# Patient Record
Sex: MALE | Race: WHITE | NOT HISPANIC OR LATINO | Employment: OTHER | URBAN - METROPOLITAN AREA
[De-identification: names, ages, dates, MRNs, and addresses within clinical notes are randomized per-mention and may not be internally consistent; named-entity substitution may affect disease eponyms.]

---

## 2017-06-21 ENCOUNTER — RECORDS - HEALTHEAST (OUTPATIENT)
Dept: LAB | Facility: CLINIC | Age: 62
End: 2017-06-21

## 2017-06-21 ENCOUNTER — AMBULATORY - HEALTHEAST (OUTPATIENT)
Dept: PHYSICAL MEDICINE AND REHAB | Facility: CLINIC | Age: 62
End: 2017-06-21

## 2017-06-21 DIAGNOSIS — M54.50 LOW BACK PAIN: ICD-10-CM

## 2017-06-21 LAB
CHOLEST SERPL-MCNC: 142 MG/DL
FASTING STATUS PATIENT QL REPORTED: ABNORMAL
HDLC SERPL-MCNC: 29 MG/DL
LDLC SERPL CALC-MCNC: 83 MG/DL
PSA SERPL-MCNC: 3.3 NG/ML (ref 0–4.5)
TRIGL SERPL-MCNC: 152 MG/DL

## 2017-06-26 ENCOUNTER — AMBULATORY - HEALTHEAST (OUTPATIENT)
Dept: PHYSICAL MEDICINE AND REHAB | Facility: CLINIC | Age: 62
End: 2017-06-26

## 2017-06-26 ENCOUNTER — HOSPITAL ENCOUNTER (OUTPATIENT)
Dept: PHYSICAL MEDICINE AND REHAB | Facility: CLINIC | Age: 62
Discharge: HOME | End: 2017-06-26
Attending: PHYSICIAN ASSISTANT

## 2017-06-26 DIAGNOSIS — M47.817 FACET ARTHROPATHY, LUMBOSACRAL: ICD-10-CM

## 2017-06-26 DIAGNOSIS — M54.50 LUMBALGIA: ICD-10-CM

## 2017-06-26 DIAGNOSIS — M79.18 MYOFASCIAL PAIN: ICD-10-CM

## 2017-06-26 RX ORDER — PAROXETINE 20 MG/1
20 TABLET, FILM COATED ORAL EVERY MORNING
Refills: 3 | Status: SHIPPED | COMMUNITY
Start: 2017-04-19

## 2017-06-26 RX ORDER — ATORVASTATIN CALCIUM 10 MG/1
10 TABLET, FILM COATED ORAL DAILY
Refills: 3 | Status: SHIPPED | COMMUNITY
Start: 2017-04-19 | End: 2023-03-03

## 2017-06-26 ASSESSMENT — MIFFLIN-ST. JEOR: SCORE: 1732.48

## 2017-06-27 ENCOUNTER — COMMUNICATION - HEALTHEAST (OUTPATIENT)
Dept: PHYSICAL MEDICINE AND REHAB | Facility: CLINIC | Age: 62
End: 2017-06-27

## 2017-06-28 ENCOUNTER — COMMUNICATION - HEALTHEAST (OUTPATIENT)
Dept: PHYSICAL MEDICINE AND REHAB | Facility: CLINIC | Age: 62
End: 2017-06-28

## 2017-06-30 ENCOUNTER — HOSPITAL ENCOUNTER (OUTPATIENT)
Dept: PHYSICAL MEDICINE AND REHAB | Facility: CLINIC | Age: 62
Discharge: HOME | End: 2017-06-30
Attending: PHYSICIAN ASSISTANT

## 2017-06-30 DIAGNOSIS — M54.50 LUMBALGIA: ICD-10-CM

## 2017-06-30 DIAGNOSIS — M79.18 MYOFASCIAL PAIN: ICD-10-CM

## 2017-06-30 DIAGNOSIS — M47.817 FACET ARTHROPATHY, LUMBOSACRAL: ICD-10-CM

## 2017-06-30 DIAGNOSIS — M12.88 OTHER SPECIFIC ARTHROPATHIES, NOT ELSEWHERE CLASSIFIED, OTHER SPECIFIED SITE: ICD-10-CM

## 2017-07-03 ENCOUNTER — AMBULATORY - HEALTHEAST (OUTPATIENT)
Dept: PHYSICAL MEDICINE AND REHAB | Facility: CLINIC | Age: 62
End: 2017-07-03

## 2017-07-03 DIAGNOSIS — M47.816 LUMBAR SPONDYLOSIS: ICD-10-CM

## 2017-07-07 ENCOUNTER — HOSPITAL ENCOUNTER (OUTPATIENT)
Dept: PHYSICAL MEDICINE AND REHAB | Facility: CLINIC | Age: 62
Discharge: HOME | End: 2017-07-07
Attending: PHYSICIAN ASSISTANT

## 2017-07-07 DIAGNOSIS — M47.816 LUMBAR SPONDYLOSIS: ICD-10-CM

## 2017-07-21 ENCOUNTER — COMMUNICATION - HEALTHEAST (OUTPATIENT)
Dept: PHYSICAL MEDICINE AND REHAB | Facility: CLINIC | Age: 62
End: 2017-07-21

## 2017-12-13 ENCOUNTER — RECORDS - HEALTHEAST (OUTPATIENT)
Dept: ADMINISTRATIVE | Facility: OTHER | Age: 62
End: 2017-12-13

## 2018-02-02 ENCOUNTER — RECORDS - HEALTHEAST (OUTPATIENT)
Dept: ADMINISTRATIVE | Facility: OTHER | Age: 63
End: 2018-02-02

## 2018-02-23 ENCOUNTER — RECORDS - HEALTHEAST (OUTPATIENT)
Dept: ADMINISTRATIVE | Facility: OTHER | Age: 63
End: 2018-02-23

## 2018-02-25 ENCOUNTER — RECORDS - HEALTHEAST (OUTPATIENT)
Dept: ADMINISTRATIVE | Facility: OTHER | Age: 63
End: 2018-02-25

## 2018-02-26 ENCOUNTER — COMMUNICATION - HEALTHEAST (OUTPATIENT)
Dept: PULMONOLOGY | Facility: OTHER | Age: 63
End: 2018-02-26

## 2018-02-26 ENCOUNTER — AMBULATORY - HEALTHEAST (OUTPATIENT)
Dept: PULMONOLOGY | Facility: OTHER | Age: 63
End: 2018-02-26

## 2018-02-26 DIAGNOSIS — R06.02 SOB (SHORTNESS OF BREATH): ICD-10-CM

## 2018-04-20 ENCOUNTER — RECORDS - HEALTHEAST (OUTPATIENT)
Dept: ADMINISTRATIVE | Facility: OTHER | Age: 63
End: 2018-04-20

## 2018-04-20 ENCOUNTER — OFFICE VISIT - HEALTHEAST (OUTPATIENT)
Dept: PULMONOLOGY | Facility: OTHER | Age: 63
End: 2018-04-20

## 2018-04-20 DIAGNOSIS — E66.9 OBESITY: ICD-10-CM

## 2018-04-20 DIAGNOSIS — Z72.0 TOBACCO USE: ICD-10-CM

## 2018-04-20 DIAGNOSIS — R53.81 PHYSICAL DECONDITIONING: ICD-10-CM

## 2018-04-20 DIAGNOSIS — R06.09 DYSPNEA ON EXERTION: ICD-10-CM

## 2018-04-20 RX ORDER — IRBESARTAN 150 MG/1
TABLET ORAL
Refills: 1 | Status: SHIPPED | COMMUNITY
Start: 2018-03-16 | End: 2022-06-24

## 2018-04-27 ENCOUNTER — COMMUNICATION - HEALTHEAST (OUTPATIENT)
Dept: UROLOGY | Facility: CLINIC | Age: 63
End: 2018-04-27

## 2018-05-01 ENCOUNTER — COMMUNICATION - HEALTHEAST (OUTPATIENT)
Dept: UROLOGY | Facility: CLINIC | Age: 63
End: 2018-05-01

## 2019-01-11 ENCOUNTER — RECORDS - HEALTHEAST (OUTPATIENT)
Dept: LAB | Facility: CLINIC | Age: 64
End: 2019-01-11

## 2019-01-11 LAB
ALBUMIN SERPL-MCNC: 3.9 G/DL (ref 3.5–5)
ALP SERPL-CCNC: 121 U/L (ref 45–120)
ALT SERPL W P-5'-P-CCNC: 85 U/L (ref 0–45)
ANION GAP SERPL CALCULATED.3IONS-SCNC: 12 MMOL/L (ref 5–18)
AST SERPL W P-5'-P-CCNC: 43 U/L (ref 0–40)
BASOPHILS # BLD AUTO: 0.1 THOU/UL (ref 0–0.2)
BASOPHILS NFR BLD AUTO: 1 % (ref 0–2)
BILIRUB SERPL-MCNC: 0.7 MG/DL (ref 0–1)
BUN SERPL-MCNC: 14 MG/DL (ref 8–22)
CALCIUM SERPL-MCNC: 10.1 MG/DL (ref 8.5–10.5)
CHLORIDE BLD-SCNC: 104 MMOL/L (ref 98–107)
CHOLEST SERPL-MCNC: 146 MG/DL
CO2 SERPL-SCNC: 23 MMOL/L (ref 22–31)
CREAT SERPL-MCNC: 1.24 MG/DL (ref 0.7–1.3)
EOSINOPHIL # BLD AUTO: 0.2 THOU/UL (ref 0–0.4)
EOSINOPHIL NFR BLD AUTO: 2 % (ref 0–6)
ERYTHROCYTE [DISTWIDTH] IN BLOOD BY AUTOMATED COUNT: 12.5 % (ref 11–14.5)
FASTING STATUS PATIENT QL REPORTED: ABNORMAL
GFR SERPL CREATININE-BSD FRML MDRD: 59 ML/MIN/1.73M2
GLUCOSE BLD-MCNC: 101 MG/DL (ref 70–125)
HCT VFR BLD AUTO: 47.4 % (ref 40–54)
HDLC SERPL-MCNC: 31 MG/DL
HGB BLD-MCNC: 16.2 G/DL (ref 14–18)
LDLC SERPL CALC-MCNC: 88 MG/DL
LYMPHOCYTES # BLD AUTO: 3.6 THOU/UL (ref 0.8–4.4)
LYMPHOCYTES NFR BLD AUTO: 37 % (ref 20–40)
MCH RBC QN AUTO: 31.6 PG (ref 27–34)
MCHC RBC AUTO-ENTMCNC: 34.2 G/DL (ref 32–36)
MCV RBC AUTO: 92 FL (ref 80–100)
MONOCYTES # BLD AUTO: 1 THOU/UL (ref 0–0.9)
MONOCYTES NFR BLD AUTO: 10 % (ref 2–10)
NEUTROPHILS # BLD AUTO: 4.9 THOU/UL (ref 2–7.7)
NEUTROPHILS NFR BLD AUTO: 51 % (ref 50–70)
PLATELET # BLD AUTO: 196 THOU/UL (ref 140–440)
PMV BLD AUTO: 12.5 FL (ref 8.5–12.5)
POTASSIUM BLD-SCNC: 3.9 MMOL/L (ref 3.5–5)
PROT SERPL-MCNC: 7.6 G/DL (ref 6–8)
RBC # BLD AUTO: 5.13 MILL/UL (ref 4.4–6.2)
SODIUM SERPL-SCNC: 139 MMOL/L (ref 136–145)
TRIGL SERPL-MCNC: 136 MG/DL
WBC: 9.7 THOU/UL (ref 4–11)

## 2019-01-16 ENCOUNTER — HOSPITAL ENCOUNTER (OUTPATIENT)
Dept: PHYSICAL MEDICINE AND REHAB | Facility: CLINIC | Age: 64
Discharge: HOME | End: 2019-01-16
Attending: NURSE PRACTITIONER

## 2019-01-16 DIAGNOSIS — M47.817 FACET ARTHROPATHY, LUMBOSACRAL: ICD-10-CM

## 2019-01-16 DIAGNOSIS — G89.29 CHRONIC BILATERAL LOW BACK PAIN WITHOUT SCIATICA: ICD-10-CM

## 2019-01-16 DIAGNOSIS — M54.50 CHRONIC BILATERAL LOW BACK PAIN WITHOUT SCIATICA: ICD-10-CM

## 2019-01-16 RX ORDER — CELECOXIB 200 MG/1
200 CAPSULE ORAL DAILY PRN
Refills: 3 | Status: SHIPPED | COMMUNITY
Start: 2018-07-27 | End: 2024-09-28

## 2019-01-16 RX ORDER — IRBESARTAN AND HYDROCHLOROTHIAZIDE 300; 12.5 MG/1; MG/1
1 TABLET, FILM COATED ORAL DAILY
Refills: 1 | Status: SHIPPED | COMMUNITY
Start: 2018-11-24

## 2019-01-16 RX ORDER — ZOLPIDEM TARTRATE 10 MG/1
10 TABLET ORAL
Refills: 2 | Status: SHIPPED | COMMUNITY
Start: 2019-01-11

## 2019-01-18 ENCOUNTER — COMMUNICATION - HEALTHEAST (OUTPATIENT)
Dept: PHYSICAL MEDICINE AND REHAB | Facility: CLINIC | Age: 64
End: 2019-01-18

## 2019-01-18 ENCOUNTER — RECORDS - HEALTHEAST (OUTPATIENT)
Dept: ADMINISTRATIVE | Facility: OTHER | Age: 64
End: 2019-01-18

## 2019-01-21 ENCOUNTER — HOSPITAL ENCOUNTER (OUTPATIENT)
Dept: PHYSICAL MEDICINE AND REHAB | Facility: CLINIC | Age: 64
Discharge: HOME | End: 2019-01-21
Attending: NURSE PRACTITIONER

## 2019-01-21 DIAGNOSIS — M54.50 CHRONIC BILATERAL LOW BACK PAIN WITHOUT SCIATICA: ICD-10-CM

## 2019-01-21 DIAGNOSIS — M47.817 FACET ARTHROPATHY, LUMBOSACRAL: ICD-10-CM

## 2019-01-21 DIAGNOSIS — G89.29 CHRONIC BILATERAL LOW BACK PAIN WITHOUT SCIATICA: ICD-10-CM

## 2019-01-21 DIAGNOSIS — M79.18 MYOFASCIAL PAIN: ICD-10-CM

## 2019-02-18 ENCOUNTER — COMMUNICATION - HEALTHEAST (OUTPATIENT)
Dept: PHYSICAL MEDICINE AND REHAB | Facility: CLINIC | Age: 64
End: 2019-02-18

## 2019-02-21 ENCOUNTER — HOSPITAL ENCOUNTER (OUTPATIENT)
Dept: PHYSICAL MEDICINE AND REHAB | Facility: CLINIC | Age: 64
Discharge: HOME | End: 2019-02-21
Attending: PHYSICAL MEDICINE & REHABILITATION

## 2019-02-21 DIAGNOSIS — M47.817 FACET ARTHROPATHY, LUMBOSACRAL: ICD-10-CM

## 2019-04-15 ENCOUNTER — RECORDS - HEALTHEAST (OUTPATIENT)
Dept: LAB | Facility: CLINIC | Age: 64
End: 2019-04-15

## 2019-04-15 LAB
ALBUMIN SERPL-MCNC: 4.1 G/DL (ref 3.5–5)
ALP SERPL-CCNC: 115 U/L (ref 45–120)
ALT SERPL W P-5'-P-CCNC: 73 U/L (ref 0–45)
ANION GAP SERPL CALCULATED.3IONS-SCNC: 13 MMOL/L (ref 5–18)
AST SERPL W P-5'-P-CCNC: 36 U/L (ref 0–40)
BASOPHILS # BLD AUTO: 0.1 THOU/UL (ref 0–0.2)
BASOPHILS NFR BLD AUTO: 1 % (ref 0–2)
BILIRUB SERPL-MCNC: 0.5 MG/DL (ref 0–1)
BUN SERPL-MCNC: 12 MG/DL (ref 8–22)
CALCIUM SERPL-MCNC: 10.4 MG/DL (ref 8.5–10.5)
CHLORIDE BLD-SCNC: 106 MMOL/L (ref 98–107)
CHOLEST SERPL-MCNC: 146 MG/DL
CO2 SERPL-SCNC: 22 MMOL/L (ref 22–31)
CREAT SERPL-MCNC: 1.05 MG/DL (ref 0.7–1.3)
EOSINOPHIL # BLD AUTO: 0.3 THOU/UL (ref 0–0.4)
EOSINOPHIL NFR BLD AUTO: 3 % (ref 0–6)
ERYTHROCYTE [DISTWIDTH] IN BLOOD BY AUTOMATED COUNT: 12.6 % (ref 11–14.5)
FASTING STATUS PATIENT QL REPORTED: ABNORMAL
GFR SERPL CREATININE-BSD FRML MDRD: >60 ML/MIN/1.73M2
GLUCOSE BLD-MCNC: 91 MG/DL (ref 70–125)
HCT VFR BLD AUTO: 48.8 % (ref 40–54)
HDLC SERPL-MCNC: 27 MG/DL
HGB BLD-MCNC: 16.3 G/DL (ref 14–18)
LDLC SERPL CALC-MCNC: 86 MG/DL
LYMPHOCYTES # BLD AUTO: 3.4 THOU/UL (ref 0.8–4.4)
LYMPHOCYTES NFR BLD AUTO: 36 % (ref 20–40)
MCH RBC QN AUTO: 31.3 PG (ref 27–34)
MCHC RBC AUTO-ENTMCNC: 33.4 G/DL (ref 32–36)
MCV RBC AUTO: 94 FL (ref 80–100)
MONOCYTES # BLD AUTO: 0.9 THOU/UL (ref 0–0.9)
MONOCYTES NFR BLD AUTO: 9 % (ref 2–10)
NEUTROPHILS # BLD AUTO: 4.9 THOU/UL (ref 2–7.7)
NEUTROPHILS NFR BLD AUTO: 52 % (ref 50–70)
PLATELET # BLD AUTO: 229 THOU/UL (ref 140–440)
PMV BLD AUTO: 13.1 FL (ref 8.5–12.5)
POTASSIUM BLD-SCNC: 4.1 MMOL/L (ref 3.5–5)
PROT SERPL-MCNC: 7.7 G/DL (ref 6–8)
PSA SERPL-MCNC: 4.7 NG/ML (ref 0–4.5)
RBC # BLD AUTO: 5.21 MILL/UL (ref 4.4–6.2)
SODIUM SERPL-SCNC: 141 MMOL/L (ref 136–145)
TRIGL SERPL-MCNC: 166 MG/DL
WBC: 9.5 THOU/UL (ref 4–11)

## 2019-10-28 ENCOUNTER — HOSPITAL ENCOUNTER (OUTPATIENT)
Dept: PHYSICAL MEDICINE AND REHAB | Facility: CLINIC | Age: 64
Discharge: HOME | End: 2019-10-28
Attending: PAIN MEDICINE

## 2019-10-28 DIAGNOSIS — M79.18 MYOFASCIAL PAIN: ICD-10-CM

## 2019-10-28 DIAGNOSIS — M47.816 SPONDYLOSIS OF LUMBAR REGION WITHOUT MYELOPATHY OR RADICULOPATHY: ICD-10-CM

## 2019-10-28 RX ORDER — ALPRAZOLAM 0.5 MG
TABLET ORAL
Refills: 0 | Status: SHIPPED | COMMUNITY
Start: 2019-02-27 | End: 2024-09-28

## 2019-10-28 ASSESSMENT — MIFFLIN-ST. JEOR: SCORE: 1694.37

## 2019-11-11 ENCOUNTER — HOSPITAL ENCOUNTER (OUTPATIENT)
Dept: PHYSICAL MEDICINE AND REHAB | Facility: CLINIC | Age: 64
Discharge: HOME | End: 2019-11-11
Attending: PAIN MEDICINE

## 2019-11-11 DIAGNOSIS — M79.18 MYOFASCIAL PAIN: ICD-10-CM

## 2019-11-11 DIAGNOSIS — M47.816 SPONDYLOSIS OF LUMBAR REGION WITHOUT MYELOPATHY OR RADICULOPATHY: ICD-10-CM

## 2019-11-25 ENCOUNTER — HOSPITAL ENCOUNTER (OUTPATIENT)
Dept: PHYSICAL MEDICINE AND REHAB | Facility: CLINIC | Age: 64
Discharge: HOME | End: 2019-11-25
Attending: NURSE PRACTITIONER

## 2019-11-25 DIAGNOSIS — M54.50 CHRONIC BILATERAL LOW BACK PAIN WITHOUT SCIATICA: ICD-10-CM

## 2019-11-25 DIAGNOSIS — M47.817 FACET ARTHROPATHY, LUMBOSACRAL: ICD-10-CM

## 2019-11-25 DIAGNOSIS — G89.29 CHRONIC BILATERAL LOW BACK PAIN WITHOUT SCIATICA: ICD-10-CM

## 2020-01-03 ENCOUNTER — HOSPITAL ENCOUNTER (OUTPATIENT)
Dept: PHYSICAL MEDICINE AND REHAB | Facility: CLINIC | Age: 65
Discharge: HOME | End: 2020-01-03
Attending: NURSE PRACTITIONER

## 2020-01-03 DIAGNOSIS — M54.50 CHRONIC BILATERAL LOW BACK PAIN WITHOUT SCIATICA: ICD-10-CM

## 2020-01-03 DIAGNOSIS — M43.16 SPONDYLOLISTHESIS OF LUMBAR REGION: ICD-10-CM

## 2020-01-03 DIAGNOSIS — M54.16 RIGHT LUMBAR RADICULITIS: ICD-10-CM

## 2020-01-03 DIAGNOSIS — M54.41 ACUTE RIGHT-SIDED LOW BACK PAIN WITH RIGHT-SIDED SCIATICA: ICD-10-CM

## 2020-01-03 DIAGNOSIS — M48.061 LUMBAR FORAMINAL STENOSIS: ICD-10-CM

## 2020-01-03 DIAGNOSIS — G89.29 CHRONIC BILATERAL LOW BACK PAIN WITHOUT SCIATICA: ICD-10-CM

## 2020-01-03 ASSESSMENT — MIFFLIN-ST. JEOR: SCORE: 1685.3

## 2020-01-06 ENCOUNTER — HOSPITAL ENCOUNTER (OUTPATIENT)
Dept: PHYSICAL MEDICINE AND REHAB | Facility: CLINIC | Age: 65
Discharge: HOME | End: 2020-01-06
Attending: PAIN MEDICINE

## 2020-01-06 DIAGNOSIS — M48.061 LUMBAR FORAMINAL STENOSIS: ICD-10-CM

## 2020-01-06 DIAGNOSIS — M54.16 RIGHT LUMBAR RADICULITIS: ICD-10-CM

## 2020-01-06 DIAGNOSIS — M43.16 SPONDYLOLISTHESIS OF LUMBAR REGION: ICD-10-CM

## 2020-01-06 DIAGNOSIS — M54.41 ACUTE RIGHT-SIDED LOW BACK PAIN WITH RIGHT-SIDED SCIATICA: ICD-10-CM

## 2020-01-17 ENCOUNTER — HOSPITAL ENCOUNTER (OUTPATIENT)
Dept: PHYSICAL MEDICINE AND REHAB | Facility: CLINIC | Age: 65
Discharge: HOME | End: 2020-01-17
Attending: NURSE PRACTITIONER

## 2020-01-17 DIAGNOSIS — M48.061 LUMBAR FORAMINAL STENOSIS: ICD-10-CM

## 2020-01-17 DIAGNOSIS — M54.41 ACUTE RIGHT-SIDED LOW BACK PAIN WITH RIGHT-SIDED SCIATICA: ICD-10-CM

## 2020-01-17 DIAGNOSIS — M43.16 SPONDYLOLISTHESIS OF LUMBAR REGION: ICD-10-CM

## 2020-01-17 DIAGNOSIS — M54.16 RIGHT LUMBAR RADICULITIS: ICD-10-CM

## 2020-01-24 ENCOUNTER — RECORDS - HEALTHEAST (OUTPATIENT)
Dept: ADMINISTRATIVE | Facility: OTHER | Age: 65
End: 2020-01-24

## 2020-01-30 ENCOUNTER — COMMUNICATION - HEALTHEAST (OUTPATIENT)
Dept: PHYSICAL MEDICINE AND REHAB | Facility: CLINIC | Age: 65
End: 2020-01-30

## 2020-01-30 ENCOUNTER — AMBULATORY - HEALTHEAST (OUTPATIENT)
Dept: PHYSICAL MEDICINE AND REHAB | Facility: CLINIC | Age: 65
End: 2020-01-30

## 2020-01-30 DIAGNOSIS — M48.061 LUMBAR FORAMINAL STENOSIS: ICD-10-CM

## 2020-01-30 DIAGNOSIS — M54.50 CHRONIC BILATERAL LOW BACK PAIN WITHOUT SCIATICA: ICD-10-CM

## 2020-01-30 DIAGNOSIS — M54.16 RIGHT LUMBAR RADICULITIS: ICD-10-CM

## 2020-01-30 DIAGNOSIS — M43.16 SPONDYLOLISTHESIS OF LUMBAR REGION: ICD-10-CM

## 2020-01-30 DIAGNOSIS — G89.29 CHRONIC BILATERAL LOW BACK PAIN WITHOUT SCIATICA: ICD-10-CM

## 2020-01-30 DIAGNOSIS — M54.50 LUMBAR SPINE PAIN: ICD-10-CM

## 2020-02-03 ENCOUNTER — HOSPITAL ENCOUNTER (OUTPATIENT)
Dept: RADIOLOGY | Facility: HOSPITAL | Age: 65
Discharge: HOME | End: 2020-02-03

## 2020-02-03 DIAGNOSIS — G89.29 CHRONIC BILATERAL LOW BACK PAIN WITHOUT SCIATICA: ICD-10-CM

## 2020-02-03 DIAGNOSIS — M48.061 LUMBAR FORAMINAL STENOSIS: ICD-10-CM

## 2020-02-03 DIAGNOSIS — M43.16 SPONDYLOLISTHESIS OF LUMBAR REGION: ICD-10-CM

## 2020-02-03 DIAGNOSIS — M54.16 RIGHT LUMBAR RADICULITIS: ICD-10-CM

## 2020-02-03 DIAGNOSIS — M54.50 CHRONIC BILATERAL LOW BACK PAIN WITHOUT SCIATICA: ICD-10-CM

## 2020-02-04 ENCOUNTER — COMMUNICATION - HEALTHEAST (OUTPATIENT)
Dept: PHYSICAL MEDICINE AND REHAB | Facility: CLINIC | Age: 65
End: 2020-02-04

## 2020-02-10 ENCOUNTER — OFFICE VISIT - HEALTHEAST (OUTPATIENT)
Dept: PHYSICAL THERAPY | Facility: REHABILITATION | Age: 65
End: 2020-02-10

## 2020-02-10 DIAGNOSIS — M54.50 CHRONIC BILATERAL LOW BACK PAIN WITHOUT SCIATICA: ICD-10-CM

## 2020-02-10 DIAGNOSIS — M62.81 GENERALIZED MUSCLE WEAKNESS: ICD-10-CM

## 2020-02-10 DIAGNOSIS — G89.29 CHRONIC BILATERAL LOW BACK PAIN WITHOUT SCIATICA: ICD-10-CM

## 2020-02-17 ENCOUNTER — OFFICE VISIT - HEALTHEAST (OUTPATIENT)
Dept: PHYSICAL THERAPY | Facility: REHABILITATION | Age: 65
End: 2020-02-17

## 2020-02-17 DIAGNOSIS — G89.29 CHRONIC BILATERAL LOW BACK PAIN WITHOUT SCIATICA: ICD-10-CM

## 2020-02-17 DIAGNOSIS — M62.81 GENERALIZED MUSCLE WEAKNESS: ICD-10-CM

## 2020-02-17 DIAGNOSIS — M54.50 CHRONIC BILATERAL LOW BACK PAIN WITHOUT SCIATICA: ICD-10-CM

## 2020-02-24 ENCOUNTER — OFFICE VISIT - HEALTHEAST (OUTPATIENT)
Dept: PHYSICAL THERAPY | Facility: REHABILITATION | Age: 65
End: 2020-02-24

## 2020-02-24 DIAGNOSIS — G89.29 CHRONIC BILATERAL LOW BACK PAIN WITHOUT SCIATICA: ICD-10-CM

## 2020-02-24 DIAGNOSIS — M62.81 GENERALIZED MUSCLE WEAKNESS: ICD-10-CM

## 2020-02-24 DIAGNOSIS — M54.50 CHRONIC BILATERAL LOW BACK PAIN WITHOUT SCIATICA: ICD-10-CM

## 2020-03-02 ENCOUNTER — AMBULATORY - HEALTHEAST (OUTPATIENT)
Dept: PHYSICAL MEDICINE AND REHAB | Facility: CLINIC | Age: 65
End: 2020-03-02

## 2020-03-02 ENCOUNTER — COMMUNICATION - HEALTHEAST (OUTPATIENT)
Dept: PHYSICAL MEDICINE AND REHAB | Facility: CLINIC | Age: 65
End: 2020-03-02

## 2020-03-02 DIAGNOSIS — M43.16 SPONDYLOLISTHESIS OF LUMBAR REGION: ICD-10-CM

## 2020-03-02 DIAGNOSIS — M47.817 FACET ARTHROPATHY, LUMBOSACRAL: ICD-10-CM

## 2020-03-02 DIAGNOSIS — M54.50 LUMBAR SPINE PAIN: ICD-10-CM

## 2020-03-11 ENCOUNTER — HOSPITAL ENCOUNTER (OUTPATIENT)
Dept: PHYSICAL MEDICINE AND REHAB | Facility: CLINIC | Age: 65
Discharge: HOME | End: 2020-03-11
Attending: PHYSICAL MEDICINE & REHABILITATION

## 2020-03-11 DIAGNOSIS — M47.817 FACET ARTHROPATHY, LUMBOSACRAL: ICD-10-CM

## 2020-03-11 DIAGNOSIS — M43.16 SPONDYLOLISTHESIS OF LUMBAR REGION: ICD-10-CM

## 2020-03-11 DIAGNOSIS — M54.50 LUMBAR SPINE PAIN: ICD-10-CM

## 2020-03-20 ENCOUNTER — AMBULATORY - HEALTHEAST (OUTPATIENT)
Dept: PHYSICAL MEDICINE AND REHAB | Facility: CLINIC | Age: 65
End: 2020-03-20

## 2020-03-20 ENCOUNTER — COMMUNICATION - HEALTHEAST (OUTPATIENT)
Dept: PHYSICAL MEDICINE AND REHAB | Facility: CLINIC | Age: 65
End: 2020-03-20

## 2020-03-20 DIAGNOSIS — M47.816 FACET SYNDROME, LUMBAR: ICD-10-CM

## 2020-03-20 DIAGNOSIS — M54.50 BILATERAL LOW BACK PAIN WITHOUT SCIATICA: ICD-10-CM

## 2020-03-20 DIAGNOSIS — M47.816 SPONDYLOSIS OF LUMBAR REGION WITHOUT MYELOPATHY OR RADICULOPATHY: ICD-10-CM

## 2020-05-05 ENCOUNTER — COMMUNICATION - HEALTHEAST (OUTPATIENT)
Dept: PHYSICAL MEDICINE AND REHAB | Facility: CLINIC | Age: 65
End: 2020-05-05

## 2020-05-28 ENCOUNTER — RECORDS - HEALTHEAST (OUTPATIENT)
Dept: LAB | Facility: CLINIC | Age: 65
End: 2020-05-28

## 2020-05-28 LAB
ALBUMIN SERPL-MCNC: 4 G/DL (ref 3.5–5)
ALP SERPL-CCNC: 138 U/L (ref 45–120)
ALT SERPL W P-5'-P-CCNC: 84 U/L (ref 0–45)
ANION GAP SERPL CALCULATED.3IONS-SCNC: 12 MMOL/L (ref 5–18)
AST SERPL W P-5'-P-CCNC: 48 U/L (ref 0–40)
BASOPHILS # BLD AUTO: 0.1 THOU/UL (ref 0–0.2)
BASOPHILS NFR BLD AUTO: 1 % (ref 0–2)
BILIRUB SERPL-MCNC: 0.8 MG/DL (ref 0–1)
BUN SERPL-MCNC: 14 MG/DL (ref 8–22)
CALCIUM SERPL-MCNC: 9.2 MG/DL (ref 8.5–10.5)
CHLORIDE BLD-SCNC: 107 MMOL/L (ref 98–107)
CHOLEST SERPL-MCNC: 145 MG/DL
CO2 SERPL-SCNC: 21 MMOL/L (ref 22–31)
CREAT SERPL-MCNC: 1.09 MG/DL (ref 0.7–1.3)
EOSINOPHIL # BLD AUTO: 0.2 THOU/UL (ref 0–0.4)
EOSINOPHIL NFR BLD AUTO: 3 % (ref 0–6)
ERYTHROCYTE [DISTWIDTH] IN BLOOD BY AUTOMATED COUNT: 12.7 % (ref 11–14.5)
FASTING STATUS PATIENT QL REPORTED: ABNORMAL
GFR SERPL CREATININE-BSD FRML MDRD: >60 ML/MIN/1.73M2
GLUCOSE BLD-MCNC: 113 MG/DL (ref 70–125)
HCT VFR BLD AUTO: 48.7 % (ref 40–54)
HDLC SERPL-MCNC: 31 MG/DL
HGB BLD-MCNC: 16.4 G/DL (ref 14–18)
LDLC SERPL CALC-MCNC: 85 MG/DL
LYMPHOCYTES # BLD AUTO: 3.2 THOU/UL (ref 0.8–4.4)
LYMPHOCYTES NFR BLD AUTO: 37 % (ref 20–40)
MCH RBC QN AUTO: 32.1 PG (ref 27–34)
MCHC RBC AUTO-ENTMCNC: 33.7 G/DL (ref 32–36)
MCV RBC AUTO: 95 FL (ref 80–100)
MONOCYTES # BLD AUTO: 0.9 THOU/UL (ref 0–0.9)
MONOCYTES NFR BLD AUTO: 10 % (ref 2–10)
NEUTROPHILS # BLD AUTO: 4.2 THOU/UL (ref 2–7.7)
NEUTROPHILS NFR BLD AUTO: 49 % (ref 50–70)
PLATELET # BLD AUTO: 201 THOU/UL (ref 140–440)
PMV BLD AUTO: 14.4 FL (ref 8.5–12.5)
POTASSIUM BLD-SCNC: 3.8 MMOL/L (ref 3.5–5)
PROT SERPL-MCNC: 7.4 G/DL (ref 6–8)
PSA SERPL-MCNC: 4.8 NG/ML (ref 0–4.5)
RBC # BLD AUTO: 5.11 MILL/UL (ref 4.4–6.2)
SODIUM SERPL-SCNC: 140 MMOL/L (ref 136–145)
TRIGL SERPL-MCNC: 143 MG/DL
WBC: 8.5 THOU/UL (ref 4–11)

## 2020-07-07 ENCOUNTER — HOSPITAL ENCOUNTER (OUTPATIENT)
Dept: PHYSICAL MEDICINE AND REHAB | Facility: CLINIC | Age: 65
Discharge: HOME | End: 2020-07-07
Attending: PHYSICAL MEDICINE & REHABILITATION

## 2020-07-07 DIAGNOSIS — M54.50 BILATERAL LOW BACK PAIN WITHOUT SCIATICA: ICD-10-CM

## 2020-07-07 DIAGNOSIS — M47.816 FACET SYNDROME, LUMBAR: ICD-10-CM

## 2020-07-07 DIAGNOSIS — M47.816 SPONDYLOSIS OF LUMBAR REGION WITHOUT MYELOPATHY OR RADICULOPATHY: ICD-10-CM

## 2020-07-09 ENCOUNTER — AMBULATORY - HEALTHEAST (OUTPATIENT)
Dept: PHYSICAL MEDICINE AND REHAB | Facility: CLINIC | Age: 65
End: 2020-07-09

## 2020-07-09 DIAGNOSIS — M54.50 CHRONIC BILATERAL LOW BACK PAIN WITHOUT SCIATICA: ICD-10-CM

## 2020-07-09 DIAGNOSIS — M47.816 LUMBAR FACET ARTHROPATHY: ICD-10-CM

## 2020-07-09 DIAGNOSIS — G89.29 CHRONIC BILATERAL LOW BACK PAIN WITHOUT SCIATICA: ICD-10-CM

## 2020-07-24 ENCOUNTER — COMMUNICATION - HEALTHEAST (OUTPATIENT)
Dept: PHYSICAL MEDICINE AND REHAB | Facility: CLINIC | Age: 65
End: 2020-07-24

## 2020-08-03 ENCOUNTER — HOSPITAL ENCOUNTER (OUTPATIENT)
Dept: PHYSICAL MEDICINE AND REHAB | Facility: CLINIC | Age: 65
Discharge: HOME | End: 2020-08-03
Attending: NURSE PRACTITIONER

## 2020-08-03 DIAGNOSIS — M47.816 LUMBAR FACET ARTHROPATHY: ICD-10-CM

## 2020-08-03 DIAGNOSIS — M43.16 SPONDYLOLISTHESIS OF LUMBAR REGION: ICD-10-CM

## 2020-08-03 DIAGNOSIS — M47.816 FACET SYNDROME, LUMBAR: ICD-10-CM

## 2020-08-03 DIAGNOSIS — G89.29 CHRONIC BILATERAL LOW BACK PAIN WITHOUT SCIATICA: ICD-10-CM

## 2020-08-03 DIAGNOSIS — M54.50 CHRONIC BILATERAL LOW BACK PAIN WITHOUT SCIATICA: ICD-10-CM

## 2020-08-04 ENCOUNTER — COMMUNICATION - HEALTHEAST (OUTPATIENT)
Dept: PHYSICAL MEDICINE AND REHAB | Facility: CLINIC | Age: 65
End: 2020-08-04

## 2020-08-18 ENCOUNTER — AMBULATORY - HEALTHEAST (OUTPATIENT)
Dept: PHYSICAL MEDICINE AND REHAB | Facility: CLINIC | Age: 65
End: 2020-08-18

## 2020-08-18 DIAGNOSIS — Z20.822 ENCOUNTER FOR LABORATORY TESTING FOR COVID-19 VIRUS: ICD-10-CM

## 2020-08-30 ENCOUNTER — COMMUNICATION - HEALTHEAST (OUTPATIENT)
Dept: SCHEDULING | Facility: CLINIC | Age: 65
End: 2020-08-30

## 2020-08-31 ENCOUNTER — AMBULATORY - HEALTHEAST (OUTPATIENT)
Dept: CARE COORDINATION | Facility: CLINIC | Age: 65
End: 2020-08-31

## 2020-08-31 ENCOUNTER — COMMUNICATION - HEALTHEAST (OUTPATIENT)
Dept: CARE COORDINATION | Facility: CLINIC | Age: 65
End: 2020-08-31

## 2020-08-31 DIAGNOSIS — U07.1 2019 NOVEL CORONAVIRUS DISEASE (COVID-19): ICD-10-CM

## 2020-10-09 ENCOUNTER — HOSPITAL ENCOUNTER (OUTPATIENT)
Dept: PHYSICAL MEDICINE AND REHAB | Facility: CLINIC | Age: 65
Discharge: HOME | End: 2020-10-09
Attending: PHYSICAL MEDICINE & REHABILITATION

## 2020-10-09 DIAGNOSIS — M54.50 CHRONIC BILATERAL LOW BACK PAIN WITHOUT SCIATICA: ICD-10-CM

## 2020-10-09 DIAGNOSIS — M47.816 LUMBAR FACET ARTHROPATHY: ICD-10-CM

## 2020-10-09 DIAGNOSIS — G89.29 CHRONIC BILATERAL LOW BACK PAIN WITHOUT SCIATICA: ICD-10-CM

## 2020-10-16 ENCOUNTER — COMMUNICATION - HEALTHEAST (OUTPATIENT)
Dept: SCHEDULING | Facility: CLINIC | Age: 65
End: 2020-10-16

## 2021-05-25 ENCOUNTER — RECORDS - HEALTHEAST (OUTPATIENT)
Dept: ADMINISTRATIVE | Facility: CLINIC | Age: 66
End: 2021-05-25

## 2021-05-28 ENCOUNTER — RECORDS - HEALTHEAST (OUTPATIENT)
Dept: ADMINISTRATIVE | Facility: CLINIC | Age: 66
End: 2021-05-28

## 2021-05-29 ENCOUNTER — RECORDS - HEALTHEAST (OUTPATIENT)
Dept: ADMINISTRATIVE | Facility: CLINIC | Age: 66
End: 2021-05-29

## 2021-05-31 VITALS — WEIGHT: 213.4 LBS | BODY MASS INDEX: 32.34 KG/M2 | HEIGHT: 68 IN

## 2021-06-01 ENCOUNTER — RECORDS - HEALTHEAST (OUTPATIENT)
Dept: ADMINISTRATIVE | Facility: CLINIC | Age: 66
End: 2021-06-01

## 2021-06-01 VITALS — WEIGHT: 213 LBS | BODY MASS INDEX: 32.39 KG/M2

## 2021-06-02 ENCOUNTER — RECORDS - HEALTHEAST (OUTPATIENT)
Dept: ADMINISTRATIVE | Facility: CLINIC | Age: 66
End: 2021-06-02

## 2021-06-02 VITALS — WEIGHT: 207 LBS | BODY MASS INDEX: 31.47 KG/M2

## 2021-06-02 NOTE — PATIENT INSTRUCTIONS - HE
I have ordered bilateral L4-5 facet joint injections with Dr. Naidu.    ~Please call Nurse Navigation line [phone] with any questions or concerns about your treatment plan, if symptoms worsen and you would like to be seen urgently, or if you have problems controlling bladder and bowel function.

## 2021-06-02 NOTE — PROGRESS NOTES
Assessment:     Diagnoses and all orders for this visit:    Spondylosis of lumbar region without myelopathy or radiculopathy  -     OPS Paravertbral Facet Joint Inj Lumbar; Future; Expected date: 10/28/2019    Myofascial pain  -     OPS Paravertbral Facet Joint Inj Lumbar; Future; Expected date: 10/28/2019       Barry Jay is a 64 y.o. y.o. male with past medical history significant for hypertension, dyslipidemia, hand osteoarthritis, multiple site arthralgia who presents today for follow-up regarding right greater than left low back pain:    -Overall patient's physical exam is reassuring he has normal strength and reflexes.  Pain is in the same pattern in nature as prior to last injections in February 2019.  He received 90% relief of pain for 5 to 6 months with his previous injections.     Plan:     A shared decision making plan was used. The patient's values and choices were respected. Prior medical records from Claudia Baptiste's last note on 1/21/2019 were reviewed today. The following represents what was discussed and decided upon by the provider and the patient.        -DIAGNOSTIC TESTS:   --[facility] lumbar spine MRI 1/17/2019 shows severe facet arthropathy L4-5 with 5 mm spondylolisthesis, mild central and subarticular recess stenosis, mild right foraminal stenosis.  Mild facet arthropathy L3-4 and L5-S1.    -INTERVENTIONS: I have ordered bilateral L4-5 facet joint injections with Dr. Naidu.  Patient will need a  for these injections.    -MEDICATIONS: No changes to medications.  He is currently taking Celebrex for pain.  -  Discussed side effects of medications and proper use. Patient verbalized understanding.    -PHYSICAL THERAPY: We discussed physical therapy and would recommend that he has therapy in the future.      -PATIENT EDUCATION: We discussed pain management in a multimodal fashion including physical therapy, medication management, future injections.    -FOLLOW UP: Patient will follow-up  with Claudia Baptiste in 2 weeks after the injections.  Advised to contact clinic if symptoms worsen or change.    Subjective:     Barry Jay is a 64 y.o. male who presents today for follow-up regarding bilateral low back pain.  Patient reports that he received injections in his low back in February 2019 which decreased his pain by 90%.  He describes his pain in the beltline of his low back area and is currently worse right greater than left.  In the last month or 2 pain has increased and is now moderate to severe in nature.  Achy and constant.  His pain today is 8/10 as worst is 9/10 as best 5/10.  Pain is worse with prolonged sitting and standing and better with lying down and sleeping.  He notes that he has been golfing over the last 2 months when pain is increased.  He is taking alternating between Celebrex and Aleve.  Which helps somewhat.  He denies any bowel or bladder changes, fevers, chills, unintentional weight loss.    Treatment to Date: No prior spinal..  No prior physical therapy for back pain.     History of bilateral L4-5 RFA [place] [facility] Dr. Green 2011 and 2013, relief at least 2 years.  Bilateral L3-4 MBB 6/30/2017.  Preprocedure pain 7/10 post 2/10.  Bilateral L4-5 facet joint steroid injection 7/7/2017 with relief.  Preprocedure pain 8/10, post 5/10.    Patient Active Problem List   Diagnosis     Dyslipidemia     Hypertension     Diffuse Joint Pains (Arthralgias)     Joint Pain, Localized In The Wrist     Joint Pain, Localized In The Knee     Generalized Osteoarthritis Of The Hand     Osteoarthritis Of The Knee     Arthralgias In Multiple Sites       Current Outpatient Medications on File Prior to Encounter   Medication Sig Dispense Refill     atorvastatin (LIPITOR) 10 MG tablet TAKE ONE TABLET BY MOUTH ONCE DIALY  3     celecoxib (CELEBREX) 200 MG capsule Take 1 capsule by mouth as needed.  3     irbesartan (AVAPRO) 150 MG tablet TAKE 1 TABLET BY MOUTH ONCE A DAY FOR BLOOD PRESSURE  1      "irbesartan-hydrochlorothiazide (AVALIDE) 300-12.5 mg per tablet Take 1 tablet by mouth daily. for blood pressure  1     omeprazole (PRILOSEC) 20 MG capsule TAKE ONE CAPSULE BY MOUTH EVERY DAY FOR STOMACH  1     ondansetron (ZOFRAN ODT) 4 MG disintegrating tablet Take 1 tablet (4 mg total) by mouth every 8 (eight) hours as needed. 12 tablet 0     PARoxetine (PAXIL) 20 MG tablet TAKE 1 TABLET BY MOUTH ONCE DAILY FOR MOOD.  3     zolpidem (AMBIEN) 10 mg tablet Take 1 tablet by mouth as needed.  2     ALPRAZolam (XANAX) 0.5 MG tablet TAKE 1 TABLET BY MOUTH ONCE A DAY AS NEEDED ORALLY  0     No current facility-administered medications on file prior to encounter.        Allergies   Allergen Reactions     Iodine-Iodine Containing        Past Medical History:   Diagnosis Date     HTN (hypertension)      Hyperlipidemia         Review of Systems  ROS:  Specifically negative for bowel/bladder dysfunction, balance changes, headache, dizziness, foot drop, fevers, chills, appetite changes, nausea/vomiting, unexplained weight loss. Otherwise 13 systems reviewed are negative. Please see the patient's intake questionnaire from today for details.    Reviewed Social, Family, Past Medical and Past Surgical history with patient, no significant changes noted since prior visit.     Objective:     /83 (Patient Site: Left Arm, Patient Position: Sitting, Cuff Size: Adult Large)   Pulse 91   Ht 5' 8\" (1.727 m)   Wt 205 lb (93 kg)   BMI 31.17 kg/m      PHYSICAL EXAMINATION:    --CONSTITUTIONAL: Well developed, well nourished, healthy appearing individual.  --PSYCHIATRIC: Appropriate mood and affect. No difficulty interacting due to temper, social withdrawal, or memory issues.  --SKIN: Lumbar region is dry and intact.   --RESPIRATORY: Normal rhythm and effort. No abnormal accessory muscle breathing patterns noted.   --MUSCULOSKELETAL:  Normal lumbar lordosis noted, no lateral shift.  --GROSS MOTOR: Easily arises from a seated " position. Gait is non-antalgic  --LUMBAR SPINE:  Inspection reveals no evidence of deformity. Range of motion is not limited in lumbar flexion, extension, lateral rotation.  He has tenderness palpation along his low lumbar paraspinal muscles bilaterally.  Facet loading is positive bilaterally.  Straight leg raising in the seated position is negative to radicular pain.    --LOWER EXTREMITY MOTOR TESTING:  Plantar flexion left 5/5, right 5/5   Dorsiflexion left 5/5, right 5/5   Great toe MTP extension left 5/5, right 5/5  Knee flexion left 5/5, right 5/5  Knee extension left 5/5, right 5/5   Hip flexion left 5/5, right 5/5  Hip abduction left 5/5, right 5/5  Hip adduction left 5/5, right 5/5   --NEUROLOGIC: Bilateral patellar and achilles reflexes are physiologic and symmetric. Sensation to light touch is intact in the bilateral L4, L5, and S1 dermatomes.    RESULTS:   Imaging: Lumbar spine imaging was reviewed today.

## 2021-06-03 VITALS — BODY MASS INDEX: 30.87 KG/M2 | WEIGHT: 203 LBS

## 2021-06-03 VITALS — BODY MASS INDEX: 30.77 KG/M2 | WEIGHT: 203 LBS | HEIGHT: 68 IN

## 2021-06-03 VITALS — WEIGHT: 205 LBS | HEIGHT: 68 IN | BODY MASS INDEX: 31.07 KG/M2

## 2021-06-03 VITALS — WEIGHT: 203 LBS | BODY MASS INDEX: 30.87 KG/M2

## 2021-06-03 NOTE — PATIENT INSTRUCTIONS - HE
DISCHARGE INSTRUCTIONS    During office hours (8:00 a.m.- 4:00 p.m.) questions or concerns may be answered  by calling [facility] Navigation Nurses at  [phone].  Messages received after hours will be returned the following business day.      In the case of an emergency, please dial 911 or seek assistance at the nearest Emergency Room/Urgent Care facility.     All Patients:    ? You may experience an increase in your symptoms for the first 2 days (It may take anywhere between 2 days- 2 weeks for the steroid to have maximum effect).    ? You may use ice on the injection site, as frequently as 20 minutes each hour if needed.    ? You may take your pain medicine.    ? You may continue taking your regular medication after your injection. If you have had a Medial Branch Block you may resume pain medication once your pain diary is completed.    ? You may shower. No swimming, tub bath or hot tub for 48 hours.  You may remove your bandaid/bandage as soon as you are home.    ? You may resume light activities, as tolerated.    ? Resume your usual diet as tolerated.    ? It is strongly advised that you do not drive for 1-3 hours post injection.    ? If you have had oral sedation:  Do not drive for 8 hours post injection.      ? If you have had IV sedation:  Do not drive for 24 hours post injection.  Do not operate hazardous machinery or make important personal/business decisions for 24 hours.      POSSIBLE STEROID SIDE EFFECTS (If steroid/cortisone was used for your procedure)    -If you experience these symptoms, it should only last for a short period      Swelling of the legs                Skin redness (flushing)       Mouth (oral) irritation     Blood sugar (glucose) levels              Sweats                      Mood changes    Headache    Sleeplessness         POSSIBLE PROCEDURE SIDE EFFECTS  -Call the Spine Center if you are concerned    Increased Pain             Increased numbness/tingling         Nausea/Vomiting            Bruising/bleeding at site        Redness or swelling                                                Difficulty walking        Weakness             Fever greater than 100.5    *In the event of a severe headache after an epidural steroid injection that is relieved by lying down, please call the [facility] to speak with a clinical staff member*

## 2021-06-03 NOTE — PROGRESS NOTES
Assessment:     Diagnoses and all orders for this visit:    Chronic bilateral low back pain without sciatica    Facet arthropathy, lumbosacral       Barry Jay is a 64 y.o. y.o. male with past medical history significant for hypertension, dyslipidemia, hand osteoarthritis, multiple site arthralgia who presents today for follow-up regarding:    -Chronic bilateral low back pain with 90% relief on the left, minimal right relief with bilateral L4-5 facet joint steroid injection.  Patient does have severe facet arthropathy noted at L4-5 on previous imaging.     Plan:     A shared decision making plan was used. The patient's values and choices were respected. Prior medical records from 1/21/19 were reviewed today. The following represents what was discussed and decided upon by the provider and the patient.        -DIAGNOSTIC TESTS: Images were personally reviewed and interpreted.   --[facility] lumbar spine MRI 1/17/2019 shows severe facet arthropathy L4-5 with 5 mm spondylolisthesis, mild central and subarticular recess stenosis, mild right foraminal stenosis.  Mild facet arthropathy L3-4 and L5-S1.    -INTERVENTIONS: Discussed that we can repeat bilateral L4-5 facet joint steroid injection in 3 months timeframe if symptoms return.  Currently symptoms are tolerable.  -We also discussed trialing bilateral L3, L4 medial branch block for consideration of rhizotomy/radio frequency ablation down the road which she has gotten benefit with in the past however discussed that typically we recommend physical therapy prior within the last 6 months prior to considering ablation.  He is not at this time interested in therapy therefore he defers moving forward with MBB.    -MEDICATIONS: No change in medications advised patient to continue Celebrex at this time for arthritis type pain.  He does only take this on occasions as well.  Discussed side effects of medications and proper use. Patient verbalized understanding.    -PHYSICAL  THERAPY: Discussed physical therapy in length today and the benefits of establishing home exercise program for core strengthening along with his generalized activity, patient reports he is not interested in physical therapy at this time and defers this option altogether.  Discussed the importance of core strengthening, ROM, stretching exercises with the patient and how each of these entities is important in decreasing pain.  Explained to the patient that the purpose of physical therapy is to teach the patient a home exercise program.  These exercises need to be performed every day in order to decrease pain and prevent future occurrences of pain.        -PATIENT EDUCATION:  20 minutes of total visit time was spent face to face with the patient today, 60 % of the visit was spent on counseling, education, and coordinating care.   -5 minutes spent outside of visit time, non-face-to-face time, reviewing chart.    -FOLLOW UP: Follow-up as needed.  Advised to contact clinic if symptoms worsen or change.    Subjective:     Barry Jay is a 64 y.o. male who presents today for follow-up regarding ongoing chronic bilateral low back pain at the beltline in which she did receive 90% relief on the left however minimal symptoms relief on the right with recent bilateral L4-5 facet joint steroid injection.  Previously he did get better relief with this injection for about 6 months however.  Patient denies any new symptoms, denies lower extremity pain, denies recent trips or falls or balance changes, denies bowel or bladder loss control.    Currently his pain is tolerable however it can get up to an 8/10 at its worse with bending or prolonged sitting as well as generalized activity.  Patient reports that he takes Celebrex on occasion specifically with golfing or activity that aggravates his back but he does not need to take it on a daily basis.  He does state that his back pain is bothersome but more of an annoyance than anything  at this time and tolerable.     Treatment to Date: No prior spinal..  No prior physical therapy for back pain.  Patient is not interested in physical therapy options.     History of bilateral L4-5 RFA [place] [facility] Dr. Green 2011 and 2013, relief at least 2 years.  Bilateral L3-4 MBB 6/30/2017.  Preprocedure pain 7/10 post 2/10.  Bilateral L4-5 facet joint steroid injection 7/7/2017 with relief.  Preproc pain 8/10, post 5/10.  Bilateral L4-5 facet joint steroid injection 11/11/2019 with 90% relief on the left, minimal on the right.  Preproc pain 8/10, post 5/10.    Patient Active Problem List   Diagnosis     Dyslipidemia     Hypertension     Diffuse Joint Pains (Arthralgias)     Joint Pain, Localized In The Wrist     Joint Pain, Localized In The Knee     Generalized Osteoarthritis Of The Hand     Osteoarthritis Of The Knee     Arthralgias In Multiple Sites       Current Outpatient Medications on File Prior to Encounter   Medication Sig Dispense Refill     celecoxib (CELEBREX) 200 MG capsule Take 1 capsule by mouth as needed.  3     ALPRAZolam (XANAX) 0.5 MG tablet TAKE 1 TABLET BY MOUTH ONCE A DAY AS NEEDED ORALLY  0     atorvastatin (LIPITOR) 10 MG tablet TAKE ONE TABLET BY MOUTH ONCE DIALY  3     irbesartan (AVAPRO) 150 MG tablet TAKE 1 TABLET BY MOUTH ONCE A DAY FOR BLOOD PRESSURE  1     irbesartan-hydrochlorothiazide (AVALIDE) 300-12.5 mg per tablet Take 1 tablet by mouth daily. for blood pressure  1     omeprazole (PRILOSEC) 20 MG capsule TAKE ONE CAPSULE BY MOUTH EVERY DAY FOR STOMACH  1     ondansetron (ZOFRAN ODT) 4 MG disintegrating tablet Take 1 tablet (4 mg total) by mouth every 8 (eight) hours as needed. 12 tablet 0     PARoxetine (PAXIL) 20 MG tablet TAKE 1 TABLET BY MOUTH ONCE DAILY FOR MOOD.  3     zolpidem (AMBIEN) 10 mg tablet Take 1 tablet by mouth as needed.  2     No current facility-administered medications on file prior to encounter.        Allergies   Allergen Reactions     Iodine-Iodine  Containing        Past Medical History:   Diagnosis Date     HTN (hypertension)      Hyperlipidemia         Review of Systems  ROS:  Specifically negative for bowel/bladder dysfunction, balance changes, headache, dizziness, foot drop, fevers, chills, appetite changes, nausea/vomiting, unexplained weight loss. Otherwise 13 systems reviewed are negative. Please see the patient's intake questionnaire from today for details.    Reviewed Social, Family, Past Medical and Past Surgical history with patient, no significant changes noted since prior visit.     Objective:     /73 (Patient Site: Left Arm, Patient Position: Sitting)   Pulse 94   Wt 203 lb (92.1 kg)   SpO2 95%   BMI 30.87 kg/m      PHYSICAL EXAMINATION:    --CONSTITUTIONAL: Well developed, well nourished, healthy appearing individual.  --PSYCHIATRIC: Appropriate mood and affect. No difficulty interacting due to temper, social withdrawal, or memory issues.  --SKIN: Lumbar region is dry and intact.   --RESPIRATORY: Normal rhythm and effort. No abnormal accessory muscle breathing patterns noted.   --MUSCULOSKELETAL:  Normal lumbar lordosis noted, no lateral shift.  --GROSS MOTOR: Easily arises from a seated position. Gait is non-antalgic  --LUMBAR SPINE:  Inspection reveals no evidence of deformity. Range of motion is not limited in lumbar flexion, increased pain with lumbar extension and lateral rotation simultaneously bilaterally.    --SACROILIAC JOINT: One Finger point test negative.    RESULTS:   Imaging: Lumbar spine imaging was reviewed today. The images were shown to the patient and the findings were explained using a spine model.      MRI LUMBAR SPINE WITHOUT CONTRAST  [facility]- 1/17/19  CLINICAL INFORMATION: 63-year-old man with low back pain.  TECHNICAL INFORMATION: MRI lumbar spine was obtained on 0.6 Teresa open upright magnet including sagittal T2, sagittal T1, sagittal STIR, axial T2 and axial T1-weighted images.  INTERPRETATION: There is normal  lordotic curvature of the lumbar spine. No marrow edema within the lumbar vertebral bodies. No loss of vertebral body height. The conus is at the L1 level and is normal in appearance.  L5-S1: Transitional L5 segment with left L5 transverse process articulating with the sacrum. Mild developmental narrowing of the disc space. Mild bilateral facet arthropathy. No central spinal canal or foraminal stenosis.  L4-5: Mild degenerative disc disease with 5 mm degenerative spondylolisthesis and disc bulge. Severe bilateral facet arthropathy with ligamentum flavum thickening contributes to mild central spinal canal stenosis and subarticular recess narrowing with encroachment upon the traversing L5 nerve roots. Moderate right foraminal stenosis.  L3-4: Mild degenerative disc disease and disc bulge. Mild bilateral facet arthropathy. Mild subarticular recess narrowing. The ganglia exit without compromise.  L2-3 through T12-L1: No focal disc herniation, central spinal canal stenosis or neural impingement.  T11-12 and T10-11: Moderate degenerative disc disease with Schmorl's nodes and endplate irregularities. No cord deformity or central spinal canal stenosis.  CONCLUSION:  1. L5-S1 transitional L5 segment with left transverse process articulating with the sacrum. Mild developmental narrowing of the disc space. Mild bilateral facet arthropathy.  2. L4-5 grade 1 spondylolisthesis with disc bulge. Severe facet arthropathy and ligamentum flavum thickening with mild central spinal canal and subarticular recess stenosis with encroachment upon the traversing L5 nerve roots. Moderate right foraminal stenosis.  3. Mild degenerative disc disease with disc bulge. Mild facet arthropathy and mild subarticular recess narrowing.

## 2021-06-04 NOTE — PATIENT INSTRUCTIONS - HE
~Please call Nurse Navigation line [phone] with any questions or concerns about your treatment plan, if symptoms worsen and you would like to be seen urgently, or if you have problems controlling bladder and bowel function.  ~Follow Up Appointment time slots with Claudia Baptiste, CNP with the [facility], are also available at the [facility] location near [facility] on the first and third THURSDAY afternoons of each month.    A Medrol Dose Pack (Prednisone Taper) was prescribed today for your acute pain. Please follow the dosing instruction on prescription bottle.     POSSIBLE STEROID SIDE EFFECTS   -If you experience these, it should only last for a short period-   Swelling   Increased appetite   Skin redness (flushness)   Skin rash   Mouth (oral) irritation   Blood sugar (glucose) levels   Sweats   Mood changes

## 2021-06-04 NOTE — PROGRESS NOTES
Assessment:     Diagnoses and all orders for this visit:    Acute right-sided low back pain with right-sided sciatica  -     OPS TFESI Lumbar Sacral Unilateral; Future; Expected date: 01/03/2020  -     predniSONE (DELTASONE) 10 mg tablet pack; Take by mouth daily. Three 10 mg tablets daily for 2 days, then two 10 mg tablets for 2 days, then one 10 mg tablet for 2 days.  Dispense: 12 tablet; Refill: 0    Chronic bilateral low back pain without sciatica    Right lumbar radiculitis  -     OPS TFESI Lumbar Sacral Unilateral; Future; Expected date: 01/03/2020  -     predniSONE (DELTASONE) 10 mg tablet pack; Take by mouth daily. Three 10 mg tablets daily for 2 days, then two 10 mg tablets for 2 days, then one 10 mg tablet for 2 days.  Dispense: 12 tablet; Refill: 0    Lumbar foraminal stenosis  -     OPS TFESI Lumbar Sacral Unilateral; Future; Expected date: 01/03/2020    Spondylolisthesis of lumbar region  -     OPS TFESI Lumbar Sacral Unilateral; Future; Expected date: 01/03/2020       Barry Jay is a 64 y.o. y.o. male with past medical history significant for hypertension, dyslipidemia, hand osteoarthritis, arthralgia who presents today for follow-up regarding:    -Chronic bilateral low back pain with some benefit with previous facet injection however acute right low back pain with right lumbar radiculitis L5 dermatome pattern that is severe and debilitating.  MRI shows L4-5 spondylolisthesis with severe facet arthropathy and L5 impingement.     Patient is neurologically intact on exam.     Plan:     A shared decision making plan was used. The patient's values and choices were respected. Prior medical records from 1/21/2019 to current were reviewed today. The following represents what was discussed and decided upon by the provider and the patient.        -DIAGNOSTIC TESTS: Images were personally reviewed and interpreted.   --Discussed with patient that if he does not get relief with the injection I would  recommend obtaining an  updated lumbar spine MRI and flexion-extension x-ray.  --[facility] lumbar spine MRI 1/17/2019 shows severe facet arthropathy L4-5 with 5 mm spondylolisthesis, mild central and subarticular recess stenosis, mild right foraminal stenosis.  Mild facet arthropathy L3-4 and L5-S1.    -INTERVENTIONS: Ordered a right L4-5 and L5-S1 transforaminal epidural steroid injection see if we get further relief of his chronic right low back pain and acute on chronic right lumbar radiculitis L5 pattern.  Patient does have a spondylolisthesis at L4-5 with facet arthropathy and L5 impingement.  -Patient is also interested in radiofrequency ablation for his chronic low back pain.  Discussed with patient that we will target his right leg pain first and if his back pain remains then this would be the next option medial branch block for potential RFA.  Patient is aware that he would have to repeat physical therapy before considering RFA.    -MEDICATIONS: Did offer opioid medications for severe pain however patient deferred today.  -Did prescribe Medrol Dosepak 10 mg to see if we get further relief of radicular pain sooner than later per patient request.  Did report that if the oral steroid does improve his pain to let us know and we will cancel the injection.  Discussed side effects of medications and proper use. Patient verbalized understanding.    -PHYSICAL THERAPY: Did discuss with patient that if he does not get relief with the injection or his back pain returns and he wants to consider radiofrequency ablation for his chronic low back pain we would recommend physical therapy which he is aware of.  Discussed the importance of core strengthening, ROM, stretching exercises with the patient and how each of these entities is important in decreasing pain.  Explained to the patient that the purpose of physical therapy is to teach the patient a home exercise program.  These exercises need to be performed every day in order to  decrease pain and prevent future occurrences of pain.        -PATIENT EDUCATION:  20 minutes of total visit time was spent face to face with the patient today, 60 % of the visit was spent on counseling, education, and coordinating care.   -5 minutes spent outside of visit time, non-face-to-face time, reviewing chart.    -FOLLOW UP: Follow-up for injection with Dr. Wolff in 2 weeks postinjection.  Advised to contact clinic if symptoms worsen or change.    Subjective:     Barry Jay is a 64 y.o. male who presents today for follow-up regarding chronic bilateral low back pain which had been more tolerable however previous injections helped on the left but minimal on the right to the right is been more bothersome for some time.  In the last couple of days however he has had significant debilitating pain into the right low back that is radiating to the right posterior lateral thigh posterior lateral calf with associated numbness and tingling.  Patient reports that he did some shoveling/snowblowing and woke up with severe pain the next day.  Currently his leg pain is much more severe in comparison to his chronic back pain.    Patient does feel weaker in his right lower extremity, denies any recent trips or falls or balance changes, denies bowel or bladder loss control, denies left leg symptoms.    Patient is again scheduled for knee replacement surgery 2/6/2019.     Treatment to Date: No prior spinal..  No prior physical therapy for back pain.     History of bilateral L4-5 RFA [facility] [place] [facility] Dr. Green 2011 and 2013, relief at least 2 years.  Bilateral L3-4 MBB 6/30/2017.  Preprocedure pain 7/10 post 2/10.  Bilateral L4-5 facet joint steroid injection 7/7/2017 with relief.  Preprocedure pain 8/10, post 5/10.    Patient Active Problem List   Diagnosis     Dyslipidemia     Hypertension     Diffuse Joint Pains (Arthralgias)     Joint Pain, Localized In The Wrist     Joint Pain, Localized In The Knee     Generalized  "Osteoarthritis Of The Hand     Osteoarthritis Of The Knee     Arthralgias In Multiple Sites       Current Outpatient Medications on File Prior to Encounter   Medication Sig Dispense Refill     ALPRAZolam (XANAX) 0.5 MG tablet TAKE 1 TABLET BY MOUTH ONCE A DAY AS NEEDED ORALLY  0     atorvastatin (LIPITOR) 10 MG tablet TAKE ONE TABLET BY MOUTH ONCE DIALY  3     celecoxib (CELEBREX) 200 MG capsule Take 1 capsule by mouth as needed.  3     irbesartan (AVAPRO) 150 MG tablet TAKE 1 TABLET BY MOUTH ONCE A DAY FOR BLOOD PRESSURE  1     irbesartan-hydrochlorothiazide (AVALIDE) 300-12.5 mg per tablet Take 1 tablet by mouth daily. for blood pressure  1     omeprazole (PRILOSEC) 20 MG capsule TAKE ONE CAPSULE BY MOUTH EVERY DAY FOR STOMACH  1     ondansetron (ZOFRAN ODT) 4 MG disintegrating tablet Take 1 tablet (4 mg total) by mouth every 8 (eight) hours as needed. 12 tablet 0     PARoxetine (PAXIL) 20 MG tablet TAKE 1 TABLET BY MOUTH ONCE DAILY FOR MOOD.  3     zolpidem (AMBIEN) 10 mg tablet Take 1 tablet by mouth as needed.  2     No current facility-administered medications on file prior to encounter.        Allergies   Allergen Reactions     Iodine-Iodine Containing        Past Medical History:   Diagnosis Date     HTN (hypertension)      Hyperlipidemia         Review of Systems  ROS: Positive for numbness and tingling, right leg weakness.  Specifically negative for bowel/bladder dysfunction, balance changes, headache, dizziness, foot drop, fevers, chills, appetite changes, nausea/vomiting, unexplained weight loss. Otherwise 13 systems reviewed are negative. Please see the patient's intake questionnaire from today for details.    Reviewed Social, Family, Past Medical and Past Surgical history with patient, no significant changes noted since prior visit.     Objective:     /88 (Patient Site: Left Arm, Patient Position: Sitting, Cuff Size: Adult Large)   Pulse 95   Ht 5' 8\" (1.727 m)   Wt 203 lb (92.1 kg)   BMI " 30.87 kg/m      PHYSICAL EXAMINATION:    --CONSTITUTIONAL: Well developed, well nourished, healthy appearing individual.  --PSYCHIATRIC: Appropriate mood and affect. No difficulty interacting due to temper, social withdrawal, or memory issues.  --SKIN: Lumbar region is dry and intact.   --RESPIRATORY: Normal rhythm and effort. No abnormal accessory muscle breathing patterns noted.   --MUSCULOSKELETAL:  Normal lumbar lordosis noted, no lateral shift.  --GROSS MOTOR: Easily arises from a seated position. Gait is non-antalgic  --LUMBAR SPINE:  Inspection reveals no evidence of deformity. Range of motion is limited in all movements: Lumbar flexion, extension, lateral rotation. No tenderness to palpation lumbar spine. Straight leg raising in the seated position is negative to radicular pain on the left and positive on the right. Sciatic notch non-tender.   --SACROILIAC JOINT: One Finger point test negative.  --LOWER EXTREMITY MOTOR TESTING:  Plantar flexion left 5/5, right 5/5   Dorsiflexion left 5/5, right 5/5   Great toe MTP extension left 5/5, right 5/5  Knee flexion left 5/5, right 5/5  Knee extension left 5/5, right 4/5   Hip flexion left 5/5, right 5/5  Hip abduction left 5/5, right 5/5  Hip adduction left 5/5, right 5/5   --HIPS: Full range of motion bilaterally.   --NEUROLOGIC: Bilateral patellar and achilles reflexes are physiologic and symmetric. Sensation to light touch is intact in the bilateral L4, L5, and S1 dermatomes.    RESULTS:   Imaging: Lumbar spine imaging was reviewed today. The images were shown to the patient and the findings were explained using a spine model.      MRI LUMBAR SPINE WITHOUT CONTRAST  [facility]- 1/17/19  CLINICAL INFORMATION: 63-year-old man with low back pain.  TECHNICAL INFORMATION: MRI lumbar spine was obtained on 0.6 Teresa open upright magnet including sagittal T2, sagittal T1, sagittal STIR, axial T2 and axial T1-weighted images.  INTERPRETATION: There is normal lordotic curvature  of the lumbar spine. No marrow edema within the lumbar vertebral bodies. No loss of vertebral body height. The conus is at the L1 level and is normal in appearance.  L5-S1: Transitional L5 segment with left L5 transverse process articulating with the sacrum. Mild developmental narrowing of the disc space. Mild bilateral facet arthropathy. No central spinal canal or foraminal stenosis.  L4-5: Mild degenerative disc disease with 5 mm degenerative spondylolisthesis and disc bulge. Severe bilateral facet arthropathy with ligamentum flavum thickening contributes to mild central spinal canal stenosis and subarticular recess narrowing with encroachment upon the traversing L5 nerve roots. Moderate right foraminal stenosis.  L3-4: Mild degenerative disc disease and disc bulge. Mild bilateral facet arthropathy. Mild subarticular recess narrowing. The ganglia exit without compromise.  L2-3 through T12-L1: No focal disc herniation, central spinal canal stenosis or neural impingement.  T11-12 and T10-11: Moderate degenerative disc disease with Schmorl's nodes and endplate irregularities. No cord deformity or central spinal canal stenosis.  CONCLUSION:  1. L5-S1 transitional L5 segment with left transverse process articulating with the sacrum. Mild developmental narrowing of the disc space. Mild bilateral facet arthropathy.  2. L4-5 grade 1 spondylolisthesis with disc bulge. Severe facet arthropathy and ligamentum flavum thickening with mild central spinal canal and subarticular recess stenosis with encroachment upon the traversing L5 nerve roots. Moderate right foraminal stenosis.  3. Mild degenerative disc disease with disc bulge. Mild facet arthropathy and mild subarticular recess narrowing.

## 2021-06-05 NOTE — TELEPHONE ENCOUNTER
"Pt returned call. Provider's results and recommendations given. Pt stated understanding. Pt will complete flexion-extension x-rays.     Pt adds that he has been taking gabapentin for 15 days now. He is on 2-2-2 with no improvement thus far. He declines PT at this time. He declines surgical consult.     Pt very interested in repeat RFA. He states \"it's the only thing that has helped me before\". He states he has had 2 previous RFAs, with one lasting him 8 years and the other lasting him 2 years. He would like to know provider's thoughts about this.             "

## 2021-06-05 NOTE — PATIENT INSTRUCTIONS - HE
Please follow up two weeks post procedure with Claudia to evaluate your plan of care.       DISCHARGE INSTRUCTIONS    During office hours (8:00 a.m.- 4:00 p.m.) questions or concerns may be answered  by calling [facility] Nurses at  [phone].  Messages received after hours will be returned the following business day.      In the case of an emergency, please dial 911 or seek assistance at the nearest Emergency Room/Urgent Care facility.     All Patients:    ? You may experience an increase in your symptoms for the first 2 days (It may take anywhere between 2 days- 2 weeks for the steroid to have maximum effect).    ? You may use ice on the injection site, as frequently as 20 minutes each hour if needed.    ? You may take your pain medicine.    ? You may continue taking your regular medication after your injection. If you have had a Medial Branch Block you may resume pain medication once your pain diary is completed.    ? You may shower. No swimming, tub bath or hot tub for 48 hours.  You may remove your bandaid/bandage as soon as you are home.    ? You may resume light activities, as tolerated.    ? Resume your usual diet as tolerated.    ? It is strongly advised that you do not drive for 1-3 hours post injection.    ? If you have had oral sedation:  Do not drive for 8 hours post injection.      ? If you have had IV sedation:  Do not drive for 24 hours post injection.  Do not operate hazardous machinery or make important personal/business decisions for 24 hours.      POSSIBLE STEROID SIDE EFFECTS (If steroid/cortisone was used for your procedure)    -If you experience these symptoms, it should only last for a short period      Swelling of the legs                Skin redness (flushing)       Mouth (oral) irritation     Blood sugar (glucose) levels              Sweats                      Mood changes    Headache    Sleeplessness         POSSIBLE PROCEDURE SIDE EFFECTS  -Call the Spine Center if you  are concerned    Increased Pain             Increased numbness/tingling        Nausea/Vomiting            Bruising/bleeding at site        Redness or swelling                                                Difficulty walking        Weakness             Fever greater than 100.5    *In the event of a severe headache after an epidural steroid injection that is relieved by lying down, please call the [facility] to speak with a clinical staff member*

## 2021-06-05 NOTE — TELEPHONE ENCOUNTER
----- Message from Narinder Wakefield DO sent at 2/3/2020  4:12 PM CST -----  Lumbar x-rays reviewed in Claudia's absence: Lumbar x-ray showed no instability from flexion to extension.    Continue plan of physical therapy and I suggest follow-up with Claudia Baptiste.

## 2021-06-05 NOTE — PROGRESS NOTES
1. Reassurance given that exam was normal at today's visit.  She may work without any limitations or restrictions.  She will have urine drug screen through [facility].    2. We will give her a flu shot at today's visit. Otherwise her immunizations are up-to-date, including her tetanus.   [facility]   [facility]umbo-Pelvic Initial Evaluation    Patient Name: Barry Jay  Date of evaluation: 2/10/2020  Referral Diagnosis: Lumbar spine pain  Referring provider: Narinder Wakefield DO  Visit Diagnosis:     ICD-10-CM    1. Chronic bilateral low back pain without sciatica M54.5     G89.29    2. Generalized muscle weakness M62.81        Assessment:        Barry Jay is a 64 y.o. male who presents to therapy today with chief complaints of chronic low back pain. Onset date of sx was about 10 years ago.  Pt reported h/o sciatica that started about a month ago, that has relieved and is no longer present.  Pain symptoms are consistent and nagging, localized to the mid low back.  Functional impairments include standing longer than 5-10min, lifting, walking long, etc.  Pt demo's signs and sx consistent with chronic low back pain. Patient is hoping to attend physical therapy prior to getting ablation.  Treatment was initiated focusing on core strengthening and mobility. Patient is motivated and willing to participate in physical therapy treatment.  The POC is dynamic and will be modified on an ongoing basis.  Barriers to achieving goals as noted in the assessment section may affect outcome.  Prognosis to achieve goals is  good   Pt. is appropriate for skilled PT intervention as outlined in the Plan of Care (POC).  Pt. is a good candidate for skilled PT services to improve pain levels and function.    Plan of care and goals were established in collaboration with patient.     IMAGIN. L5-S1 transitional L5 segment with left transverse process articulating with the sacrum. Mild developmental narrowing of the disc space. Mild bilateral facet arthropathy.  2. L4-5 grade 1 spondylolisthesis with disc bulge. Severe facet arthropathy and ligamentum flavum thickening with mild central spinal canal and subarticular recess stenosis with encroachment upon the traversing L5 nerve roots. Moderate right  foraminal stenosis.  3. Mild degenerative disc disease with disc bulge. Mild facet arthropathy and mild subarticular recess narrowing.    Goals:  Pt. will demonstrate/verbalize independence in self-management of condition in : 4 weeks  Pt. will be independent with home exercise program in : 4 weeks    Pt will: improve JEWEL score by 6 points or more to show significant improvement in functional activity not being limited by low back pain, within 4 weeks.  Pt will: report being able to tolerate standing for 30min or more without having to sit due to increase in back pain, within 4 weeks.      Patient's expectations/goals are realistic.    Barriers to Learning or Achieving Goals:  Chronicity of the problem.       Plan / Patient Instructions:        Plan of Care:   Authorization / Certification Start Date: 02/10/20  Authorization / Certification End Date: 05/10/20  Authorization / Certification Number of Visits: 8  Communication with: Referral Source  Patient Related Instruction: Nature of Condition;Treatment plan and rationale;Self Care instruction;Basis of treatment;Body mechanics;Posture;Precautions;Next steps;Expected outcome  Times per Week: 1-2  Number of Weeks: 4-8  Number of Visits: 4-8  Discharge Planning: patient will discharge when goals are met or a plateau in progress  Therapeutic Exercise: ROM;Stretching;Strengthening  Neuromuscular Reeducation: kinesio tape;posture;balance/proprioception;TNE;core  Manual Therapy: soft tissue mobilization;myofascial release;joint mobilization;muscle energy  Modalities: TENS      POC and pathology of condition were reviewed with patient.  Pt. is in agreement with the Plan of Care  A Home Exercise Program (HEP) was initiated today.  Pt. was instructed in exercises by PT and patient was given a handout with detailed instructions.    Plan for next visit: progress exercises (patient only wants a few so progress ones given add leg or arm extension to quadriped and progress core  "strengthening)     Subjective:        Patient reports he has had his back pain for years. Recently, new years day had a sciatica attack. He went to the spine clinic and got an injection, he thinks the sciatica is gone (it has been a month) that feels a lot better. It was down the right leg, not really having any symptoms.  Back radiofrequency ablasians in the last 15 years (one lasted 2 years the other lasted 8 years). He was hoping to get this done again, he has to go through the process of PT before having it done. It is the only thing that has ever really worked. He does a lot of core and does golf in the summer.     Exercise: he goes to the gym everyday: walks, minimal weights, stretching.   Has seen a chiropractor a couple times and he is not a fan of it. He had a knee replacement surgery about a year ago that limits his feeling on the outer side of the left leg.    Social information:   Occupation: retired, last worked  (likes to golf, fish, gym and exercise)      Pain Ratin \"nagging\"  Pain rating at best: 6  Pain rating at worst: 9  Pain description: aching nagging    Functional limitations are described as occurring with:   Stand 5 min,  walk 10 min (feel it), bend, stairs, walking on uneven, lifting, kneeling/squating    Patient reports benefit from stretching and exercise (good day of exercise makes him feel better)      Objective:      Note: Items left blank indicates the item was not performed or not indicated at the time of the evaluation.    Patient Outcome Measures :    Modified Oswestry Low Back Pain Disablity Questionnaire  in %: 38     Scores range from 0-100%, where a score of 0% represents minimal pain and maximal function. The minimal clinically important difference is a score reduction of 12%.    Examination  1. Chronic bilateral low back pain without sciatica     2. Generalized muscle weakness       Involved side: Bilateral  Posture Observation:      General sitting posture is  " normal.  General standing posture is normal.    Lumbar ROM:    Date: 02/10/20     *Indicate scale AROM AROM AROM   Lumbar Flexion WNL no increase in pain     Lumbar Extension WNL no increase in pain      Right Left Right Left Right Left   Lumbar Sidebending WNL no increase in pain WNL no increase in pain       Lumbar Rotation WNL no increase in pain WNL no increase in pain       Thoracic Flexion      Thoracic Extension      Thoracic Sidebending         Thoracic Rotation           Lower Extremity Strength:   All LE strength 5/5 within normal limits  Date: 02/10/20     LE strength/5 Right Left Right Left Right Left   Hip Flexion (L1-3)         Hip Extension (L5-S1)         Hip Abduction (L4-5)         Hip Adduction (L2-3)         Hip External Rotation         Hip Internal Rotation         Knee Extension (L3-4)         Knee Flexion         Ankle Dorsiflexion (L4-5)         Great Toe Extension (L5)         Ankle Plantar flexion (S1)         Abdominals Weakness to nore       Sensation    Within normal limits to light touch bre LE, except for left lateral knee (from knee surgery)    Palpation: non tender to palpation on this date    Lumbar Special Tests:     Lumbar Special Tests Right Left SI Tests Right  Left   Quadrant test - - SI Compression - -   Straight leg raise - - SI Distraction - -   Crossover response   POSH Test     Slump - - Sacral Thrust - -   Sit-up test  FADIR - -   SLR - gaenslens - -   Trunk extensor endurance test  Resisted Abduction     Prone instability test - Other:     Pubic shotgun  Other:       Repeated Motion Testing:  Does not centralize  Does not peripheralize    Passive Mobility - Joint Integrity:  WNL    LE Screen:  WNL    Exercises:  Exercise #1: LTR x20  Comment #1: supine SLR x20  Exercise #2: TA activation/pelvic tilt x10  Comment #2: bridge x10  Exercise #3: cat/cow x10      Appt time: 130 - 224    Treatment Today:  TREATMENT MINUTES COMMENTS   Evaluation 20 Low Complexity Eval  Patient  educated on pathology  Discussed POC   Self-care/ Home management     Manual therapy 10 S/l lumbar mobilizations grade II III, decrease in pain from 7 to 5 upon completition   Neuromuscular Re-education     Therapeutic Activity     Therapeutic Exercises 24 Demo/performance of HEP, education on purpose of exercise. Handouts with written instruction provided.   Exercises:  Exercise #1: LTR x20  Comment #1: supine SLR x20  Exercise #2: TA activation/pelvic tilt x10  Comment #2: bridge x10  Exercise #3: cat/cow x10       Gait training     Modality__________________                Total 54    Blank areas are intentional and mean the treatment did not include these items.     PT Evaluation Code: (Please list factors)  Patient History/Comorbidities: See PMH   Examination: see above, 4+ elements   Clinical Presentation: stable  Clinical Decision Making: low    Patient History/  Comorbidities Examination  (body structures and functions, activity limitations, and/or participation restrictions) Clinical Presentation Clinical Decision Making (Complexity)   No documented Comorbidities or personal factors 1-2 Elements Stable and/or uncomplicated Low   1-2 documented comorbidities or personal factor 3 Elements Evolving clinical presentation with changing characteristics Moderate   3-4 documented comorbidities or personal factors 4 or more Unstable and unpredictable High            Afia Ulloa, PT, DPT  2/10/2020  7:39 AM

## 2021-06-05 NOTE — TELEPHONE ENCOUNTER
The radiofrequency ablation could potentially help with his back pain but will not help with his leg pain, therefore this would be a option for his back pain down the road.  Also it is mandated by insurance to trial at least 4 sessions of physical therapy prior to radiofrequency ablation as well.

## 2021-06-05 NOTE — TELEPHONE ENCOUNTER
Pt returned call. Provider's response given. Pt stated understanding. Pt would like to trial physical therapy at this time. His preferred location is [facility]. He is aware provider is not in clinic today. Will route to covering provider to see if order could be placed.     Did provide pt with PT scheduling contact # as well.

## 2021-06-05 NOTE — PROGRESS NOTES
Assessment:     Diagnoses and all orders for this visit:    Acute right-sided low back pain with right-sided sciatica  -     Cancel: MR Lumbar Spine Without Contrast; Future; Expected date: 01/17/2020  -     gabapentin (NEURONTIN) 100 MG capsule; Take 300 mg by mouth 3 (three) times a day. Follow Gabapentin Dosing chart given  Dispense: 270 capsule; Refill: 3  -     MR Lumbar Spine Without Contrast; Future; Expected date: 01/17/2020    Right lumbar radiculitis  -     Cancel: MR Lumbar Spine Without Contrast; Future; Expected date: 01/17/2020  -     gabapentin (NEURONTIN) 100 MG capsule; Take 300 mg by mouth 3 (three) times a day. Follow Gabapentin Dosing chart given  Dispense: 270 capsule; Refill: 3  -     MR Lumbar Spine Without Contrast; Future; Expected date: 01/17/2020    Lumbar foraminal stenosis  -     Cancel: MR Lumbar Spine Without Contrast; Future; Expected date: 01/17/2020  -     MR Lumbar Spine Without Contrast; Future; Expected date: 01/17/2020    Spondylolisthesis of lumbar region  -     Cancel: MR Lumbar Spine Without Contrast; Future; Expected date: 01/17/2020  -     MR Lumbar Spine Without Contrast; Future; Expected date: 01/17/2020       Barry Jay is a 64 y.o. y.o. male with past medical history significant for hypertension, dyslipidemia, hand osteoarthritis, arthralgia who presents today for follow-up regarding:    -Chronic bilateral low back pain with ongoing right lumbar radiculitis L5 dermatomal pattern with 30% relief only with recent right L4-5 and L5-S1 TFESI, pain is still most significant to the lateral thigh lateral shin.          Plan:     A shared decision making plan was used. The patient's values and choices were respected. Prior medical records from 1/3/2020 to current were reviewed today. The following represents what was discussed and decided upon by the provider and the patient.        -DIAGNOSTIC TESTS: Images were personally reviewed and interpreted.   --Given new  radicular symptoms recommend and ordered updated lumbar spine MRI open upright with CDI due to claustrophobia to further evaluate for possible further nerve compression.  --[facility] lumbar spine MRI 1/17/2019 shows severe facet arthropathy L4-5 with 5 mm spondylolisthesis, mild central and subarticular recess stenosis, mild right foraminal stenosis.  Mild facet arthropathy L3-4 and L5-S1.    -INTERVENTIONS: No further injection recommendations at this time, can reevaluate pending MRI review.  Currently his leg pain is worse than his back pain.  We did discuss however down the road that radiofrequency ablation would be a potential option to consider starting with MBB but this would only target his back pain not his leg symptoms.    -MEDICATIONS: Prescribed gabapentin to titrate up to 3 tablets 3 times daily as tolerated for radicular pain as well.  Discussed side effects of medications and proper use. Patient verbalized understanding.    -PHYSICAL THERAPY: Physical therapy would be a very good option however patient is not interested in physical therapy at this time.  Discussed the importance of core strengthening, ROM, stretching exercises with the patient and how each of these entities is important in decreasing pain.  Explained to the patient that the purpose of physical therapy is to teach the patient a home exercise program.  These exercises need to be performed every day in order to decrease pain and prevent future occurrences of pain.        -PATIENT EDUCATION:  20 minutes of total visit time was spent face to face with the patient today, 60 % of the visit was spent on counseling, education, and coordinating care.   -5 minutes spent outside of visit time, non-face-to-face time, reviewing chart.    -FOLLOW UP: Follow-up after obtaining updated MRI to review results and next step in his plan.  Advised to contact clinic if symptoms worsen or change.    Subjective:     Barry Weemsriel is a 64 y.o. male who presents  today for follow-up regarding ongoing chronic bilateral low back pain with new pain into the right lateral thigh and lateral shin as well as posterior thigh and posterior calf with perceived weakness onset in December with recent 30% relief with right L4-5 and L5-S1 however pain is still most significant is right lateral thigh as well as right lateral shin, most significant with walking, does improve with sitting.  Currently his pain is a 7/10, and 8 at its worst, a 6 at its best.  Patient denies left leg symptoms and he does report that his back pain is better with the injection but again his leg pain is most significant at this time.    Patient is again scheduled for knee replacement surgery 2/6/2019.     Treatment to Date: No prior spinal..  No prior physical therapy for back pain.     History of bilateral L4-5 RFA [facility] Dr. Green 2011 and 2013, relief at least 2 years.  Bilateral L3-4 MBB 6/30/2017.  Preprocedure pain 7/10 post 2/10.  Bilateral L4-5 facet joint steroid injection 7/7/2017 with relief.  Preprocedure pain 8/10, post 5/10.  Right L4-5 and L5-S1 TFESI 1/6/2020 with 30% lasting benefit only.  Preprocedure pain 10/10, post 7/10.    Patient Active Problem List   Diagnosis     Dyslipidemia     Hypertension     Diffuse Joint Pains (Arthralgias)     Joint Pain, Localized In The Wrist     Joint Pain, Localized In The Knee     Generalized Osteoarthritis Of The Hand     Osteoarthritis Of The Knee     Arthralgias In Multiple Sites       Current Outpatient Medications on File Prior to Encounter   Medication Sig Dispense Refill     ALPRAZolam (XANAX) 0.5 MG tablet TAKE 1 TABLET BY MOUTH ONCE A DAY AS NEEDED ORALLY  0     atorvastatin (LIPITOR) 10 MG tablet TAKE ONE TABLET BY MOUTH ONCE DIALY  3     celecoxib (CELEBREX) 200 MG capsule Take 1 capsule by mouth as needed.  3     irbesartan (AVAPRO) 150 MG tablet TAKE 1 TABLET BY MOUTH ONCE A DAY FOR BLOOD PRESSURE  1     irbesartan-hydrochlorothiazide  (AVALIDE) 300-12.5 mg per tablet Take 1 tablet by mouth daily. for blood pressure  1     omeprazole (PRILOSEC) 20 MG capsule TAKE ONE CAPSULE BY MOUTH EVERY DAY FOR STOMACH  1     ondansetron (ZOFRAN ODT) 4 MG disintegrating tablet Take 1 tablet (4 mg total) by mouth every 8 (eight) hours as needed. 12 tablet 0     PARoxetine (PAXIL) 20 MG tablet TAKE 1 TABLET BY MOUTH ONCE DAILY FOR MOOD.  3     zolpidem (AMBIEN) 10 mg tablet Take 1 tablet by mouth as needed.  2     predniSONE (DELTASONE) 10 mg tablet pack Take by mouth daily. Three 10 mg tablets daily for 2 days, then two 10 mg tablets for 2 days, then one 10 mg tablet for 2 days. 12 tablet 0     No current facility-administered medications on file prior to encounter.        Allergies   Allergen Reactions     Iodine-Iodine Containing        Past Medical History:   Diagnosis Date     HTN (hypertension)      Hyperlipidemia         Review of Systems  ROS: Positive for right lower extremity weakness.  Specifically negative for bowel/bladder dysfunction, balance changes, headache, dizziness, foot drop, fevers, chills, appetite changes, nausea/vomiting, unexplained weight loss. Otherwise 13 systems reviewed are negative. Please see the patient's intake questionnaire from today for details.    Reviewed Social, Family, Past Medical and Past Surgical history with patient, no significant changes noted since prior visit.     Objective:     /73   Pulse 99     PHYSICAL EXAMINATION:    --CONSTITUTIONAL: Well developed, well nourished, healthy appearing individual.  --PSYCHIATRIC: Appropriate mood and affect. No difficulty interacting due to temper, social withdrawal, or memory issues.  --SKIN: Lumbar region is dry and intact.   --RESPIRATORY: Normal rhythm and effort. No abnormal accessory muscle breathing patterns noted.   --MUSCULOSKELETAL:  Normal lumbar lordosis noted, no lateral shift.  --GROSS MOTOR: Easily arises from a seated position. Gait is  non-antalgic  --LUMBAR SPINE:  Inspection reveals no evidence of deformity.   --SACROILIAC JOINT: One Finger point test negative.  --LOWER EXTREMITY MOTOR TESTING:  Plantar flexion left 5/5, right 5/5   Dorsiflexion left 5/5, right 5/5   Great toe MTP extension left 5/5, right 4/5  Knee flexion left 5/5, right 5/5  Knee extension left 5/5, right 5/5   Hip flexion left 5/5, right 5/5  Hip abduction left 5/5, right 5/5  Hip adduction left 5/5, right 5/5   --HIPS: Full range of motion bilaterally. Negative FABERs on the involved lower extremity.   --NEUROLOGIC: Bilateral patellar and achilles reflexes are physiologic and symmetric. Sensation to light touch is intact in the bilateral L4, L5, and S1 dermatomes.    RESULTS:   Imaging: Lumbar spine imaging was reviewed today. The images were shown to the patient and the findings were explained using a spine model.      MRI LUMBAR SPINE WITHOUT CONTRAST  CDI- 1/17/19  CLINICAL INFORMATION: 63-year-old man with low back pain.  TECHNICAL INFORMATION: MRI lumbar spine was obtained on 0.6 Teresa open upright magnet including sagittal T2, sagittal T1, sagittal STIR, axial T2 and axial T1-weighted images.  INTERPRETATION: There is normal lordotic curvature of the lumbar spine. No marrow edema within the lumbar vertebral bodies. No loss of vertebral body height. The conus is at the L1 level and is normal in appearance.  L5-S1: Transitional L5 segment with left L5 transverse process articulating with the sacrum. Mild developmental narrowing of the disc space. Mild bilateral facet arthropathy. No central spinal canal or foraminal stenosis.  L4-5: Mild degenerative disc disease with 5 mm degenerative spondylolisthesis and disc bulge. Severe bilateral facet arthropathy with ligamentum flavum thickening contributes to mild central spinal canal stenosis and subarticular recess narrowing with encroachment upon the traversing L5 nerve roots. Moderate right foraminal stenosis.  L3-4: Mild  degenerative disc disease and disc bulge. Mild bilateral facet arthropathy. Mild subarticular recess narrowing. The ganglia exit without compromise.  L2-3 through T12-L1: No focal disc herniation, central spinal canal stenosis or neural impingement.  T11-12 and T10-11: Moderate degenerative disc disease with Schmorl's nodes and endplate irregularities. No cord deformity or central spinal canal stenosis.  CONCLUSION:  1. L5-S1 transitional L5 segment with left transverse process articulating with the sacrum. Mild developmental narrowing of the disc space. Mild bilateral facet arthropathy.  2. L4-5 grade 1 spondylolisthesis with disc bulge. Severe facet arthropathy and ligamentum flavum thickening with mild central spinal canal and subarticular recess stenosis with encroachment upon the traversing L5 nerve roots. Moderate right foraminal stenosis.  3. Mild degenerative disc disease with disc bulge. Mild facet arthropathy and mild subarticular recess narrowing.

## 2021-06-05 NOTE — PATIENT INSTRUCTIONS - HE
Prescribed Gabapentin today, 100 mg tablets, to be titrated up to 3 tablets 3 times a day as tolerated for your nerve pain. Please follow Gabapentin dosing chart below.    Gabapentin 100mg Dosing Chart    DATE  MORNING AFTERNOON BEDTIME    Day 1 0 0 1    Day 2 0 0 1    Day 3 0 0 1    Day 4 1 0 1    Day 5 1 0 1    Day 6 1 0 1    Day 7 1 1 1    Day 8 1 1 1    Day 9 1 1 1    Day 10 1 1 2    Day 11 1 1 2    Day 12 1 1 2    Day 13 2 1 2    Day 14 2 1 2    Day 15 2 1 2    Day 16 2 2 2    Day 17 2 2 2    Day 18 2 2 2    Day 19 2 2 3    Day 20 2 2 3    Day 21 2 2 3    Day 22 3 2 3    Day 23 3 2 3    Day 24 3 2 3    Day 25 3 3 3    Day 26 3 3 3    Day 27 3 3 3     Continue medication, taking 3 capsules three times daily    Please call if you have any questions regarding how to take your medication          Gabapentin Dosing Chart    DATE  MORNING AFTERNOON BEDTIME    Today 0 0 1     1 0 1     1 1 1     1 1 2     2 1 2     2 2 2     2 2 3     3 2 3     3 3 3

## 2021-06-05 NOTE — TELEPHONE ENCOUNTER
----- Message from Claudia Baptiste CNP sent at 1/30/2020  9:03 AM CST -----  Regarding: MRI results CDI  Please call patient and notify him that I did review his  open upright lumbar spine MRI with CDI.  It does show similar findings with bone shifting at L4-5/spondylolisthesis with facet arthritis with right greater than left nerve compression.  However this is the level that we targeted with the previous injection in which she received only about 30% relief, no further injection recommendations at this time. I would recommend a lumbar flexion-extension x-ray to evaluate if there is any further shifting at the L4-5 level, if there is surgery may be an option.  I did place an order for the x-ray. As I have recommended in the past I highly would recommend physical therapy at this point to establish core strengthening home exercises to take pressure off the L4-5 disc and the nerves that are being compressed.  Previously he deferred this option.  Typically in general if he fails conservative treatments with physical therapy, medications and injections, discussing surgery would be an option at any time as well.  Thanks,  Claudia

## 2021-06-06 NOTE — PROGRESS NOTES
"[facility] Daily Progress     Patient Name: Barry Jay  Date: 2/24/2020  Visit #: 3  PTA visit #:    Referral Diagnosis: Lumbar spine pain  Referring provider: Osmel Iniguez MD  Visit Diagnosis:     ICD-10-CM    1. Chronic bilateral low back pain without sciatica M54.5     G89.29    2. Generalized muscle weakness M62.81        Barry Jay is a 64 y.o. male who presents to therapy today with chief complaints of chronic low back pain. Onset date of sx was about 10 years ago.  Pt reported h/o sciatica that started about a month ago, that has relieved and is no longer present.  Pain symptoms are consistent and nagging, localized to the mid low back.  Functional impairments include standing longer than 5-10min, lifting, walking long, etc.  Pt demo's signs and sx consistent with chronic low back pain. Patient is hoping to attend physical therapy prior to getting ablation.  Treatment was initiated focusing on core strengthening and mobility. Patient is motivated and willing to participate in physical therapy treatment.  The POC is dynamic and will be modified on an ongoing basis.  No data recorded    Assessment:     HEP/POC compliance is  good .  Patient is benefitting from skilled physical therapy and is making steady progress toward functional goals.  Patient is appropriate to continue with skilled physical therapy intervention, as indicated by initial plan of care.  Patient no longer has pain free range of motion, still rating pain a 5/10 but noting that it is \"not bad at all\". Multiple times throughout session said he has no pain. Patient is tolerating strengthening exercises well and is progressing through therapy. Patient is going to think about choice for ablation and follow up with doctor will return to therapy after that.    Goal Status:  Pt. will demonstrate/verbalize independence in self-management of condition in : 4 weeks  Pt. will be independent with home exercise program in : 4 " "weeks    Pt will: improve JEWEL score by 6 points or more to show significant improvement in functional activity not being limited by low back pain, within 4 weeks.  Pt will: report being able to tolerate standing for 30min or more without having to sit due to increase in back pain, within 4 weeks.      Plan / Patient Education:     Continue with initial plan of care.  Progress with home program as tolerated.     Plan for next visit: progress exercises (patient only wants a few so progress ones given add leg or arm extension to quadriped and progress core strengthening), plank, rotation strengthening for golf  Subjective:     Pain Rating: \"not bad\" maybe a 5-6    Patient reports that pain is not to bad. He can tell they are making a difference. He can tell his left leg is weak and tight. He is probably going to wait about a week till deciding about the ablasian     Nagging pain more than real sharp pain    Can still feel it after standing for 20-30min but was able to do it when he wasn't able to in the past.     Objective:     No pain with lumbar flexion, extension or rotation on this date    Hypomobility in the lumbar spine      Exercise #1: LTR x20  Comment #1: supine SLR slump x20  Exercise #2: TA activation/pelvic tilt x10- progressed to leg extension  Comment #2: bridge x10  Exercise #3: quadriped leg extension x10-15 each leg  Comment #3: Trunk rotation with L 3 band x10 bre      Appt time: [id number]    Treatment Today     TREATMENT MINUTES COMMENTS   Evaluation     Self-care/ Home management     Manual therapy     Neuromuscular Re-education     Therapeutic Activity     Therapeutic Exercises 26 See flow sheet and above reviewed and progressed exercises. Patient is tolerating it well. Took objective measures  Added trunk rotation strengthening this session   Gait training     Modality__________________                Total 26    Blank areas are intentional and mean the treatment did not include these items. "       Afia Ulloa, PT, DPT  2/24/2020

## 2021-06-06 NOTE — TELEPHONE ENCOUNTER
Please call patient and notify him that I did place an order for medial branch block for the facet joints with L3-4 and L4-5 to see if he would be a candidate for radiofrequency ablation.  I see initially he did have the injections with Dr. Naidu back in 2017 however then saw Dr. Wolff in office and then had injections after with Dr. Wolff.    We can schedule the medial branch blocks if he would like with Dr. Naidu since he did have initial MBB with her in 2017, or Dr. Wolff if he prefers since he has seen him in clinic.

## 2021-06-06 NOTE — TELEPHONE ENCOUNTER
"Pt returned call. Recommendations given. Pt stated understanding and would like to proceed. Injection requirements reviewed. Pt does have listed \"Iodine-iodine containing\" allergy. He notes he has never had an issue with any of his injections and the medications used. Transferred pt to scheduling to make appt.   "

## 2021-06-06 NOTE — TELEPHONE ENCOUNTER
"Patient calling as he has completed the PT that was ordered. He is still wanting to move forward with an RF. The PT went well, but helped a little. I still have the pain in the back that I've had for years. At its best it is a 7/10 and at its worst it is a 9/10.\" Please advise.    See phone note of 1/30/20 for more information.   "

## 2021-06-06 NOTE — PROGRESS NOTES
"[facility] Daily Progress     Patient Name: Barry Jay  Date: 2/17/2020  Visit #: 2  PTA visit #:    Referral Diagnosis: Lumbar spine pain  Referring provider: Osmel Iniguez MD  Visit Diagnosis:     ICD-10-CM    1. Chronic bilateral low back pain without sciatica M54.5     G89.29    2. Generalized muscle weakness M62.81        Barry Jay is a 64 y.o. male who presents to therapy today with chief complaints of chronic low back pain. Onset date of sx was about 10 years ago.  Pt reported h/o sciatica that started about a month ago, that has relieved and is no longer present.  Pain symptoms are consistent and nagging, localized to the mid low back.  Functional impairments include standing longer than 5-10min, lifting, walking long, etc.  Pt demo's signs and sx consistent with chronic low back pain. Patient is hoping to attend physical therapy prior to getting ablation.  Treatment was initiated focusing on core strengthening and mobility. Patient is motivated and willing to participate in physical therapy treatment.  The POC is dynamic and will be modified on an ongoing basis.  No data recorded    Assessment:     HEP/POC compliance is  good .  Patient is benefitting from skilled physical therapy and is making steady progress toward functional goals.  Patient is appropriate to continue with skilled physical therapy intervention, as indicated by initial plan of care.  Patient no longer has pain free range of motion, still rating pain a 6/10 but noting that it is \"not bad at all\". Patient is tolerating strengthening exercises well and is progressing through therapy.    Goal Status:  Pt. will demonstrate/verbalize independence in self-management of condition in : 4 weeks  Pt. will be independent with home exercise program in : 4 weeks    Pt will: improve JEWEL score by 6 points or more to show significant improvement in functional activity not being limited by low back pain, within 4 weeks.  Pt " "will: report being able to tolerate standing for 30min or more without having to sit due to increase in back pain, within 4 weeks.      Plan / Patient Education:     Continue with initial plan of care.  Progress with home program as tolerated.     Plan for next visit: progress exercises (patient only wants a few so progress ones given add leg or arm extension to quadriped and progress core strengthening), plank, rotation strengthening for golf  Subjective:     Pain Rating: \"not bad\" maybe a 5-6    Patient reports that he is doing pretty good. He was sore from the exercise for the core but is doing good. Feels as if the exercises make him better for a bit. Did exercises every day last week, not the weekend.     Objective:     No pain with lumbar flexion, extension or rotation on this date    Hypomobility in the lumbar spine      Exercise #1: LTR x20  Comment #1: supine SLR slump x20  Exercise #2: TA activation/pelvic tilt x10- progressed to leg extension  Comment #2: bridge x10  Exercise #3: quadriped leg extension x10-15 each leg      Appt time: [id number]    Treatment Today     TREATMENT MINUTES COMMENTS   Evaluation     Self-care/ Home management     Manual therapy 10 S/l lumbar mobilizations grade II III, decrease in pain from 6 to 3-4 upon completition   Neuromuscular Re-education     Therapeutic Activity     Therapeutic Exercises 14 See flow sheet and above reviewed and progressed exercises. Patient is tolerating it well.   Gait training     Modality__________________                Total 24    Blank areas are intentional and mean the treatment did not include these items.       Afia Ulloa, PT, DPT  2/17/2020    "

## 2021-06-06 NOTE — PATIENT INSTRUCTIONS - HE
Please complete your pain diary and return the diary to the [facility] at your earliest convenience.  The [facility] Center will contact you to schedule your next appointment after your pain diary is reviewed by your doctor.  Thank you.    DISCHARGE INSTRUCTIONS    During office hours (8:00 a.m.- 4:00 p.m.) questions or concerns may be answered  by calling Spine Center Navigation Nurses at  [phone].  Messages received after hours will be returned the following business day.      In the case of an emergency, please dial 911 or seek assistance at the nearest Emergency Room/Urgent Care facility.     All Patients:  ? You may experience an increase in your symptoms for the first 2 days, once the numbing medication wears off.    ? You may resume your regular medication, no pain medication until you have completed your diary    ? You may shower. No swimming, tub bath or hot tub for 24 hours; remove bandage after 4 hours    ? Continue your activities that can cause you pain to test the blocks.                         ? You should not drive for the next 3 to 5 hours (have someone drive you)           POSSIBLE PROCEDURE SIDE EFFECTS  -Call Spine Center if concerned-    Increased Pain  Increased numbness/tingling     Nausea/Vomiting  Bruising/bleeding at site (hematoma)             Swelling at site (edema) Headache  Difficulty walking  Infection        Fever greater than 100.5

## 2021-06-07 NOTE — TELEPHONE ENCOUNTER
The patient was called today to let him know that his lumbar medial branch blocks have been recommended to be rescheduled out until July (currently he was scheduled for later in May 2020).  The patient says that his pain is manageable and he is okay rescheduling these injections for July.  He is encouraged to contact the clinic at any time if his pain significantly worsens, so that other treatment options can be discussed with him.  Nurse navigation phone number was provided today.  He verbalized understanding.

## 2021-06-07 NOTE — TELEPHONE ENCOUNTER
Pt was called to let him know that Claudia Baptiste had a chance to look over his 1st set of blocks, and she would like him to move forward with having Dr. Naidu do Confirmatory Bilat. L2 L3 L4 MBB's, but scheduled 4 wks out.  Pt was in complete agreement with this plan.  Msg was forwarded to Scheduling.

## 2021-06-07 NOTE — PROGRESS NOTES
[facility] Discharge Summary  Patient Name: Barry Jay  Date: 4/9/2020  Referral Diagnosis: Lumbar spine pain  Referring provider: Osmel Iniguez MD  Visit Diagnosis:     ICD-10-CM    1. Chronic bilateral low back pain without sciatica  M54.5     G89.29    2. Generalized muscle weakness  M62.81        Goals:  Pt. will demonstrate/verbalize independence in self-management of condition in : 4 weeks  Pt. will be independent with home exercise program in : 4 weeks    Pt will: improve JEWEL score by 6 points or more to show significant improvement in functional activity not being limited by low back pain, within 4 weeks.  Pt will: report being able to tolerate standing for 30min or more without having to sit due to increase in back pain, within 4 weeks.      Patient was seen for three visits from 2/10 to 2/24 with no missed appointments.  The patient attended therapy initially, but did not finish the therapy sessions prescribed.  Goals were not fully achieved.     Home exercise program given to patient:  Exercise #1: LTR x20  Comment #1: supine SLR slump x20  Exercise #2: TA activation/pelvic tilt x10- progressed to leg extension  Comment #2: bridge x10  Exercise #3: quadriped leg extension x10-15 each leg  Comment #3: Trunk rotation with L 3 band x10 bre      Therapy will be discontinued at this time.  The patient will need a new referral to resume.    Thank you for your referral.  Afia Ulloa, PT, DPT  4/9/2020  9:44 AM

## 2021-06-09 NOTE — TELEPHONE ENCOUNTER
Patient contacted to inform him that Claudia Baptiste recommends that he schedule a follow-up visit to complete an exam and discuss care plan recommendations prior to scheduling a [id number] call with is insurance company regarding the radiofrequency PA denial.    The patient is in agreement with the plan and states he will call the clinic on 7/27/20 to schedule the appointment with her.

## 2021-06-09 NOTE — PATIENT INSTRUCTIONS - HE
Please complete your pain diary and return the diary to the Spine Center at your earliest convenience.  The Sp[facility] Center will contact you to schedule your next appointment after your pain diary is reviewed by your doctor.  Thank you.    DISCHARGE INSTRUCTIONS    During office hours (8:00 a.m.- 4:00 p.m.) questions or concerns may be answered  by calling Sp[facility] Center Navigation Nurses at  [phone].  Messages received after hours will be returned the following business day.      In the case of an emergency, please dial 911 or seek assistance at the nearest Emergency Room/Urgent Care facility.     All Patients:  ? You may experience an increase in your symptoms for the first 2 days, once the numbing medication wears off.    ? You may resume your regular medication, no pain medication until you have completed your diary    ? You may shower. No swimming, tub bath or hot tub for 24 hours; remove bandage after 4 hours    ? Continue your activities that can cause you pain to test the blocks.                         ? You should not drive for the next 3 to 5 hours (have someone drive you)           POSSIBLE PROCEDURE SIDE EFFECTS  -Call Spine Center if concerned-    Increased Pain  Increased numbness/tingling     Nausea/Vomiting  Bruising/bleeding at site (hematoma)             Swelling at site (edema) Headache  Difficulty walking  Infection        Fever greater than 100.5

## 2021-06-10 NOTE — PATIENT INSTRUCTIONS - HE
~Please call Nurse Navigation line [phone] with any questions or concerns about your treatment plan, if symptoms worsen and you would like to be seen urgently, or if you have problems controlling bladder and bowel function.

## 2021-06-10 NOTE — TELEPHONE ENCOUNTER
Saundra called back stating that I should call the appeal's department at [phone]. The appeal letter can be fax to [phone]. However, to call this department first to be given to right info for hte appeal.

## 2021-06-10 NOTE — TELEPHONE ENCOUNTER
Saundra with [employer] called and left me a VM. She coordinates all the [id number] review appointments and does have 8/6 at 10 AM available but needing more info like the provider best contact number. Also she would like to let us know that the denial date on the letter was 7/17/20 and they have a 14 day mary period for reconsidering. However, after the 14 days then the [id number] is only be a discussion about why it was denied. Maybe an appeal would be something we would probably want. Either way to call her back at [phone].

## 2021-06-10 NOTE — TELEPHONE ENCOUNTER
Left VM for [employer] to request for a [id number] review. To call me back to schedule a peer-to peer for possibly Thursday, 8/6/20 at 10 AM or 2 PM with Claudia Baptiste.

## 2021-06-10 NOTE — TELEPHONE ENCOUNTER
I called and spoke with Gloria at the [facility] appeal's department. I was instructed to log onto [email or web address] and to fill out the appeal form then fax to [phone] along with any medical records.

## 2021-06-10 NOTE — TELEPHONE ENCOUNTER
I called back and left Saundra a message to return my call. Claudia would like to possible change the status of the denial on 7/17/20 so sounds like the [id number] wouldn't be the best option for us and maybe doing an appeal is better. On the denial letter from 7/17/20 it noted that another letter with additional info about this decision and full appeal rights will be mailed but I have not receive anything else. I would like to talk with Saundra about the appeal process if this is something she deals with too. Call back number is [phone].

## 2021-06-10 NOTE — PROGRESS NOTES
Assessment:     Diagnoses and all orders for this visit:    Chronic bilateral low back pain without sciatica    Lumbar facet arthropathy    Facet syndrome, lumbar    Spondylolisthesis of lumbar region       Barry Jay is a 65 y.o. y.o. male with past medical history significant for hypertension, dyslipidemia, hand osteoarthritis, arthralgia who presents today for follow-up regarding:    -Chronic bilateral low back pain at the belt line most consistent with facet mediated pain.  Positive response with medial branch block.    -Previous sciatica/radicular pain has resolved.     Plan:     A shared decision making plan was used. The patient's values and choices were respected. Prior medical records from 1/17/2020 to current were reviewed today. The following represents what was discussed and decided upon by the provider and the patient.        -DIAGNOSTIC TESTS: Images were personally reviewed and interpreted.   --Lumbar spine MRI 1/24/2020 [facility] with advanced facet arthropathy L4-5 right greater than left and facet arthropathy L3-4 moderate bilaterally.  Grade 1 spondylolisthesis L4-5 with right greater than left L5 impingement.  --Lumbar spine flexion-extension x-ray 2/3/2020 was 0.6 cm spondylolisthesis L4-5, no instability noted.  --[facility] lumbar spine MRI 1/17/2019 shows severe facet arthropathy L4-5 with 5 mm spondylolisthesis, mild central and subarticular recess stenosis, mild right foraminal stenosis.  Mild facet arthropathy L3-4 and L5-S1.    -INTERVENTIONS: Recommend moving forward with bilateral L2, L3, L4 radiofrequency ablation however this was denied by insurance therefore we will conduct a peer to peer to see if we get this approved.  *Positive response with bilateral L2, L3, L4 MBB 3/11/2020 and 7/7/2020.  Prior to that short-term relief with facet joint steroid injection only.  Patient does have advanced facet arthropathy at L3-4 and L4-5 and pain localizes to this area with increased pain with facet  loading on exam.  *Patient has had positive response from radiofrequency ablation in 2011 as well as 2013 with relief on the latter for 5 years.    -MEDICATIONS: No change in medications advised patient to continue with Celebrex as needed.  Discussed side effects of medications and proper use. Patient verbalized understanding.    -PHYSICAL THERAPY: Encourage patient to continue with home exercises on a regular basis which she is doing at this time.  He had 3 physical therapy sessions in February 2020 and was discharged from therapy at that time and told to continue with home exercises.  Discussed the importance of core strengthening, ROM, stretching exercises with the patient and how each of these entities is important in decreasing pain.  Explained to the patient that the purpose of physical therapy is to teach the patient a home exercise program.  These exercises need to be performed every day in order to decrease pain and prevent future occurrences of pain.        -PATIENT EDUCATION:  20 minutes of total visit time was spent face to face with the patient today, 60 % of the visit was spent on counseling, education, and coordinating care.   -5 minutes spent outside of visit time, non-face-to-face time, reviewing chart.  -Today we also discussed the issues related to the current COVID-19 pandemic, the pros and cons of the current treatment plan, the CDC guidelines such as social distancing, washing the hands, and covering the cough.    -FOLLOW UP:  Follow-up as needed, will conduct peer to peer at this time.  Advised to contact clinic if symptoms worsen or change.    Subjective:     Barry Jay is a 65 y.o. male who presents today for follow-up regarding chronic recurrent bilateral low back pain at the belt line slightly worse on the right however fairly equal bilaterally most of the time.  He does report previous leg pain in January 2020 had resolved and his back pain is his biggest concern today.  Patient  denies any lower extremity pain, denies numbness or tingling sensations, denies lower extremity weakness, denies recent trips or falls or balance changes, denies bowel or bladder loss control.  Patient does report that his medial branch block specifically the second 1 was significantly helpful for his back pain and he would like to move forward with radiofrequency ablation.  He reports at this time he has done everything he was told in regards to getting an updated MRI, physical therapy as well as the medial branch blocks and would like to pursue radiofrequency ablation however it was recently denied by insurance.    Treatment to Date: No prior spinal..  Physical therapy [facility] x3 sessions.  Patient continues with home exercises at this time.     History of bilateral L4-5 RFA [facility] Dr. Green 2011 and 2013, relief at least 2 years.  Bilateral L3-4 MBB 6/30/2017.  Preprocedure pain 7/10 post 2/10.  Bilateral L4-5 facet joint steroid injection 7/7/2017 with relief.  Preprocedure pain 8/10, post 5/10.  Right L4-5 and L5-S1 TFESI 1/6/2020 with 30% lasting benefit only.  Preprocedure pain 10/10, post 7/10.  Bilateral L2, L3, L4 MBB 3/11/2020 and 7/7/2020 with positive response.    Patient Active Problem List   Diagnosis     Dyslipidemia     Hypertension     Diffuse Joint Pains (Arthralgias)     Joint Pain, Localized In The Wrist     Joint Pain, Localized In The Knee     Generalized Osteoarthritis Of The Hand     Osteoarthritis Of The Knee     Arthralgias In Multiple Sites       Current Outpatient Medications on File Prior to Encounter   Medication Sig Dispense Refill     celecoxib (CELEBREX) 200 MG capsule Take 1 capsule by mouth as needed.  3     ALPRAZolam (XANAX) 0.5 MG tablet TAKE 1 TABLET BY MOUTH ONCE A DAY AS NEEDED ORALLY  0     atorvastatin (LIPITOR) 10 MG tablet TAKE ONE TABLET BY MOUTH ONCE DIALY  3     irbesartan (AVAPRO) 150 MG tablet TAKE 1 TABLET BY MOUTH ONCE A DAY FOR BLOOD PRESSURE  1      irbesartan-hydrochlorothiazide (AVALIDE) 300-12.5 mg per tablet Take 1 tablet by mouth daily. for blood pressure  1     omeprazole (PRILOSEC) 20 MG capsule TAKE ONE CAPSULE BY MOUTH EVERY DAY FOR STOMACH  1     ondansetron (ZOFRAN ODT) 4 MG disintegrating tablet Take 1 tablet (4 mg total) by mouth every 8 (eight) hours as needed. 12 tablet 0     PARoxetine (PAXIL) 20 MG tablet TAKE 1 TABLET BY MOUTH ONCE DAILY FOR MOOD.  3     zolpidem (AMBIEN) 10 mg tablet Take 1 tablet by mouth as needed.  2     [DISCONTINUED] gabapentin (NEURONTIN) 100 MG capsule Take 300 mg by mouth 3 (three) times a day. Follow Gabapentin Dosing chart given 270 capsule 3     [DISCONTINUED] predniSONE (DELTASONE) 10 mg tablet pack Take by mouth daily. Three 10 mg tablets daily for 2 days, then two 10 mg tablets for 2 days, then one 10 mg tablet for 2 days. 12 tablet 0     No current facility-administered medications on file prior to encounter.        Allergies   Allergen Reactions     Iodine-Iodine Containing        Past Medical History:   Diagnosis Date     HTN (hypertension)      Hyperlipidemia         Review of Systems  ROS:  Specifically negative for bowel/bladder dysfunction, balance changes, headache, dizziness, foot drop, fevers, chills, appetite changes, nausea/vomiting, unexplained weight loss. Otherwise 13 systems reviewed are negative. Please see the patient's intake questionnaire from today for details.    Reviewed Social, Family, Past Medical and Past Surgical history with patient, no significant changes noted since prior visit.     Objective:     /66 (Patient Site: Right Arm, Patient Position: Sitting)     PHYSICAL EXAMINATION:    --CONSTITUTIONAL: Well developed, well nourished, healthy appearing individual.  --PSYCHIATRIC: Appropriate mood and affect. No difficulty interacting due to temper, social withdrawal, or memory issues.  --SKIN: Lumbar region is dry and intact.   --RESPIRATORY: Normal rhythm and effort. No abnormal  accessory muscle breathing patterns noted.   --MUSCULOSKELETAL:  Normal lumbar lordosis noted, no lateral shift.  --GROSS MOTOR: Easily arises from a seated position. Gait is non-antalgic  --LUMBAR SPINE:  Inspection reveals no evidence of deformity. Range of motion is not limited in lumbar flexion, increased pain with lumbar extension and lateral rotation simultaneously bilaterally right greater than left. No tenderness to palpation lumbar spine. Straight leg raising in the supine position is negative to radicular pain. Sciatic notch non-tender.   --SACROILIAC JOINT: Negative distraction.  Negative Antonio's with reproduction of pain to affected extremity. One Finger point test negative.  --LOWER EXTREMITY MOTOR TESTING:  Plantar flexion left 5/5, right 5/5   Dorsiflexion left 5/5, right 5/5   Great toe MTP extension left 5/5, right 5/5  Knee flexion left 5/5, right 5/5  Knee extension left 5/5, right 5/5   Hip flexion left 5/5, right 5/5  Hip abduction left 5/5, right 5/5  Hip adduction left 5/5, right 5/5   --HIPS: Full range of motion bilaterally. Negative FABERs on the involved lower extremity.   --NEUROLOGIC: Bilateral patellar and achilles reflexes are physiologic and symmetric. Sensation to light touch is intact in the bilateral L4, L5, and S1 dermatomes.    RESULTS:   Imaging: Lumbar spine imaging was reviewed today. The images were shown to the patient and the findings were explained using a spine model.      XR LUMBAR SPINE FLEX AND EXT 2 OR 3 VWS  LOCATION: [facility]  DATE/TIME: 2/3/2020   INDICATION: Flexion, extension, and neutral views please to evaluate spondylolisthesis stability at L4-L5.  COMPARISON: 04/26/2018 CT abdomen/pelvis.  IMPRESSION:   In the upright position there is approximately 0.6 cm degenerative anterolisthesis of L4 on L5. There is no change with flexion or extension to suggest segmental instability.  The vertebral body heights are preserved. Mild intervertebral disc  degeneration at L3-L4, L4-L5 and L5-S1. Moderate/severe facet hypertrophic changes at L4-L5 and L5-S1.  There are vascular calcifications. Abdominal surgical clips.      MR LUMBAR SPINE WITHOUT CONTRAST  At [facility]-1/24/2020  CLINICAL INFORMATION: 64-year-old male with right low back and leg pain.  COMPARISON: Lumbar spine MRI dated January 17, 2019.  TECHNICAL INFORMATION: Sagittal T2, sagittal T1, sagittal STIR, axial T2, and axial T1-weighted MR images of the lumbar spine in the neutral, seated position on an open 0.6 Teresa magnet.  CONTRAST: None.  SEDATION: None.  INTERPRETATION: Transitional lumbosacral anatomy with partially sacralized L5, mild lumbar levocurvature centered at L3, and shallow chronic Scheuermann-type endplate Schmorl's nodes at T10-11 and T11-12 without spondylolysis, vertebral collapse, acute fracture, or destructive osseous lesion. Normal pre and paravertebral soft tissues. Enlarged prostate gland measuring at least 5 cm AP (series 2, image 7).  Conus medullaris positioned at upper L2, with normal configuration of the terminal nerve roots.  L5-S1: Hypoplastic disc and facets with mild facet degeneration on the left, an accessory articulation on the left, and no stenosis or impingement.  L4-5: Moderate disc degeneration, 3 mm spondylolisthesis, bilateral foraminal annular tears, diffuse annular bulge, advanced right/moderate left facet degeneration, moderate right greater than left subarticular stenosis with L5 nerve root impingement, and mild to moderate right/mild left foraminal stenosis with right L4 nerve root encroachment.  L3-4: Mild disc degeneration, 1-2 mm spondylolisthesis, mild diffuse annular bulge, moderate left facet degeneration, mild left subarticular stenosis, and mild bilateral foraminal stenosis.  L2-3: Mild degenerative disc desiccation with preserved disc height, mild bilateral foraminal annular bulge, normal facet morphology, and no stenosis or impingement.  L1-2 and  T12-L1: Normal dorsal disc contours and normal morphology.  T11-12: Mild disc degeneration, 1 mm spondylolisthesis, normal facet morphology, and no stenosis or impingement.  No change from January 17, 2019.  CONCLUSION: Multilevel lumbar degenerative changes and mild levocurvature with specific findings as follows:  1. L4-5 subarticular impingement of the right greater than left L5 nerve roots in the setting of grade 1 degenerative spondylolisthesis. L3-4 mild left subarticular stenosis without impingement.  2. Foraminal stenosis, mild to moderate on the right at L4-5 with right L4 nerve root encroachment. Mild on the left at L4-5 and bilaterally at L3-4.  3. Facet degeneration, advanced on the right at L4-5. Moderate on the left at L4-5 and L3-4. Mild on the left at L5-S1.  4. No spondylolysis, vertebral collapse, acute fracture, or destructive osseous lesion.  5. Transitional lumbosacral anatomy with partially sacralized L5.  6. Enlarged prostate gland.         MRI LUMBAR SPINE WITHOUT CONTRAST  [facility]- 1/17/19  CLINICAL INFORMATION: 63-year-old man with low back pain.  TECHNICAL INFORMATION: MRI lumbar spine was obtained on 0.6 Teresa open upright magnet including sagittal T2, sagittal T1, sagittal STIR, axial T2 and axial T1-weighted images.  INTERPRETATION: There is normal lordotic curvature of the lumbar spine. No marrow edema within the lumbar vertebral bodies. No loss of vertebral body height. The conus is at the L1 level and is normal in appearance.  L5-S1: Transitional L5 segment with left L5 transverse process articulating with the sacrum. Mild developmental narrowing of the disc space. Mild bilateral facet arthropathy. No central spinal canal or foraminal stenosis.  L4-5: Mild degenerative disc disease with 5 mm degenerative spondylolisthesis and disc bulge. Severe bilateral facet arthropathy with ligamentum flavum thickening contributes to mild central spinal canal stenosis and subarticular recess narrowing  with encroachment upon the traversing L5 nerve roots. Moderate right foraminal stenosis.  L3-4: Mild degenerative disc disease and disc bulge. Mild bilateral facet arthropathy. Mild subarticular recess narrowing. The ganglia exit without compromise.  L2-3 through T12-L1: No focal disc herniation, central spinal canal stenosis or neural impingement.  T11-12 and T10-11: Moderate degenerative disc disease with Schmorl's nodes and endplate irregularities. No cord deformity or central spinal canal stenosis.  CONCLUSION:  1. L5-S1 transitional L5 segment with left transverse process articulating with the sacrum. Mild developmental narrowing of the disc space. Mild bilateral facet arthropathy.  2. L4-5 grade 1 spondylolisthesis with disc bulge. Severe facet arthropathy and ligamentum flavum thickening with mild central spinal canal and subarticular recess stenosis with encroachment upon the traversing L5 nerve roots. Moderate right foraminal stenosis.  3. Mild degenerative disc disease with disc bulge. Mild facet arthropathy and mild subarticular recess narrowing.

## 2021-06-11 ENCOUNTER — RECORDS - HEALTHEAST (OUTPATIENT)
Dept: LAB | Facility: CLINIC | Age: 66
End: 2021-06-11

## 2021-06-11 LAB
ALBUMIN SERPL-MCNC: 4.2 G/DL (ref 3.5–5)
ALP SERPL-CCNC: 122 U/L (ref 45–120)
ALT SERPL W P-5'-P-CCNC: 89 U/L (ref 0–45)
ANION GAP SERPL CALCULATED.3IONS-SCNC: 14 MMOL/L (ref 5–18)
AST SERPL W P-5'-P-CCNC: 57 U/L (ref 0–40)
BASOPHILS # BLD AUTO: 0.1 THOU/UL (ref 0–0.2)
BASOPHILS NFR BLD AUTO: 1 % (ref 0–2)
BILIRUB SERPL-MCNC: 0.7 MG/DL (ref 0–1)
BUN SERPL-MCNC: 12 MG/DL (ref 8–22)
CALCIUM SERPL-MCNC: 9.3 MG/DL (ref 8.5–10.5)
CHLORIDE BLD-SCNC: 106 MMOL/L (ref 98–107)
CHOLEST SERPL-MCNC: 139 MG/DL
CO2 SERPL-SCNC: 18 MMOL/L (ref 22–31)
CREAT SERPL-MCNC: 1.05 MG/DL (ref 0.7–1.3)
EOSINOPHIL # BLD AUTO: 0.2 THOU/UL (ref 0–0.4)
EOSINOPHIL NFR BLD AUTO: 2 % (ref 0–6)
ERYTHROCYTE [DISTWIDTH] IN BLOOD BY AUTOMATED COUNT: 13.2 % (ref 11–14.5)
FASTING STATUS PATIENT QL REPORTED: ABNORMAL
GFR SERPL CREATININE-BSD FRML MDRD: >60 ML/MIN/1.73M2
GLUCOSE BLD-MCNC: 88 MG/DL (ref 70–125)
HCT VFR BLD AUTO: 46.2 % (ref 40–54)
HDLC SERPL-MCNC: 27 MG/DL
HGB BLD-MCNC: 16 G/DL (ref 14–18)
IMM GRANULOCYTES # BLD: 0 THOU/UL
IMM GRANULOCYTES NFR BLD: 0 %
LDLC SERPL CALC-MCNC: 74 MG/DL
LYMPHOCYTES # BLD AUTO: 3.5 THOU/UL (ref 0.8–4.4)
LYMPHOCYTES NFR BLD AUTO: 34 % (ref 20–40)
MCH RBC QN AUTO: 31.1 PG (ref 27–34)
MCHC RBC AUTO-ENTMCNC: 34.6 G/DL (ref 32–36)
MCV RBC AUTO: 90 FL (ref 80–100)
MONOCYTES # BLD AUTO: 1.1 THOU/UL (ref 0–0.9)
MONOCYTES NFR BLD AUTO: 10 % (ref 2–10)
NEUTROPHILS # BLD AUTO: 5.5 THOU/UL (ref 2–7.7)
NEUTROPHILS NFR BLD AUTO: 53 % (ref 50–70)
PLATELET # BLD AUTO: 222 THOU/UL (ref 140–440)
PMV BLD AUTO: 13.3 FL (ref 8.5–12.5)
POTASSIUM BLD-SCNC: 3.9 MMOL/L (ref 3.5–5)
PROT SERPL-MCNC: 7.6 G/DL (ref 6–8)
PSA SERPL-MCNC: 5.5 NG/ML (ref 0–4.5)
RBC # BLD AUTO: 5.15 MILL/UL (ref 4.4–6.2)
SODIUM SERPL-SCNC: 138 MMOL/L (ref 136–145)
TRIGL SERPL-MCNC: 189 MG/DL
WBC: 10.4 THOU/UL (ref 4–11)

## 2021-06-11 NOTE — PROGRESS NOTES
ASSESSMENT:     Diagnoses and all orders for this visit:    Lumbalgia  -     OPS Medial Branch Block Bilateral; Future; Expected date: 6/26/17  -     cyclobenzaprine (FLEXERIL) 5 MG tablet; Take 1 tablet at qhs for muscle spasm.  Dispense: 30 tablet; Refill: 0    Facet arthropathy, lumbosacral  -     OPS Medial Branch Block Bilateral; Future; Expected date: 6/26/17  -     cyclobenzaprine (FLEXERIL) 5 MG tablet; Take 1 tablet at qhs for muscle spasm.  Dispense: 30 tablet; Refill: 0    Myofascial pain  -     OPS Medial Branch Block Bilateral; Future; Expected date: 6/26/17  -     cyclobenzaprine (FLEXERIL) 5 MG tablet; Take 1 tablet at qhs for muscle spasm.  Dispense: 30 tablet; Refill: 0            Barry Jay is a 62 y.o. male  with a BMI of Body mass index is 32.45 kg/(m^2). who presents today in consultation at the request of Osmel Mendez MD  for new patient evaluation of chronic bilateral low back pain.  Patient reports improvement in his symptoms in the past with radiofrequency to the L4-L5 facets bilaterally.  He had first procedure was done back in 2011, second procedure in 2013 at [facility] by Dr. Green.  Second procedure of the radiofrequency provided him with at least 2 years relief.  Patient would like to proceed with radiofrequency to the L4-L5 facets.  I thoroughly explained to  the patient that since his last procedure was back in 2013 that we will proceed with medial branch block to the L3, L4 targeting the L4-L5 facets bilaterally.  I discussed with the patient thoroughly that proceeding with the third radiofrequency might not give him the expected results of pain relief.  I thoroughly discussed the benefits of facet steroid injection as well.  Patient verbalizes understanding.  CT of the lumbar spine done back in 2010 shows facet arthropathy at the L4-L5 facets bilaterally.           JEWEL:  26  WHO-5:  18    PLAN:  A shared decision making model was used.  The patient's values  and choices were respected.  The following represents what was discussed and decided upon by the physician and the patient.      1.  DIAGNOSTIC TESTS: I reviewed the CT scan results of the lumbar spine with the patient today.  2.  PHYSICAL THERAPY:  Discussed the importance of core strengthening, ROM, stretching exercises with the patient and how each of these entities is important in decreasing pain.  Explained to the patient that the purpose of physical therapy is to teach the patient a home exercise program.  These exercises need to be performed every day in order to decrease pain and prevent future occurrences of pain.  Likened it to brushing one's teeth.  Patient will start on physical therapy - [facility] program.   3.  MEDICATIONS: Patient will start on Flexeril 5 mg at nighttime.  4.  INTERVENTIONS: Medial branch block L3, L4 targeting the L4-L5 facets bilaterally in preparation for radiofrequency procedure if indicated..   5.  PATIENT EDUCATION: I thoroughly discussed the plan with the patient today.  He verbalizes understanding and agrees with the plan.      FOLLOW-UP:   Patient will follow up with me in 2 weeks after procedure. All questions were answered.      FABRICIO Case, MPA-C          SUBJECTIVE:  Barry Jay  Is a 62 y.o. male who presents today for new patient evaluation of low back pain.  Patient reports bilateral low back pain for years.  He had received radiofrequency treatment at [facility] by Dr. Norma falcon in 2011, and 2013.  Patient stated that the first procedure provided him with a limited pain relief however the second procedure  2013 provided him with at least 2 years of pain relief.  Is interested in repeating the procedure to help with his symptoms.  Patient stated that he noticed reappearance of his symptoms back in 2015 and went to the [facility] however due to insurance issues he was not able to receive the treatment there.    Today patient reports 7 out of 10  intensity dull ache pain at the lower lumbar spine bilaterally..  His symptoms are aggravated by standing, walking, sitting for too long and rates it at 10 out of 10 intensity on its worst.  His symptoms are alleviated by taking Tylenol as needed for pain.  Patient denies recent history of trauma to the lower back.  He denies history of back surgeries.  Patient denies urinary or bowel incontinence, unintentional weight loss, saddle anesthesia, fever or chills, balance difficulties.      Medications:    Current Outpatient Prescriptions   Medication Sig Dispense Refill     atorvastatin (LIPITOR) 10 MG tablet TAKE ONE TABLET BY MOUTH ONCE DIALY  3     betamethasone valerate (VALISONE) 0.1 % ointment APPLY A THIN LAYER TO AFFECTED AREA(S) 2 TIMES DAILY*REPLACES CLOBETASOL*  0     EDARBI 40 mg Tab tablet        PARoxetine (PAXIL) 20 MG tablet TAKE 1 TABLET BY MOUTH ONCE DAILY FOR MOOD.  3     cyclobenzaprine (FLEXERIL) 5 MG tablet Take 1 tablet at qhs for muscle spasm. 30 tablet 0     No current facility-administered medications for this encounter.        Allergies:   Allergies   Allergen Reactions     Iodine-Iodine Containing        PMH:  No past medical history on file.    PSH:  No past surgical history on file.    Family History:  No family history on file.    Social History:   Social History     Social History     Marital status: Single     Spouse name: N/A     Number of children: N/A     Years of education: N/A     Occupational History     Not on file.     Social History Main Topics     Smoking status: Current Every Day Smoker     Smokeless tobacco: Not on file     Alcohol use Not on file     Drug use: Not on file     Sexual activity: Not on file     Other Topics Concern     Not on file     Social History Narrative     No narrative on file       ROS: Bilateral low back pain.  Specifically negative for bowel/bladder dysfunction, fevers,chills, appetite changes, unexplained weight loss.   Otherwise 13 systems reviewed  are negative.  Please see the patient's intake questionnaire from today for details.      OBJECTIVE:  PHYSICAL EXAMINATION:    CONSTITUTIONAL:  Vital signs as above.  No acute distress.  The patient is well nourished and well groomed.  PSYCHIATRIC:  The patient is awake, alert, oriented to person, place, time and answering questions appropriately with clear speech.    SKIN:  Skin over the face, bilateral lower extremities, and posterior torso is clean, dry, intact without rashes.    GAIT:  Gait is non-antalgic.  The patient is able to heel and toe walk without significant difficulty.    STANDING EXAMINATION: Tenderness present at the L4-L5 bilaterally.  MUSCLE STRENGTH:  The patient has 5/5 strength for the bilateral hip flexors, knee flexors/extensors, ankle dorsiflexors/plantar flexors, great toe extensors, ankle evertors/invertors.  NEUROLOGICAL:  2/4 patellar, medial hamstring, and achilles reflexes bilaterally.  Negative Babinski's bilaterally.  No ankle clonus bilaterally. Sensation to light touch is intact in the bilateral L4, L5, and S1 dermatomes.  VASCULAR:  2/4  pulses bilaterally.  Bilateral lower extremities are warm.  There is no pitting edema of the bilateral lower extremities.  ABDOMINAL:  Soft, non-distended, non-tender throughout all quadrants.  No pulsatile mass palpated in the left lower quadrant.  LYMPH NODES:  No palpable or tender inguinal/popliteal lymph nodes.  MUSCULOSKELETAL: Negative straight leg raise bilaterally.  Negative Zachariah test bilaterally.  Negative hip impingement bilaterally.  Facet loading maneuver exacerbates pain at the L4-L5 facets.      RESULTS:      CT of the lumbar spine done on April 14 of 2010 shows moderate to advanced facet arthropathy at the L4-L5.  Please refer to media for a full report of the CT scan done at [facility].

## 2021-06-11 NOTE — TELEPHONE ENCOUNTER
Coronavirus (COVID-19) Notification    Reason for call  Notify of POSITIVE  COVID-19 lab result, assess symptoms,  review [facility] recommendations    Lab Result   Lab test for 2019-nCoV rRt-PCR or SARS-COV-2 PCR  Oropharyngeal AND/OR nasopharyngeal swabs were POSITIVE for 2019-nCoV RNA [OR] SARS-COV-2 RNA (COVID-19) RNA     We have been unable to reach Patient by phone at this time to notify of their Positive COVID-19 result.  Left voicemail message requesting a call back to [phone] [facility] for results.        POSITIVE COVID-19 Letter sent.    [Name]  Dot Mims RN  [facility]  COVID19 Results Team RN  Ph# [phone]

## 2021-06-12 NOTE — PATIENT INSTRUCTIONS - HE
Follow-up visit with Claudia Baptiste CNP in 4 weeks to discuss procedure outcome and determine care plan going forward.       DISCHARGE INSTRUCTIONS    During office hours (8:00 a.m.- 4:00 p.m.) questions or concerns may be answered  by calling [facility] Navigation Nurses at  [phone].  Messages received after hours will be returned the following business day.      In the case of an emergency, please dial 911 or seek assistance at the nearest Emergency Room/Urgent Care facility.     All Patients:    ? You may experience an increase in your symptoms for the first 2 days (It may take anywhere between 2 days- 2 weeks for the steroid to have maximum effect).    ? You may use ice on the injection site, as frequently as 20 minutes each hour if needed.    ? You may take your pain medicine.    ? You may continue taking your regular medication after your injection. If you have had a Medial Branch Block you may resume pain medication once your pain diary is completed.    ? You may shower. No swimming, tub bath or hot tub for 48 hours.  You may remove your bandaid/bandage as soon as you are home.    ? You may resume light activities, as tolerated.    ? Resume your usual diet as tolerated.    ? It is strongly advised that you do not drive for 1-3 hours post injection.    ? If you have had oral sedation:  Do not drive for 8 hours post injection.      ? If you have had IV sedation:  Do not drive for 24 hours post injection.  Do not operate hazardous machinery or make important personal/business decisions for 24 hours.      POSSIBLE STEROID SIDE EFFECTS (If steroid/cortisone was used for your procedure)    -If you experience these symptoms, it should only last for a short period      Swelling of the legs                Skin redness (flushing)       Mouth (oral) irritation     Blood sugar (glucose) levels              Sweats                      Mood changes    Headache    Sleeplessness         POSSIBLE PROCEDURE SIDE  EFFECTS  -Call the Spine Center if you are concerned    Increased Pain             Increased numbness/tingling        Nausea/Vomiting            Bruising/bleeding at site        Redness or swelling                                                Difficulty walking        Weakness             Fever greater than 100.5    *In the event of a severe headache after an epidural steroid injection that is relieved by lying down, please call the [facility] to speak with a clinical staff member*

## 2021-06-17 NOTE — PROGRESS NOTES
"Assessment/Plan:        Diagnoses and all orders for this visit:    Dyspnea on exertion    Tobacco use    Obesity    Physical deconditioning    63M with shortness of breath.  It is unlikely he has any primary pulmonary cause.  He is no evidence of COPD, asthma or interstitial lung disease on his pulmonary function tests and his chest x-ray is normal.  Because of his cigarette smoking he is at higher risk for cardiovascular disease.  We discussed these.  He has no pertinent positives that suggest a high risk of angina, but there is a possibility of shortness of breath is an anginal equivalent.      It is most likely that his symptoms are due to a combination of obesity and deconditioning.  His obesity will make his work of breathing much higher than this is combined by relative lack of physical activity.  Is able to go up the stairs without much limitation and he is breathing is primarily affected with more significant rates of exertion.    He artery has an exercise plan and will continue with this.  He will continue to work on weight control as well.  I recommended that he make a goal of losing 1-2 pounds per month and add more \"cardio\" to his exercise plan.  This is in addition to stretching and strengthening.    We discussed in detail his smoking.  We discussed strategies for quitting including nicotine replacement and medications like Chantix or Zyban.  At this point, due to significant stress of his job he thinks it is unlikely that he will be able to quit.  He is more interested in cutting down we discussed strategies for this as well.  Greater than 3 minutes spent on smoking cessation counseling regarding these issues today.    He will follow-up with his primary care doctor to discuss whether further cardiac evaluation will be helpful.        Subjective:    Patient ID: Barry Jay is a 63 y.o. male.    HPI  63-year-old man here with shortness of breath.  He first noticed it during some workouts a few months " ago.  His shortness of breath has been relatively stable since then.  It is worse with exertion.  It improves with rest.  There is no other clear provocative factors-it does not worsen with weather, exposures or other issues.  He has had chest cold in the past and it improved with some antibiotics and a short course of albuterol.  He is never had a problem with recurrent chest colds.  Symptoms localized to his chest.  Pertinent positives include weight gain over the last few years although he had some smaller amount of weight loss.  Pertinent negatives include no fever, no hemoptysis, no significant leg swelling.    He has been smoking for about 30 years. He smokes 10-20 cigarettes per day.  He has been cutting down a little bit.  He is quit for 10 months in the past.  He has some stress at work.    His weight has been going up and down.  Overall he feels better when he is less than 200 pounds.  He has been slowly gaining over the winter.    He does not do much physical activity at home.  He is on his feet about half time at work can go up and down stairs without much limitation.  He does not get much formal cardiovascular exercise.  Mostly stretching.      Review of Systems  Negative for 12 systems except as listed in HPI.        Objective:    Physical Exam   Constitutional: He is oriented to person, place, and time. He appears well-developed and well-nourished. No distress.   obese   HENT:   Head: Normocephalic.   Nose: Nose normal.   Mouth/Throat: Oropharynx is clear and moist. No oropharyngeal exudate.   Eyes: Pupils are equal, round, and reactive to light. Right eye exhibits no discharge. Left eye exhibits no discharge. No scleral icterus.   Neck: Normal range of motion. No JVD present. No tracheal deviation present. No thyromegaly present.   Cardiovascular: Normal rate and regular rhythm.  Exam reveals no gallop and no friction rub.    No murmur heard.  Pulmonary/Chest: Effort normal and breath sounds normal.  No stridor. No respiratory distress. He has no wheezes. He has no rales.   Abdominal: Soft. Bowel sounds are normal. He exhibits no distension. There is no tenderness.   Musculoskeletal: He exhibits no edema or tenderness.   Lymphadenopathy:     He has no cervical adenopathy.   Neurological: He is alert and oriented to person, place, and time. No cranial nerve deficit.   Skin: Skin is warm and dry. No rash noted. He is not diaphoretic. No erythema. No pallor.   Psychiatric: He has a normal mood and affect. His behavior is normal. Judgment and thought content normal.           Current Outpatient Prescriptions on File Prior to Visit   Medication Sig Dispense Refill     atorvastatin (LIPITOR) 10 MG tablet TAKE ONE TABLET BY MOUTH ONCE DIALY  3     PARoxetine (PAXIL) 20 MG tablet TAKE 1 TABLET BY MOUTH ONCE DAILY FOR MOOD.  3     [DISCONTINUED] betamethasone valerate (VALISONE) 0.1 % ointment APPLY A THIN LAYER TO AFFECTED AREA(S) 2 TIMES DAILY*REPLACES CLOBETASOL*  0     [DISCONTINUED] cyclobenzaprine (FLEXERIL) 5 MG tablet Take 1 tablet at qhs for muscle spasm. 30 tablet 0     [DISCONTINUED] EDARBI 40 mg Tab tablet        No current facility-administered medications on file prior to visit.      /82  Pulse 85  Resp 17  Wt 213 lb (96.6 kg)  SpO2 95% Comment: RA  BMI 32.39 kg/m2    Medical History  Active Ambulatory (Non-Hospital) Problems    Diagnosis     Diffuse Joint Pains (Arthralgias)     Joint Pain, Localized In The Wrist     Joint Pain, Localized In The Knee     Generalized Osteoarthritis Of The Hand     Osteoarthritis Of The Knee     Arthralgias In Multiple Sites     Dyslipidemia     Hypertension     No past medical history on file.     Surgical History  He  has no past surgical history on file.       Social History  Reviewed, and he  reports that he has been smoking Cigarettes.  He has a 30.00 pack-year smoking history. He has never used smokeless tobacco.    10-20 cigs a day.  No alcohol.      [place].   - 50% desk.       Allergies  Allergies   Allergen Reactions     Iodine-Iodine Containing     Family History  Reviewed, and mother with vascular disease.                            Data Review - imaging, labs, and ekgs listed below were reviewed by me.  Chest XRay and chest CT images and EKG tracings interpreted personally.     Past Labs  Appointment on 04/20/2018   Component Date Value     Hgb 04/20/2018 16.1        Past Imaging  Pulmonary function test raw data and tracings reviewed by me.  Explained patient.  FEV1, FVC and ratio normal.  No response to bronchodilators.  Lung volumes normal.  Diffusion capacity normal.    Assessment: Normal pulmonary function test.    [facility] and reviewed by me.  Summarized below.  The patient was seen for persistent shortness of breath.  He also has been problems with reflux which is improved with PPI that he was started on that time.  Although I do not see the results the clinic note reported that he had spirometry in the past that was essentially normal.    Outside chest x-ray reports reviewed by me from [facility].  Chest x-ray from 12/13/2017 and August 2, 2013.  Both chest x-rays were read as negative.    EKG reviewed by me.  Interpreted by me.  From February 2016.  Sinus rhythm with right bundle branch block and possible LAFB.

## 2021-06-18 NOTE — PATIENT INSTRUCTIONS - HE
Patient Instructions by Afia Ulloa PT at 2/17/2020 12:00 PM     Author: Afia Ulloa PT Service: -- Author Type: Physical Therapist    Filed: 2/17/2020 12:21 PM Encounter Date: 2/17/2020 Status: Signed    : Afia Ulloa PT (Physical Therapist)        QUADRUPED LEG LIFT    Start on hands and knees while keeping your spine flat and neutral.  Tighten abdominal muscles.  Raise leg back and hold 3-5 seconds.  Return to original position and alternate legs.    x10-15 each leg, 2 sets, 1x/day        BRACE HEEL SLIDES    While lying on your back with your knees bent,  slowly straighten your leg out and then bring it back, the lower the harder. Use your stomach muscles to keep your spine from moving.    x10-15 reps each side, 2 sets, 1x/day

## 2021-06-18 NOTE — PATIENT INSTRUCTIONS - HE
"Patient Instructions by Afia Ulloa PT at 2/10/2020  1:30 PM     Author: Afia Ulloa PT Service: -- Author Type: Physical Therapist    Filed: 2/10/2020  2:08 PM Encounter Date: 2/10/2020 Status: Signed    : Afia Ulloa PT (Physical Therapist)        PELVIC TILT    While lying on your back, use your stomach muscles to press your spine downwards towards the ground. Do not move into a painful position.        BRIDGING    While lying on your back, tighten your lower abdominals, squeeze your buttocks and then raise your buttocks off the floor/bed as creating a \"Bridge\" with your body.      CAT AND COW    While on your hands and knees in a crawl position, raise up your back and arch it towards the ceiling like an angry cat.    Next return to a lowered position and arch your back the opposite direction.          LOWER TRUNK ROTATIONS - LTR    Lying on your back with your knees bent, gently move your knees side-to-side.         Lie on your back   - Grab behind your knee slightly pulling your knee to your chest (you can use a towel if needed)   - Straighten the leg as far as you can. Get a nice easy stretch. - Bring the leg down            "

## 2021-06-23 NOTE — PROGRESS NOTES
Assessment:     Diagnoses and all orders for this visit:    Chronic bilateral low back pain without sciatica  -     OPS Injection To Be Determined By MD/DO  -     Cancel: MR Lumbar Spine Without Contrast  -     MR Lumbar Spine Without Contrast    Facet arthropathy, lumbosacral  -     OPS Injection To Be Determined By MD/DO  -     Cancel: MR Lumbar Spine Without Contrast  -     MR Lumbar Spine Without Contrast       Barry Jay is a 63 y.o. y.o. male previous patient of Audrey Mayorga last seen 6/26/2017, with past medical history significant for hypertension, dyslipidemia, hand osteoarthritis, multiple site arthralgia who presents today for follow-up regarding:    -Chronic bilateral low back pain most consistent with lumbar facet mediated pain as he does have increased pain with facet loading on exam.    No current radicular pain, patient is neurologically intact on exam.     Plan:     A shared decision making plan was used. The patient's values and choices were respected. Prior medical records from 6/26/2017 to current were reviewed today. The following represents what was discussed and decided upon by the provider and the patient.        -DIAGNOSTIC TESTS: Images were personally reviewed and interpreted.   --Ordered lumbar spine MRI to further evaluate ongoing chronic low back pain for consideration of injections, no images since 2010.  Patient requesting open upright MRI with CDI.    -INTERVENTIONS: To be determined injection order placed, likely facet joint steroid injection at L4-5.  He may be candidate for rhizotomy down the road however he is having a knee replacement 2/6/2019 and would like some relief prior to this.    -MEDICATIONS: No change in medications at this time advised patient to continue Tylenol as needed for pain.  Discussed side effects of medications and proper use. Patient verbalized understanding.    -PHYSICAL THERAPY: Did discuss the importance of physical therapy and I did recommend at  least physical therapy for establishing core strengthening program, could consider [facility] program down the road however given he is going to have knee replacement he would like to hold off on this modality until after his knee replacement has been completed which is understandable.  Discussed the importance of core strengthening, ROM, stretching exercises with the patient and how each of these entities is important in decreasing pain.  Explained to the patient that the purpose of physical therapy is to teach the patient a home exercise program.  These exercises need to be performed every day in order to decrease pain and prevent future occurrences of pain.        -PATIENT EDUCATION:  40 minutes of total visit time was spent face to face with the patient today, 60 % of the visit was spent on counseling, education, and coordinating care.   -5 minutes spent outside of visit time, non-face-to-face time, reviewing chart.    -FOLLOW UP: Follow-up for MRI review and for same-day injection to be determined.  Advised to contact clinic if symptoms worsen or change.    Subjective:     Barry Jay is a 63 y.o. male who presents today for follow-up regarding ongoing chronic bilateral low back pain belt line and lumbosacral junction that is worse first thing in the morning and constant and aching type of pain currently an 8/10, a 6 at its best he reports that its been more bothersome for the last couple months however this is the same pain he has had ongoing for many years and did get relief in the past with lumbar rhizotomy/RFA 2011 and 2013 for at least a couple of years.  Patient denies any leg pain, denies weakness or recent trips or falls, denies balance changes, denies bowel or bladder dysfunction.    Treatment to Date: No prior spinal surgery.  No prior physical therapy for back pain.    History of bilateral L4-5 RFA [place] [facility] Dr. Green 2011 and 2013, relief at least 2 years.  Bilateral L3-4 MBB 6/30/2017.   Preprocedure pain 7/10 post 2/10.  Bilateral L4-5 facet joint steroid injection 7/7/2017 with relief.  Preprocedure pain 8/10, post 5/10.    Patient Active Problem List   Diagnosis     Dyslipidemia     Hypertension     Diffuse Joint Pains (Arthralgias)     Joint Pain, Localized In The Wrist     Joint Pain, Localized In The Knee     Generalized Osteoarthritis Of The Hand     Osteoarthritis Of The Knee     Arthralgias In Multiple Sites       Current Outpatient Medications on File Prior to Encounter   Medication Sig Dispense Refill     atorvastatin (LIPITOR) 10 MG tablet TAKE ONE TABLET BY MOUTH ONCE DIALY  3     celecoxib (CELEBREX) 200 MG capsule Take 1 capsule by mouth as needed.  3     irbesartan (AVAPRO) 150 MG tablet TAKE 1 TABLET BY MOUTH ONCE A DAY FOR BLOOD PRESSURE  1     irbesartan-hydrochlorothiazide (AVALIDE) 300-12.5 mg per tablet Take 1 tablet by mouth daily. for blood pressure  1     omeprazole (PRILOSEC) 20 MG capsule TAKE ONE CAPSULE BY MOUTH EVERY DAY FOR STOMACH  1     ondansetron (ZOFRAN ODT) 4 MG disintegrating tablet Take 1 tablet (4 mg total) by mouth every 8 (eight) hours as needed. 12 tablet 0     PARoxetine (PAXIL) 20 MG tablet TAKE 1 TABLET BY MOUTH ONCE DAILY FOR MOOD.  3     zolpidem (AMBIEN) 10 mg tablet Take 1 tablet by mouth as needed.  2     No current facility-administered medications on file prior to encounter.        Allergies   Allergen Reactions     Iodine-Iodine Containing        Past Medical History:   Diagnosis Date     HTN (hypertension)      Hyperlipidemia         Review of Systems  ROS:  Specifically negative for bowel/bladder dysfunction, balance changes, headache, dizziness, foot drop, fevers, chills, appetite changes, nausea/vomiting, unexplained weight loss. Otherwise 13 systems reviewed are negative. Please see the patient's intake questionnaire from today for details.    Reviewed Social, Family, Past Medical and Past Surgical history with patient, no significant  changes noted since prior visit.     Objective:     There were no vitals taken for this visit.    PHYSICAL EXAMINATION:    --CONSTITUTIONAL: Well developed, well nourished, healthy appearing individual.  --PSYCHIATRIC: Appropriate mood and affect. No difficulty interacting due to temper, social withdrawal, or memory issues.  --SKIN: Lumbar region is dry and intact.   --RESPIRATORY: Normal rhythm and effort. No abnormal accessory muscle breathing patterns noted.   --MUSCULOSKELETAL:  Normal lumbar lordosis noted, no lateral shift.  --GROSS MOTOR: Easily arises from a seated position. Gait is non-antalgic  --LUMBAR SPINE:  Inspection reveals no evidence of deformity. Range of motion is not limited in lumbar flexion, increased pain with lumbar extension and lateral rotation simultaneously bilaterally.  No tenderness to palpation lumbar spine. Straight leg raising in the supine position is negative to radicular pain. Sciatic notch non-tender.   --SACROILIAC JOINT: One Finger point test negative.  --LOWER EXTREMITY MOTOR TESTING:  Plantar flexion left 5/5, right 5/5   Dorsiflexion left 5/5, right 5/5   Great toe MTP extension left 5/5, right 5/5  Knee flexion left 5/5, right 5/5  Knee extension left 5/5, right 5/5   Hip flexion left 5/5, right 5/5  Hip abduction left 5/5, right 5/5  Hip adduction left 5/5, right 5/5   --HIPS: Full range of motion bilaterally. Negative FABERs on the involved lower extremity.   --NEUROLOGIC: Bilateral patellar and achilles reflexes are physiologic and symmetric. Sensation to light touch is intact in the bilateral L4, L5, and S1 dermatomes.    RESULTS:   Imaging: No results found.

## 2021-06-23 NOTE — PATIENT INSTRUCTIONS - HE
MRI scheduled for tomorrow, 19 at 10:20 AM.    [facility] [place] Location  [address] [place] [address]   [id number]   [place] [address]  [phone]   Fax: [phone]         Discussed the importance of core strengthening, ROM, stretching exercises with the patient and how each of these entities is important in decreasing pain.  Explained to the patient that the purpose of physical therapy is to teach the patient a home exercise program.  These exercises need to be performed every day in order to decrease pain and prevent future occurrences of pain.        ~Please call Nurse Navigation line [phone] with any questions or concerns about your treatment plan, if symptoms worsen and you would like to be seen urgently, or if you have problems controlling bladder and bowel function.  ~Follow Up Appointment time slots with Claudia Baptiste CNP with the [facility], are also available at the [facility] location near [facility] on the first and third THURSDAY afternoons of each month.

## 2021-06-23 NOTE — TELEPHONE ENCOUNTER
----- Message from Telma Robledo PA-C sent at 1/18/2019 12:49 PM CST -----  Regarding: MRI  Can you please call the patient let him know that I reviewed his MRI lumbar spine since Claudia is out of the office.  This does show severe arthritis in the joints of the lower back at L4-5 which does result in some narrowing around the nerves.  Patient should continue with the plan of care including following up with Claudia on Monday.

## 2021-06-23 NOTE — PATIENT INSTRUCTIONS - HE
Discussed the importance of core strengthening, ROM, stretching exercises with the patient and how each of these entities is important in decreasing pain.  Explained to the patient that the purpose of physical therapy is to teach the patient a home exercise program.  These exercises need to be performed every day in order to decrease pain and prevent future occurrences of pain.        ~Please call Nurse Navigation line [phone] with any questions or concerns about your treatment plan, if symptoms worsen and you would like to be seen urgently, or if you have problems controlling bladder and bowel function.  ~Follow Up Appointment time slots with Claudia Baptiste CNP with the [facility], are also available at the [facility] location near [facility] on the first and third THURSDAY afternoons of each month.

## 2021-06-23 NOTE — PROGRESS NOTES
Assessment:     Diagnoses and all orders for this visit:    Chronic bilateral low back pain without sciatica  -     OPS Paravertbral Facet Joint Inj Lumbar    Facet arthropathy, lumbosacral  -     OPS Paravertbral Facet Joint Inj Lumbar    Myofascial pain  -     OPS Paravertbral Facet Joint Inj Lumbar       Barry Jay is a 63 y.o. y.o. male with past medical history significant for  hypertension, dyslipidemia, hand osteoarthritis, multiple site arthralgia who presents today for follow-up regarding:    -Chronic bilateral low back pain most consistent with lumbar facet mediated pain as he does have increased facet loading on exam and he does have severe facet arthropathy noted at the L4-5 level on recent imaging.    No current radicular pain, patient is neurologically intact on exam.     Plan:     A shared decision making plan was used. The patient's values and choices were respected. Prior medical records from 1/16/19 were reviewed today. The following represents what was discussed and decided upon by the provider and the patient.        -DIAGNOSTIC TESTS: Images were personally reviewed and interpreted.   --[facility] lumbar spine MRI 1/17/2019 shows severe facet arthropathy L4-5 with 5 mm spondylolisthesis, mild central and subarticular recess stenosis, mild right foraminal stenosis.  Mild facet arthropathy L3-4 and L5-S1.    -INTERVENTIONS: Ordered bilateral L4-5 facet joint steroid injection to target his severe facet arthropathy at L4-5.  If he does not get lasting relief down the road we could consider rhizotomy however he is having knee replacement 2/6/2019 therefore recommend trialing steroid injection to see if he gets some relief prior to this surgery.    -MEDICATIONS: No change in medications advised patient continue Tylenol as needed at this time.  Discussed side effects of medications and proper use. Patient verbalized understanding.    -PHYSICAL THERAPY: Did discuss the importance of physical therapy and  I did recommend at least physical therapy for establishing core strengthening program, could consider [facility] program down the road however given he is going to have knee replacement he would like to hold off on this modality until after his knee replacement has been completed which is understandable.  Discussed the importance of core strengthening, ROM, stretching exercises with the patient and how each of these entities is important in decreasing pain.  Explained to the patient that the purpose of physical therapy is to teach the patient a home exercise program.  These exercises need to be performed every day in order to decrease pain and prevent future occurrences of pain.        -PATIENT EDUCATION:  25 minutes of total visit time was spent face to face with the patient today, 60 % of the visit was spent on counseling, education, and coordinating care.   -5 minutes spent outside of visit time, non-face-to-face time, reviewing chart.    -FOLLOW UP: Follow-up for facet injection then 2 weeks postinjection if symptoms are not improving  Advised to contact clinic if symptoms worsen or change.    Subjective:     Barry Jay is a 63 y.o. male who presents today for follow-up regarding ongoing chronic low back pain at the belt line that is worse first thing in the morning constant aching type of pain that currently is a 4/10 up to an 8 at its worst which is also bothersome with climbing stairs and lifting.  Patient denies any radicular leg pain, denies any weakness or recent trips or falls, denies numbness or tingling sensations, denies bowel or bladder dysfunction.    Patient is again scheduled for knee replacement surgery 2/6/2019.    Treatment to Date: No prior spinal..  No prior physical therapy for back pain.     History of bilateral L4-5 RFA [facility] Dr. Green 2011 and 2013, relief at least 2 years.  Bilateral L3-4 MBB 6/30/2017.  Preprocedure pain 7/10 post 2/10.  Bilateral L4-5 facet joint steroid injection  7/7/2017 with relief.  Preprocedure pain 8/10, post 5/10.    Patient Active Problem List   Diagnosis     Dyslipidemia     Hypertension     Diffuse Joint Pains (Arthralgias)     Joint Pain, Localized In The Wrist     Joint Pain, Localized In The Knee     Generalized Osteoarthritis Of The Hand     Osteoarthritis Of The Knee     Arthralgias In Multiple Sites       Current Outpatient Medications on File Prior to Encounter   Medication Sig Dispense Refill     atorvastatin (LIPITOR) 10 MG tablet TAKE ONE TABLET BY MOUTH ONCE DIALY  3     celecoxib (CELEBREX) 200 MG capsule Take 1 capsule by mouth as needed.  3     irbesartan (AVAPRO) 150 MG tablet TAKE 1 TABLET BY MOUTH ONCE A DAY FOR BLOOD PRESSURE  1     irbesartan-hydrochlorothiazide (AVALIDE) 300-12.5 mg per tablet Take 1 tablet by mouth daily. for blood pressure  1     omeprazole (PRILOSEC) 20 MG capsule TAKE ONE CAPSULE BY MOUTH EVERY DAY FOR STOMACH  1     ondansetron (ZOFRAN ODT) 4 MG disintegrating tablet Take 1 tablet (4 mg total) by mouth every 8 (eight) hours as needed. 12 tablet 0     PARoxetine (PAXIL) 20 MG tablet TAKE 1 TABLET BY MOUTH ONCE DAILY FOR MOOD.  3     zolpidem (AMBIEN) 10 mg tablet Take 1 tablet by mouth as needed.  2     No current facility-administered medications on file prior to encounter.        Allergies   Allergen Reactions     Iodine-Iodine Containing        Past Medical History:   Diagnosis Date     HTN (hypertension)      Hyperlipidemia         Review of Systems  ROS:  Specifically negative for bowel/bladder dysfunction, balance changes, headache, dizziness, foot drop, fevers, chills, appetite changes, nausea/vomiting, unexplained weight loss. Otherwise 13 systems reviewed are negative. Please see the patient's intake questionnaire from today for details.    Reviewed Social, Family, Past Medical and Past Surgical history with patient, no significant changes noted since prior visit.     Objective:     /88 (Patient Site: Right  Arm, Patient Position: Sitting)   Pulse 82   Temp 98.5  F (36.9  C) (Oral)   Wt 207 lb (93.9 kg)   SpO2 96%   BMI 31.47 kg/m      PHYSICAL EXAMINATION:    --CONSTITUTIONAL: Well developed, well nourished, healthy appearing individual.  --PSYCHIATRIC: Appropriate mood and affect. No difficulty interacting due to temper, social withdrawal, or memory issues.  --SKIN: Lumbar region is dry and intact.   --RESPIRATORY: Normal rhythm and effort. No abnormal accessory muscle breathing patterns noted.   --MUSCULOSKELETAL:  Normal lumbar lordosis noted, no lateral shift.  --GROSS MOTOR: Easily arises from a seated position. Gait is non-antalgic  --LUMBAR SPINE:  Inspection reveals no evidence of deformity. Range of motion is not limited in lumbar flexion, increased pain with lumbar extension and lateral rotation simultaneously bilaterally.  --SACROILIAC JOINT: One Finger point test negative.    RESULTS:   Imaging: Lumbar spine imaging was reviewed today. The images were shown to the patient and the findings were explained using a spine model.      MRI LUMBAR SPINE WITHOUT CONTRAST  [facility]- 1/17/19  CLINICAL INFORMATION: 63-year-old man with low back pain.  TECHNICAL INFORMATION: MRI lumbar spine was obtained on 0.6 Teresa open upright magnet including sagittal T2, sagittal T1, sagittal STIR, axial T2 and axial T1-weighted images.  INTERPRETATION: There is normal lordotic curvature of the lumbar spine. No marrow edema within the lumbar vertebral bodies. No loss of vertebral body height. The conus is at the L1 level and is normal in appearance.  L5-S1: Transitional L5 segment with left L5 transverse process articulating with the sacrum. Mild developmental narrowing of the disc space. Mild bilateral facet arthropathy. No central spinal canal or foraminal stenosis.  L4-5: Mild degenerative disc disease with 5 mm degenerative spondylolisthesis and disc bulge. Severe bilateral facet arthropathy with ligamentum flavum thickening  contributes to mild central spinal canal stenosis and subarticular recess narrowing with encroachment upon the traversing L5 nerve roots. Moderate right foraminal stenosis.  L3-4: Mild degenerative disc disease and disc bulge. Mild bilateral facet arthropathy. Mild subarticular recess narrowing. The ganglia exit without compromise.  L2-3 through T12-L1: No focal disc herniation, central spinal canal stenosis or neural impingement.  T11-12 and T10-11: Moderate degenerative disc disease with Schmorl's nodes and endplate irregularities. No cord deformity or central spinal canal stenosis.  CONCLUSION:  1. L5-S1 transitional L5 segment with left transverse process articulating with the sacrum. Mild developmental narrowing of the disc space. Mild bilateral facet arthropathy.  2. L4-5 grade 1 spondylolisthesis with disc bulge. Severe facet arthropathy and ligamentum flavum thickening with mild central spinal canal and subarticular recess stenosis with encroachment upon the traversing L5 nerve roots. Moderate right foraminal stenosis.  3. Mild degenerative disc disease with disc bulge. Mild facet arthropathy and mild subarticular recess narrowing.

## 2021-06-24 NOTE — TELEPHONE ENCOUNTER
Phone call to patient to inform there is no need for antibiotics prior to his facet injections. Stated understanding.

## 2021-06-24 NOTE — PATIENT INSTRUCTIONS - HE
DISCHARGE INSTRUCTIONS    During office hours (8:00 a.m.- 4:30 p.m.) questions or concerns may be answered  by calling Spine Navigation Nurses at  [phone].     If you experience any problems after hours  please call [phone] and you will be connected to [facility].     All Patients:    ? You may experience an increase in your symptoms for the first 2 days (It may take anywhere between 2 days- 2 weeks for the steroid to have maximum effect).    ? You may use ice on the injection site, as frequently as 20 minutes each hour if needed.    ? You may take your pain medicine.    ? You may continue taking your regular medication after your injection. If you have had a Medial Branch Block you may resume pain medication once your pain diary is completed.    ? You may shower. No swimming, tub bath or hot tub for 48 hours.  You may remove your bandaid/bandage as soon as you are home.    ? You may resume light activities, as tolerated.    ? Resume your usual diet as tolerated.    ? It is strongly advised that you do not drive for 1-3 hours post injection.    ? If you have had oral sedation:  Do not drive for 8 hours post injection.      ? If you have had IV sedation:  Do not drive for 24 hours post injection.  Do not operate hazardous machinery or make important personal/business decisions for 24 hours.      POSSIBLE STEROID SIDE EFFECTS (If steroid/cortisone was used for your procedure)    -If you experience these symptoms, it should only last for a short period      Swelling of the legs                Skin redness (flushing)       Mouth (oral) irritation     Blood sugar (glucose) levels              Sweats                      Mood changes    Headache    Sleeplessness         POSSIBLE PROCEDURE SIDE EFFECTS  -Call the Spine Center if you are concerned    Increased Pain             Increased numbness/tingling        Nausea/Vomiting            Bruising/bleeding at site        Redness or swelling                                                 Difficulty walking        Weakness             Fever greater than 100.5    *In the event of a severe headache after an epidural steroid injection that is relieved by lying down, please call the [facility] to speak with a clinical staff member*

## 2021-06-24 NOTE — TELEPHONE ENCOUNTER
Patient calling to inquire if he needs to be treated with antibiotics prior to having his lumbar facet injections on 2/21/19. Patient is S/P total knee replacement of 2 weeks ago per [facility]. He is concerned as he was told by his dentist he would need to take them prior to having any dental work for the next 6 months. Please advise.

## 2021-07-03 NOTE — ADDENDUM NOTE
Addendum Note by Claudia Baptiste CNP at 1/16/2019 10:28 AM     Author: Claudia Baptiste CNP Service: -- Author Type: Nurse Practitioner    Filed: 1/16/2019  1:11 PM Date of Service: 1/16/2019 10:28 AM Status: Signed    : Claudia Baptiste CNP (Nurse Practitioner)    Encounter addended by: Claudia Baptiste CNP on: 1/16/2019  1:11 PM      Actions taken: Sign clinical note

## 2021-08-30 ENCOUNTER — OFFICE VISIT (OUTPATIENT)
Dept: PHYSICAL MEDICINE AND REHAB | Facility: CLINIC | Age: 66
End: 2021-08-30
Payer: COMMERCIAL

## 2021-08-30 VITALS — SYSTOLIC BLOOD PRESSURE: 130 MMHG | HEART RATE: 98 BPM | DIASTOLIC BLOOD PRESSURE: 70 MMHG | OXYGEN SATURATION: 95 %

## 2021-08-30 DIAGNOSIS — G89.29 CHRONIC RIGHT-SIDED LOW BACK PAIN WITH RIGHT-SIDED SCIATICA: Primary | ICD-10-CM

## 2021-08-30 DIAGNOSIS — M47.816 LUMBAR FACET ARTHROPATHY: ICD-10-CM

## 2021-08-30 DIAGNOSIS — M54.16 RIGHT LUMBAR RADICULITIS: ICD-10-CM

## 2021-08-30 DIAGNOSIS — M43.16 SPONDYLOLISTHESIS OF LUMBAR REGION: ICD-10-CM

## 2021-08-30 DIAGNOSIS — M47.816 FACET SYNDROME, LUMBAR: ICD-10-CM

## 2021-08-30 DIAGNOSIS — M54.41 CHRONIC RIGHT-SIDED LOW BACK PAIN WITH RIGHT-SIDED SCIATICA: Primary | ICD-10-CM

## 2021-08-30 PROCEDURE — 99214 OFFICE O/P EST MOD 30 MIN: CPT | Performed by: NURSE PRACTITIONER

## 2021-08-30 ASSESSMENT — PAIN SCALES - GENERAL: PAINLEVEL: EXTREME PAIN (8)

## 2021-08-30 NOTE — PROGRESS NOTES
Assessment:     Diagnoses and all orders for this visit:  Chronic right-sided low back pain with right-sided sciatica  -     PAIN Transforaminal REGI Inj Lumbosacral Right; Future  Right lumbar radiculitis  -     PAIN Transforaminal REGI Inj Lumbosacral Right; Future  Spondylolisthesis of lumbar region  -     PAIN Transforaminal REGI Inj Lumbosacral Right; Future  Lumbar facet arthropathy  Facet syndrome, lumbar     Barry Jay is a 66 year old y.o. male with past medical history significant for hypertension, dyslipidemia, hand osteoarthritis, arthralgia who presents today for follow-up regarding:    -Chronic bilateral low back pain with acute flareup right low back at the L4-5 location with some consistency with radiculitis, likely related to L4-5 spondylolisthesis.  Patient is neurologically intact on exam.     Plan:     A shared decision making plan was used. The patient's values and choices were respected. Prior medical records were reviewed today. The following represents what was discussed and decided upon by the provider and the patient.        -DIAGNOSTIC TESTS: Images were personally reviewed and interpreted.   --Patient would like to hold off on further imaging which is reasonable as his pain is chronic and recurrent and he is neurologically intact on exam.  Did discuss with patient that if his pain has not improved with injection then I would recommend further imaging to evaluate L4-5 spondylolisthesis.  --Lumbar spine MRI 1/24/2020 [facility] with advanced facet arthropathy L4-5 right greater than left and facet arthropathy L3-4 moderate bilaterally.  Grade 1 spondylolisthesis L4-5 with right greater than left L5 impingement.  --Lumbar spine flexion-extension x-ray 2/3/2020 was 0.6 cm spondylolisthesis L4-5, no instability noted.  --[facility] lumbar spine MRI 1/17/2019 shows severe facet arthropathy L4-5 with 5 mm spondylolisthesis, mild central and subarticular recess stenosis, mild right foraminal stenosis.  Mild  facet arthropathy L3-4 and L5-S1.    -INTERVENTIONS: Ordered an urgent right L4-5 transforaminal epidural steroid injection for acute flareup of right low back pain with some sciatica/radicular component however the majority of his pain is in his low back.  Does have some increased pain with facet loading on exam today, previous benefit with RFA October 2020, until recent flareup.    -MEDICATIONS: Advised patient continue with Celebrex as needed at this time.  Discussed side effects of medications and proper use. Patient verbalized understanding.    -PHYSICAL THERAPY: Did advise patient to continue with home exercises from prior physical therapy sessions which she is diligent about doing, minimal benefit at this time.  Discussed the importance of core strengthening, ROM, stretching exercises with the patient and how each of these entities is important in decreasing pain.  Explained to the patient that the purpose of physical therapy is to teach the patient a home exercise program.  These exercises need to be performed every day in order to decrease pain and prevent future occurrences of pain.        -PATIENT EDUCATION:  Total time of 32 minutes spent with the patient, reviewing the chart, placing orders, and documenting.   -Today we also discussed the issues related to the current COVID-19 pandemic, the pros and cons of the current treatment plan, the CDC guidelines such as social distancing, washing the hands, and covering the cough.    -FOLLOW UP: Follow-up for REGI, due to urgent injection patient is okay with being seen with Dr. Wolff, typically has seen Dr. Naidu in the past.  Advised to contact clinic if symptoms worsen or change.    Subjective:     Barry Jay is a 66 year old male who presents today for follow-up regarding chronic bilateral low back pain with improvement previous with radiofrequency ablation last year up until recent flareup with golfing.  He does report that his pain is over 75% on  the right at the low back at the belt line with some radiation pain into the right leg however primarily pain is centered in the low back.  Currently his pain is an 8/10 from a 10 at its worst, a 6 at its best aggravated with sitting, walking, bending.  Currently nothing is helping with his pain and he is hoping for an injection at this time.    Patient denies lower extremity weakness, denies recent trips falls or balance changes, denies bowel or bladder loss control.    Treatment to Date: No prior spinal..  Physical therapy [facility] x3 sessions.  Patient continues with home exercises at this time.     History of bilateral L4-5 RFA [facility] Dr. Green 2011 and 2013, relief at least 2 years.  Bilateral L3-4 MBB 6/30/2017.  Preprocedure pain 7/10 post 2/10.  Bilateral L4-5 facet joint steroid injection 7/7/2017 with relief.  Preprocedure pain 8/10, post 5/10.  Right L4-5 and L5-S1 TFESI 1/6/2020 with 30% lasting benefit only.  Preprocedure pain 10/10, post 7/10.  Bilateral L2, L3, L4 MBB 3/11/2020 and 7/7/2020 with positive response.  Bilateral L2, L3, L4 RFA 10/9/2020 with significant benefit with Dr. Naidu.    Patient Active Problem List   Diagnosis     Dyslipidemia     Hypertension     Diffuse Joint Pains (Arthralgias)     Joint Pain, Localized In The Wrist     Joint Pain, Localized In The Knee     Generalized Osteoarthritis Of The Hand     Osteoarthritis Of The Knee     Arthralgias In Multiple Sites       Current Outpatient Medications   Medication     celecoxib (CELEBREX) 200 MG capsule     ALPRAZolam (XANAX) 0.5 MG tablet     atorvastatin (LIPITOR) 10 MG tablet     irbesartan (AVAPRO) 150 MG tablet     irbesartan-hydrochlorothiazide (AVALIDE) 300-12.5 mg per tablet     omeprazole (PRILOSEC) 20 MG capsule     ondansetron (ZOFRAN ODT) 4 MG disintegrating tablet     PARoxetine (PAXIL) 20 MG tablet     zolpidem (AMBIEN) 10 mg tablet     No current facility-administered medications for this visit.        Allergies   Allergen Reactions     Iodine-Iodine Containing [Iodides] Unknown     Pt thinks 20+ yrs ago, Pt received omni 350 without premed and did fine 11/7/2020 [facility]       Past Medical History:   Diagnosis Date     HTN (hypertension)      Hyperlipidemia         Review of Systems  ROS:  Specifically negative for bowel/bladder dysfunction, balance changes, headache, dizziness, foot drop, fevers, chills, appetite changes, nausea/vomiting, unexplained weight loss. Otherwise 13 systems reviewed are negative. Please see the patient's intake questionnaire from today for details.    Reviewed Social, Family, Past Medical and Past Surgical history with patient, no significant changes noted since prior visit.     Objective:     /70 (BP Location: Right arm, Patient Position: Sitting)   Pulse 98   SpO2 95%     PHYSICAL EXAMINATION:    --CONSTITUTIONAL: Well developed, well nourished, healthy appearing individual.  --PSYCHIATRIC: Appropriate mood and affect. No difficulty interacting due to temper, social withdrawal, or memory issues.  --SKIN: Lumbar region is dry and intact.   --RESPIRATORY: Normal rhythm and effort. No abnormal accessory muscle breathing patterns noted.   --MUSCULOSKELETAL:  Normal lumbar lordosis noted, no lateral shift.  --GROSS MOTOR: Easily arises from a seated position. Gait is non-antalgic  --LUMBAR SPINE:  Inspection reveals no evidence of deformity. Range of motion is not limited in forward flexion, limitations with lumbar extension as well as lateral rotation simultaneously greater on the right. No tenderness to palpation lumbar spine. Straight leg raising in the supine position is negative to radicular pain. Sciatic notch non-tender.   --LOWER EXTREMITY MOTOR TESTING:  Plantar flexion left 5/5, right 5/5   Dorsiflexion left 5/5, right 5/5   Great toe MTP extension left 5/5, right 5/5  Knee flexion left 5/5, right 5/5  Knee extension left 5/5, right 5/5   Hip flexion left 5/5, right  5/5  Hip abduction left 5/5, right 5/5  Hip adduction left 5/5, right 5/5   --HIPS: Full range of motion bilaterally.   --NEUROLOGIC: Bilateral patellar and achilles reflexes are physiologic and symmetric. Sensation to light touch is intact in the bilateral L4, L5, and S1 dermatomes.    RESULTS:   Imaging: Lumbar spine imaging was reviewed today. The images were shown to the patient and the findings were explained using a spine model.      XR LUMBAR SPINE FLEX AND EXT 2 OR 3 VWS  LOCATION: [facility]  DATE/TIME: 2/3/2020   INDICATION: Flexion, extension, and neutral views please to evaluate spondylolisthesis stability at L4-L5.  COMPARISON: 04/26/2018 CT abdomen/pelvis.  IMPRESSION:   In the upright position there is approximately 0.6 cm degenerative anterolisthesis of L4 on L5. There is no change with flexion or extension to suggest segmental instability.  The vertebral body heights are preserved. Mild intervertebral disc degeneration at L3-L4, L4-L5 and L5-S1. Moderate/severe facet hypertrophic changes at L4-L5 and L5-S1.  There are vascular calcifications. Abdominal surgical clips.        MR LUMBAR SPINE WITHOUT CONTRAST  At [facility]-1/24/2020  CLINICAL INFORMATION: 64-year-old male with right low back and leg pain.  COMPARISON: Lumbar spine MRI dated January 17, 2019.  TECHNICAL INFORMATION: Sagittal T2, sagittal T1, sagittal STIR, axial T2, and axial T1-weighted MR images of the lumbar spine in the neutral, seated position on an open 0.6 Teresa magnet.  CONTRAST: None.  SEDATION: None.  INTERPRETATION: Transitional lumbosacral anatomy with partially sacralized L5, mild lumbar levocurvature centered at L3, and shallow chronic Scheuermann-type endplate Schmorl's nodes at T10-11 and T11-12 without spondylolysis, vertebral collapse, acute fracture, or destructive osseous lesion. Normal pre and paravertebral soft tissues. Enlarged prostate gland measuring at least 5 cm AP (series 2, image 7).  Conus medullaris  positioned at upper L2, with normal configuration of the terminal nerve roots.  L5-S1: Hypoplastic disc and facets with mild facet degeneration on the left, an accessory articulation on the left, and no stenosis or impingement.  L4-5: Moderate disc degeneration, 3 mm spondylolisthesis, bilateral foraminal annular tears, diffuse annular bulge, advanced right/moderate left facet degeneration, moderate right greater than left subarticular stenosis with L5 nerve root impingement, and mild to moderate right/mild left foraminal stenosis with right L4 nerve root encroachment.  L3-4: Mild disc degeneration, 1-2 mm spondylolisthesis, mild diffuse annular bulge, moderate left facet degeneration, mild left subarticular stenosis, and mild bilateral foraminal stenosis.  L2-3: Mild degenerative disc desiccation with preserved disc height, mild bilateral foraminal annular bulge, normal facet morphology, and no stenosis or impingement.  L1-2 and T12-L1: Normal dorsal disc contours and normal morphology.  T11-12: Mild disc degeneration, 1 mm spondylolisthesis, normal facet morphology, and no stenosis or impingement.  No change from January 17, 2019.  CONCLUSION: Multilevel lumbar degenerative changes and mild levocurvature with specific findings as follows:  1. L4-5 subarticular impingement of the right greater than left L5 nerve roots in the setting of grade 1 degenerative spondylolisthesis. L3-4 mild left subarticular stenosis without impingement.  2. Foraminal stenosis, mild to moderate on the right at L4-5 with right L4 nerve root encroachment. Mild on the left at L4-5 and bilaterally at L3-4.  3. Facet degeneration, advanced on the right at L4-5. Moderate on the left at L4-5 and L3-4. Mild on the left at L5-S1.  4. No spondylolysis, vertebral collapse, acute fracture, or destructive osseous lesion.  5. Transitional lumbosacral anatomy with partially sacralized L5.  6. Enlarged prostate gland.           MRI LUMBAR SPINE WITHOUT  CONTRAST  [facility]- 1/17/19  CLINICAL INFORMATION: 63-year-old man with low back pain.  TECHNICAL INFORMATION: MRI lumbar spine was obtained on 0.6 Teresa open upright magnet including sagittal T2, sagittal T1, sagittal STIR, axial T2 and axial T1-weighted images.  INTERPRETATION: There is normal lordotic curvature of the lumbar spine. No marrow edema within the lumbar vertebral bodies. No loss of vertebral body height. The conus is at the L1 level and is normal in appearance.  L5-S1: Transitional L5 segment with left L5 transverse process articulating with the sacrum. Mild developmental narrowing of the disc space. Mild bilateral facet arthropathy. No central spinal canal or foraminal stenosis.  L4-5: Mild degenerative disc disease with 5 mm degenerative spondylolisthesis and disc bulge. Severe bilateral facet arthropathy with ligamentum flavum thickening contributes to mild central spinal canal stenosis and subarticular recess narrowing with encroachment upon the traversing L5 nerve roots. Moderate right foraminal stenosis.  L3-4: Mild degenerative disc disease and disc bulge. Mild bilateral facet arthropathy. Mild subarticular recess narrowing. The ganglia exit without compromise.  L2-3 through T12-L1: No focal disc herniation, central spinal canal stenosis or neural impingement.  T11-12 and T10-11: Moderate degenerative disc disease with Schmorl's nodes and endplate irregularities. No cord deformity or central spinal canal stenosis.  CONCLUSION:  1. L5-S1 transitional L5 segment with left transverse process articulating with the sacrum. Mild developmental narrowing of the disc space. Mild bilateral facet arthropathy.  2. L4-5 grade 1 spondylolisthesis with disc bulge. Severe facet arthropathy and ligamentum flavum thickening with mild central spinal canal and subarticular recess stenosis with encroachment upon the traversing L5 nerve roots. Moderate right foraminal stenosis.  3. Mild degenerative disc disease with  disc bulge. Mild facet arthropathy and mild subarticular recess narrowing.

## 2021-08-30 NOTE — LETTER
8/30/2021         RE: Barry Jay  [address] [facility] [address]        Dear Colleague,    Thank you for referring your patient, Barry Jay, to the [facility] [place]. Please see a copy of my visit note below.      Assessment:     Diagnoses and all orders for this visit:  Chronic right-sided low back pain with right-sided sciatica  -     PAIN Transforaminal REGI Inj Lumbosacral Right; Future  Right lumbar radiculitis  -     PAIN Transforaminal REGI Inj Lumbosacral Right; Future  Spondylolisthesis of lumbar region  -     PAIN Transforaminal REGI Inj Lumbosacral Right; Future  Lumbar facet arthropathy  Facet syndrome, lumbar     Barry Jay is a 66 year old y.o. male with past medical history significant for hypertension, dyslipidemia, hand osteoarthritis, arthralgia who presents today for follow-up regarding:    -Chronic bilateral low back pain with acute flareup right low back at the L4-5 location with some consistency with radiculitis, likely related to L4-5 spondylolisthesis.  Patient is neurologically intact on exam.     Plan:     A shared decision making plan was used. The patient's values and choices were respected. Prior medical records were reviewed today. The following represents what was discussed and decided upon by the provider and the patient.        -DIAGNOSTIC TESTS: Images were personally reviewed and interpreted.   --Patient would like to hold off on further imaging which is reasonable as his pain is chronic and recurrent and he is neurologically intact on exam.  Did discuss with patient that if his pain has not improved with injection then I would recommend further imaging to evaluate L4-5 spondylolisthesis.  --Lumbar spine MRI 1/24/2020 [facility] with advanced facet arthropathy L4-5 right greater than left and facet arthropathy L3-4 moderate bilaterally.  Grade 1 spondylolisthesis L4-5 with right greater than left L5 impingement.  --Lumbar spine flexion-extension  x-ray 2/3/2020 was 0.6 cm spondylolisthesis L4-5, no instability noted.  --[facility] lumbar spine MRI 1/17/2019 shows severe facet arthropathy L4-5 with 5 mm spondylolisthesis, mild central and subarticular recess stenosis, mild right foraminal stenosis.  Mild facet arthropathy L3-4 and L5-S1.    -INTERVENTIONS: Ordered an urgent right L4-5 transforaminal epidural steroid injection for acute flareup of right low back pain with some sciatica/radicular component however the majority of his pain is in his low back.  Does have some increased pain with facet loading on exam today, previous benefit with RFA October 2020, until recent flareup.    -MEDICATIONS: Advised patient continue with Celebrex as needed at this time.  Discussed side effects of medications and proper use. Patient verbalized understanding.    -PHYSICAL THERAPY: Did advise patient to continue with home exercises from prior physical therapy sessions which she is diligent about doing, minimal benefit at this time.  Discussed the importance of core strengthening, ROM, stretching exercises with the patient and how each of these entities is important in decreasing pain.  Explained to the patient that the purpose of physical therapy is to teach the patient a home exercise program.  These exercises need to be performed every day in order to decrease pain and prevent future occurrences of pain.        -PATIENT EDUCATION:  Total time of 32 minutes spent with the patient, reviewing the chart, placing orders, and documenting.   -Today we also discussed the issues related to the current COVID-19 pandemic, the pros and cons of the current treatment plan, the CDC guidelines such as social distancing, washing the hands, and covering the cough.    -FOLLOW UP: Follow-up for REGI, due to urgent injection patient is okay with being seen with Dr. Wolff, typically has seen Dr. Naidu in the past.  Advised to contact clinic if symptoms worsen or change.    Subjective:     Barry  KADEEM Jay is a 66 year old male who presents today for follow-up regarding chronic bilateral low back pain with improvement previous with radiofrequency ablation last year up until recent flareup with golfing.  He does report that his pain is over 75% on the right at the low back at the belt line with some radiation pain into the right leg however primarily pain is centered in the low back.  Currently his pain is an 8/10 from a 10 at its worst, a 6 at its best aggravated with sitting, walking, bending.  Currently nothing is helping with his pain and he is hoping for an injection at this time.    Patient denies lower extremity weakness, denies recent trips falls or balance changes, denies bowel or bladder loss control.    Treatment to Date: No prior spinal..  Physical therapy [facility] x3 sessions.  Patient continues with home exercises at this time.     History of bilateral L4-5 RFA [place] [facility] Dr. Green 2011 and 2013, relief at least 2 years.  Bilateral L3-4 MBB 6/30/2017.  Preprocedure pain 7/10 post 2/10.  Bilateral L4-5 facet joint steroid injection 7/7/2017 with relief.  Preprocedure pain 8/10, post 5/10.  Right L4-5 and L5-S1 TFESI 1/6/2020 with 30% lasting benefit only.  Preprocedure pain 10/10, post 7/10.  Bilateral L2, L3, L4 MBB 3/11/2020 and 7/7/2020 with positive response.  Bilateral L2, L3, L4 RFA 10/9/2020 with significant benefit with Dr. Naidu.    Patient Active Problem List   Diagnosis     Dyslipidemia     Hypertension     Diffuse Joint Pains (Arthralgias)     Joint Pain, Localized In The Wrist     Joint Pain, Localized In The Knee     Generalized Osteoarthritis Of The Hand     Osteoarthritis Of The Knee     Arthralgias In Multiple Sites       Current Outpatient Medications   Medication     celecoxib (CELEBREX) 200 MG capsule     ALPRAZolam (XANAX) 0.5 MG tablet     atorvastatin (LIPITOR) 10 MG tablet     irbesartan (AVAPRO) 150 MG tablet     irbesartan-hydrochlorothiazide (AVALIDE) 300-12.5 mg  per tablet     omeprazole (PRILOSEC) 20 MG capsule     ondansetron (ZOFRAN ODT) 4 MG disintegrating tablet     PARoxetine (PAXIL) 20 MG tablet     zolpidem (AMBIEN) 10 mg tablet     No current facility-administered medications for this visit.       Allergies   Allergen Reactions     Iodine-Iodine Containing [Iodides] Unknown     Pt thinks 20+ yrs ago, Pt received omni 350 without premed and did fine 11/7/2020 [facility]       Past Medical History:   Diagnosis Date     HTN (hypertension)      Hyperlipidemia         Review of Systems  ROS:  Specifically negative for bowel/bladder dysfunction, balance changes, headache, dizziness, foot drop, fevers, chills, appetite changes, nausea/vomiting, unexplained weight loss. Otherwise 13 systems reviewed are negative. Please see the patient's intake questionnaire from today for details.    Reviewed Social, Family, Past Medical and Past Surgical history with patient, no significant changes noted since prior visit.     Objective:     /70 (BP Location: Right arm, Patient Position: Sitting)   Pulse 98   SpO2 95%     PHYSICAL EXAMINATION:    --CONSTITUTIONAL: Well developed, well nourished, healthy appearing individual.  --PSYCHIATRIC: Appropriate mood and affect. No difficulty interacting due to temper, social withdrawal, or memory issues.  --SKIN: Lumbar region is dry and intact.   --RESPIRATORY: Normal rhythm and effort. No abnormal accessory muscle breathing patterns noted.   --MUSCULOSKELETAL:  Normal lumbar lordosis noted, no lateral shift.  --GROSS MOTOR: Easily arises from a seated position. Gait is non-antalgic  --LUMBAR SPINE:  Inspection reveals no evidence of deformity. Range of motion is not limited in forward flexion, limitations with lumbar extension as well as lateral rotation simultaneously greater on the right. No tenderness to palpation lumbar spine. Straight leg raising in the supine position is negative to radicular pain. Sciatic notch non-tender.   --LOWER  EXTREMITY MOTOR TESTING:  Plantar flexion left 5/5, right 5/5   Dorsiflexion left 5/5, right 5/5   Great toe MTP extension left 5/5, right 5/5  Knee flexion left 5/5, right 5/5  Knee extension left 5/5, right 5/5   Hip flexion left 5/5, right 5/5  Hip abduction left 5/5, right 5/5  Hip adduction left 5/5, right 5/5   --HIPS: Full range of motion bilaterally.   --NEUROLOGIC: Bilateral patellar and achilles reflexes are physiologic and symmetric. Sensation to light touch is intact in the bilateral L4, L5, and S1 dermatomes.    RESULTS:   Imaging: Lumbar spine imaging was reviewed today. The images were shown to the patient and the findings were explained using a spine model.      XR LUMBAR SPINE FLEX AND EXT 2 OR 3 VWS  LOCATION: [facility]  DATE/TIME: 2/3/2020   INDICATION: Flexion, extension, and neutral views please to evaluate spondylolisthesis stability at L4-L5.  COMPARISON: 04/26/2018 CT abdomen/pelvis.  IMPRESSION:   In the upright position there is approximately 0.6 cm degenerative anterolisthesis of L4 on L5. There is no change with flexion or extension to suggest segmental instability.  The vertebral body heights are preserved. Mild intervertebral disc degeneration at L3-L4, L4-L5 and L5-S1. Moderate/severe facet hypertrophic changes at L4-L5 and L5-S1.  There are vascular calcifications. Abdominal surgical clips.        MR LUMBAR SPINE WITHOUT CONTRAST  At [facility]-1/24/2020  CLINICAL INFORMATION: 64-year-old male with right low back and leg pain.  COMPARISON: Lumbar spine MRI dated January 17, 2019.  TECHNICAL INFORMATION: Sagittal T2, sagittal T1, sagittal STIR, axial T2, and axial T1-weighted MR images of the lumbar spine in the neutral, seated position on an open 0.6 Teresa magnet.  CONTRAST: None.  SEDATION: None.  INTERPRETATION: Transitional lumbosacral anatomy with partially sacralized L5, mild lumbar levocurvature centered at L3, and shallow chronic Scheuermann-type endplate Schmorl's nodes at  T10-11 and T11-12 without spondylolysis, vertebral collapse, acute fracture, or destructive osseous lesion. Normal pre and paravertebral soft tissues. Enlarged prostate gland measuring at least 5 cm AP (series 2, image 7).  Conus medullaris positioned at upper L2, with normal configuration of the terminal nerve roots.  L5-S1: Hypoplastic disc and facets with mild facet degeneration on the left, an accessory articulation on the left, and no stenosis or impingement.  L4-5: Moderate disc degeneration, 3 mm spondylolisthesis, bilateral foraminal annular tears, diffuse annular bulge, advanced right/moderate left facet degeneration, moderate right greater than left subarticular stenosis with L5 nerve root impingement, and mild to moderate right/mild left foraminal stenosis with right L4 nerve root encroachment.  L3-4: Mild disc degeneration, 1-2 mm spondylolisthesis, mild diffuse annular bulge, moderate left facet degeneration, mild left subarticular stenosis, and mild bilateral foraminal stenosis.  L2-3: Mild degenerative disc desiccation with preserved disc height, mild bilateral foraminal annular bulge, normal facet morphology, and no stenosis or impingement.  L1-2 and T12-L1: Normal dorsal disc contours and normal morphology.  T11-12: Mild disc degeneration, 1 mm spondylolisthesis, normal facet morphology, and no stenosis or impingement.  No change from January 17, 2019.  CONCLUSION: Multilevel lumbar degenerative changes and mild levocurvature with specific findings as follows:  1. L4-5 subarticular impingement of the right greater than left L5 nerve roots in the setting of grade 1 degenerative spondylolisthesis. L3-4 mild left subarticular stenosis without impingement.  2. Foraminal stenosis, mild to moderate on the right at L4-5 with right L4 nerve root encroachment. Mild on the left at L4-5 and bilaterally at L3-4.  3. Facet degeneration, advanced on the right at L4-5. Moderate on the left at L4-5 and L3-4. Mild  on the left at L5-S1.  4. No spondylolysis, vertebral collapse, acute fracture, or destructive osseous lesion.  5. Transitional lumbosacral anatomy with partially sacralized L5.  6. Enlarged prostate gland.           MRI LUMBAR SPINE WITHOUT CONTRAST  [facility]- 1/17/19  CLINICAL INFORMATION: 63-year-old man with low back pain.  TECHNICAL INFORMATION: MRI lumbar spine was obtained on 0.6 Teresa open upright magnet including sagittal T2, sagittal T1, sagittal STIR, axial T2 and axial T1-weighted images.  INTERPRETATION: There is normal lordotic curvature of the lumbar spine. No marrow edema within the lumbar vertebral bodies. No loss of vertebral body height. The conus is at the L1 level and is normal in appearance.  L5-S1: Transitional L5 segment with left L5 transverse process articulating with the sacrum. Mild developmental narrowing of the disc space. Mild bilateral facet arthropathy. No central spinal canal or foraminal stenosis.  L4-5: Mild degenerative disc disease with 5 mm degenerative spondylolisthesis and disc bulge. Severe bilateral facet arthropathy with ligamentum flavum thickening contributes to mild central spinal canal stenosis and subarticular recess narrowing with encroachment upon the traversing L5 nerve roots. Moderate right foraminal stenosis.  L3-4: Mild degenerative disc disease and disc bulge. Mild bilateral facet arthropathy. Mild subarticular recess narrowing. The ganglia exit without compromise.  L2-3 through T12-L1: No focal disc herniation, central spinal canal stenosis or neural impingement.  T11-12 and T10-11: Moderate degenerative disc disease with Schmorl's nodes and endplate irregularities. No cord deformity or central spinal canal stenosis.  CONCLUSION:  1. L5-S1 transitional L5 segment with left transverse process articulating with the sacrum. Mild developmental narrowing of the disc space. Mild bilateral facet arthropathy.  2. L4-5 grade 1 spondylolisthesis with disc bulge. Severe facet  arthropathy and ligamentum flavum thickening with mild central spinal canal and subarticular recess stenosis with encroachment upon the traversing L5 nerve roots. Moderate right foraminal stenosis.  3. Mild degenerative disc disease with disc bulge. Mild facet arthropathy and mild subarticular recess narrowing.                        Again, thank you for allowing me to participate in the care of your patient.        Sincerely,        Claudia Baptiste, CNP

## 2021-08-30 NOTE — PATIENT INSTRUCTIONS
~Please call our [facility] Nurse Navigation line [phone] with any questions or concerns about your treatment plan, if symptoms worsen and you would like to be seen urgently, or if you have problems controlling bladder and bowel function.      Importance of specialized Physical Therapy: Discussed the importance of core strengthening, ROM, stretching exercises with the patient and how each of these entities is important in decreasing pain.  Explained to the patient that the purpose of physical therapy is to teach the patient a home exercise program individualized to them and their specific health concerns.  These exercises need to be performed every day in order to decrease pain and prevent future occurrences of pain.          An injection has been ordered today to potentially help with your pain symptoms. These injections do not fix what is going on in your back, therefore they typically do not take away the pain completely, however they can many times help improve symptoms. Injections should always be completed along with other modalities such as physical therapy for the best long term outcomes. If injections alone are done, then pain will likely return.     [facility] Injection Requirements      A  is required for all fluoroscopically-guided injections.    Injection appointments may be cancelled if there are signs/symptoms of an active infection or if the patient is being actively treated with antibiotics for a diagnosed infection.    Patients may have their steroid injection cancelled if they have had another steroid injection within 2 weeks.    Diabetic patients will have their blood glucose levels checked the day of their injection and the appointment will be rescheduled if the blood glucose level is 300 or higher.    Patients with allergies to cortisone, local anesthetics, iodine, or contrast dye should contact the Spine Center to further discuss these  considerations.    Patients scheduled for medial branch block diagnostic injections should refrain from taking pain medication the day of the procedure.  The medial branch block injection appointment will be rescheduled if the patient's pain rating is not 5/10 or greater at the time of the procedure.    Patients taking warfarin/Coumadin will have their INR checked the day of the procedure and the procedure may be rescheduled if the INR is greater than 3.0.    Please contact the Spine Center [phone]) if you are taking any prescription blood-thinning medications (warfarin, Plavix, Lovenox, Eliquis, Brilinta, Effient, etc.) as special dosing adjustments may need to be made depending on the type of injection you are scheduled to receive.    It is recommended that you delay having your steroid injection if you have received a flu shot or shingles vaccine within 2 weeks.

## 2021-08-31 ENCOUNTER — ANCILLARY PROCEDURE (OUTPATIENT)
Dept: PHYSICAL MEDICINE AND REHAB | Facility: CLINIC | Age: 66
End: 2021-08-31
Attending: NURSE PRACTITIONER
Payer: COMMERCIAL

## 2021-08-31 VITALS — HEART RATE: 81 BPM | DIASTOLIC BLOOD PRESSURE: 62 MMHG | OXYGEN SATURATION: 93 % | SYSTOLIC BLOOD PRESSURE: 126 MMHG

## 2021-08-31 DIAGNOSIS — M54.41 CHRONIC RIGHT-SIDED LOW BACK PAIN WITH RIGHT-SIDED SCIATICA: ICD-10-CM

## 2021-08-31 DIAGNOSIS — M54.16 RIGHT LUMBAR RADICULITIS: ICD-10-CM

## 2021-08-31 DIAGNOSIS — G89.29 CHRONIC RIGHT-SIDED LOW BACK PAIN WITH RIGHT-SIDED SCIATICA: ICD-10-CM

## 2021-08-31 DIAGNOSIS — M43.16 SPONDYLOLISTHESIS OF LUMBAR REGION: ICD-10-CM

## 2021-08-31 PROCEDURE — 64483 NJX AA&/STRD TFRM EPI L/S 1: CPT | Mod: RT | Performed by: PAIN MEDICINE

## 2021-08-31 RX ORDER — DEXAMETHASONE SODIUM PHOSPHATE 10 MG/ML
INJECTION, SOLUTION INTRAMUSCULAR; INTRAVENOUS
Status: COMPLETED | OUTPATIENT
Start: 2021-08-31 | End: 2021-08-31

## 2021-08-31 RX ORDER — LIDOCAINE HYDROCHLORIDE 10 MG/ML
INJECTION, SOLUTION EPIDURAL; INFILTRATION; INTRACAUDAL; PERINEURAL
Status: COMPLETED | OUTPATIENT
Start: 2021-08-31 | End: 2021-08-31

## 2021-08-31 RX ADMIN — DEXAMETHASONE SODIUM PHOSPHATE 10 MG: 10 INJECTION, SOLUTION INTRAMUSCULAR; INTRAVENOUS at 10:55

## 2021-08-31 RX ADMIN — LIDOCAINE HYDROCHLORIDE 1 ML: 10 INJECTION, SOLUTION EPIDURAL; INFILTRATION; INTRACAUDAL; PERINEURAL at 10:54

## 2021-08-31 ASSESSMENT — PAIN SCALES - GENERAL
PAINLEVEL: EXTREME PAIN (9)
PAINLEVEL: EXTREME PAIN (9)

## 2021-08-31 NOTE — PATIENT INSTRUCTIONS
DISCHARGE INSTRUCTIONS    During office hours (8:00 a.m.- 4:00 p.m.) questions or concerns may be answered  by calling Spine Center Navigation Nurses at  [phone].  Messages received after hours will be returned the following business day.      In the case of an emergency, please dial 911 or seek assistance at the nearest Emergency Room/Urgent Care facility.     All Patients:  ? You may experience an increase in your symptoms for the first 2 days (It may take anywhere between 2 days- 2 weeks for the steroid to have maximum effect).    ? You may use ice on the injection site, as frequently as 20 minutes each hour if needed.    ? You may take your pain medicine.    ? You may continue taking your regular medication.    ? You may shower. No swimming, tub bath or hot tub for 48 hours.  You may remove your bandaid/bandage as soon as you are home.    ? You may resume light activities, as tolerated.    ? Resume your usual diet as tolerated.    ? It is strongly advised that you do not drive for 1-3 hours post injection.    ? If you have had oral sedation:  Do not drive for 8 hours post injection.      ? If you have had IV sedation:  Do not drive for 24 hours post injection.  Do not operate hazardous machinery or make important personal/business decisions for 24 hours.    POSSIBLE STEROID SIDE EFFECTS (If steroid/cortisone was used for your procedure)    -If you experience these symptoms, it should only last for a short period      Swelling of the legs                Skin redness (flushing)       Mouth (oral) irritation     Blood sugar (glucose) levels              Sweats                     Mood changes    Headache    Weakened immune system for up to 14 days, which could increase the risk of navdeep the COVID-19 virus and/or experiencing more severe symptoms of the disease, if exposed.         POSSIBLE PROCEDURE SIDE EFFECTS    -Call the Spine Center if you are concerned      Increased Pain             Increased  numbness/tingling        Nausea/Vomiting            Bruising/bleeding at site        Redness or swelling                                                Difficulty walking        Weakness            Fever greater than 100.5    *In the event of a severe headache after an epidural steroid injection that is relieved by lying down, please call the [facility] to speak with a clinical staff member*

## 2021-09-23 ENCOUNTER — OFFICE VISIT (OUTPATIENT)
Dept: PHYSICAL MEDICINE AND REHAB | Facility: CLINIC | Age: 66
End: 2021-09-23
Payer: COMMERCIAL

## 2021-09-23 VITALS — SYSTOLIC BLOOD PRESSURE: 116 MMHG | OXYGEN SATURATION: 94 % | DIASTOLIC BLOOD PRESSURE: 75 MMHG | HEART RATE: 98 BPM

## 2021-09-23 DIAGNOSIS — G89.29 CHRONIC BILATERAL LOW BACK PAIN WITHOUT SCIATICA: Primary | ICD-10-CM

## 2021-09-23 DIAGNOSIS — M43.16 SPONDYLOLISTHESIS OF LUMBAR REGION: ICD-10-CM

## 2021-09-23 DIAGNOSIS — M47.816 LUMBAR FACET ARTHROPATHY: ICD-10-CM

## 2021-09-23 DIAGNOSIS — M54.50 CHRONIC BILATERAL LOW BACK PAIN WITHOUT SCIATICA: Primary | ICD-10-CM

## 2021-09-23 PROCEDURE — 99214 OFFICE O/P EST MOD 30 MIN: CPT | Performed by: NURSE PRACTITIONER

## 2021-09-23 ASSESSMENT — PAIN SCALES - GENERAL: PAINLEVEL: SEVERE PAIN (7)

## 2021-09-23 NOTE — LETTER
9/23/2021         RE: Barry Jay  [address] [facility] [address]        Dear Colleague,    Thank you for referring your patient, Barry Jay, to the [facility] [place]. Please see a copy of my visit note below.      Assessment:     Diagnoses and all orders for this visit:  Chronic bilateral low back pain without sciatica  -     PAIN Radiofreq Ablation Lumbar/Sacral Joint Nerve Bilateral; Future  Lumbar facet arthropathy  -     PAIN Radiofreq Ablation Lumbar/Sacral Joint Nerve Bilateral; Future  Spondylolisthesis of lumbar region  -     PAIN Radiofreq Ablation Lumbar/Sacral Joint Nerve Bilateral; Future     Barry Jay is a 66 year old y.o. male with past medical history significant for hypertension, dyslipidemia, hand osteoarthritis, arthralgia who presents today for follow-up regarding:    -Chronic recurrent bilateral low back pain with previous relief with radiofrequency ablation in 2020.  Pain consistent with facet mediated pain.  Patient does have spondylolisthesis at L4-5 with facet arthropathy L3-4 and L4-5.    -More acute right low back pain is resolved post right L4-5 TFESI.  No current radicular pain.     Plan:     A shared decision making plan was used. The patient's values and choices were respected. Prior medical records were reviewed today. The following represents what was discussed and decided upon by the provider and the patient.        -DIAGNOSTIC TESTS: Images were personally reviewed and interpreted.   --Lumbar spine MRI 1/24/2020 [facility] with advanced facet arthropathy L4-5 right greater than left and facet arthropathy L3-4 moderate bilaterally.  Grade 1 spondylolisthesis L4-5 with right greater than left L5 impingement.  --Lumbar spine flexion-extension x-ray 2/3/2020 was 0.6 cm spondylolisthesis L4-5, no instability noted.  --[facility] lumbar spine MRI 1/17/2019 shows severe facet arthropathy L4-5 with 5 mm spondylolisthesis, mild central and subarticular recess  stenosis, mild right foraminal stenosis.  Mild facet arthropathy L3-4 and L5-S1.    -INTERVENTIONS: Ordered a bilateral L2, L3, L4 radiofrequency ablation to target the bilateral L3-4 and L4-5 facet joints as he does have facet arthropathy at this level, did have a positive response with RFA in October 2020 as well as relief historically in the past as well.    -MEDICATIONS: No changes medications  Discussed side effects of medications and proper use. Patient verbalized understanding.    -PHYSICAL THERAPY: Advised patient continue with home exercises which he is diligent about doing on a daily basis at this time, minimal benefit with current chronic LBP however.  Discussed the importance of core strengthening, ROM, stretching exercises with the patient and how each of these entities is important in decreasing pain.  Explained to the patient that the purpose of physical therapy is to teach the patient a home exercise program.  These exercises need to be performed every day in order to decrease pain and prevent future occurrences of pain.        -PATIENT EDUCATION:  Total time of 32 minutes spent with the patient, reviewing the chart, placing orders, and documenting.   -Today we also discussed the issues related to the current COVID-19 pandemic, the pros and cons of the current treatment plan, the CDC guidelines such as social distancing, washing the hands, and covering the cough.    -FOLLOW UP: We will submit for prior authorization and notify patient once approval obtained to schedule RFA with Dr. Wolff.  Advised to contact clinic if symptoms worsen or change.    Subjective:     Barry Jay is a 66 year old male who presents today for follow-up regarding post right L4-5 TFESI in which he did receive significant relief of the more acute right low back pain.  At this point however he still having chronic bilateral low back pain that is back to his baseline in which he did receive significant relief with this  similar type of pain back in January 2021 with relief for over a year.  Patient currently reports his back pain is a 7/10, and 8 at its worst, aggravated with generalized standing, bending and prolonged sitting, does improve with laying down.  Patient currently denies radiating lower extremity pain, denies    Treatment to Date: No prior spinal.  Physical therapy [facility] x3 sessions.  Patient continues with home exercises at this time on a daily basis.     History of bilateral L4-5 RFA [place] [facility] Dr. Green 2011 and 2013, relief at least 2 years.  Bilateral L3-4 MBB 6/30/2017.  Preprocedure pain 7/10 post 2/10.  Bilateral L4-5 facet joint steroid injection 7/7/2017 with relief.  Preprocedure pain 8/10, post 5/10.  Right L4-5 and L5-S1 TFESI 1/6/2020 with 30% lasting benefit only.  Preprocedure pain 10/10, post 7/10.  Bilateral L2, L3, L4 MBB 3/11/2020 and 7/7/2020 with positive response.  Bilateral L2, L3, L4 RFA 10/9/2020 with significant benefit.  Right L4-5 TFESI 8/31/2021 with significant relief acute right LBP, minimal relief with chronic bilateral LBP however.  Pre and post procedure pain 9/10.    Patient Active Problem List   Diagnosis     Dyslipidemia     Hypertension     Diffuse Joint Pains (Arthralgias)     Joint Pain, Localized In The Wrist     Joint Pain, Localized In The Knee     Generalized Osteoarthritis Of The Hand     Osteoarthritis Of The Knee     Arthralgias In Multiple Sites       Current Outpatient Medications   Medication     celecoxib (CELEBREX) 200 MG capsule     ALPRAZolam (XANAX) 0.5 MG tablet     atorvastatin (LIPITOR) 10 MG tablet     irbesartan (AVAPRO) 150 MG tablet     irbesartan-hydrochlorothiazide (AVALIDE) 300-12.5 mg per tablet     omeprazole (PRILOSEC) 20 MG capsule     ondansetron (ZOFRAN ODT) 4 MG disintegrating tablet     PARoxetine (PAXIL) 20 MG tablet     zolpidem (AMBIEN) 10 mg tablet     No current facility-administered medications for this visit.       Allergies    Allergen Reactions     Iodine-Iodine Containing [Iodides] Unknown     Pt thinks 20+ yrs ago, Pt received omni 350 without premed and did fine 11/7/2020 [facility]       Past Medical History:   Diagnosis Date     HTN (hypertension)      Hyperlipidemia         Review of Systems  ROS:  Specifically negative for bowel/bladder dysfunction, balance changes, headache, dizziness, foot drop, fevers, chills, appetite changes, nausea/vomiting, unexplained weight loss. Otherwise 13 systems reviewed are negative. Please see the patient's intake questionnaire from today for details.    Reviewed Social, Family, Past Medical and Past Surgical history with patient, no significant changes noted since prior visit.     Objective:     /75 (BP Location: Right arm, Patient Position: Sitting)   Pulse 98   SpO2 94%     PHYSICAL EXAMINATION:    --CONSTITUTIONAL: Well developed, well nourished, healthy appearing individual.  --PSYCHIATRIC: Appropriate mood and affect. No difficulty interacting due to temper, social withdrawal, or memory issues.  --SKIN: Lumbar region is dry and intact.   --RESPIRATORY: Normal rhythm and effort. No abnormal accessory muscle breathing patterns noted.   --MUSCULOSKELETAL:  Normal lumbar lordosis noted, no lateral shift.  --GROSS MOTOR: Easily arises from a seated position. Gait is non-antalgic  --LUMBAR SPINE:  Inspection reveals no evidence of deformity. Range of motion is not limited in lumbar flexion, increased pain with lumbar extension lateral rotation simultaneously bilaterally.  --LOWER EXTREMITY MOTOR TESTING:  Plantar flexion left 5/5, right 5/5   Dorsiflexion left 5/5, right 5/5   Great toe MTP extension left 5/5, right 5/5  Knee flexion left 5/5, right 5/5  Knee extension left 5/5, right 5/5   Hip flexion left 5/5, right 5/5  Hip abduction left 5/5, right 5/5  Hip adduction left 5/5, right 5/5   --HIPS: Full range of motion bilaterally.  --NEUROLOGIC: Bilateral patellar and achilles reflexes are  physiologic and symmetric. Sensation to light touch is intact in the bilateral L4, L5, and S1 dermatomes.    RESULTS:   Imaging: Lumbar spine imaging was reviewed today. The images were shown to the patient and the findings were explained using a spine model.      XR LUMBAR SPINE FLEX AND EXT 2 OR 3 VWS  LOCATION: [facility]  DATE/TIME: 2/3/2020   INDICATION: Flexion, extension, and neutral views please to evaluate spondylolisthesis stability at L4-L5.  COMPARISON: 04/26/2018 CT abdomen/pelvis.  IMPRESSION:   In the upright position there is approximately 0.6 cm degenerative anterolisthesis of L4 on L5. There is no change with flexion or extension to suggest segmental instability.  The vertebral body heights are preserved. Mild intervertebral disc degeneration at L3-L4, L4-L5 and L5-S1. Moderate/severe facet hypertrophic changes at L4-L5 and L5-S1.  There are vascular calcifications. Abdominal surgical clips.        MR LUMBAR SPINE WITHOUT CONTRAST  At [facility]-1/24/2020  CLINICAL INFORMATION: 64-year-old male with right low back and leg pain.  COMPARISON: Lumbar spine MRI dated January 17, 2019.  TECHNICAL INFORMATION: Sagittal T2, sagittal T1, sagittal STIR, axial T2, and axial T1-weighted MR images of the lumbar spine in the neutral, seated position on an open 0.6 Teresa magnet.  CONTRAST: None.  SEDATION: None.  INTERPRETATION: Transitional lumbosacral anatomy with partially sacralized L5, mild lumbar levocurvature centered at L3, and shallow chronic Scheuermann-type endplate Schmorl's nodes at T10-11 and T11-12 without spondylolysis, vertebral collapse, acute fracture, or destructive osseous lesion. Normal pre and paravertebral soft tissues. Enlarged prostate gland measuring at least 5 cm AP (series 2, image 7).  Conus medullaris positioned at upper L2, with normal configuration of the terminal nerve roots.  L5-S1: Hypoplastic disc and facets with mild facet degeneration on the left, an accessory  articulation on the left, and no stenosis or impingement.  L4-5: Moderate disc degeneration, 3 mm spondylolisthesis, bilateral foraminal annular tears, diffuse annular bulge, advanced right/moderate left facet degeneration, moderate right greater than left subarticular stenosis with L5 nerve root impingement, and mild to moderate right/mild left foraminal stenosis with right L4 nerve root encroachment.  L3-4: Mild disc degeneration, 1-2 mm spondylolisthesis, mild diffuse annular bulge, moderate left facet degeneration, mild left subarticular stenosis, and mild bilateral foraminal stenosis.  L2-3: Mild degenerative disc desiccation with preserved disc height, mild bilateral foraminal annular bulge, normal facet morphology, and no stenosis or impingement.  L1-2 and T12-L1: Normal dorsal disc contours and normal morphology.  T11-12: Mild disc degeneration, 1 mm spondylolisthesis, normal facet morphology, and no stenosis or impingement.  No change from January 17, 2019.  CONCLUSION: Multilevel lumbar degenerative changes and mild levocurvature with specific findings as follows:  1. L4-5 subarticular impingement of the right greater than left L5 nerve roots in the setting of grade 1 degenerative spondylolisthesis. L3-4 mild left subarticular stenosis without impingement.  2. Foraminal stenosis, mild to moderate on the right at L4-5 with right L4 nerve root encroachment. Mild on the left at L4-5 and bilaterally at L3-4.  3. Facet degeneration, advanced on the right at L4-5. Moderate on the left at L4-5 and L3-4. Mild on the left at L5-S1.  4. No spondylolysis, vertebral collapse, acute fracture, or destructive osseous lesion.  5. Transitional lumbosacral anatomy with partially sacralized L5.  6. Enlarged prostate gland.           MRI LUMBAR SPINE WITHOUT CONTRAST  [facility]- 1/17/19  CLINICAL INFORMATION: 63-year-old man with low back pain.  TECHNICAL INFORMATION: MRI lumbar spine was obtained on 0.6 Teresa open upright magnet  including sagittal T2, sagittal T1, sagittal STIR, axial T2 and axial T1-weighted images.  INTERPRETATION: There is normal lordotic curvature of the lumbar spine. No marrow edema within the lumbar vertebral bodies. No loss of vertebral body height. The conus is at the L1 level and is normal in appearance.  L5-S1: Transitional L5 segment with left L5 transverse process articulating with the sacrum. Mild developmental narrowing of the disc space. Mild bilateral facet arthropathy. No central spinal canal or foraminal stenosis.  L4-5: Mild degenerative disc disease with 5 mm degenerative spondylolisthesis and disc bulge. Severe bilateral facet arthropathy with ligamentum flavum thickening contributes to mild central spinal canal stenosis and subarticular recess narrowing with encroachment upon the traversing L5 nerve roots. Moderate right foraminal stenosis.  L3-4: Mild degenerative disc disease and disc bulge. Mild bilateral facet arthropathy. Mild subarticular recess narrowing. The ganglia exit without compromise.  L2-3 through T12-L1: No focal disc herniation, central spinal canal stenosis or neural impingement.  T11-12 and T10-11: Moderate degenerative disc disease with Schmorl's nodes and endplate irregularities. No cord deformity or central spinal canal stenosis.  CONCLUSION:  1. L5-S1 transitional L5 segment with left transverse process articulating with the sacrum. Mild developmental narrowing of the disc space. Mild bilateral facet arthropathy.  2. L4-5 grade 1 spondylolisthesis with disc bulge. Severe facet arthropathy and ligamentum flavum thickening with mild central spinal canal and subarticular recess stenosis with encroachment upon the traversing L5 nerve roots. Moderate right foraminal stenosis.  3. Mild degenerative disc disease with disc bulge. Mild facet arthropathy and mild subarticular recess narrowing.                                    Again, thank you for allowing me to participate in the care of  your patient.        Sincerely,        Claudia Baptiste, CNP

## 2021-09-23 NOTE — PROGRESS NOTES
Assessment:     Diagnoses and all orders for this visit:  Chronic bilateral low back pain without sciatica  -     PAIN Radiofreq Ablation Lumbar/Sacral Joint Nerve Bilateral; Future  Lumbar facet arthropathy  -     PAIN Radiofreq Ablation Lumbar/Sacral Joint Nerve Bilateral; Future  Spondylolisthesis of lumbar region  -     PAIN Radiofreq Ablation Lumbar/Sacral Joint Nerve Bilateral; Future     Barry Jay is a 66 year old y.o. male with past medical history significant for hypertension, dyslipidemia, hand osteoarthritis, arthralgia who presents today for follow-up regarding:    -Chronic recurrent bilateral low back pain with previous 90% significant relief with radiofrequency ablation in 2020 for 8 months.  Pain consistent with facet mediated pain.  Patient does have spondylolisthesis at L4-5 with facet arthropathy L3-4 and L4-5.    -More acute right low back pain is resolved post right L4-5 TFESI.  No current radicular pain.     Plan:     A shared decision making plan was used. The patient's values and choices were respected. Prior medical records were reviewed today. The following represents what was discussed and decided upon by the provider and the patient.        -DIAGNOSTIC TESTS: Images were personally reviewed and interpreted.   --Lumbar spine MRI 1/24/2020 [facility] with advanced facet arthropathy L4-5 right greater than left and facet arthropathy L3-4 moderate bilaterally.  Grade 1 spondylolisthesis L4-5 with right greater than left L5 impingement.  --Lumbar spine flexion-extension x-ray 2/3/2020 was 0.6 cm spondylolisthesis L4-5, no instability noted.  --[facility] lumbar spine MRI 1/17/2019 shows severe facet arthropathy L4-5 with 5 mm spondylolisthesis, mild central and subarticular recess stenosis, mild right foraminal stenosis.  Mild facet arthropathy L3-4 and L5-S1.    -INTERVENTIONS: Ordered a bilateral L2, L3, L4 radiofrequency ablation to target the bilateral L3-4 and L4-5 facet joints as he does have  facet arthropathy at this level, did have a positive response with RFA in October 2020 with 90% relief as well as relief historically in the past as well.    -MEDICATIONS: No changes medications  Discussed side effects of medications and proper use. Patient verbalized understanding.    -PHYSICAL THERAPY: Advised patient continue with home exercises which he is diligent about doing on a daily basis at this time, minimal benefit with current chronic LBP however.  Discussed the importance of core strengthening, ROM, stretching exercises with the patient and how each of these entities is important in decreasing pain.  Explained to the patient that the purpose of physical therapy is to teach the patient a home exercise program.  These exercises need to be performed every day in order to decrease pain and prevent future occurrences of pain.        -PATIENT EDUCATION:  Total time of 32 minutes spent with the patient, reviewing the chart, placing orders, and documenting.   -Today we also discussed the issues related to the current COVID-19 pandemic, the pros and cons of the current treatment plan, the CDC guidelines such as social distancing, washing the hands, and covering the cough.    -FOLLOW UP: We will submit for prior authorization and notify patient once approval obtained to schedule RFA with Dr. Wolff.  Advised to contact clinic if symptoms worsen or change.    Subjective:     Barry Jay is a 66 year old male who presents today for follow-up regarding post right L4-5 TFESI in which he did receive significant relief of the more acute right low back pain.  At this point however he still having chronic bilateral low back pain that is back to his baseline in which he did receive significant relief with this similar type of pain back in January 2021 with relief for over a year.  Patient currently reports his back pain is a 7/10, and 8 at its worst, aggravated with generalized standing, bending and prolonged  sitting, does improve with laying down.  Patient currently denies radiating lower extremity pain, denies    Treatment to Date: No prior spinal.  Physical therapy [facility] x3 sessions.  Patient continues with home exercises at this time on a daily basis.     History of bilateral L4-5 RFA [facility] Dr. Green 2011 and 2013, relief at least 2 years.  Bilateral L3-4 MBB 6/30/2017.  Preprocedure pain 7/10 post 2/10.  Bilateral L4-5 facet joint steroid injection 7/7/2017 with relief.  Preprocedure pain 8/10, post 5/10.  Right L4-5 and L5-S1 TFESI 1/6/2020 with 30% lasting benefit only.  Preprocedure pain 10/10, post 7/10.  Bilateral L2, L3, L4 MBB 3/11/2020 and 7/7/2020 with positive response.  Bilateral L2, L3, L4 RFA 10/9/2020 with 90% significant relief x 8 months.  Right L4-5 TFESI 8/31/2021 with significant relief acute right LBP, minimal relief with chronic bilateral LBP however.  Pre and post procedure pain 9/10.    Patient Active Problem List   Diagnosis     Dyslipidemia     Hypertension     Diffuse Joint Pains (Arthralgias)     Joint Pain, Localized In The Wrist     Joint Pain, Localized In The Knee     Generalized Osteoarthritis Of The Hand     Osteoarthritis Of The Knee     Arthralgias In Multiple Sites       Current Outpatient Medications   Medication     celecoxib (CELEBREX) 200 MG capsule     ALPRAZolam (XANAX) 0.5 MG tablet     atorvastatin (LIPITOR) 10 MG tablet     irbesartan (AVAPRO) 150 MG tablet     irbesartan-hydrochlorothiazide (AVALIDE) 300-12.5 mg per tablet     omeprazole (PRILOSEC) 20 MG capsule     ondansetron (ZOFRAN ODT) 4 MG disintegrating tablet     PARoxetine (PAXIL) 20 MG tablet     zolpidem (AMBIEN) 10 mg tablet     No current facility-administered medications for this visit.       Allergies   Allergen Reactions     Iodine-Iodine Containing [Iodides] Unknown     Pt thinks 20+ yrs ago, Pt received omni 350 without premed and did fine 11/7/2020 [facility]       Past Medical History:   Diagnosis  Date     HTN (hypertension)      Hyperlipidemia         Review of Systems  ROS:  Specifically negative for bowel/bladder dysfunction, balance changes, headache, dizziness, foot drop, fevers, chills, appetite changes, nausea/vomiting, unexplained weight loss. Otherwise 13 systems reviewed are negative. Please see the patient's intake questionnaire from today for details.    Reviewed Social, Family, Past Medical and Past Surgical history with patient, no significant changes noted since prior visit.     Objective:     /75 (BP Location: Right arm, Patient Position: Sitting)   Pulse 98   SpO2 94%     PHYSICAL EXAMINATION:    --CONSTITUTIONAL: Well developed, well nourished, healthy appearing individual.  --PSYCHIATRIC: Appropriate mood and affect. No difficulty interacting due to temper, social withdrawal, or memory issues.  --SKIN: Lumbar region is dry and intact.   --RESPIRATORY: Normal rhythm and effort. No abnormal accessory muscle breathing patterns noted.   --MUSCULOSKELETAL:  Normal lumbar lordosis noted, no lateral shift.  --GROSS MOTOR: Easily arises from a seated position. Gait is non-antalgic  --LUMBAR SPINE:  Inspection reveals no evidence of deformity. Range of motion is not limited in lumbar flexion, increased pain with lumbar extension lateral rotation simultaneously bilaterally.  --LOWER EXTREMITY MOTOR TESTING:  Plantar flexion left 5/5, right 5/5   Dorsiflexion left 5/5, right 5/5   Great toe MTP extension left 5/5, right 5/5  Knee flexion left 5/5, right 5/5  Knee extension left 5/5, right 5/5   Hip flexion left 5/5, right 5/5  Hip abduction left 5/5, right 5/5  Hip adduction left 5/5, right 5/5   --HIPS: Full range of motion bilaterally.  --NEUROLOGIC: Bilateral patellar and achilles reflexes are physiologic and symmetric. Sensation to light touch is intact in the bilateral L4, L5, and S1 dermatomes.    RESULTS:   Imaging: Lumbar spine imaging was reviewed today. The images were shown to the  patient and the findings were explained using a spine model.      XR LUMBAR SPINE FLEX AND EXT 2 OR 3 VWS  LOCATION: [facility]  DATE/TIME: 2/3/2020   INDICATION: Flexion, extension, and neutral views please to evaluate spondylolisthesis stability at L4-L5.  COMPARISON: 04/26/2018 CT abdomen/pelvis.  IMPRESSION:   In the upright position there is approximately 0.6 cm degenerative anterolisthesis of L4 on L5. There is no change with flexion or extension to suggest segmental instability.  The vertebral body heights are preserved. Mild intervertebral disc degeneration at L3-L4, L4-L5 and L5-S1. Moderate/severe facet hypertrophic changes at L4-L5 and L5-S1.  There are vascular calcifications. Abdominal surgical clips.        MR LUMBAR SPINE WITHOUT CONTRAST  At [facility]-1/24/2020  CLINICAL INFORMATION: 64-year-old male with right low back and leg pain.  COMPARISON: Lumbar spine MRI dated January 17, 2019.  TECHNICAL INFORMATION: Sagittal T2, sagittal T1, sagittal STIR, axial T2, and axial T1-weighted MR images of the lumbar spine in the neutral, seated position on an open 0.6 Teresa magnet.  CONTRAST: None.  SEDATION: None.  INTERPRETATION: Transitional lumbosacral anatomy with partially sacralized L5, mild lumbar levocurvature centered at L3, and shallow chronic Scheuermann-type endplate Schmorl's nodes at T10-11 and T11-12 without spondylolysis, vertebral collapse, acute fracture, or destructive osseous lesion. Normal pre and paravertebral soft tissues. Enlarged prostate gland measuring at least 5 cm AP (series 2, image 7).  Conus medullaris positioned at upper L2, with normal configuration of the terminal nerve roots.  L5-S1: Hypoplastic disc and facets with mild facet degeneration on the left, an accessory articulation on the left, and no stenosis or impingement.  L4-5: Moderate disc degeneration, 3 mm spondylolisthesis, bilateral foraminal annular tears, diffuse annular bulge, advanced right/moderate left facet  degeneration, moderate right greater than left subarticular stenosis with L5 nerve root impingement, and mild to moderate right/mild left foraminal stenosis with right L4 nerve root encroachment.  L3-4: Mild disc degeneration, 1-2 mm spondylolisthesis, mild diffuse annular bulge, moderate left facet degeneration, mild left subarticular stenosis, and mild bilateral foraminal stenosis.  L2-3: Mild degenerative disc desiccation with preserved disc height, mild bilateral foraminal annular bulge, normal facet morphology, and no stenosis or impingement.  L1-2 and T12-L1: Normal dorsal disc contours and normal morphology.  T11-12: Mild disc degeneration, 1 mm spondylolisthesis, normal facet morphology, and no stenosis or impingement.  No change from January 17, 2019.  CONCLUSION: Multilevel lumbar degenerative changes and mild levocurvature with specific findings as follows:  1. L4-5 subarticular impingement of the right greater than left L5 nerve roots in the setting of grade 1 degenerative spondylolisthesis. L3-4 mild left subarticular stenosis without impingement.  2. Foraminal stenosis, mild to moderate on the right at L4-5 with right L4 nerve root encroachment. Mild on the left at L4-5 and bilaterally at L3-4.  3. Facet degeneration, advanced on the right at L4-5. Moderate on the left at L4-5 and L3-4. Mild on the left at L5-S1.  4. No spondylolysis, vertebral collapse, acute fracture, or destructive osseous lesion.  5. Transitional lumbosacral anatomy with partially sacralized L5.  6. Enlarged prostate gland.           MRI LUMBAR SPINE WITHOUT CONTRAST  [facility]- 1/17/19  CLINICAL INFORMATION: 63-year-old man with low back pain.  TECHNICAL INFORMATION: MRI lumbar spine was obtained on 0.6 Teresa open upright magnet including sagittal T2, sagittal T1, sagittal STIR, axial T2 and axial T1-weighted images.  INTERPRETATION: There is normal lordotic curvature of the lumbar spine. No marrow edema within the lumbar vertebral  bodies. No loss of vertebral body height. The conus is at the L1 level and is normal in appearance.  L5-S1: Transitional L5 segment with left L5 transverse process articulating with the sacrum. Mild developmental narrowing of the disc space. Mild bilateral facet arthropathy. No central spinal canal or foraminal stenosis.  L4-5: Mild degenerative disc disease with 5 mm degenerative spondylolisthesis and disc bulge. Severe bilateral facet arthropathy with ligamentum flavum thickening contributes to mild central spinal canal stenosis and subarticular recess narrowing with encroachment upon the traversing L5 nerve roots. Moderate right foraminal stenosis.  L3-4: Mild degenerative disc disease and disc bulge. Mild bilateral facet arthropathy. Mild subarticular recess narrowing. The ganglia exit without compromise.  L2-3 through T12-L1: No focal disc herniation, central spinal canal stenosis or neural impingement.  T11-12 and T10-11: Moderate degenerative disc disease with Schmorl's nodes and endplate irregularities. No cord deformity or central spinal canal stenosis.  CONCLUSION:  1. L5-S1 transitional L5 segment with left transverse process articulating with the sacrum. Mild developmental narrowing of the disc space. Mild bilateral facet arthropathy.  2. L4-5 grade 1 spondylolisthesis with disc bulge. Severe facet arthropathy and ligamentum flavum thickening with mild central spinal canal and subarticular recess stenosis with encroachment upon the traversing L5 nerve roots. Moderate right foraminal stenosis.  3. Mild degenerative disc disease with disc bulge. Mild facet arthropathy and mild subarticular recess narrowing.

## 2021-10-19 ENCOUNTER — ANCILLARY PROCEDURE (OUTPATIENT)
Dept: PHYSICAL MEDICINE AND REHAB | Facility: CLINIC | Age: 66
End: 2021-10-19
Attending: NURSE PRACTITIONER
Payer: COMMERCIAL

## 2021-10-19 VITALS
TEMPERATURE: 98.1 F | HEART RATE: 79 BPM | DIASTOLIC BLOOD PRESSURE: 76 MMHG | SYSTOLIC BLOOD PRESSURE: 132 MMHG | OXYGEN SATURATION: 97 %

## 2021-10-19 DIAGNOSIS — M54.50 CHRONIC BILATERAL LOW BACK PAIN WITHOUT SCIATICA: ICD-10-CM

## 2021-10-19 DIAGNOSIS — G89.29 CHRONIC BILATERAL LOW BACK PAIN WITHOUT SCIATICA: ICD-10-CM

## 2021-10-19 DIAGNOSIS — M43.16 SPONDYLOLISTHESIS OF LUMBAR REGION: ICD-10-CM

## 2021-10-19 DIAGNOSIS — M47.816 LUMBAR FACET ARTHROPATHY: ICD-10-CM

## 2021-10-19 PROCEDURE — 64635 DESTROY LUMB/SAC FACET JNT: CPT | Mod: 50 | Performed by: PHYSICAL MEDICINE & REHABILITATION

## 2021-10-19 PROCEDURE — 64636 DESTROY L/S FACET JNT ADDL: CPT | Mod: 50 | Performed by: PHYSICAL MEDICINE & REHABILITATION

## 2021-10-19 RX ORDER — LIDOCAINE HYDROCHLORIDE 10 MG/ML
INJECTION, SOLUTION EPIDURAL; INFILTRATION; INTRACAUDAL; PERINEURAL
Status: COMPLETED | OUTPATIENT
Start: 2021-10-19 | End: 2021-10-19

## 2021-10-19 RX ORDER — LIDOCAINE HYDROCHLORIDE 20 MG/ML
INJECTION, SOLUTION EPIDURAL; INFILTRATION; INTRACAUDAL; PERINEURAL
Status: COMPLETED | OUTPATIENT
Start: 2021-10-19 | End: 2021-10-19

## 2021-10-19 RX ADMIN — LIDOCAINE HYDROCHLORIDE 9 ML: 20 INJECTION, SOLUTION EPIDURAL; INFILTRATION; INTRACAUDAL; PERINEURAL at 10:07

## 2021-10-19 RX ADMIN — LIDOCAINE HYDROCHLORIDE 6 ML: 10 INJECTION, SOLUTION EPIDURAL; INFILTRATION; INTRACAUDAL; PERINEURAL at 10:07

## 2021-10-19 ASSESSMENT — PAIN SCALES - GENERAL
PAINLEVEL: EXTREME PAIN (8)
PAINLEVEL: NO PAIN (0)

## 2021-10-19 NOTE — PATIENT INSTRUCTIONS
Please follow up four weeks post procedure with Claudia to evaluate your plan of care.    DISCHARGE INSTRUCTIONS    During office hours (8:00 a.m.-4:00 p.m.) questions or concerns may be answered  by calling [facility] Nurses at  [phone].  Messages received after hours will be returned the following business day.      In the case of an emergency,please dial 911 or seek assistance at the nearest Emergency Room/Urgent Care facility.     All Patients:  ? You may experience an increase in your symptoms forthe first 5-7 days. Soreness may persist during the first few weeks as it takes 4-6 weeks for the nerves to fully heal.    ? You may use ice on the injection site,as frequently as 20 minutes each hour if needed. It is recommended NOT to apply heat during the first 24 hours.    ? You may take your pain medicine.    ? You may continue taking your regular medication.    ? You may shower. No swimming, tub bath or hot tub for 48hours. This also includes no lotions, ointments or patches to the needle sites as well. You may remove your bandaid/bandage as soon as you are home.    ? You mayresume light activities, as tolerated.    ? Resume your usual diet as tolerated.    ? It is strongly advisedthat you do not drive for 1-3 hours post injection.           POSSIBLE PROCEDURE SIDE EFFECTS    -Call the Spine Center if you are concerned      IncreasedPain             Increased numbness/tingling        Nausea/Vomiting            Bruising/bleeding at site        Redness or swelling    Difficulty walking        Weakness            Fever greater than 100.5

## 2022-04-27 ENCOUNTER — OFFICE VISIT (OUTPATIENT)
Dept: PHYSICAL MEDICINE AND REHAB | Facility: CLINIC | Age: 67
End: 2022-04-27
Payer: COMMERCIAL

## 2022-04-27 VITALS — DIASTOLIC BLOOD PRESSURE: 78 MMHG | SYSTOLIC BLOOD PRESSURE: 150 MMHG

## 2022-04-27 DIAGNOSIS — M54.50 CHRONIC BILATERAL LOW BACK PAIN WITHOUT SCIATICA: Primary | ICD-10-CM

## 2022-04-27 DIAGNOSIS — M53.3 SACROILIAC JOINT DYSFUNCTION: ICD-10-CM

## 2022-04-27 DIAGNOSIS — M79.18 RIGHT BUTTOCK PAIN: ICD-10-CM

## 2022-04-27 DIAGNOSIS — M47.816 LUMBAR FACET ARTHROPATHY: ICD-10-CM

## 2022-04-27 DIAGNOSIS — M43.16 SPONDYLOLISTHESIS OF LUMBAR REGION: ICD-10-CM

## 2022-04-27 DIAGNOSIS — M53.3 PAIN OF RIGHT SACROILIAC JOINT: ICD-10-CM

## 2022-04-27 DIAGNOSIS — G89.29 CHRONIC BILATERAL LOW BACK PAIN WITHOUT SCIATICA: Primary | ICD-10-CM

## 2022-04-27 PROCEDURE — 99214 OFFICE O/P EST MOD 30 MIN: CPT | Performed by: NURSE PRACTITIONER

## 2022-04-27 RX ORDER — MELOXICAM 7.5 MG/1
7.5 TABLET ORAL 2 TIMES DAILY PRN
Qty: 30 TABLET | Refills: 3 | Status: SHIPPED | OUTPATIENT
Start: 2022-04-27 | End: 2022-06-02 | Stop reason: ALTCHOICE

## 2022-04-27 ASSESSMENT — PAIN SCALES - GENERAL: PAINLEVEL: WORST PAIN (10)

## 2022-04-27 NOTE — PROGRESS NOTES
Assessment:     Diagnoses and all orders for this visit:  Chronic bilateral low back pain without sciatica  Lumbar facet arthropathy  Spondylolisthesis of lumbar region  Right buttock pain  -     meloxicam (MOBIC) 7.5 MG tablet; Take 1 tablet (7.5 mg) by mouth 2 times daily as needed for pain Take with food  -     PAIN Joint Injection Sacroiliac Joint Right; Future  Pain of right sacroiliac joint  -     meloxicam (MOBIC) 7.5 MG tablet; Take 1 tablet (7.5 mg) by mouth 2 times daily as needed for pain Take with food  -     PAIN Joint Injection Sacroiliac Joint Right; Future  Sacroiliac joint dysfunction  -     PAIN Joint Injection Sacroiliac Joint Right; Future     Barry Jay is a 67 year old y.o. male with past medical history significant for hypertension, dyslipidemia, hand osteoarthritis, arthralgia who presents today for follow-up regarding:    -Chronic bilateral low back pain that is doing quite well at this time.    -Acute right upper buttock pain localizing to the sacroiliac joint with increased pain with SI provocative maneuvers on exam today indicating sacroiliac joint dysfunction.  No current radicular component.     Plan:     A shared decision making plan was used. The patient's values and choices were respected. Prior medical records were reviewed today. The following represents what was discussed and decided upon by the provider and the patient.        -DIAGNOSTIC TESTS: Images were personally reviewed and interpreted.    --If no benefit with SI injection would recommend updated Lumbar spine MRI.  --Lumbar spine MRI 1/24/2020 [facility] with advanced facet arthropathy L4-5 right greater than left and facet arthropathy L3-4 moderate bilaterally.  Grade 1 spondylolisthesis L4-5 with right greater than left L5 impingement.  --Lumbar spine flexion-extension x-ray 2/3/2020 was 0.6 cm spondylolisthesis L4-5, no instability noted.  --[facility] lumbar spine MRI 1/17/2019 shows severe facet arthropathy L4-5 with 5 mm  spondylolisthesis, mild central and subarticular recess stenosis, mild right foraminal stenosis.  Mild facet arthropathy L3-4 and L5-S1.    -INTERVENTIONS: Ordered right sacroiliac joint steroid injection to be completed with Dr. Naidu deceiving get further relief of right buttock pain.  He is continuing with home exercises from prior physical therapy sessions with aggravation at this time.    -MEDICATIONS: Advised patient to stop taking Celebrex due to ineffectiveness.  Prescribed meloxicam 7.5 mg 1 tablet twice daily for pain and inflammation at this time.  Advised patient to avoid OTC NSAIDs and to take this medication with full glass water and food.  Discussed side effects of medications and proper use. Patient verbalized understanding.    -PHYSICAL THERAPY: Advised patient continue with home exercises however he does find that these exercises are aggravating at this time.  Discussed the importance of core strengthening, ROM, stretching exercises with the patient and how each of these entities is important in decreasing pain.  Explained to the patient that the purpose of physical therapy is to teach the patient a home exercise program.  These exercises need to be performed every day in order to decrease pain and prevent future occurrences of pain.        -PATIENT EDUCATION:  Total time of 32 minutes, on the day of service, spent with the patient, reviewing the chart, placing orders, and documenting.   -Today we also discussed the issues related to the current COVID-19 pandemic, the pros and cons of the current treatment plan, the CDC guidelines such as social distancing, washing the hands, and covering the cough.    -FOLLOW UP: Follow-up for injection with Dr. Naidu  Advised to contact clinic if symptoms worsen or change.    Subjective:     Barry Jay is a 67 year old male who presents today for follow-up regarding chronic bilateral low back pain that is doing quite well after the ablation October  2021.  Patient reports new symptoms into the upper buttock which is quite a bit lower than where he was having pain previously.  He reports that pain has been significant for the last couple of weeks with standing as well as bending forward and prolonged sitting, does feel better when he is laying in bed.  He reports that currently his pain is a 10/10 and quite aggravation localized to the upper buttock.  He denies any radiating pain into the lower extremity.  Denies left-sided symptoms.  Denies numbness or tingling sensations, denies recent trips or falls or balance changes, denies bowel or bladder loss control.    Treatment to Date: No prior spinal.  Physical therapy [facility] x3 sessions.  Patient continues with home exercises at this time on a daily basis.     History of bilateral L4-5 RFA [place] [facility] Dr. Green 2011 and 2013, relief at least 2 years.  Bilateral L3-4 MBB 6/30/2017.  Preprocedure pain 7/10 post 2/10.  Bilateral L4-5 facet joint steroid injection 7/7/2017 with relief.  Preprocedure pain 8/10, post 5/10.  Right L4-5 and L5-S1 TFESI 1/6/2020 with 30% lasting benefit only.  Preprocedure pain 10/10, post 7/10.  Bilateral L2, L3, L4 MBB 3/11/2020 and 7/7/2020 with positive response.  Bilateral L2, L3, L4 RFA 10/9/2020 with 90% significant relief x 8 months.  Right L4-5 TFESI 8/31/2021 with significant relief acute right LBP, minimal relief with chronic bilateral LBP however.  Pre and post procedure pain 9/10.  Bilateral L2, L3, L4 RFA 10/19/2021 with significant relief.  Preprocedure pain 8/10, post 0/10.    Patient Active Problem List   Diagnosis     Dyslipidemia     Hypertension     Diffuse Joint Pains (Arthralgias)     Joint Pain, Localized In The Wrist     Joint Pain, Localized In The Knee     Generalized Osteoarthritis Of The Hand     Osteoarthritis Of The Knee     Arthralgias In Multiple Sites       Current Outpatient Medications   Medication     meloxicam (MOBIC) 7.5 MG tablet     ALPRAZolam  (XANAX) 0.5 MG tablet     atorvastatin (LIPITOR) 10 MG tablet     celecoxib (CELEBREX) 200 MG capsule     irbesartan (AVAPRO) 150 MG tablet     irbesartan-hydrochlorothiazide (AVALIDE) 300-12.5 mg per tablet     omeprazole (PRILOSEC) 20 MG capsule     ondansetron (ZOFRAN ODT) 4 MG disintegrating tablet     PARoxetine (PAXIL) 20 MG tablet     zolpidem (AMBIEN) 10 mg tablet     No current facility-administered medications for this visit.       Allergies   Allergen Reactions     Iodine-Iodine Containing [Iodides] Unknown     Pt thinks 20+ yrs ago, Pt received omni 350 without premed and did fine 11/7/2020 [facility]       Past Medical History:   Diagnosis Date     HTN (hypertension)      Hyperlipidemia         Review of Systems  ROS:  Specifically negative for bowel/bladder dysfunction, balance changes, headache, dizziness, foot drop, fevers, chills, appetite changes, nausea/vomiting, unexplained weight loss. Otherwise 13 systems reviewed are negative. Please see the patient's intake questionnaire from today for details.    Reviewed Social, Family, Past Medical and Past Surgical history with patient, no significant changes noted since prior visit.     Objective:     BP (!) 150/78 (BP Location: Right arm, Patient Position: Sitting)     PHYSICAL EXAMINATION:    --CONSTITUTIONAL: Well developed, well nourished, healthy appearing individual.  --PSYCHIATRIC: Appropriate mood and affect. No difficulty interacting due to temper, social withdrawal, or memory issues.  --SKIN: Lumbar region is dry and intact.   --RESPIRATORY: Normal rhythm and effort. No abnormal accessory muscle breathing patterns noted.   --MUSCULOSKELETAL:  Normal lumbar lordosis noted, no lateral shift.  --GROSS MOTOR: Easily arises from a seated position. Gait is non-antalgic  --LUMBAR SPINE:  Inspection reveals no evidence of deformity. Range of motion is not limited in lumbar extension, lateral rotation, limitation with forward flexion due to pain. No tenderness  to palpation lumbar spine. Straight leg raising in the supine position is negative to radicular pain. Sciatic notch non-tender on the left, tender on the right.   --SACROILIAC JOINT: Positive right distraction.  Positive right Antonio's with reproduction of pain to affected extremity.  Positive right Gaenslen's Test with reproduction of pain to affected extremity. Posterior Pelvic Pain Provocative Test positive right. Sacroiliac Joint Compression Test positive right. Sacral Thrust/Yeoman's Test positive right.  --LOWER EXTREMITY MOTOR TESTING:  Plantar flexion left 5/5, right 5/5   Dorsiflexion left 5/5, right 5/5   Great toe MTP extension left 5/5, right 5/5  Knee flexion left 5/5, right 5/5  Knee extension left 5/5, right 5/5   Hip flexion left 5/5, right 5/5  Hip abduction left 5/5, right 5/5  Hip adduction left 5/5, right 5/5   --HIPS: Full range of motion bilaterally. Negative FABERs on the involved lower extremity.  Negative scour maneuver.  --NEUROLOGIC: Bilateral patellar and achilles reflexes are physiologic and symmetric. Sensation to light touch is intact in the bilateral L4, L5, and S1 dermatomes.    RESULTS:   Imaging: Lumbar spine imaging was reviewed today. The images were shown to the patient and the findings were explained using a spine model.      XR LUMBAR SPINE FLEX AND EXT 2 OR 3 VWS  LOCATION: [facility]  DATE/TIME: 2/3/2020   INDICATION: Flexion, extension, and neutral views please to evaluate spondylolisthesis stability at L4-L5.  COMPARISON: 04/26/2018 CT abdomen/pelvis.  IMPRESSION:   In the upright position there is approximately 0.6 cm degenerative anterolisthesis of L4 on L5. There is no change with flexion or extension to suggest segmental instability.  The vertebral body heights are preserved. Mild intervertebral disc degeneration at L3-L4, L4-L5 and L5-S1. Moderate/severe facet hypertrophic changes at L4-L5 and L5-S1.  There are vascular calcifications. Abdominal surgical  clips.        MR LUMBAR SPINE WITHOUT CONTRAST  At [facility]-1/24/2020  CLINICAL INFORMATION: 64-year-old male with right low back and leg pain.  COMPARISON: Lumbar spine MRI dated January 17, 2019.  TECHNICAL INFORMATION: Sagittal T2, sagittal T1, sagittal STIR, axial T2, and axial T1-weighted MR images of the lumbar spine in the neutral, seated position on an open 0.6 Teresa magnet.  CONTRAST: None.  SEDATION: None.  INTERPRETATION: Transitional lumbosacral anatomy with partially sacralized L5, mild lumbar levocurvature centered at L3, and shallow chronic Scheuermann-type endplate Schmorl's nodes at T10-11 and T11-12 without spondylolysis, vertebral collapse, acute fracture, or destructive osseous lesion. Normal pre and paravertebral soft tissues. Enlarged prostate gland measuring at least 5 cm AP (series 2, image 7).  Conus medullaris positioned at upper L2, with normal configuration of the terminal nerve roots.  L5-S1: Hypoplastic disc and facets with mild facet degeneration on the left, an accessory articulation on the left, and no stenosis or impingement.  L4-5: Moderate disc degeneration, 3 mm spondylolisthesis, bilateral foraminal annular tears, diffuse annular bulge, advanced right/moderate left facet degeneration, moderate right greater than left subarticular stenosis with L5 nerve root impingement, and mild to moderate right/mild left foraminal stenosis with right L4 nerve root encroachment.  L3-4: Mild disc degeneration, 1-2 mm spondylolisthesis, mild diffuse annular bulge, moderate left facet degeneration, mild left subarticular stenosis, and mild bilateral foraminal stenosis.  L2-3: Mild degenerative disc desiccation with preserved disc height, mild bilateral foraminal annular bulge, normal facet morphology, and no stenosis or impingement.  L1-2 and T12-L1: Normal dorsal disc contours and normal morphology.  T11-12: Mild disc degeneration, 1 mm spondylolisthesis, normal facet morphology, and no stenosis or  impingement.  No change from January 17, 2019.  CONCLUSION: Multilevel lumbar degenerative changes and mild levocurvature with specific findings as follows:  1. L4-5 subarticular impingement of the right greater than left L5 nerve roots in the setting of grade 1 degenerative spondylolisthesis. L3-4 mild left subarticular stenosis without impingement.  2. Foraminal stenosis, mild to moderate on the right at L4-5 with right L4 nerve root encroachment. Mild on the left at L4-5 and bilaterally at L3-4.  3. Facet degeneration, advanced on the right at L4-5. Moderate on the left at L4-5 and L3-4. Mild on the left at L5-S1.  4. No spondylolysis, vertebral collapse, acute fracture, or destructive osseous lesion.  5. Transitional lumbosacral anatomy with partially sacralized L5.  6. Enlarged prostate gland.        MRI LUMBAR SPINE WITHOUT CONTRAST  [facility]- 1/17/19  CLINICAL INFORMATION: 63-year-old man with low back pain.  TECHNICAL INFORMATION: MRI lumbar spine was obtained on 0.6 Teresa open upright magnet including sagittal T2, sagittal T1, sagittal STIR, axial T2 and axial T1-weighted images.  INTERPRETATION: There is normal lordotic curvature of the lumbar spine. No marrow edema within the lumbar vertebral bodies. No loss of vertebral body height. The conus is at the L1 level and is normal in appearance.  L5-S1: Transitional L5 segment with left L5 transverse process articulating with the sacrum. Mild developmental narrowing of the disc space. Mild bilateral facet arthropathy. No central spinal canal or foraminal stenosis.  L4-5: Mild degenerative disc disease with 5 mm degenerative spondylolisthesis and disc bulge. Severe bilateral facet arthropathy with ligamentum flavum thickening contributes to mild central spinal canal stenosis and subarticular recess narrowing with encroachment upon the traversing L5 nerve roots. Moderate right foraminal stenosis.  L3-4: Mild degenerative disc disease and disc bulge. Mild bilateral  facet arthropathy. Mild subarticular recess narrowing. The ganglia exit without compromise.  L2-3 through T12-L1: No focal disc herniation, central spinal canal stenosis or neural impingement.  T11-12 and T10-11: Moderate degenerative disc disease with Schmorl's nodes and endplate irregularities. No cord deformity or central spinal canal stenosis.  CONCLUSION:  1. L5-S1 transitional L5 segment with left transverse process articulating with the sacrum. Mild developmental narrowing of the disc space. Mild bilateral facet arthropathy.  2. L4-5 grade 1 spondylolisthesis with disc bulge. Severe facet arthropathy and ligamentum flavum thickening with mild central spinal canal and subarticular recess stenosis with encroachment upon the traversing L5 nerve roots. Moderate right foraminal stenosis.  3. Mild degenerative disc disease with disc bulge. Mild facet arthropathy and mild subarticular recess narrowing.

## 2022-04-27 NOTE — LETTER
4/27/2022         RE: Barry Jay  [address] [facility] [address]        Dear Colleague,    Thank you for referring your patient, Barry Jay, to the [facility]. Please see a copy of my visit note below.      Assessment:     Diagnoses and all orders for this visit:  Chronic bilateral low back pain without sciatica  Lumbar facet arthropathy  Spondylolisthesis of lumbar region  Right buttock pain  -     meloxicam (MOBIC) 7.5 MG tablet; Take 1 tablet (7.5 mg) by mouth 2 times daily as needed for pain Take with food  -     PAIN Joint Injection Sacroiliac Joint Right; Future  Pain of right sacroiliac joint  -     meloxicam (MOBIC) 7.5 MG tablet; Take 1 tablet (7.5 mg) by mouth 2 times daily as needed for pain Take with food  -     PAIN Joint Injection Sacroiliac Joint Right; Future  Sacroiliac joint dysfunction  -     PAIN Joint Injection Sacroiliac Joint Right; Future     Barry Jay is a 67 year old y.o. male with past medical history significant for hypertension, dyslipidemia, hand osteoarthritis, arthralgia who presents today for follow-up regarding:    -Chronic bilateral low back pain that is doing quite well at this time.    -Acute right upper buttock pain localizing to the sacroiliac joint with increased pain with SI provocative maneuvers on exam today indicating sacroiliac joint dysfunction.  No current radicular component.     Plan:     A shared decision making plan was used. The patient's values and choices were respected. Prior medical records were reviewed today. The following represents what was discussed and decided upon by the provider and the patient.        -DIAGNOSTIC TESTS: Images were personally reviewed and interpreted.    --If no benefit with SI injection would recommend updated Lumbar spine MRI.  --Lumbar spine MRI 1/24/2020 [facility] with advanced facet arthropathy L4-5 right greater than left and facet arthropathy L3-4 moderate bilaterally.  Grade 1  spondylolisthesis L4-5 with right greater than left L5 impingement.  --Lumbar spine flexion-extension x-ray 2/3/2020 was 0.6 cm spondylolisthesis L4-5, no instability noted.  --[facility] lumbar spine MRI 1/17/2019 shows severe facet arthropathy L4-5 with 5 mm spondylolisthesis, mild central and subarticular recess stenosis, mild right foraminal stenosis.  Mild facet arthropathy L3-4 and L5-S1.    -INTERVENTIONS: Ordered right sacroiliac joint steroid injection to be completed with Dr. Naidu deceiving get further relief of right buttock pain.  He is continuing with home exercises from prior physical therapy sessions with aggravation at this time.    -MEDICATIONS: Advised patient to stop taking Celebrex due to ineffectiveness.  Prescribed meloxicam 7.5 mg 1 tablet twice daily for pain and inflammation at this time.  Advised patient to avoid OTC NSAIDs and to take this medication with full glass water and food.  Discussed side effects of medications and proper use. Patient verbalized understanding.    -PHYSICAL THERAPY: Advised patient continue with home exercises however he does find that these exercises are aggravating at this time.  Discussed the importance of core strengthening, ROM, stretching exercises with the patient and how each of these entities is important in decreasing pain.  Explained to the patient that the purpose of physical therapy is to teach the patient a home exercise program.  These exercises need to be performed every day in order to decrease pain and prevent future occurrences of pain.        -PATIENT EDUCATION:  Total time of 32 minutes, on the day of service, spent with the patient, reviewing the chart, placing orders, and documenting.   -Today we also discussed the issues related to the current COVID-19 pandemic, the pros and cons of the current treatment plan, the CDC guidelines such as social distancing, washing the hands, and covering the cough.    -FOLLOW UP: Follow-up for injection with   Evangel[facility]  Advised to contact clinic if symptoms worsen or change.    Subjective:     Barry Jay is a 67 year old male who presents today for follow-up regarding chronic bilateral low back pain that is doing quite well after the ablation October 2021.  Patient reports new symptoms into the upper buttock which is quite a bit lower than where he was having pain previously.  He reports that pain has been significant for the last couple of weeks with standing as well as bending forward and prolonged sitting, does feel better when he is laying in bed.  He reports that currently his pain is a 10/10 and quite aggravation localized to the upper buttock.  He denies any radiating pain into the lower extremity.  Denies left-sided symptoms.  Denies numbness or tingling sensations, denies recent trips or falls or balance changes, denies bowel or bladder loss control.    Treatment to Date: No prior spinal.  Physical therapy optimum x3 sessions.  Patient continues with home exercises at this time on a daily basis.     History of bilateral L4-5 RFA [facility] Dr. Green 2011 and 2013, relief at least 2 years.  Bilateral L3-4 MBB 6/30/2017.  Preprocedure pain 7/10 post 2/10.  Bilateral L4-5 facet joint steroid injection 7/7/2017 with relief.  Preprocedure pain 8/10, post 5/10.  Right L4-5 and L5-S1 TFESI 1/6/2020 with 30% lasting benefit only.  Preprocedure pain 10/10, post 7/10.  Bilateral L2, L3, L4 MBB 3/11/2020 and 7/7/2020 with positive response.  Bilateral L2, L3, L4 RFA 10/9/2020 with 90% significant relief x 8 months.  Right L4-5 TFESI 8/31/2021 with significant relief acute right LBP, minimal relief with chronic bilateral LBP however.  Pre and post procedure pain 9/10.  Bilateral L2, L3, L4 RFA 10/19/2021 with significant relief.  Preprocedure pain 8/10, post 0/10.    Patient Active Problem List   Diagnosis     Dyslipidemia     Hypertension     Diffuse Joint Pains (Arthralgias)     Joint Pain, Localized In The Wrist      Joint Pain, Localized In The Knee     Generalized Osteoarthritis Of The Hand     Osteoarthritis Of The Knee     Arthralgias In Multiple Sites       Current Outpatient Medications   Medication     meloxicam (MOBIC) 7.5 MG tablet     ALPRAZolam (XANAX) 0.5 MG tablet     atorvastatin (LIPITOR) 10 MG tablet     celecoxib (CELEBREX) 200 MG capsule     irbesartan (AVAPRO) 150 MG tablet     irbesartan-hydrochlorothiazide (AVALIDE) 300-12.5 mg per tablet     omeprazole (PRILOSEC) 20 MG capsule     ondansetron (ZOFRAN ODT) 4 MG disintegrating tablet     PARoxetine (PAXIL) 20 MG tablet     zolpidem (AMBIEN) 10 mg tablet     No current facility-administered medications for this visit.       Allergies   Allergen Reactions     Iodine-Iodine Containing [Iodides] Unknown     Pt thinks 20+ yrs ago, Pt received omni 350 without premed and did fine 11/7/2020 [facility]       Past Medical History:   Diagnosis Date     HTN (hypertension)      Hyperlipidemia         Review of Systems  ROS:  Specifically negative for bowel/bladder dysfunction, balance changes, headache, dizziness, foot drop, fevers, chills, appetite changes, nausea/vomiting, unexplained weight loss. Otherwise 13 systems reviewed are negative. Please see the patient's intake questionnaire from today for details.    Reviewed Social, Family, Past Medical and Past Surgical history with patient, no significant changes noted since prior visit.     Objective:     BP (!) 150/78 (BP Location: Right arm, Patient Position: Sitting)     PHYSICAL EXAMINATION:    --CONSTITUTIONAL: Well developed, well nourished, healthy appearing individual.  --PSYCHIATRIC: Appropriate mood and affect. No difficulty interacting due to temper, social withdrawal, or memory issues.  --SKIN: Lumbar region is dry and intact.   --RESPIRATORY: Normal rhythm and effort. No abnormal accessory muscle breathing patterns noted.   --MUSCULOSKELETAL:  Normal lumbar lordosis noted, no lateral shift.  --GROSS MOTOR: Easily  arises from a seated position. Gait is non-antalgic  --LUMBAR SPINE:  Inspection reveals no evidence of deformity. Range of motion is not limited in lumbar extension, lateral rotation, limitation with forward flexion due to pain. No tenderness to palpation lumbar spine. Straight leg raising in the supine position is negative to radicular pain. Sciatic notch non-tender on the left, tender on the right.   --SACROILIAC JOINT: Positive right distraction.  Positive right Antonio's with reproduction of pain to affected extremity.  Positive right Gaenslen's Test with reproduction of pain to affected extremity. Posterior Pelvic Pain Provocative Test positive right. Sacroiliac Joint Compression Test positive right. Sacral Thrust/Yeoman's Test positive right.  --LOWER EXTREMITY MOTOR TESTING:  Plantar flexion left 5/5, right 5/5   Dorsiflexion left 5/5, right 5/5   Great toe MTP extension left 5/5, right 5/5  Knee flexion left 5/5, right 5/5  Knee extension left 5/5, right 5/5   Hip flexion left 5/5, right 5/5  Hip abduction left 5/5, right 5/5  Hip adduction left 5/5, right 5/5   --HIPS: Full range of motion bilaterally. Negative FABERs on the involved lower extremity.  Negative scour maneuver.  --NEUROLOGIC: Bilateral patellar and achilles reflexes are physiologic and symmetric. Sensation to light touch is intact in the bilateral L4, L5, and S1 dermatomes.    RESULTS:   Imaging: Lumbar spine imaging was reviewed today. The images were shown to the patient and the findings were explained using a spine model.      XR LUMBAR SPINE FLEX AND EXT 2 OR 3 VWS  LOCATION: [facility]  DATE/TIME: 2/3/2020   INDICATION: Flexion, extension, and neutral views please to evaluate spondylolisthesis stability at L4-L5.  COMPARISON: 04/26/2018 CT abdomen/pelvis.  IMPRESSION:   In the upright position there is approximately 0.6 cm degenerative anterolisthesis of L4 on L5. There is no change with flexion or extension to suggest segmental  instability.  The vertebral body heights are preserved. Mild intervertebral disc degeneration at L3-L4, L4-L5 and L5-S1. Moderate/severe facet hypertrophic changes at L4-L5 and L5-S1.  There are vascular calcifications. Abdominal surgical clips.        MR LUMBAR SPINE WITHOUT CONTRAST  At [facility]-1/24/2020  CLINICAL INFORMATION: 64-year-old male with right low back and leg pain.  COMPARISON: Lumbar spine MRI dated January 17, 2019.  TECHNICAL INFORMATION: Sagittal T2, sagittal T1, sagittal STIR, axial T2, and axial T1-weighted MR images of the lumbar spine in the neutral, seated position on an open 0.6 Teresa magnet.  CONTRAST: None.  SEDATION: None.  INTERPRETATION: Transitional lumbosacral anatomy with partially sacralized L5, mild lumbar levocurvature centered at L3, and shallow chronic Scheuermann-type endplate Schmorl's nodes at T10-11 and T11-12 without spondylolysis, vertebral collapse, acute fracture, or destructive osseous lesion. Normal pre and paravertebral soft tissues. Enlarged prostate gland measuring at least 5 cm AP (series 2, image 7).  Conus medullaris positioned at upper L2, with normal configuration of the terminal nerve roots.  L5-S1: Hypoplastic disc and facets with mild facet degeneration on the left, an accessory articulation on the left, and no stenosis or impingement.  L4-5: Moderate disc degeneration, 3 mm spondylolisthesis, bilateral foraminal annular tears, diffuse annular bulge, advanced right/moderate left facet degeneration, moderate right greater than left subarticular stenosis with L5 nerve root impingement, and mild to moderate right/mild left foraminal stenosis with right L4 nerve root encroachment.  L3-4: Mild disc degeneration, 1-2 mm spondylolisthesis, mild diffuse annular bulge, moderate left facet degeneration, mild left subarticular stenosis, and mild bilateral foraminal stenosis.  L2-3: Mild degenerative disc desiccation with preserved disc height, mild bilateral foraminal  annular bulge, normal facet morphology, and no stenosis or impingement.  L1-2 and T12-L1: Normal dorsal disc contours and normal morphology.  T11-12: Mild disc degeneration, 1 mm spondylolisthesis, normal facet morphology, and no stenosis or impingement.  No change from January 17, 2019.  CONCLUSION: Multilevel lumbar degenerative changes and mild levocurvature with specific findings as follows:  1. L4-5 subarticular impingement of the right greater than left L5 nerve roots in the setting of grade 1 degenerative spondylolisthesis. L3-4 mild left subarticular stenosis without impingement.  2. Foraminal stenosis, mild to moderate on the right at L4-5 with right L4 nerve root encroachment. Mild on the left at L4-5 and bilaterally at L3-4.  3. Facet degeneration, advanced on the right at L4-5. Moderate on the left at L4-5 and L3-4. Mild on the left at L5-S1.  4. No spondylolysis, vertebral collapse, acute fracture, or destructive osseous lesion.  5. Transitional lumbosacral anatomy with partially sacralized L5.  6. Enlarged prostate gland.        MRI LUMBAR SPINE WITHOUT CONTRAST  [facility]- 1/17/19  CLINICAL INFORMATION: 63-year-old man with low back pain.  TECHNICAL INFORMATION: MRI lumbar spine was obtained on 0.6 Teresa open upright magnet including sagittal T2, sagittal T1, sagittal STIR, axial T2 and axial T1-weighted images.  INTERPRETATION: There is normal lordotic curvature of the lumbar spine. No marrow edema within the lumbar vertebral bodies. No loss of vertebral body height. The conus is at the L1 level and is normal in appearance.  L5-S1: Transitional L5 segment with left L5 transverse process articulating with the sacrum. Mild developmental narrowing of the disc space. Mild bilateral facet arthropathy. No central spinal canal or foraminal stenosis.  L4-5: Mild degenerative disc disease with 5 mm degenerative spondylolisthesis and disc bulge. Severe bilateral facet arthropathy with ligamentum flavum thickening  contributes to mild central spinal canal stenosis and subarticular recess narrowing with encroachment upon the traversing L5 nerve roots. Moderate right foraminal stenosis.  L3-4: Mild degenerative disc disease and disc bulge. Mild bilateral facet arthropathy. Mild subarticular recess narrowing. The ganglia exit without compromise.  L2-3 through T12-L1: No focal disc herniation, central spinal canal stenosis or neural impingement.  T11-12 and T10-11: Moderate degenerative disc disease with Schmorl's nodes and endplate irregularities. No cord deformity or central spinal canal stenosis.  CONCLUSION:  1. L5-S1 transitional L5 segment with left transverse process articulating with the sacrum. Mild developmental narrowing of the disc space. Mild bilateral facet arthropathy.  2. L4-5 grade 1 spondylolisthesis with disc bulge. Severe facet arthropathy and ligamentum flavum thickening with mild central spinal canal and subarticular recess stenosis with encroachment upon the traversing L5 nerve roots. Moderate right foraminal stenosis.  3. Mild degenerative disc disease with disc bulge. Mild facet arthropathy and mild subarticular recess narrowing.                             Again, thank you for allowing me to participate in the care of your patient.        Sincerely,        Claudia Baptiste, CNP

## 2022-04-27 NOTE — PATIENT INSTRUCTIONS
~[facility] scheduling #[phone].  ~Please call our [facility] Nurse Navigation line [phone] with any questions or concerns about your treatment plan, if symptoms worsen and you would like to be seen urgently, or if you have problems controlling bladder and bowel function.       Prescribed meloxicam today. Please take as prescribed, as needed for pain control as well as to aid in decreasing inflammation. Take medication with a full glass of water and food.   *Do not take Advil, Ibuprofen, Aleve, or Naproxen, or Celebrex while taking this medication as it can cause organ failure if taken together*  This medication does have risks if taken long term, these risks include: gastrointestinal irritation, kidney dysfunction, and cardiovascular effects.       An injection has been ordered today to potentially help with your pain symptoms. These injections do not fix what is going on in your back, therefore they typically do not take away the pain completely, however they can many times help improve symptoms. Injections should always be completed along with other modalities such as physical therapy for the best long term outcomes. If injections alone are done, then pain will likely return.     [facility] Injection Requirements    A  is required for all fluoroscopically-guided injections.  Injection appointments may be cancelled if there are signs/symptoms of an active infection or if the patient is being actively treated with antibiotics for a diagnosed infection.  Patients may have their steroid injection cancelled if they have had another steroid injection within 2 weeks.  Diabetic patients will have their blood glucose levels checked the day of their injection and the appointment will be rescheduled if the blood glucose level is 300 or higher.  Patients with allergies to cortisone, local anesthetics, iodine, or contrast dye should contact the Spine  Center to further discuss these considerations.  Patients scheduled for medial branch block diagnostic injections should refrain from taking pain medication the day of the procedure.  The medial branch block injection appointment will be rescheduled if the patient's pain rating is not 5/10 or greater at the time of the procedure.  Patients taking warfarin/Coumadin will have their INR checked the day of the procedure and the procedure may be rescheduled if the INR is greater than 3.0.  Please contact the Spine Center (#[phone]) if you are taking any prescription blood-thinning medications (warfarin, Plavix, Lovenox, Eliquis, Brilinta, Effient, etc.) as special dosing adjustments may need to be made depending on the type of injection you are scheduled to receive.  It is recommended that you delay having your steroid injection if you have received a flu shot or shingles vaccine within 2 weeks.

## 2022-05-04 ENCOUNTER — RADIOLOGY INJECTION OFFICE VISIT (OUTPATIENT)
Dept: PHYSICAL MEDICINE AND REHAB | Facility: CLINIC | Age: 67
End: 2022-05-04
Attending: NURSE PRACTITIONER
Payer: COMMERCIAL

## 2022-05-04 VITALS
HEART RATE: 100 BPM | SYSTOLIC BLOOD PRESSURE: 137 MMHG | TEMPERATURE: 98.3 F | OXYGEN SATURATION: 94 % | DIASTOLIC BLOOD PRESSURE: 72 MMHG

## 2022-05-04 DIAGNOSIS — M79.18 RIGHT BUTTOCK PAIN: ICD-10-CM

## 2022-05-04 DIAGNOSIS — M53.3 PAIN OF RIGHT SACROILIAC JOINT: ICD-10-CM

## 2022-05-04 DIAGNOSIS — M53.3 SACROILIAC JOINT DYSFUNCTION: ICD-10-CM

## 2022-05-04 PROCEDURE — 99207 PR DROP WITH A PROCEDURE: CPT | Mod: 25 | Performed by: PHYSICAL MEDICINE & REHABILITATION

## 2022-05-04 ASSESSMENT — PAIN SCALES - GENERAL: PAINLEVEL: WORST PAIN (10)

## 2022-05-04 NOTE — PATIENT INSTRUCTIONS
Follow-up visit in 2 weeks with Claudia Baptiste CNP to discuss injection outcome and determine care plan going forward.     DISCHARGE INSTRUCTIONS    During office hours (8:00 a.m.- 4:00 p.m.) questions or concerns may be answered  by calling Spine Center Navigation Nurses at  [phone].  Messages received after hours will be returned the following business day.      In the case of an emergency, please dial 911 or seek assistance at the nearest Emergency Room/Urgent Care facility.     All Patients:    You may experience an increase in your symptoms for the first 2 days (It may take anywhere between 2 days- 2 weeks for the steroid to have maximum effect).    You may use ice on the injection site, as frequently as 20 minutes each hour if needed.    You may take your pain medicine.    You may continue taking your regular medication after your injection. If you have had a Medial Branch Block you may resume pain medication once your pain diary is completed.    You may shower. No swimming, tub bath or hot tub for 48 hours.  You may remove your bandaid/bandage as soon as you are home.    You may resume light activities, as tolerated.    Resume your usual diet as tolerated.    It is strongly advised that you do not drive for 1-3 hours post injection.    If you have had oral sedation:  Do not drive for 8 hours post injection.      If you have had IV sedation:  Do not drive for 24 hours post injection.  Do not operate hazardous machinery or make important personal/business decisions for 24 hours.      POSSIBLE STEROID SIDE EFFECTS (If steroid/cortisone was used for your procedure)    -If you experience these symptoms, it should only last for a short period    Swelling of the legs              Skin redness (flushing)     Mouth (oral) irritation   Blood sugar (glucose) levels            Sweats                    Mood changes  Headache  Sleeplessness  Weakened immune system for up to 14 days, which could increase the risk  of navdeep the COVID-19 virus and/or experiencing more severe symptoms of the disease, if exposed.  Decreased effectiveness of the flu vaccine if given within 2 weeks of the steroid.         POSSIBLE PROCEDURE SIDE EFFECTS  -Call the Spine Center if you are concerned  Increased Pain           Increased numbness/tingling      Nausea/Vomiting          Bruising/bleeding at site      Redness or swelling                                              Difficulty walking      Weakness           Fever greater than 100.5    *In the event of a severe headache after an epidural steroid injection that is relieved by lying down, please call the [facility] to speak with a clinical staff member*

## 2022-05-04 NOTE — PROGRESS NOTES
Barry Jay is here today for a Right sacroiliac joint injection.  The patient is not able to have the injection today because he received his COVID 2nd booster on 4/29/2022. Per the system guidelines, the minimum wait time between a COVID booster and a cortisone injection is 7 days (with the preferred wait time being 2 weeks). The patient was rescheduled for Friday 5/6/2022.    No charge for today s visit.

## 2022-05-06 ENCOUNTER — RADIOLOGY INJECTION OFFICE VISIT (OUTPATIENT)
Dept: PHYSICAL MEDICINE AND REHAB | Facility: CLINIC | Age: 67
End: 2022-05-06
Attending: NURSE PRACTITIONER
Payer: COMMERCIAL

## 2022-05-06 ENCOUNTER — TELEPHONE (OUTPATIENT)
Dept: PHYSICAL MEDICINE AND REHAB | Facility: CLINIC | Age: 67
End: 2022-05-06

## 2022-05-06 VITALS
SYSTOLIC BLOOD PRESSURE: 152 MMHG | HEART RATE: 86 BPM | TEMPERATURE: 98.8 F | OXYGEN SATURATION: 97 % | DIASTOLIC BLOOD PRESSURE: 84 MMHG | RESPIRATION RATE: 16 BRPM

## 2022-05-06 DIAGNOSIS — M79.18 RIGHT BUTTOCK PAIN: ICD-10-CM

## 2022-05-06 DIAGNOSIS — G89.29 CHRONIC BILATERAL LOW BACK PAIN WITHOUT SCIATICA: Primary | ICD-10-CM

## 2022-05-06 DIAGNOSIS — M54.50 CHRONIC BILATERAL LOW BACK PAIN WITHOUT SCIATICA: Primary | ICD-10-CM

## 2022-05-06 DIAGNOSIS — M53.3 PAIN OF RIGHT SACROILIAC JOINT: ICD-10-CM

## 2022-05-06 DIAGNOSIS — M47.816 LUMBAR FACET ARTHROPATHY: ICD-10-CM

## 2022-05-06 DIAGNOSIS — M53.3 SACROILIAC JOINT DYSFUNCTION: ICD-10-CM

## 2022-05-06 DIAGNOSIS — M43.16 SPONDYLOLISTHESIS OF LUMBAR REGION: ICD-10-CM

## 2022-05-06 PROCEDURE — 27096 INJECT SACROILIAC JOINT: CPT | Mod: RT | Performed by: PHYSICAL MEDICINE & REHABILITATION

## 2022-05-06 RX ORDER — METHYLPREDNISOLONE ACETATE 40 MG/ML
INJECTION, SUSPENSION INTRA-ARTICULAR; INTRALESIONAL; INTRAMUSCULAR; SOFT TISSUE
Status: COMPLETED | OUTPATIENT
Start: 2022-05-06 | End: 2022-05-06

## 2022-05-06 RX ORDER — ROPIVACAINE HYDROCHLORIDE 5 MG/ML
INJECTION, SOLUTION EPIDURAL; INFILTRATION; PERINEURAL
Status: COMPLETED | OUTPATIENT
Start: 2022-05-06 | End: 2022-05-06

## 2022-05-06 RX ADMIN — METHYLPREDNISOLONE ACETATE 40 MG: 40 INJECTION, SUSPENSION INTRA-ARTICULAR; INTRALESIONAL; INTRAMUSCULAR; SOFT TISSUE at 12:59

## 2022-05-06 RX ADMIN — ROPIVACAINE HYDROCHLORIDE 2 ML: 5 INJECTION, SOLUTION EPIDURAL; INFILTRATION; PERINEURAL at 12:58

## 2022-05-06 ASSESSMENT — PAIN SCALES - GENERAL
PAINLEVEL: WORST PAIN (10)
PAINLEVEL: MODERATE PAIN (5)

## 2022-05-06 NOTE — TELEPHONE ENCOUNTER
Per [facility] Claudia Baptiste, CNP: Ordered for updated lumbar spine MRI per Dr. Naidu's recommendation due to his severe pain.   Please let him know that he can get the MRI at any time, specifically if he did not feel he got benefit with medial branch block.  If he did get benefit then it is up to him if he wants to obtain MRI or we can discuss it further when we get his pain diary.     Phone call to patient to discuss. Left message to return call.

## 2022-05-06 NOTE — PATIENT INSTRUCTIONS
Follow-up visit with Claudia Baptiste CNP in 1 week to discuss injection outcome and determine care plan going forward.       DISCHARGE INSTRUCTIONS    During office hours (8:00 a.m.- 4:00 p.m.) questions or concerns may be answered  by calling Spine Center Navigation Nurses at  [phone].  Messages received after hours will be returned the following business day.      In the case of an emergency, please dial 911 or seek assistance at the nearest Emergency Room/Urgent Care facility.     All Patients:    You may experience an increase in your symptoms for the first 2 days (It may take anywhere between 2 days- 2 weeks for the steroid to have maximum effect).    You may use ice on the injection site, as frequently as 20 minutes each hour if needed.    You may take your pain medicine.    You may continue taking your regular medication after your injection. If you have had a Medial Branch Block you may resume pain medication once your pain diary is completed.    You may shower. No swimming, tub bath or hot tub for 48 hours.  You may remove your bandaid/bandage as soon as you are home.    You may resume light activities, as tolerated.    Resume your usual diet as tolerated.    It is strongly advised that you do not drive for 1-3 hours post injection.    If you have had oral sedation:  Do not drive for 8 hours post injection.      If you have had IV sedation:  Do not drive for 24 hours post injection.  Do not operate hazardous machinery or make important personal/business decisions for 24 hours.      POSSIBLE STEROID SIDE EFFECTS (If steroid/cortisone was used for your procedure)    -If you experience these symptoms, it should only last for a short period    Swelling of the legs              Skin redness (flushing)     Mouth (oral) irritation   Blood sugar (glucose) levels            Sweats                    Mood changes  Headache  Sleeplessness  Weakened immune system for up to 14 days, which could increase the risk  of navdeep the COVID-19 virus and/or experiencing more severe symptoms of the disease, if exposed.  Decreased effectiveness of the flu vaccine if given within 2 weeks of the steroid.         POSSIBLE PROCEDURE SIDE EFFECTS  -Call the Spine Center if you are concerned  Increased Pain           Increased numbness/tingling      Nausea/Vomiting          Bruising/bleeding at site      Redness or swelling                                              Difficulty walking      Weakness           Fever greater than 100.5    *In the event of a severe headache after an epidural steroid injection that is relieved by lying down, please call the [facility] to speak with a clinical staff member*

## 2022-05-09 NOTE — TELEPHONE ENCOUNTER
Patient had called on Friday at end of day. Left message on nurse navigation line.    Returned his call. Explained MRI order had been placed per [facility] after discussion was had with Dr. Naidu. Recommended to have the updated imaging particularly if had not seen improvement of pain after his MBBs. States he is still waiting for some relief. Explained that if he was going to have any relief he would have had it in the hours following the injections. Encouraged him to have the MRI done and return for the follow up with [facility]. He is also encouraged to call his insurance to locate and in-network imaging facility. He will call back with this information.

## 2022-05-09 NOTE — TELEPHONE ENCOUNTER
Patient had returned call. [facility] is in-network for imaging. Contact information provided.  Order for MRI printed and faxed.

## 2022-05-10 ENCOUNTER — TRANSFERRED RECORDS (OUTPATIENT)
Dept: HEALTH INFORMATION MANAGEMENT | Facility: CLINIC | Age: 67
End: 2022-05-10
Payer: COMMERCIAL

## 2022-05-12 ENCOUNTER — OFFICE VISIT (OUTPATIENT)
Dept: PHYSICAL MEDICINE AND REHAB | Facility: CLINIC | Age: 67
End: 2022-05-12
Payer: COMMERCIAL

## 2022-05-12 VITALS
HEIGHT: 68 IN | SYSTOLIC BLOOD PRESSURE: 140 MMHG | DIASTOLIC BLOOD PRESSURE: 78 MMHG | BODY MASS INDEX: 30.31 KG/M2 | WEIGHT: 200 LBS

## 2022-05-12 DIAGNOSIS — M47.816 LUMBAR FACET ARTHROPATHY: ICD-10-CM

## 2022-05-12 DIAGNOSIS — M54.16 RIGHT LUMBAR RADICULOPATHY: ICD-10-CM

## 2022-05-12 DIAGNOSIS — M51.369 BULGING LUMBAR DISC: ICD-10-CM

## 2022-05-12 DIAGNOSIS — M54.41 CHRONIC RIGHT-SIDED LOW BACK PAIN WITH RIGHT-SIDED SCIATICA: Primary | ICD-10-CM

## 2022-05-12 DIAGNOSIS — G89.29 CHRONIC RIGHT-SIDED LOW BACK PAIN WITH RIGHT-SIDED SCIATICA: Primary | ICD-10-CM

## 2022-05-12 DIAGNOSIS — M43.16 SPONDYLOLISTHESIS OF LUMBAR REGION: ICD-10-CM

## 2022-05-12 PROCEDURE — 99214 OFFICE O/P EST MOD 30 MIN: CPT | Performed by: NURSE PRACTITIONER

## 2022-05-12 RX ORDER — GABAPENTIN 100 MG/1
300 CAPSULE ORAL 3 TIMES DAILY
Qty: 270 CAPSULE | Refills: 3 | Status: SHIPPED | OUTPATIENT
Start: 2022-05-12 | End: 2022-06-02

## 2022-05-12 RX ORDER — OXYCODONE AND ACETAMINOPHEN 5; 325 MG/1; MG/1
1 TABLET ORAL EVERY 6 HOURS PRN
Qty: 30 TABLET | Refills: 0 | Status: SHIPPED | OUTPATIENT
Start: 2022-05-12 | End: 2022-05-19

## 2022-05-12 ASSESSMENT — PAIN SCALES - GENERAL: PAINLEVEL: WORST PAIN (10)

## 2022-05-12 NOTE — PATIENT INSTRUCTIONS
~[facility] scheduling #[phone].  ~Please call our [facility] Nurse Navigation line [phone] with any questions or concerns about your treatment plan, if symptoms worsen and you would like to be seen urgently, or if you have problems controlling bladder and bowel function.     Prescribed Gabapentin today, 100 mg tablets, to be titrated up to 3 tablets 3 times a day as tolerated for your nerve pain. Please follow Gabapentin dosing chart below.    Gabapentin 100mg Dosing Chart    DATE  MORNING AFTERNOON BEDTIME    Day 1 0 0 1    Day 2 0 0 1    Day 3 0 0 1    Day 4 1 0 1    Day 5 1 0 1    Day 6 1 0 1    Day 7 1 1 1    Day 8 1 1 1    Day 9 1 1 1    Day 10 1 1 2    Day 11 1 1 2    Day 12 1 1 2    Day 13 2 1 2    Day 14 2 1 2    Day 15 2 1 2    Day 16 2 2 2    Day 17 2 2 2    Day 18 2 2 2    Day 19 2 2 3    Day 20 2 2 3    Day 21 2 2 3    Day 22 3 2 3    Day 23 3 2 3    Day 24 3 2 3    Day 25 3 3 3    Day 26 3 3 3    Day 27 3 3 3     Continue medication, taking 3 capsules three times daily  Please call if you have any questions regarding how to take your medication    Gabapentin Dosing Chart    DATE  MORNING AFTERNOON BEDTIME    Today 0 0 1     1 0 1     1 1 1     1 1 2     2 1 2     2 2 2     2 2 3     3 2 3     3 3 3        An injection has been ordered today to potentially help with your pain symptoms. These injections do not fix what is going on in your back, therefore they typically do not take away the pain completely, however they can many times help improve symptoms. Injections should always be completed along with other modalities such as physical therapy for the best long term outcomes. If injections alone are done, then pain will likely return.     [facility] Injection Requirements    A  is required for all fluoroscopically-guided injections.  Injection appointments may be cancelled if there are signs/symptoms of an active infection or if  the patient is being actively treated with antibiotics for a diagnosed infection.  Patients may have their steroid injection cancelled if they have had another steroid injection within 2 weeks.  Diabetic patients will have their blood glucose levels checked the day of their injection and the appointment will be rescheduled if the blood glucose level is 300 or higher.  Patients with allergies to cortisone, local anesthetics, iodine, or contrast dye should contact the Spine Center to further discuss these considerations.  Patients scheduled for medial branch block diagnostic injections should refrain from taking pain medication the day of the procedure.  The medial branch block injection appointment will be rescheduled if the patient's pain rating is not 5/10 or greater at the time of the procedure.  Patients taking warfarin/Coumadin will have their INR checked the day of the procedure and the procedure may be rescheduled if the INR is greater than 3.0.  Please contact the Spine Center (#[phone]) if you are taking any prescription blood-thinning medications (warfarin, Plavix, Lovenox, Eliquis, Brilinta, Effient, etc.) as special dosing adjustments may need to be made depending on the type of injection you are scheduled to receive.  It is recommended that you delay having your steroid injection if you have received a flu shot or shingles vaccine within 2 weeks.

## 2022-05-12 NOTE — PROGRESS NOTES
Assessment:     Diagnoses and all orders for this visit:  Chronic right-sided low back pain with right-sided sciatica  -     PAIN Transforaminal REGI Inj Lumbosacral Right; Future  -     gabapentin (NEURONTIN) 100 MG capsule; Take 3 capsules (300 mg) by mouth 3 times daily Follow Dosing Chart  Spondylolisthesis of lumbar region  -     PAIN Transforaminal REGI Inj Lumbosacral Right; Future  Lumbar facet arthropathy  Bulging lumbar disc  -     PAIN Transforaminal REGI Inj Lumbosacral Right; Future  Right lumbar radiculopathy  -     oxyCODONE-acetaminophen (PERCOCET) 5-325 MG tablet; Take 1 tablet by mouth every 6 hours as needed for severe pain Do not drive while taking.  -     PAIN Transforaminal REGI Inj Lumbosacral Right; Future  -     gabapentin (NEURONTIN) 100 MG capsule; Take 3 capsules (300 mg) by mouth 3 times daily Follow Dosing Chart     Barry Jay is a 67 year old y.o. male with past medical history significant for hypertension, dyslipidemia, hand osteoarthritis, arthralgias who presents today for follow-up regarding:    -Chronic bilateral low back pain    -Severe acute right lumbar radiculitis, no benefit with sacroiliac joint injection.     Plan:     A shared decision making plan was used. The patient's values and choices were respected. Prior medical records were reviewed today. The following represents what was discussed and decided upon by the provider and the patient.        -DIAGNOSTIC TESTS: Images were personally reviewed and interpreted.   -- Consider updated lumbar flexion-extension x-ray to evaluate spondylolisthesis stability prior to any surgical intervention.  --Lumbar spine MRI [facility] 5/10/2022 with transitional lumbosacral anatomy with partially sacralized L5.  L4-5 grade 1 spondylolisthesis, 3 mm, with annular bulging with advanced facet arthropathy with mild to moderate right and mild left foraminal stenosis, right L4 nerve root impingement.  L3-4 1 to 2 mm spondylolisthesis with facet  arthropathy with mild left greater than right foraminal stenosis.  --Lumbar spine MRI 1/24/2020 [facility] with advanced facet arthropathy L4-5 right greater than left and facet arthropathy L3-4 moderate bilaterally.  Grade 1 spondylolisthesis L4-5 with right greater than left L5 impingement.  --Lumbar spine flexion-extension x-ray 2/3/2020 was 0.6 cm spondylolisthesis L4-5, no instability noted.  --[facility] lumbar spine MRI 1/17/2019 shows severe facet arthropathy L4-5 with 5 mm spondylolisthesis, mild central and subarticular recess stenosis, mild right foraminal stenosis.  Mild facet arthropathy L3-4 and L5-S1.    -INTERVENTIONS: Ordered a right L4-5 transforaminal epidural steroid injection to see if we get further relief of severe acute right lumbar radiculopathy as he does have a disc bulging at this level with right L4 nerve root compression and foraminal stenosis.  -Patient had his SI joint steroid injection 5/6/2022 therefore we cannot do the injection before 5/20/2022.  Patient is hoping to get an for the injection 5/20/2022 due to the severe pain and he does have a wedding that weekend that he is hoping to be able to go to and tolerate mobility a little bit better.  *Transitional lumbosacral anatomy with partially sacralized L5.    -MEDICATIONS: Prescribed gabapentin 100 mg to titrate up to 3 tablets 3 times daily as tolerated for radicular pain.  Prescribed Percocet 5/325 mg 1 tablet every 8 hours as needed for severe breakthrough pain number 30 tablets given first 10 days worth.  [place]  checked.  This is for acute pain only.  Refills will not be given over the telephone.  Discussed the risks (eg, addiction, overdose, worsening pain, death) verses benefit of opioid use with patient today. Explained that this medication will not be a long term solution to ongoing pain. Discussed using lowest effective dose and the importance of other measures for pain management including PT, other non-opioid medications, behavioral  treatments, and other procedure options.   Discussed side effects of medications and proper use. Patient verbalized understanding.    -PHYSICAL THERAPY: Consider physical therapy again down the road, currently he is not tolerating his home exercises however therefore we will hold off.  Discussed the importance of core strengthening, ROM, stretching exercises with the patient and how each of these entities is important in decreasing pain.  Explained to the patient that the purpose of physical therapy is to teach the patient a home exercise program.  These exercises need to be performed every day in order to decrease pain and prevent future occurrences of pain.        -PATIENT EDUCATION:  Total time of 32 minutes, on the day of service, spent with the patient, reviewing the chart, placing orders, and documenting.   -Today we also discussed the issues related to the current COVID-19 pandemic, the pros and cons of the current treatment plan, the CDC guidelines such as social distancing, washing the hands, and covering the cough.    -FOLLOW UP: Follow-up for injection hopefully on 5/20/2022, message sent to Dr. Evangelista Wolff to see if we can potentially double book him that day.  Advised to contact clinic if symptoms worsen or change.    Subjective:     Barry Jay is a 67 year old male who presents today for follow-up regarding chronic bilateral low back pain with severe acute worsening right low back pain rating to the right buttock lateral hip and anterior thigh mostly that stops at the knee with some numbness and tingling to the right foot.  Currently his pain is a 10/10 severe pain with any type of movement being upright standing walking, does report that laying down gives him some benefit but his pain is still constant and severe and intolerable.    Treatment to Date: No prior spinal surgery.  Physical therapy [facility] x3 sessions.  Patient continues with home exercises at this time on a daily  basis.     History of bilateral L4-5 RFA [place] [facility] Dr. Green 2011 and 2013, relief at least 2 years.  Bilateral L3-4 MBB 6/30/2017.  Preprocedure pain 7/10 post 2/10.  Bilateral L4-5 facet joint steroid injection 7/7/2017 with relief.  Preprocedure pain 8/10, post 5/10.  Right L4-5 and L5-S1 TFESI 1/6/2020 with 30% lasting benefit only.  Preprocedure pain 10/10, post 7/10.  Bilateral L2, L3, L4 MBB 3/11/2020 and 7/7/2020 with positive response.  Bilateral L2, L3, L4 RFA 10/9/2020 with 90% significant relief x 8 months.  Right L4-5 TFESI 8/31/2021 with 100% relief acute right LBP and right lumbar radiculitis for 6 months, minimal relief with chronic bilateral LBP however.  Pre and post procedure pain 9/10.  Bilateral L2, L3, L4 RFA 10/19/2021 with significant relief.  Preprocedure pain 8/10, post 0/10.  Right SI joint steroid injection 5/6/2022 with minimal initial and no lasting benefit.  Preprocedure pain 10/10, post 5/10.    Medications:  Meloxicam    Patient Active Problem List   Diagnosis     Dyslipidemia     Hypertension     Diffuse Joint Pains (Arthralgias)     Joint Pain, Localized In The Wrist     Joint Pain, Localized In The Knee     Generalized Osteoarthritis Of The Hand     Osteoarthritis Of The Knee     Arthralgias In Multiple Sites       Current Outpatient Medications   Medication     gabapentin (NEURONTIN) 100 MG capsule     oxyCODONE-acetaminophen (PERCOCET) 5-325 MG tablet     ALPRAZolam (XANAX) 0.5 MG tablet     atorvastatin (LIPITOR) 10 MG tablet     celecoxib (CELEBREX) 200 MG capsule     irbesartan (AVAPRO) 150 MG tablet     irbesartan-hydrochlorothiazide (AVALIDE) 300-12.5 mg per tablet     meloxicam (MOBIC) 7.5 MG tablet     omeprazole (PRILOSEC) 20 MG capsule     PARoxetine (PAXIL) 20 MG tablet     zolpidem (AMBIEN) 10 mg tablet     No current facility-administered medications for this visit.       Allergies   Allergen Reactions     Iodides Unknown and Hives     Pt thinks 20+ yrs ago, Pt  "received omni 350 without premed and did fine 11/7/2020 [facility]  Pt thinks 20+ yrs ago  Pt received omni 350 without premed and did fine 11/7/2020 [facility]         Past Medical History:   Diagnosis Date     HTN (hypertension)      Hyperlipidemia         Review of Systems  ROS:  Specifically negative for bowel/bladder dysfunction, balance changes, headache, dizziness, foot drop, fevers, chills, appetite changes, nausea/vomiting, unexplained weight loss. Otherwise 13 systems reviewed are negative. Please see the patient's intake questionnaire from today for details.    Reviewed Social, Family, Past Medical and Past Surgical history with patient, no significant changes noted since prior visit.     Objective:     BP (!) 140/78 (BP Location: Right arm, Patient Position: Sitting)   Ht 5' 8\" (1.727 m)   Wt 200 lb (90.7 kg)   BMI 30.41 kg/m      PHYSICAL EXAMINATION:    --CONSTITUTIONAL: Well developed, well nourished, healthy appearing individual.  --PSYCHIATRIC: Appropriate mood and affect. No difficulty interacting due to temper, social withdrawal, or memory issues.  --SKIN: Lumbar region is dry and intact.   --RESPIRATORY: Normal rhythm and effort. No abnormal accessory muscle breathing patterns noted.   --MUSCULOSKELETAL:  Normal lumbar lordosis noted, no lateral shift.  --GROSS MOTOR: Easily arises from a seated position. Gait is non-antalgic  --LUMBAR SPINE:  Inspection reveals no evidence of deformity.    RESULTS:   Imaging: Lumbar spine imaging was reviewed today. The images were shown to the patient and the findings were explained using a spine model.      [facility] MRI 5/10/2022 lumbar spine      XR LUMBAR SPINE FLEX AND EXT 2 OR 3 VWS  LOCATION: [facility]  DATE/TIME: 2/3/2020   INDICATION: Flexion, extension, and neutral views please to evaluate spondylolisthesis stability at L4-L5.  COMPARISON: 04/26/2018 CT abdomen/pelvis.  IMPRESSION:   In the upright position there is approximately 0.6 cm degenerative " anterolisthesis of L4 on L5. There is no change with flexion or extension to suggest segmental instability.  The vertebral body heights are preserved. Mild intervertebral disc degeneration at L3-L4, L4-L5 and L5-S1. Moderate/severe facet hypertrophic changes at L4-L5 and L5-S1.  There are vascular calcifications. Abdominal surgical clips.        MR LUMBAR SPINE WITHOUT CONTRAST  At [facility]-1/24/2020  CLINICAL INFORMATION: 64-year-old male with right low back and leg pain.  COMPARISON: Lumbar spine MRI dated January 17, 2019.  TECHNICAL INFORMATION: Sagittal T2, sagittal T1, sagittal STIR, axial T2, and axial T1-weighted MR images of the lumbar spine in the neutral, seated position on an open 0.6 Teresa magnet.  CONTRAST: None.  SEDATION: None.  INTERPRETATION: Transitional lumbosacral anatomy with partially sacralized L5, mild lumbar levocurvature centered at L3, and shallow chronic Scheuermann-type endplate Schmorl's nodes at T10-11 and T11-12 without spondylolysis, vertebral collapse, acute fracture, or destructive osseous lesion. Normal pre and paravertebral soft tissues. Enlarged prostate gland measuring at least 5 cm AP (series 2, image 7).  Conus medullaris positioned at upper L2, with normal configuration of the terminal nerve roots.  L5-S1: Hypoplastic disc and facets with mild facet degeneration on the left, an accessory articulation on the left, and no stenosis or impingement.  L4-5: Moderate disc degeneration, 3 mm spondylolisthesis, bilateral foraminal annular tears, diffuse annular bulge, advanced right/moderate left facet degeneration, moderate right greater than left subarticular stenosis with L5 nerve root impingement, and mild to moderate right/mild left foraminal stenosis with right L4 nerve root encroachment.  L3-4: Mild disc degeneration, 1-2 mm spondylolisthesis, mild diffuse annular bulge, moderate left facet degeneration, mild left subarticular stenosis, and mild bilateral foraminal  stenosis.  L2-3: Mild degenerative disc desiccation with preserved disc height, mild bilateral foraminal annular bulge, normal facet morphology, and no stenosis or impingement.  L1-2 and T12-L1: Normal dorsal disc contours and normal morphology.  T11-12: Mild disc degeneration, 1 mm spondylolisthesis, normal facet morphology, and no stenosis or impingement.  No change from January 17, 2019.  CONCLUSION: Multilevel lumbar degenerative changes and mild levocurvature with specific findings as follows:  1. L4-5 subarticular impingement of the right greater than left L5 nerve roots in the setting of grade 1 degenerative spondylolisthesis. L3-4 mild left subarticular stenosis without impingement.  2. Foraminal stenosis, mild to moderate on the right at L4-5 with right L4 nerve root encroachment. Mild on the left at L4-5 and bilaterally at L3-4.  3. Facet degeneration, advanced on the right at L4-5. Moderate on the left at L4-5 and L3-4. Mild on the left at L5-S1.  4. No spondylolysis, vertebral collapse, acute fracture, or destructive osseous lesion.  5. Transitional lumbosacral anatomy with partially sacralized L5.  6. Enlarged prostate gland.        MRI LUMBAR SPINE WITHOUT CONTRAST  [facility]- 1/17/19  CLINICAL INFORMATION: 63-year-old man with low back pain.  TECHNICAL INFORMATION: MRI lumbar spine was obtained on 0.6 Teresa open upright magnet including sagittal T2, sagittal T1, sagittal STIR, axial T2 and axial T1-weighted images.  INTERPRETATION: There is normal lordotic curvature of the lumbar spine. No marrow edema within the lumbar vertebral bodies. No loss of vertebral body height. The conus is at the L1 level and is normal in appearance.  L5-S1: Transitional L5 segment with left L5 transverse process articulating with the sacrum. Mild developmental narrowing of the disc space. Mild bilateral facet arthropathy. No central spinal canal or foraminal stenosis.  L4-5: Mild degenerative disc disease with 5 mm degenerative  spondylolisthesis and disc bulge. Severe bilateral facet arthropathy with ligamentum flavum thickening contributes to mild central spinal canal stenosis and subarticular recess narrowing with encroachment upon the traversing L5 nerve roots. Moderate right foraminal stenosis.  L3-4: Mild degenerative disc disease and disc bulge. Mild bilateral facet arthropathy. Mild subarticular recess narrowing. The ganglia exit without compromise.  L2-3 through T12-L1: No focal disc herniation, central spinal canal stenosis or neural impingement.  T11-12 and T10-11: Moderate degenerative disc disease with Schmorl's nodes and endplate irregularities. No cord deformity or central spinal canal stenosis.  CONCLUSION:  1. L5-S1 transitional L5 segment with left transverse process articulating with the sacrum. Mild developmental narrowing of the disc space. Mild bilateral facet arthropathy.  2. L4-5 grade 1 spondylolisthesis with disc bulge. Severe facet arthropathy and ligamentum flavum thickening with mild central spinal canal and subarticular recess stenosis with encroachment upon the traversing L5 nerve roots. Moderate right foraminal stenosis.  3. Mild degenerative disc disease with disc bulge. Mild facet arthropathy and mild subarticular recess narrowing.

## 2022-05-12 NOTE — LETTER
5/12/2022         RE: Barry Jay  [address] [facility] [address]        Dear Colleague,    Thank you for referring your patient, Barry Jay, to the [facility]. Please see a copy of my visit note below.      Assessment:     Diagnoses and all orders for this visit:  Chronic right-sided low back pain with right-sided sciatica  -     PAIN Transforaminal REGI Inj Lumbosacral Right; Future  -     gabapentin (NEURONTIN) 100 MG capsule; Take 3 capsules (300 mg) by mouth 3 times daily Follow Dosing Chart  Spondylolisthesis of lumbar region  -     PAIN Transforaminal REGI Inj Lumbosacral Right; Future  Lumbar facet arthropathy  Bulging lumbar disc  -     PAIN Transforaminal REGI Inj Lumbosacral Right; Future  Right lumbar radiculopathy  -     oxyCODONE-acetaminophen (PERCOCET) 5-325 MG tablet; Take 1 tablet by mouth every 6 hours as needed for severe pain Do not drive while taking.  -     PAIN Transforaminal REGI Inj Lumbosacral Right; Future  -     gabapentin (NEURONTIN) 100 MG capsule; Take 3 capsules (300 mg) by mouth 3 times daily Follow Dosing Chart     Barry Jay is a 67 year old y.o. male with past medical history significant for hypertension, dyslipidemia, hand osteoarthritis, arthralgias who presents today for follow-up regarding:    -Chronic bilateral low back pain    -Severe acute right lumbar radiculitis, no benefit with sacroiliac joint injection.     Plan:     A shared decision making plan was used. The patient's values and choices were respected. Prior medical records were reviewed today. The following represents what was discussed and decided upon by the provider and the patient.        -DIAGNOSTIC TESTS: Images were personally reviewed and interpreted.   -- Consider updated lumbar flexion-extension x-ray to evaluate spondylolisthesis stability prior to any surgical intervention.  --Lumbar spine MRI [facility] 5/10/2022 with transitional lumbosacral anatomy with  partially sacralized L5.  L4-5 grade 1 spondylolisthesis, 3 mm, with annular bulging with advanced facet arthropathy with mild to moderate right and mild left foraminal stenosis, right L4 nerve root impingement.  L3-4 1 to 2 mm spondylolisthesis with facet arthropathy with mild left greater than right foraminal stenosis.  --Lumbar spine MRI 1/24/2020 [facility] with advanced facet arthropathy L4-5 right greater than left and facet arthropathy L3-4 moderate bilaterally.  Grade 1 spondylolisthesis L4-5 with right greater than left L5 impingement.  --Lumbar spine flexion-extension x-ray 2/3/2020 was 0.6 cm spondylolisthesis L4-5, no instability noted.  --[facility] lumbar spine MRI 1/17/2019 shows severe facet arthropathy L4-5 with 5 mm spondylolisthesis, mild central and subarticular recess stenosis, mild right foraminal stenosis.  Mild facet arthropathy L3-4 and L5-S1.    -INTERVENTIONS: Ordered a right L4-5 transforaminal epidural steroid injection to see if we get further relief of severe acute right lumbar radiculopathy as he does have a disc bulging at this level with right L4 nerve root compression and foraminal stenosis.  -Patient had his SI joint steroid injection 5/6/2022 therefore we cannot do the injection before 5/20/2022.  Patient is hoping to get an for the injection 5/20/2022 due to the severe pain and he does have a wedding that weekend that he is hoping to be able to go to and tolerate mobility a little bit better.  *Transitional lumbosacral anatomy with partially sacralized L5.    -MEDICATIONS: Prescribed gabapentin 100 mg to titrate up to 3 tablets 3 times daily as tolerated for radicular pain.  Prescribed Percocet 5/325 mg 1 tablet every 8 hours as needed for severe breakthrough pain number 30 tablets given first 10 days worth.  [place]  checked.  This is for acute pain only.  Refills will not be given over the telephone.  Discussed the risks (eg, addiction, overdose, worsening pain, death) verses benefit of  opioid use with patient today. Explained that this medication will not be a long term solution to ongoing pain. Discussed using lowest effective dose and the importance of other measures for pain management including PT, other non-opioid medications, behavioral treatments, and other procedure options.   Discussed side effects of medications and proper use. Patient verbalized understanding.    -PHYSICAL THERAPY: Consider physical therapy again down the road, currently he is not tolerating his home exercises however therefore we will hold off.  Discussed the importance of core strengthening, ROM, stretching exercises with the patient and how each of these entities is important in decreasing pain.  Explained to the patient that the purpose of physical therapy is to teach the patient a home exercise program.  These exercises need to be performed every day in order to decrease pain and prevent future occurrences of pain.        -PATIENT EDUCATION:  Total time of 32 minutes, on the day of service, spent with the patient, reviewing the chart, placing orders, and documenting.   -Today we also discussed the issues related to the current COVID-19 pandemic, the pros and cons of the current treatment plan, the CDC guidelines such as social distancing, washing the hands, and covering the cough.    -FOLLOW UP: Follow-up for injection hopefully on 5/20/2022, message sent to Dr. Evangelista Wolff to see if we can potentially double book him that day.  Advised to contact clinic if symptoms worsen or change.    Subjective:     Barry Jay is a 67 year old male who presents today for follow-up regarding chronic bilateral low back pain with severe acute worsening right low back pain rating to the right buttock lateral hip and anterior thigh mostly that stops at the knee with some numbness and tingling to the right foot.  Currently his pain is a 10/10 severe pain with any type of movement being upright standing walking, does  report that laying down gives him some benefit but his pain is still constant and severe and intolerable.    Treatment to Date: No prior spinal surgery.  Physical therapy [facility] x3 sessions.  Patient continues with home exercises at this time on a daily basis.     History of bilateral L4-5 RFA [facility] Dr. Green 2011 and 2013, relief at least 2 years.  Bilateral L3-4 MBB 6/30/2017.  Preprocedure pain 7/10 post 2/10.  Bilateral L4-5 facet joint steroid injection 7/7/2017 with relief.  Preprocedure pain 8/10, post 5/10.  Right L4-5 and L5-S1 TFESI 1/6/2020 with 30% lasting benefit only.  Preprocedure pain 10/10, post 7/10.  Bilateral L2, L3, L4 MBB 3/11/2020 and 7/7/2020 with positive response.  Bilateral L2, L3, L4 RFA 10/9/2020 with 90% significant relief x 8 months.  Right L4-5 TFESI 8/31/2021 with significant relief acute right LBP, minimal relief with chronic bilateral LBP however.  Pre and post procedure pain 9/10.  Bilateral L2, L3, L4 RFA 10/19/2021 with significant relief.  Preprocedure pain 8/10, post 0/10.  Right SI joint steroid injection 5/6/2022 with minimal initial and no lasting benefit.  Preprocedure pain 10/10, post 5/10.    Medications:  Meloxicam    Patient Active Problem List   Diagnosis     Dyslipidemia     Hypertension     Diffuse Joint Pains (Arthralgias)     Joint Pain, Localized In The Wrist     Joint Pain, Localized In The Knee     Generalized Osteoarthritis Of The Hand     Osteoarthritis Of The Knee     Arthralgias In Multiple Sites       Current Outpatient Medications   Medication     gabapentin (NEURONTIN) 100 MG capsule     oxyCODONE-acetaminophen (PERCOCET) 5-325 MG tablet     ALPRAZolam (XANAX) 0.5 MG tablet     atorvastatin (LIPITOR) 10 MG tablet     celecoxib (CELEBREX) 200 MG capsule     irbesartan (AVAPRO) 150 MG tablet     irbesartan-hydrochlorothiazide (AVALIDE) 300-12.5 mg per tablet     meloxicam (MOBIC) 7.5 MG tablet     omeprazole (PRILOSEC) 20 MG capsule     PARoxetine  "(PAXIL) 20 MG tablet     zolpidem (AMBIEN) 10 mg tablet     No current facility-administered medications for this visit.       Allergies   Allergen Reactions     Iodides Unknown and Hives     Pt thinks 20+ yrs ago, Pt received omni 350 without premed and did fine 11/7/2020 [facility]  Pt thinks 20+ yrs ago  Pt received omni 350 without premed and did fine 11/7/2020 [facility]         Past Medical History:   Diagnosis Date     HTN (hypertension)      Hyperlipidemia         Review of Systems  ROS:  Specifically negative for bowel/bladder dysfunction, balance changes, headache, dizziness, foot drop, fevers, chills, appetite changes, nausea/vomiting, unexplained weight loss. Otherwise 13 systems reviewed are negative. Please see the patient's intake questionnaire from today for details.    Reviewed Social, Family, Past Medical and Past Surgical history with patient, no significant changes noted since prior visit.     Objective:     BP (!) 140/78 (BP Location: Right arm, Patient Position: Sitting)   Ht 5' 8\" (1.727 m)   Wt 200 lb (90.7 kg)   BMI 30.41 kg/m      PHYSICAL EXAMINATION:    --CONSTITUTIONAL: Well developed, well nourished, healthy appearing individual.  --PSYCHIATRIC: Appropriate mood and affect. No difficulty interacting due to temper, social withdrawal, or memory issues.  --SKIN: Lumbar region is dry and intact.   --RESPIRATORY: Normal rhythm and effort. No abnormal accessory muscle breathing patterns noted.   --MUSCULOSKELETAL:  Normal lumbar lordosis noted, no lateral shift.  --GROSS MOTOR: Easily arises from a seated position. Gait is non-antalgic  --LUMBAR SPINE:  Inspection reveals no evidence of deformity.    RESULTS:   Imaging: Lumbar spine imaging was reviewed today. The images were shown to the patient and the findings were explained using a spine model.      [facility] MRI 5/10/2022 lumbar spine      XR LUMBAR SPINE FLEX AND EXT 2 OR 3 VWS  LOCATION: [facility]  DATE/TIME: 2/3/2020   INDICATION: " Flexion, extension, and neutral views please to evaluate spondylolisthesis stability at L4-L5.  COMPARISON: 04/26/2018 CT abdomen/pelvis.  IMPRESSION:   In the upright position there is approximately 0.6 cm degenerative anterolisthesis of L4 on L5. There is no change with flexion or extension to suggest segmental instability.  The vertebral body heights are preserved. Mild intervertebral disc degeneration at L3-L4, L4-L5 and L5-S1. Moderate/severe facet hypertrophic changes at L4-L5 and L5-S1.  There are vascular calcifications. Abdominal surgical clips.        MR LUMBAR SPINE WITHOUT CONTRAST  At [facility]-1/24/2020  CLINICAL INFORMATION: 64-year-old male with right low back and leg pain.  COMPARISON: Lumbar spine MRI dated January 17, 2019.  TECHNICAL INFORMATION: Sagittal T2, sagittal T1, sagittal STIR, axial T2, and axial T1-weighted MR images of the lumbar spine in the neutral, seated position on an open 0.6 Teresa magnet.  CONTRAST: None.  SEDATION: None.  INTERPRETATION: Transitional lumbosacral anatomy with partially sacralized L5, mild lumbar levocurvature centered at L3, and shallow chronic Scheuermann-type endplate Schmorl's nodes at T10-11 and T11-12 without spondylolysis, vertebral collapse, acute fracture, or destructive osseous lesion. Normal pre and paravertebral soft tissues. Enlarged prostate gland measuring at least 5 cm AP (series 2, image 7).  Conus medullaris positioned at upper L2, with normal configuration of the terminal nerve roots.  L5-S1: Hypoplastic disc and facets with mild facet degeneration on the left, an accessory articulation on the left, and no stenosis or impingement.  L4-5: Moderate disc degeneration, 3 mm spondylolisthesis, bilateral foraminal annular tears, diffuse annular bulge, advanced right/moderate left facet degeneration, moderate right greater than left subarticular stenosis with L5 nerve root impingement, and mild to moderate right/mild left foraminal stenosis with right L4  nerve root encroachment.  L3-4: Mild disc degeneration, 1-2 mm spondylolisthesis, mild diffuse annular bulge, moderate left facet degeneration, mild left subarticular stenosis, and mild bilateral foraminal stenosis.  L2-3: Mild degenerative disc desiccation with preserved disc height, mild bilateral foraminal annular bulge, normal facet morphology, and no stenosis or impingement.  L1-2 and T12-L1: Normal dorsal disc contours and normal morphology.  T11-12: Mild disc degeneration, 1 mm spondylolisthesis, normal facet morphology, and no stenosis or impingement.  No change from January 17, 2019.  CONCLUSION: Multilevel lumbar degenerative changes and mild levocurvature with specific findings as follows:  1. L4-5 subarticular impingement of the right greater than left L5 nerve roots in the setting of grade 1 degenerative spondylolisthesis. L3-4 mild left subarticular stenosis without impingement.  2. Foraminal stenosis, mild to moderate on the right at L4-5 with right L4 nerve root encroachment. Mild on the left at L4-5 and bilaterally at L3-4.  3. Facet degeneration, advanced on the right at L4-5. Moderate on the left at L4-5 and L3-4. Mild on the left at L5-S1.  4. No spondylolysis, vertebral collapse, acute fracture, or destructive osseous lesion.  5. Transitional lumbosacral anatomy with partially sacralized L5.  6. Enlarged prostate gland.        MRI LUMBAR SPINE WITHOUT CONTRAST  [facility]- 1/17/19  CLINICAL INFORMATION: 63-year-old man with low back pain.  TECHNICAL INFORMATION: MRI lumbar spine was obtained on 0.6 Teresa open upright magnet including sagittal T2, sagittal T1, sagittal STIR, axial T2 and axial T1-weighted images.  INTERPRETATION: There is normal lordotic curvature of the lumbar spine. No marrow edema within the lumbar vertebral bodies. No loss of vertebral body height. The conus is at the L1 level and is normal in appearance.  L5-S1: Transitional L5 segment with left L5 transverse process articulating  with the sacrum. Mild developmental narrowing of the disc space. Mild bilateral facet arthropathy. No central spinal canal or foraminal stenosis.  L4-5: Mild degenerative disc disease with 5 mm degenerative spondylolisthesis and disc bulge. Severe bilateral facet arthropathy with ligamentum flavum thickening contributes to mild central spinal canal stenosis and subarticular recess narrowing with encroachment upon the traversing L5 nerve roots. Moderate right foraminal stenosis.  L3-4: Mild degenerative disc disease and disc bulge. Mild bilateral facet arthropathy. Mild subarticular recess narrowing. The ganglia exit without compromise.  L2-3 through T12-L1: No focal disc herniation, central spinal canal stenosis or neural impingement.  T11-12 and T10-11: Moderate degenerative disc disease with Schmorl's nodes and endplate irregularities. No cord deformity or central spinal canal stenosis.  CONCLUSION:  1. L5-S1 transitional L5 segment with left transverse process articulating with the sacrum. Mild developmental narrowing of the disc space. Mild bilateral facet arthropathy.  2. L4-5 grade 1 spondylolisthesis with disc bulge. Severe facet arthropathy and ligamentum flavum thickening with mild central spinal canal and subarticular recess stenosis with encroachment upon the traversing L5 nerve roots. Moderate right foraminal stenosis.  3. Mild degenerative disc disease with disc bulge. Mild facet arthropathy and mild subarticular recess narrowing.                           Again, thank you for allowing me to participate in the care of your patient.        Sincerely,        Claudia Baptiste, CNP

## 2022-05-16 ENCOUNTER — TELEPHONE (OUTPATIENT)
Dept: PHYSICAL MEDICINE AND REHAB | Facility: CLINIC | Age: 67
End: 2022-05-16
Payer: COMMERCIAL

## 2022-05-16 NOTE — TELEPHONE ENCOUNTER
PSP: Claudia Baptiste CNP   Last clinic visit:  5/12/22 OV; 5/6/22 SI joint injection  Reason for call: Pain medication not helping severe right hip   Clinical information:  Reports he is taking 1 Oxycodone 5/325 every 6 hours without any relief of pain. He is also currently taking Gabapentin 100 mg without side effect.   Advice given to patient: Explained he could try to increase the Gabapentin up quicker if he is tolerating it. He will take 200 mg this afternoon and then 200 mg at HS.   PSP was updated. She is in agreement with increasing Gabapentin more rapidly to a total of 300 mg TID if tolerates. He may also that Oxycodone 1.5 to 2 tablets every 6-8 hours for severe pain. Discussed moving patient's injection up from 5/20 to 5/19. She will be reaching out to Dr. Naidu.     All directions for medications reviewed in full with patient upon another call back to him. Stated understanding. Explained that as he will be increasing his pain medications, he will need a follow up appointment to discuss refills further with PSP. Assisted with coordinating PSP appointment for Thursday afternoon and asked that a hold be placed for possible injection that same afternoon. Informed him he will be called with answer regarding moving injection to Thursday or not. Stated understanding and appreciation for call back.

## 2022-05-19 ENCOUNTER — RADIOLOGY INJECTION OFFICE VISIT (OUTPATIENT)
Dept: PHYSICAL MEDICINE AND REHAB | Facility: CLINIC | Age: 67
End: 2022-05-19
Attending: NURSE PRACTITIONER
Payer: COMMERCIAL

## 2022-05-19 ENCOUNTER — OFFICE VISIT (OUTPATIENT)
Dept: PHYSICAL MEDICINE AND REHAB | Facility: CLINIC | Age: 67
End: 2022-05-19
Payer: COMMERCIAL

## 2022-05-19 VITALS
SYSTOLIC BLOOD PRESSURE: 127 MMHG | DIASTOLIC BLOOD PRESSURE: 74 MMHG | OXYGEN SATURATION: 96 % | TEMPERATURE: 98.9 F | HEART RATE: 88 BPM

## 2022-05-19 VITALS — SYSTOLIC BLOOD PRESSURE: 126 MMHG | DIASTOLIC BLOOD PRESSURE: 82 MMHG | OXYGEN SATURATION: 98 % | HEART RATE: 87 BPM

## 2022-05-19 DIAGNOSIS — M51.369 BULGING LUMBAR DISC: ICD-10-CM

## 2022-05-19 DIAGNOSIS — M54.41 CHRONIC RIGHT-SIDED LOW BACK PAIN WITH RIGHT-SIDED SCIATICA: ICD-10-CM

## 2022-05-19 DIAGNOSIS — M54.16 RIGHT LUMBAR RADICULOPATHY: ICD-10-CM

## 2022-05-19 DIAGNOSIS — G89.29 CHRONIC RIGHT-SIDED LOW BACK PAIN WITH RIGHT-SIDED SCIATICA: ICD-10-CM

## 2022-05-19 DIAGNOSIS — M43.16 SPONDYLOLISTHESIS OF LUMBAR REGION: ICD-10-CM

## 2022-05-19 PROCEDURE — 99213 OFFICE O/P EST LOW 20 MIN: CPT | Performed by: NURSE PRACTITIONER

## 2022-05-19 PROCEDURE — 64483 NJX AA&/STRD TFRM EPI L/S 1: CPT | Mod: RT | Performed by: PHYSICAL MEDICINE & REHABILITATION

## 2022-05-19 RX ORDER — METHYLPREDNISOLONE ACETATE 80 MG/ML
INJECTION, SUSPENSION INTRA-ARTICULAR; INTRALESIONAL; INTRAMUSCULAR; SOFT TISSUE
Status: COMPLETED | OUTPATIENT
Start: 2022-05-19 | End: 2022-05-19

## 2022-05-19 RX ORDER — LIDOCAINE HYDROCHLORIDE 10 MG/ML
INJECTION, SOLUTION EPIDURAL; INFILTRATION; INTRACAUDAL; PERINEURAL
Status: COMPLETED | OUTPATIENT
Start: 2022-05-19 | End: 2022-05-19

## 2022-05-19 RX ORDER — OXYCODONE AND ACETAMINOPHEN 5; 325 MG/1; MG/1
1-2 TABLET ORAL EVERY 6 HOURS PRN
Qty: 42 TABLET | Refills: 0 | Status: SHIPPED | OUTPATIENT
Start: 2022-05-19 | End: 2022-06-02

## 2022-05-19 RX ADMIN — METHYLPREDNISOLONE ACETATE 80 MG: 80 INJECTION, SUSPENSION INTRA-ARTICULAR; INTRALESIONAL; INTRAMUSCULAR; SOFT TISSUE at 14:11

## 2022-05-19 RX ADMIN — LIDOCAINE HYDROCHLORIDE 2 ML: 10 INJECTION, SOLUTION EPIDURAL; INFILTRATION; INTRACAUDAL; PERINEURAL at 14:10

## 2022-05-19 ASSESSMENT — PAIN SCALES - GENERAL
PAINLEVEL: WORST PAIN (10)

## 2022-05-19 NOTE — LETTER
5/19/2022         RE: Barry Jay  [id number] [facility] [address]        Dear Colleague,    Thank you for referring your patient, Barry Jay, to the [facility]. Please see a copy of my visit note below.      Assessment:     Diagnoses and all orders for this visit:  Right lumbar radiculopathy  -     oxyCODONE-acetaminophen (PERCOCET) 5-325 MG tablet; Take 1-2 tablets by mouth every 6 hours as needed for severe pain Do not drive while taking.     Barry Jay is a 67 year old y.o. male with past medical history significant for hypertension, dyslipidemia, hand osteoarthritis, arthralgias who presents today for follow-up regarding:    -Bilateral low back pain that is more tolerable.    -Severe acute right lumbar radiculopathy, no benefit with previous SI injection.  MRI with disc bulging at L4-5 with spondylolisthesis with L4 impingement.     Plan:     A shared decision making plan was used. The patient's values and choices were respected. Prior medical records were reviewed today. The following represents what was discussed and decided upon by the provider and the patient.        -DIAGNOSTIC TESTS: Images were personally reviewed and interpreted.   --Lumbar spine MRI [facility] 5/10/2022 with transitional lumbosacral anatomy with partially sacralized L5.  L4-5 grade 1 spondylolisthesis, 3 mm, with annular bulging with advanced facet arthropathy with mild to moderate right and mild left foraminal stenosis, right L4 nerve root impingement.  L3-4 1 to 2 mm spondylolisthesis with facet arthropathy with mild left greater than right foraminal stenosis.  --Lumbar spine MRI 1/24/2020 [facility] with advanced facet arthropathy L4-5 right greater than left and facet arthropathy L3-4 moderate bilaterally.  Grade 1 spondylolisthesis L4-5 with right greater than left L5 impingement.  --Lumbar spine flexion-extension x-ray 2/3/2020 was 0.6 cm spondylolisthesis L4-5, no instability  noted.  --[facility] lumbar spine MRI 1/17/2019 shows severe facet arthropathy L4-5 with 5 mm spondylolisthesis, mild central and subarticular recess stenosis, mild right foraminal stenosis.  Mild facet arthropathy L3-4 and L5-S1.    -INTERVENTIONS: RIGHT L4-5 TFESI with today with Dr. Naidu.    -MEDICATIONS: Advised patient to increase gabapentin 100 mg to 3 tablets in the morning 3 in the afternoon and 3 at bedtime.  Prescribed Percocet 5/325 mg 1 to 2 tablets every 6 hours as needed for severe breakthrough pain number 42 tablets given for 1 weeks worth.  [place]  checked.  This is for acute pain only.  Refills will not be given over the telephone.  Discussed the risks (eg, addiction, overdose, worsening pain, death) verses benefit of opioid use with patient today. Explained that this medication will not be a long term solution to ongoing pain. Discussed using lowest effective dose and the importance of other measures for pain management including PT, other non-opioid medications, behavioral treatments, and other procedure options.   Discussed side effects of medications and proper use. Patient verbalized understanding.    -PHYSICAL THERAPY: Consider physical therapy referral if symptoms are improving, currently his pain is severe and debilitating he does not feel he would tolerate further physical therapy is walking his severe at this time even.  Discussed the importance of core strengthening, ROM, stretching exercises with the patient and how each of these entities is important in decreasing pain.  Explained to the patient that the purpose of physical therapy is to teach the patient a home exercise program.  These exercises need to be performed every day in order to decrease pain and prevent future occurrences of pain.        -PATIENT EDUCATION:  Total time of 28 minutes, on the day of service, spent with the patient, reviewing the chart, placing orders, and documenting.   -Today we also discussed the issues related to  the current COVID-19 pandemic, the pros and cons of the current treatment plan, the CDC guidelines such as social distancing, washing the hands, and covering the cough.    -FOLLOW UP: Follow-up for injection with Dr. Naidu today  Advised to contact clinic if symptoms worsen or change.    Subjective:     Barry Jay is a 67 year old male who presents today for follow-up regarding chronic bilateral low back pain that is more tolerable.  Patient has had severe acute worsening right low back pain at the belt line radiating to the lateral hip posterior thigh and lateral thigh as well as anterior thigh that is currently severe at a 10/10 with any type of range of motion, bending as well as walking and sitting, laying down is his only comfortable position unfortunately.  Currently denies any episodes of his leg giving out on him however he does feel perceived weakness in his right lower extremity.  Denies left leg symptoms currently.    Patient is here for med refill and has a scheduled injection with Dr. Naidu as well today.    Treatment to Date: No prior spinal surgery.  Physical therapy optimum x3 sessions.  Patient continues with home exercises at this time on a daily basis.     History of bilateral L4-5 RFA [place] [facility] Dr. Green 2011 and 2013, relief at least 2 years.  Bilateral L3-4 MBB 6/30/2017.  Preprocedure pain 7/10 post 2/10.  Bilateral L4-5 facet joint steroid injection 7/7/2017 with relief.  Preprocedure pain 8/10, post 5/10.  Right L4-5 and L5-S1 TFESI 1/6/2020 with 30% lasting benefit only.  Preprocedure pain 10/10, post 7/10.  Bilateral L2, L3, L4 MBB 3/11/2020 and 7/7/2020 with positive response.  Bilateral L2, L3, L4 RFA 10/9/2020 with 90% significant relief x 8 months.  Right L4-5 TFESI 8/31/2021 with 100% relief acute right LBP and right lumbar radiculitis for 6 months, minimal relief with chronic bilateral LBP however.  Pre and post procedure pain 9/10.  Bilateral L2, L3, L4 RFA 10/19/2021  with significant relief.  Preprocedure pain 8/10, post 0/10.  Right SI joint steroid injection 5/6/2022 with minimal initial and no lasting benefit.  Preprocedure pain 10/10, post 5/10.     Medications:  Meloxicam    Patient Active Problem List   Diagnosis     Dyslipidemia     Hypertension     Diffuse Joint Pains (Arthralgias)     Joint Pain, Localized In The Wrist     Joint Pain, Localized In The Knee     Generalized Osteoarthritis Of The Hand     Osteoarthritis Of The Knee     Arthralgias In Multiple Sites       Current Outpatient Medications   Medication     celecoxib (CELEBREX) 200 MG capsule     gabapentin (NEURONTIN) 100 MG capsule     oxyCODONE-acetaminophen (PERCOCET) 5-325 MG tablet     ALPRAZolam (XANAX) 0.5 MG tablet     atorvastatin (LIPITOR) 10 MG tablet     irbesartan (AVAPRO) 150 MG tablet     irbesartan-hydrochlorothiazide (AVALIDE) 300-12.5 mg per tablet     meloxicam (MOBIC) 7.5 MG tablet     omeprazole (PRILOSEC) 20 MG capsule     PARoxetine (PAXIL) 20 MG tablet     zolpidem (AMBIEN) 10 mg tablet     No current facility-administered medications for this visit.       Allergies   Allergen Reactions     Iodides Unknown and Hives     Pt thinks 20+ yrs ago, Pt received omni 350 without premed and did fine 11/7/2020 [facility]  Pt thinks 20+ yrs ago  Pt received omni 350 without premed and did fine 11/7/2020 S         Past Medical History:   Diagnosis Date     HTN (hypertension)      Hyperlipidemia         Review of Systems  ROS:  Specifically negative for bowel/bladder dysfunction, balance changes, headache, dizziness, foot drop, fevers, chills, appetite changes, nausea/vomiting, unexplained weight loss. Otherwise 13 systems reviewed are negative. Please see the patient's intake questionnaire from today for details.    Reviewed Social, Family, Past Medical and Past Surgical history with patient, no significant changes noted since prior visit.     Objective:     /74 (BP Location: Right arm, Patient  Position: Sitting)   Pulse 88   Temp 98.9  F (37.2  C) (Oral)   SpO2 96%     PHYSICAL EXAMINATION:    --CONSTITUTIONAL: Well developed, well nourished, healthy appearing individual.  --PSYCHIATRIC: Appropriate mood and affect. No difficulty interacting due to temper, social withdrawal, or memory issues.  --SKIN: Lumbar region is dry and intact.   --RESPIRATORY: Normal rhythm and effort. No abnormal accessory muscle breathing patterns noted.   --MUSCULOSKELETAL:  Normal lumbar lordosis noted, no lateral shift.  --LUMBAR SPINE:  Inspection reveals no evidence of deformity.     RESULTS:   Imaging: Lumbar spine imaging was reviewed today. The images were shown to the patient and the findings were explained using a spine model.      [facility] MRI 5/10/2022 lumbar spine        XR LUMBAR SPINE FLEX AND EXT 2 OR 3 VWS  LOCATION: [facility]  DATE/TIME: 2/3/2020   INDICATION: Flexion, extension, and neutral views please to evaluate spondylolisthesis stability at L4-L5.  COMPARISON: 04/26/2018 CT abdomen/pelvis.  IMPRESSION:   In the upright position there is approximately 0.6 cm degenerative anterolisthesis of L4 on L5. There is no change with flexion or extension to suggest segmental instability.  The vertebral body heights are preserved. Mild intervertebral disc degeneration at L3-L4, L4-L5 and L5-S1. Moderate/severe facet hypertrophic changes at L4-L5 and L5-S1.  There are vascular calcifications. Abdominal surgical clips.        MR LUMBAR SPINE WITHOUT CONTRAST  At [facility]-1/24/2020  CLINICAL INFORMATION: 64-year-old male with right low back and leg pain.  COMPARISON: Lumbar spine MRI dated January 17, 2019.  TECHNICAL INFORMATION: Sagittal T2, sagittal T1, sagittal STIR, axial T2, and axial T1-weighted MR images of the lumbar spine in the neutral, seated position on an open 0.6 Teresa magnet.  CONTRAST: None.  SEDATION: None.  INTERPRETATION: Transitional lumbosacral anatomy with partially sacralized L5, mild  lumbar levocurvature centered at L3, and shallow chronic Scheuermann-type endplate Schmorl's nodes at T10-11 and T11-12 without spondylolysis, vertebral collapse, acute fracture, or destructive osseous lesion. Normal pre and paravertebral soft tissues. Enlarged prostate gland measuring at least 5 cm AP (series 2, image 7).  Conus medullaris positioned at upper L2, with normal configuration of the terminal nerve roots.  L5-S1: Hypoplastic disc and facets with mild facet degeneration on the left, an accessory articulation on the left, and no stenosis or impingement.  L4-5: Moderate disc degeneration, 3 mm spondylolisthesis, bilateral foraminal annular tears, diffuse annular bulge, advanced right/moderate left facet degeneration, moderate right greater than left subarticular stenosis with L5 nerve root impingement, and mild to moderate right/mild left foraminal stenosis with right L4 nerve root encroachment.  L3-4: Mild disc degeneration, 1-2 mm spondylolisthesis, mild diffuse annular bulge, moderate left facet degeneration, mild left subarticular stenosis, and mild bilateral foraminal stenosis.  L2-3: Mild degenerative disc desiccation with preserved disc height, mild bilateral foraminal annular bulge, normal facet morphology, and no stenosis or impingement.  L1-2 and T12-L1: Normal dorsal disc contours and normal morphology.  T11-12: Mild disc degeneration, 1 mm spondylolisthesis, normal facet morphology, and no stenosis or impingement.  No change from January 17, 2019.  CONCLUSION: Multilevel lumbar degenerative changes and mild levocurvature with specific findings as follows:  1. L4-5 subarticular impingement of the right greater than left L5 nerve roots in the setting of grade 1 degenerative spondylolisthesis. L3-4 mild left subarticular stenosis without impingement.  2. Foraminal stenosis, mild to moderate on the right at L4-5 with right L4 nerve root encroachment. Mild on the left at L4-5 and bilaterally at  L3-4.  3. Facet degeneration, advanced on the right at L4-5. Moderate on the left at L4-5 and L3-4. Mild on the left at L5-S1.  4. No spondylolysis, vertebral collapse, acute fracture, or destructive osseous lesion.  5. Transitional lumbosacral anatomy with partially sacralized L5.  6. Enlarged prostate gland.        MRI LUMBAR SPINE WITHOUT CONTRAST  [facility]- 1/17/19  CLINICAL INFORMATION: 63-year-old man with low back pain.  TECHNICAL INFORMATION: MRI lumbar spine was obtained on 0.6 Teresa open upright magnet including sagittal T2, sagittal T1, sagittal STIR, axial T2 and axial T1-weighted images.  INTERPRETATION: There is normal lordotic curvature of the lumbar spine. No marrow edema within the lumbar vertebral bodies. No loss of vertebral body height. The conus is at the L1 level and is normal in appearance.  L5-S1: Transitional L5 segment with left L5 transverse process articulating with the sacrum. Mild developmental narrowing of the disc space. Mild bilateral facet arthropathy. No central spinal canal or foraminal stenosis.  L4-5: Mild degenerative disc disease with 5 mm degenerative spondylolisthesis and disc bulge. Severe bilateral facet arthropathy with ligamentum flavum thickening contributes to mild central spinal canal stenosis and subarticular recess narrowing with encroachment upon the traversing L5 nerve roots. Moderate right foraminal stenosis.  L3-4: Mild degenerative disc disease and disc bulge. Mild bilateral facet arthropathy. Mild subarticular recess narrowing. The ganglia exit without compromise.  L2-3 through T12-L1: No focal disc herniation, central spinal canal stenosis or neural impingement.  T11-12 and T10-11: Moderate degenerative disc disease with Schmorl's nodes and endplate irregularities. No cord deformity or central spinal canal stenosis.  CONCLUSION:  1. L5-S1 transitional L5 segment with left transverse process articulating with the sacrum. Mild developmental narrowing of the disc  space. Mild bilateral facet arthropathy.  2. L4-5 grade 1 spondylolisthesis with disc bulge. Severe facet arthropathy and ligamentum flavum thickening with mild central spinal canal and subarticular recess stenosis with encroachment upon the traversing L5 nerve roots. Moderate right foraminal stenosis.  3. Mild degenerative disc disease with disc bulge. Mild facet arthropathy and mild subarticular recess narrowing.                                  Again, thank you for allowing me to participate in the care of your patient.        Sincerely,        Claudia Baptiste, CNP

## 2022-05-19 NOTE — PATIENT INSTRUCTIONS
~[facility] scheduling #[phone].  ~Please call our [facility] Nurse Navigation line [phone] with any questions or concerns about your treatment plan, if symptoms worsen and you would like to be seen urgently, or if you have problems controlling bladder and bowel function.       You have been prescribed a controlled substance medication today for pain control.   This medication must be taken as prescribed only as it can be very dangerous to your health if taken more than prescribed.  We discussed the risks (eg, addiction, overdose, worsening pain, death) verses the potential benefit of opioid medication use. Explained that this medication will not be a long term solution to ongoing pain. Discussed using lowest effective dose and the importance of other measures for pain management including physical therapy, other non-opioid medications, behavioral treatments, acupuncture and massage, and other procedure options.     **For any further refills on opioid pain medication you must schedule, in advance, an in person clinic or video visit to discuss refill further, in which you may or may not receive refills.   Claudia Baptiste CNP is in clinic Monday - Thursdays. This medication must be refilled by one provider only. Last minute appointments may not be able to be accommodated.

## 2022-05-19 NOTE — PATIENT INSTRUCTIONS
Follow-up visit with Claudia Baptiste CNP in 2 weeks to discuss injection outcome and determine care plan going forward.       DISCHARGE INSTRUCTIONS    During office hours (8:00 a.m.- 4:00 p.m.) questions or concerns may be answered  by calling Spine Center Navigation Nurses at  [phone].  Messages received after hours will be returned the following business day.      In the case of an emergency, please dial 911 or seek assistance at the nearest Emergency Room/Urgent Care facility.     All Patients:    You may experience an increase in your symptoms for the first 2 days (It may take anywhere between 2 days- 2 weeks for the steroid to have maximum effect).    You may use ice on the injection site, as frequently as 20 minutes each hour if needed.    You may take your pain medicine.    You may continue taking your regular medication after your injection. If you have had a Medial Branch Block you may resume pain medication once your pain diary is completed.    You may shower. No swimming, tub bath or hot tub for 48 hours.  You may remove your bandaid/bandage as soon as you are home.    You may resume light activities, as tolerated.    Resume your usual diet as tolerated.    It is strongly advised that you do not drive for 1-3 hours post injection.    If you have had oral sedation:  Do not drive for 8 hours post injection.      If you have had IV sedation:  Do not drive for 24 hours post injection.  Do not operate hazardous machinery or make important personal/business decisions for 24 hours.      POSSIBLE STEROID SIDE EFFECTS (If steroid/cortisone was used for your procedure)    -If you experience these symptoms, it should only last for a short period    Swelling of the legs              Skin redness (flushing)     Mouth (oral) irritation   Blood sugar (glucose) levels            Sweats                    Mood changes  Headache  Sleeplessness  Weakened immune system for up to 14 days, which could increase the  risk of navdeep the COVID-19 virus and/or experiencing more severe symptoms of the disease, if exposed.  Decreased effectiveness of the flu vaccine if given within 2 weeks of the steroid.         POSSIBLE PROCEDURE SIDE EFFECTS  -Call the Spine Center if you are concerned  Increased Pain           Increased numbness/tingling      Nausea/Vomiting          Bruising/bleeding at site      Redness or swelling                                              Difficulty walking      Weakness           Fever greater than 100.5    *In the event of a severe headache after an epidural steroid injection that is relieved by lying down, please call the [facility] to speak with a clinical staff member*

## 2022-05-19 NOTE — PROGRESS NOTES
Assessment:     Diagnoses and all orders for this visit:  Right lumbar radiculopathy  -     oxyCODONE-acetaminophen (PERCOCET) 5-325 MG tablet; Take 1-2 tablets by mouth every 6 hours as needed for severe pain Do not drive while taking.     Barry Jay is a 67 year old y.o. male with past medical history significant for hypertension, dyslipidemia, hand osteoarthritis, arthralgias who presents today for follow-up regarding:    -Bilateral low back pain that is more tolerable.    -Severe acute right lumbar radiculopathy, no benefit with previous SI injection.  MRI with disc bulging at L4-5 with spondylolisthesis with L4 impingement.     Plan:     A shared decision making plan was used. The patient's values and choices were respected. Prior medical records were reviewed today. The following represents what was discussed and decided upon by the provider and the patient.        -DIAGNOSTIC TESTS: Images were personally reviewed and interpreted.   --Lumbar spine MRI [facility] 5/10/2022 with transitional lumbosacral anatomy with partially sacralized L5.  L4-5 grade 1 spondylolisthesis, 3 mm, with annular bulging with advanced facet arthropathy with mild to moderate right and mild left foraminal stenosis, right L4 nerve root impingement.  L3-4 1 to 2 mm spondylolisthesis with facet arthropathy with mild left greater than right foraminal stenosis.  --Lumbar spine MRI 1/24/2020 [facility] with advanced facet arthropathy L4-5 right greater than left and facet arthropathy L3-4 moderate bilaterally.  Grade 1 spondylolisthesis L4-5 with right greater than left L5 impingement.  --Lumbar spine flexion-extension x-ray 2/3/2020 was 0.6 cm spondylolisthesis L4-5, no instability noted.  --[facility] lumbar spine MRI 1/17/2019 shows severe facet arthropathy L4-5 with 5 mm spondylolisthesis, mild central and subarticular recess stenosis, mild right foraminal stenosis.  Mild facet arthropathy L3-4 and L5-S1.    -INTERVENTIONS: RIGHT L4-5 TFESI with  today with Dr. Naidu.    -MEDICATIONS: Advised patient to increase gabapentin 100 mg to 3 tablets in the morning 3 in the afternoon and 3 at bedtime.  Prescribed Percocet 5/325 mg 1 to 2 tablets every 6 hours as needed for severe breakthrough pain number 42 tablets given for 1 weeks worth.  [place]  checked.  This is for acute pain only.  Refills will not be given over the telephone.  Discussed the risks (eg, addiction, overdose, worsening pain, death) verses benefit of opioid use with patient today. Explained that this medication will not be a long term solution to ongoing pain. Discussed using lowest effective dose and the importance of other measures for pain management including PT, other non-opioid medications, behavioral treatments, and other procedure options.   Discussed side effects of medications and proper use. Patient verbalized understanding.    -PHYSICAL THERAPY: Consider physical therapy referral if symptoms are improving, currently his pain is severe and debilitating he does not feel he would tolerate further physical therapy is walking his severe at this time even.  Discussed the importance of core strengthening, ROM, stretching exercises with the patient and how each of these entities is important in decreasing pain.  Explained to the patient that the purpose of physical therapy is to teach the patient a home exercise program.  These exercises need to be performed every day in order to decrease pain and prevent future occurrences of pain.        -PATIENT EDUCATION:  Total time of 28 minutes, on the day of service, spent with the patient, reviewing the chart, placing orders, and documenting.   -Today we also discussed the issues related to the current COVID-19 pandemic, the pros and cons of the current treatment plan, the CDC guidelines such as social distancing, washing the hands, and covering the cough.    -FOLLOW UP: Follow-up for injection with Dr. Naidu today  Advised to contact clinic if  symptoms worsen or change.    Subjective:     Barry Jay is a 67 year old male who presents today for follow-up regarding chronic bilateral low back pain that is more tolerable.  Patient has had severe acute worsening right low back pain at the belt line radiating to the lateral hip posterior thigh and lateral thigh as well as anterior thigh that is currently severe at a 10/10 with any type of range of motion, bending as well as walking and sitting, laying down is his only comfortable position unfortunately.  Currently denies any episodes of his leg giving out on him however he does feel perceived weakness in his right lower extremity.  Denies left leg symptoms currently.    Patient is here for med refill and has a scheduled injection with Dr. Naidu as well today.    Treatment to Date: No prior spinal surgery.  Physical therapy [facility] x3 sessions.  Patient continues with home exercises at this time on a daily basis.     History of bilateral L4-5 RFA [place] [facility] Dr. Green 2011 and 2013, relief at least 2 years.  Bilateral L3-4 MBB 6/30/2017.  Preprocedure pain 7/10 post 2/10.  Bilateral L4-5 facet joint steroid injection 7/7/2017 with relief.  Preprocedure pain 8/10, post 5/10.  Right L4-5 and L5-S1 TFESI 1/6/2020 with 30% lasting benefit only.  Preprocedure pain 10/10, post 7/10.  Bilateral L2, L3, L4 MBB 3/11/2020 and 7/7/2020 with positive response.  Bilateral L2, L3, L4 RFA 10/9/2020 with 90% significant relief x 8 months.  Right L4-5 TFESI 8/31/2021 with 100% relief acute right LBP and right lumbar radiculitis for 6 months, minimal relief with chronic bilateral LBP however.  Pre and post procedure pain 9/10.  Bilateral L2, L3, L4 RFA 10/19/2021 with significant relief.  Preprocedure pain 8/10, post 0/10.  Right SI joint steroid injection 5/6/2022 with minimal initial and no lasting benefit.  Preprocedure pain 10/10, post 5/10.     Medications:  Meloxicam    Patient Active Problem List   Diagnosis      Dyslipidemia     Hypertension     Diffuse Joint Pains (Arthralgias)     Joint Pain, Localized In The Wrist     Joint Pain, Localized In The Knee     Generalized Osteoarthritis Of The Hand     Osteoarthritis Of The Knee     Arthralgias In Multiple Sites       Current Outpatient Medications   Medication     celecoxib (CELEBREX) 200 MG capsule     gabapentin (NEURONTIN) 100 MG capsule     oxyCODONE-acetaminophen (PERCOCET) 5-325 MG tablet     ALPRAZolam (XANAX) 0.5 MG tablet     atorvastatin (LIPITOR) 10 MG tablet     irbesartan (AVAPRO) 150 MG tablet     irbesartan-hydrochlorothiazide (AVALIDE) 300-12.5 mg per tablet     meloxicam (MOBIC) 7.5 MG tablet     omeprazole (PRILOSEC) 20 MG capsule     PARoxetine (PAXIL) 20 MG tablet     zolpidem (AMBIEN) 10 mg tablet     No current facility-administered medications for this visit.       Allergies   Allergen Reactions     Iodides Unknown and Hives     Pt thinks 20+ yrs ago, Pt received omni 350 without premed and did fine 11/7/2020 [facility]  Pt thinks 20+ yrs ago  Pt received omni 350 without premed and did fine 11/7/2020 [facility]         Past Medical History:   Diagnosis Date     HTN (hypertension)      Hyperlipidemia         Review of Systems  ROS:  Specifically negative for bowel/bladder dysfunction, balance changes, headache, dizziness, foot drop, fevers, chills, appetite changes, nausea/vomiting, unexplained weight loss. Otherwise 13 systems reviewed are negative. Please see the patient's intake questionnaire from today for details.    Reviewed Social, Family, Past Medical and Past Surgical history with patient, no significant changes noted since prior visit.     Objective:     /74 (BP Location: Right arm, Patient Position: Sitting)   Pulse 88   Temp 98.9  F (37.2  C) (Oral)   SpO2 96%     PHYSICAL EXAMINATION:    --CONSTITUTIONAL: Well developed, well nourished, healthy appearing individual.  --PSYCHIATRIC: Appropriate mood and affect. No difficulty interacting due to  temper, social withdrawal, or memory issues.  --SKIN: Lumbar region is dry and intact.   --RESPIRATORY: Normal rhythm and effort. No abnormal accessory muscle breathing patterns noted.   --MUSCULOSKELETAL:  Normal lumbar lordosis noted, no lateral shift.  --LUMBAR SPINE:  Inspection reveals no evidence of deformity.     RESULTS:   Imaging: Lumbar spine imaging was reviewed today. The images were shown to the patient and the findings were explained using a spine model.      [facility] MRI 5/10/2022 lumbar spine        XR LUMBAR SPINE FLEX AND EXT 2 OR 3 VWS  LOCATION: [facility]  DATE/TIME: 2/3/2020   INDICATION: Flexion, extension, and neutral views please to evaluate spondylolisthesis stability at L4-L5.  COMPARISON: 04/26/2018 CT abdomen/pelvis.  IMPRESSION:   In the upright position there is approximately 0.6 cm degenerative anterolisthesis of L4 on L5. There is no change with flexion or extension to suggest segmental instability.  The vertebral body heights are preserved. Mild intervertebral disc degeneration at L3-L4, L4-L5 and L5-S1. Moderate/severe facet hypertrophic changes at L4-L5 and L5-S1.  There are vascular calcifications. Abdominal surgical clips.        MR LUMBAR SPINE WITHOUT CONTRAST  At [facility]-1/24/2020  CLINICAL INFORMATION: 64-year-old male with right low back and leg pain.  COMPARISON: Lumbar spine MRI dated January 17, 2019.  TECHNICAL INFORMATION: Sagittal T2, sagittal T1, sagittal STIR, axial T2, and axial T1-weighted MR images of the lumbar spine in the neutral, seated position on an open 0.6 Teresa magnet.  CONTRAST: None.  SEDATION: None.  INTERPRETATION: Transitional lumbosacral anatomy with partially sacralized L5, mild lumbar levocurvature centered at L3, and shallow chronic Scheuermann-type endplate Schmorl's nodes at T10-11 and T11-12 without spondylolysis, vertebral collapse, acute fracture, or destructive osseous lesion. Normal pre and paravertebral soft tissues. Enlarged  prostate gland measuring at least 5 cm AP (series 2, image 7).  Conus medullaris positioned at upper L2, with normal configuration of the terminal nerve roots.  L5-S1: Hypoplastic disc and facets with mild facet degeneration on the left, an accessory articulation on the left, and no stenosis or impingement.  L4-5: Moderate disc degeneration, 3 mm spondylolisthesis, bilateral foraminal annular tears, diffuse annular bulge, advanced right/moderate left facet degeneration, moderate right greater than left subarticular stenosis with L5 nerve root impingement, and mild to moderate right/mild left foraminal stenosis with right L4 nerve root encroachment.  L3-4: Mild disc degeneration, 1-2 mm spondylolisthesis, mild diffuse annular bulge, moderate left facet degeneration, mild left subarticular stenosis, and mild bilateral foraminal stenosis.  L2-3: Mild degenerative disc desiccation with preserved disc height, mild bilateral foraminal annular bulge, normal facet morphology, and no stenosis or impingement.  L1-2 and T12-L1: Normal dorsal disc contours and normal morphology.  T11-12: Mild disc degeneration, 1 mm spondylolisthesis, normal facet morphology, and no stenosis or impingement.  No change from January 17, 2019.  CONCLUSION: Multilevel lumbar degenerative changes and mild levocurvature with specific findings as follows:  1. L4-5 subarticular impingement of the right greater than left L5 nerve roots in the setting of grade 1 degenerative spondylolisthesis. L3-4 mild left subarticular stenosis without impingement.  2. Foraminal stenosis, mild to moderate on the right at L4-5 with right L4 nerve root encroachment. Mild on the left at L4-5 and bilaterally at L3-4.  3. Facet degeneration, advanced on the right at L4-5. Moderate on the left at L4-5 and L3-4. Mild on the left at L5-S1.  4. No spondylolysis, vertebral collapse, acute fracture, or destructive osseous lesion.  5. Transitional lumbosacral anatomy with  partially sacralized L5.  6. Enlarged prostate gland.        MRI LUMBAR SPINE WITHOUT CONTRAST  [facility]- 1/17/19  CLINICAL INFORMATION: 63-year-old man with low back pain.  TECHNICAL INFORMATION: MRI lumbar spine was obtained on 0.6 Teresa open upright magnet including sagittal T2, sagittal T1, sagittal STIR, axial T2 and axial T1-weighted images.  INTERPRETATION: There is normal lordotic curvature of the lumbar spine. No marrow edema within the lumbar vertebral bodies. No loss of vertebral body height. The conus is at the L1 level and is normal in appearance.  L5-S1: Transitional L5 segment with left L5 transverse process articulating with the sacrum. Mild developmental narrowing of the disc space. Mild bilateral facet arthropathy. No central spinal canal or foraminal stenosis.  L4-5: Mild degenerative disc disease with 5 mm degenerative spondylolisthesis and disc bulge. Severe bilateral facet arthropathy with ligamentum flavum thickening contributes to mild central spinal canal stenosis and subarticular recess narrowing with encroachment upon the traversing L5 nerve roots. Moderate right foraminal stenosis.  L3-4: Mild degenerative disc disease and disc bulge. Mild bilateral facet arthropathy. Mild subarticular recess narrowing. The ganglia exit without compromise.  L2-3 through T12-L1: No focal disc herniation, central spinal canal stenosis or neural impingement.  T11-12 and T10-11: Moderate degenerative disc disease with Schmorl's nodes and endplate irregularities. No cord deformity or central spinal canal stenosis.  CONCLUSION:  1. L5-S1 transitional L5 segment with left transverse process articulating with the sacrum. Mild developmental narrowing of the disc space. Mild bilateral facet arthropathy.  2. L4-5 grade 1 spondylolisthesis with disc bulge. Severe facet arthropathy and ligamentum flavum thickening with mild central spinal canal and subarticular recess stenosis with encroachment upon the traversing L5 nerve  roots. Moderate right foraminal stenosis.  3. Mild degenerative disc disease with disc bulge. Mild facet arthropathy and mild subarticular recess narrowing.

## 2022-05-31 ENCOUNTER — TELEPHONE (OUTPATIENT)
Dept: PHYSICAL MEDICINE AND REHAB | Facility: CLINIC | Age: 67
End: 2022-05-31
Payer: COMMERCIAL

## 2022-05-31 NOTE — TELEPHONE ENCOUNTER
PSP:  Claudia Baptiste CNP  Last clinic visit:  5/19/2022 OV and Inj   Reason for call: No relief with injection  Clinical information:  Call received from pt. Pt reports he got no relief with the Right L4-5 TFESI he had. He is currently scheduled for follow-up appt on 6/2.   Pt reports he is now having difficulty sleeping due to pain. He states he has been having spasms which he has not had before. He also reports new urinary issues within the last 7-10 days. He reports he has been having difficulty urinating. Denies incontinence episodes. Denies weakness. Denies numbness/tingling.   Advice given to patient: Offered pt sooner appointment for tomorrow AM. Pt declined and states he will wait until his scheduled appt on Thursday.   Provider to address: JUAN LUIS

## 2022-06-01 NOTE — PROGRESS NOTES
Assessment:     Diagnoses and all orders for this visit:  Chronic right-sided low back pain with right-sided sciatica  -     XR Lumbar Spine G/E 4 Views; Future  -     Neurosurgery Referral  Right lumbar radiculopathy  -     XR Lumbar Spine G/E 4 Views; Future  -     Neurosurgery Referral  Spondylolisthesis of lumbar region  -     XR Lumbar Spine G/E 4 Views; Future  -     Neurosurgery Referral     Barry Jay is a 67 year old y.o. male with past medical history significant for hypertension, dyslipidemia, hand osteoarthritis, arthralgias who presents today for follow-up regarding:    -Severe right lumbar radiculopathy, no benefit with SI injection and no benefit with right L4-5 TFESI.  MRI with disc bulging at L4-5 with spondylolisthesis with L4 impingement.     Plan:     A shared decision making plan was used. The patient's values and choices were respected. Prior medical records were reviewed today. The following represents what was discussed and decided upon by the provider and the patient.        -DIAGNOSTIC TESTS: Images were personally reviewed and interpreted.   -- Ordered lumbar spine flexion-extension x-ray  --Lumbar spine MRI [facility] 5/10/2022 with transitional lumbosacral anatomy with partially sacralized L5.  L4-5 grade 1 spondylolisthesis, 3 mm, with annular bulging with advanced facet arthropathy with mild to moderate right and mild left foraminal stenosis, right L4 nerve root impingement.  L3-4 1 to 2 mm spondylolisthesis with facet arthropathy with mild left greater than right foraminal stenosis.  --Lumbar spine MRI 1/24/2020 [facility] with advanced facet arthropathy L4-5 right greater than left and facet arthropathy L3-4 moderate bilaterally.  Grade 1 spondylolisthesis L4-5 with right greater than left L5 impingement.  --Lumbar spine flexion-extension x-ray 2/3/2020 was 0.6 cm spondylolisthesis L4-5, no instability noted.  --[facility] lumbar spine MRI 1/17/2019 shows severe facet arthropathy L4-5 with 5 mm  spondylolisthesis, mild central and subarticular recess stenosis, mild right foraminal stenosis.  Mild facet arthropathy L3-4 and L5-S1.    -INTERVENTIONS: Patient is not interested in further injections    -Referral placed to [facility] neurosurgery for surgical opinion.    -MEDICATIONS: Discussed higher dose of gabapentin, patient deferred today.  Discussed side effects of medications and proper use. Patient verbalized understanding.    -PHYSICAL THERAPY: Advised patient continue with home exercises by physical therapy sessions however he is in severe pain at this time does not feel he would tolerate further physical therapy.  Discussed the importance of core strengthening, ROM, stretching exercises with the patient and how each of these entities is important in decreasing pain.  Explained to the patient that the purpose of physical therapy is to teach the patient a home exercise program.  These exercises need to be performed every day in order to decrease pain and prevent future occurrences of pain.        -PATIENT EDUCATION:  Total time of 32 minutes, on the day of service, spent with the patient, reviewing the chart, placing orders, and documenting.   -Today we also discussed the issues related to the current COVID-19 pandemic, the pros and cons of the current treatment plan, the CDC guidelines such as social distancing, washing the hands, and covering the cough.    -FOLLOW UP: Follow-up for neurosurgical consult  Advised to contact clinic if symptoms worsen or change.    Subjective:     Barry Jay is a 67 year old male who presents today for follow-up regarding right lumbar radiculopathy that is fairly significant with numbness and tingling.  Patient reports that currently his pain is fairly severe at a 10/10 with walking and bending and his pain is intolerable.  He does report weakness right lower extremity as well as some bladder retention, does have a history of prostate issues as well however.   Denies any bowel or bladder loss control, denies saddle anesthesia.    Treatment to Date: No prior spinal surgery.  Physical therapy [facility] x3 sessions.  Patient continues with home exercises at this time on a daily basis.     History of bilateral L4-5 RFA [place] [facility] Dr. Green 2011 and 2013, relief at least 2 years.  Bilateral L3-4 MBB 6/30/2017.  Preprocedure pain 7/10 post 2/10.  Bilateral L4-5 facet joint steroid injection 7/7/2017 with relief.  Preprocedure pain 8/10, post 5/10.  Right L4-5 and L5-S1 TFESI 1/6/2020 with 30% lasting benefit only.  Preprocedure pain 10/10, post 7/10.  Bilateral L2, L3, L4 MBB 3/11/2020 and 7/7/2020 with positive response.  Bilateral L2, L3, L4 RFA 10/9/2020 with 90% significant relief x 8 months.  Right L4-5 TFESI 8/31/2021 with 100% relief acute right LBP and right lumbar radiculitis for 6 months, minimal relief with chronic bilateral LBP however.  Pre and post procedure pain 9/10.  Bilateral L2, L3, L4 RFA 10/19/2021 with significant relief.  Preprocedure pain 8/10, post 0/10.  Right SI joint steroid injection 5/6/2022 with minimal initial and no lasting benefit.  Preprocedure pain 10/10, post 5/10.     Medications:  Meloxicam    Patient Active Problem List   Diagnosis     Dyslipidemia     Hypertension     Diffuse Joint Pains (Arthralgias)     Joint Pain, Localized In The Wrist     Joint Pain, Localized In The Knee     Generalized Osteoarthritis Of The Hand     Osteoarthritis Of The Knee     Arthralgias In Multiple Sites       Current Outpatient Medications   Medication     ALPRAZolam (XANAX) 0.5 MG tablet     atorvastatin (LIPITOR) 10 MG tablet     celecoxib (CELEBREX) 200 MG capsule     irbesartan (AVAPRO) 150 MG tablet     irbesartan-hydrochlorothiazide (AVALIDE) 300-12.5 mg per tablet     omeprazole (PRILOSEC) 20 MG capsule     PARoxetine (PAXIL) 20 MG tablet     zolpidem (AMBIEN) 10 mg tablet     No current facility-administered medications for this visit.        Allergies   Allergen Reactions     Iodides Unknown and Hives     Pt thinks 20+ yrs ago, Pt received omni 350 without premed and did fine 11/7/2020 [facility]  Pt thinks 20+ yrs ago  Pt received omni 350 without premed and did fine 11/7/2020 [facility]         Past Medical History:   Diagnosis Date     HTN (hypertension)      Hyperlipidemia         Review of Systems  ROS:  Specifically negative for bowel/bladder dysfunction, balance changes, headache, dizziness, foot drop, fevers, chills, appetite changes, nausea/vomiting, unexplained weight loss. Otherwise 13 systems reviewed are negative. Please see the patient's intake questionnaire from today for details.    Reviewed Social, Family, Past Medical and Past Surgical history with patient, no significant changes noted since prior visit.     Objective:     PHYSICAL EXAMINATION:    --CONSTITUTIONAL: Well developed, well nourished, healthy appearing individual.  --PSYCHIATRIC: Appropriate mood and affect. No difficulty interacting due to temper, social withdrawal, or memory issues.  --SKIN: Lumbar region is dry and intact.   --RESPIRATORY: Normal rhythm and effort. No abnormal accessory muscle breathing patterns noted.   --MUSCULOSKELETAL:  Normal lumbar lordosis noted, no lateral shift.  --GROSS MOTOR: Easily arises from a seated position. Gait is non-antalgic  --LUMBAR SPINE:  Inspection reveals no evidence of deformity.     RESULTS:   Imaging: Lumbar spine imaging was reviewed today. The images were shown to the patient and the findings were explained using a spine model.      [facility] MRI 5/10/2022 lumbar spine        XR LUMBAR SPINE FLEX AND EXT 2 OR 3 VWS  LOCATION: [facility]  DATE/TIME: 2/3/2020   INDICATION: Flexion, extension, and neutral views please to evaluate spondylolisthesis stability at L4-L5.  COMPARISON: 04/26/2018 CT abdomen/pelvis.  IMPRESSION:   In the upright position there is approximately 0.6 cm degenerative anterolisthesis of L4 on L5. There  is no change with flexion or extension to suggest segmental instability.  The vertebral body heights are preserved. Mild intervertebral disc degeneration at L3-L4, L4-L5 and L5-S1. Moderate/severe facet hypertrophic changes at L4-L5 and L5-S1.  There are vascular calcifications. Abdominal surgical clips.        MR LUMBAR SPINE WITHOUT CONTRAST  At [facility]-1/24/2020  CLINICAL INFORMATION: 64-year-old male with right low back and leg pain.  COMPARISON: Lumbar spine MRI dated January 17, 2019.  TECHNICAL INFORMATION: Sagittal T2, sagittal T1, sagittal STIR, axial T2, and axial T1-weighted MR images of the lumbar spine in the neutral, seated position on an open 0.6 Teresa magnet.  CONTRAST: None.  SEDATION: None.  INTERPRETATION: Transitional lumbosacral anatomy with partially sacralized L5, mild lumbar levocurvature centered at L3, and shallow chronic Scheuermann-type endplate Schmorl's nodes at T10-11 and T11-12 without spondylolysis, vertebral collapse, acute fracture, or destructive osseous lesion. Normal pre and paravertebral soft tissues. Enlarged prostate gland measuring at least 5 cm AP (series 2, image 7).  Conus medullaris positioned at upper L2, with normal configuration of the terminal nerve roots.  L5-S1: Hypoplastic disc and facets with mild facet degeneration on the left, an accessory articulation on the left, and no stenosis or impingement.  L4-5: Moderate disc degeneration, 3 mm spondylolisthesis, bilateral foraminal annular tears, diffuse annular bulge, advanced right/moderate left facet degeneration, moderate right greater than left subarticular stenosis with L5 nerve root impingement, and mild to moderate right/mild left foraminal stenosis with right L4 nerve root encroachment.  L3-4: Mild disc degeneration, 1-2 mm spondylolisthesis, mild diffuse annular bulge, moderate left facet degeneration, mild left subarticular stenosis, and mild bilateral foraminal stenosis.  L2-3: Mild degenerative disc  desiccation with preserved disc height, mild bilateral foraminal annular bulge, normal facet morphology, and no stenosis or impingement.  L1-2 and T12-L1: Normal dorsal disc contours and normal morphology.  T11-12: Mild disc degeneration, 1 mm spondylolisthesis, normal facet morphology, and no stenosis or impingement.  No change from January 17, 2019.  CONCLUSION: Multilevel lumbar degenerative changes and mild levocurvature with specific findings as follows:  1. L4-5 subarticular impingement of the right greater than left L5 nerve roots in the setting of grade 1 degenerative spondylolisthesis. L3-4 mild left subarticular stenosis without impingement.  2. Foraminal stenosis, mild to moderate on the right at L4-5 with right L4 nerve root encroachment. Mild on the left at L4-5 and bilaterally at L3-4.  3. Facet degeneration, advanced on the right at L4-5. Moderate on the left at L4-5 and L3-4. Mild on the left at L5-S1.  4. No spondylolysis, vertebral collapse, acute fracture, or destructive osseous lesion.  5. Transitional lumbosacral anatomy with partially sacralized L5.  6. Enlarged prostate gland.        MRI LUMBAR SPINE WITHOUT CONTRAST  [facility]- 1/17/19  CLINICAL INFORMATION: 63-year-old man with low back pain.  TECHNICAL INFORMATION: MRI lumbar spine was obtained on 0.6 Teresa open upright magnet including sagittal T2, sagittal T1, sagittal STIR, axial T2 and axial T1-weighted images.  INTERPRETATION: There is normal lordotic curvature of the lumbar spine. No marrow edema within the lumbar vertebral bodies. No loss of vertebral body height. The conus is at the L1 level and is normal in appearance.  L5-S1: Transitional L5 segment with left L5 transverse process articulating with the sacrum. Mild developmental narrowing of the disc space. Mild bilateral facet arthropathy. No central spinal canal or foraminal stenosis.  L4-5: Mild degenerative disc disease with 5 mm degenerative spondylolisthesis and disc bulge. Severe  bilateral facet arthropathy with ligamentum flavum thickening contributes to mild central spinal canal stenosis and subarticular recess narrowing with encroachment upon the traversing L5 nerve roots. Moderate right foraminal stenosis.  L3-4: Mild degenerative disc disease and disc bulge. Mild bilateral facet arthropathy. Mild subarticular recess narrowing. The ganglia exit without compromise.  L2-3 through T12-L1: No focal disc herniation, central spinal canal stenosis or neural impingement.  T11-12 and T10-11: Moderate degenerative disc disease with Schmorl's nodes and endplate irregularities. No cord deformity or central spinal canal stenosis.  CONCLUSION:  1. L5-S1 transitional L5 segment with left transverse process articulating with the sacrum. Mild developmental narrowing of the disc space. Mild bilateral facet arthropathy.  2. L4-5 grade 1 spondylolisthesis with disc bulge. Severe facet arthropathy and ligamentum flavum thickening with mild central spinal canal and subarticular recess stenosis with encroachment upon the traversing L5 nerve roots. Moderate right foraminal stenosis.  3. Mild degenerative disc disease with disc bulge. Mild facet arthropathy and mild subarticular recess narrowing.

## 2022-06-02 ENCOUNTER — HOSPITAL ENCOUNTER (OUTPATIENT)
Dept: GENERAL RADIOLOGY | Facility: HOSPITAL | Age: 67
Discharge: HOME | End: 2022-06-02
Attending: NURSE PRACTITIONER | Admitting: NURSE PRACTITIONER
Payer: COMMERCIAL

## 2022-06-02 ENCOUNTER — OFFICE VISIT (OUTPATIENT)
Dept: PHYSICAL MEDICINE AND REHAB | Facility: CLINIC | Age: 67
End: 2022-06-02
Payer: COMMERCIAL

## 2022-06-02 DIAGNOSIS — M54.41 CHRONIC RIGHT-SIDED LOW BACK PAIN WITH RIGHT-SIDED SCIATICA: Primary | ICD-10-CM

## 2022-06-02 DIAGNOSIS — M43.16 SPONDYLOLISTHESIS OF LUMBAR REGION: ICD-10-CM

## 2022-06-02 DIAGNOSIS — M54.16 RIGHT LUMBAR RADICULOPATHY: ICD-10-CM

## 2022-06-02 DIAGNOSIS — G89.29 CHRONIC RIGHT-SIDED LOW BACK PAIN WITH RIGHT-SIDED SCIATICA: Primary | ICD-10-CM

## 2022-06-02 DIAGNOSIS — G89.29 CHRONIC RIGHT-SIDED LOW BACK PAIN WITH RIGHT-SIDED SCIATICA: ICD-10-CM

## 2022-06-02 DIAGNOSIS — M54.41 CHRONIC RIGHT-SIDED LOW BACK PAIN WITH RIGHT-SIDED SCIATICA: ICD-10-CM

## 2022-06-02 PROCEDURE — 99214 OFFICE O/P EST MOD 30 MIN: CPT | Performed by: NURSE PRACTITIONER

## 2022-06-02 PROCEDURE — 72110 X-RAY EXAM L-2 SPINE 4/>VWS: CPT | Mod: FY

## 2022-06-02 NOTE — LETTER
6/2/2022         RE: Barry Jay  [address] [facility] [address]        Dear Colleague,    Thank you for referring your patient, Barry Jay, to the [facility]. Please see a copy of my visit note below.      Assessment:     Diagnoses and all orders for this visit:  Chronic right-sided low back pain with right-sided sciatica  -     XR Lumbar Spine G/E 4 Views; Future  -     Neurosurgery Referral  Right lumbar radiculopathy  -     XR Lumbar Spine G/E 4 Views; Future  -     Neurosurgery Referral  Spondylolisthesis of lumbar region  -     XR Lumbar Spine G/E 4 Views; Future  -     Neurosurgery Referral     Barry Jay is a 67 year old y.o. male with past medical history significant for hypertension, dyslipidemia, hand osteoarthritis, arthralgias who presents today for follow-up regarding:    -Severe right lumbar radiculopathy, no benefit with SI injection and no benefit with right L4-5 TFESI.  MRI with disc bulging at L4-5 with spondylolisthesis with L4 impingement.     Plan:     A shared decision making plan was used. The patient's values and choices were respected. Prior medical records were reviewed today. The following represents what was discussed and decided upon by the provider and the patient.        -DIAGNOSTIC TESTS: Images were personally reviewed and interpreted.   -- Ordered lumbar spine flexion-extension x-ray  --Lumbar spine MRI [facility] 5/10/2022 with transitional lumbosacral anatomy with partially sacralized L5.  L4-5 grade 1 spondylolisthesis, 3 mm, with annular bulging with advanced facet arthropathy with mild to moderate right and mild left foraminal stenosis, right L4 nerve root impingement.  L3-4 1 to 2 mm spondylolisthesis with facet arthropathy with mild left greater than right foraminal stenosis.  --Lumbar spine MRI 1/24/2020 [facility] with advanced facet arthropathy L4-5 right greater than left and facet arthropathy L3-4 moderate bilaterally.  Grade  1 spondylolisthesis L4-5 with right greater than left L5 impingement.  --Lumbar spine flexion-extension x-ray 2/3/2020 was 0.6 cm spondylolisthesis L4-5, no instability noted.  --[facility] lumbar spine MRI 1/17/2019 shows severe facet arthropathy L4-5 with 5 mm spondylolisthesis, mild central and subarticular recess stenosis, mild right foraminal stenosis.  Mild facet arthropathy L3-4 and L5-S1.    -INTERVENTIONS: Patient is not interested in further injections    -Referral placed to [facility] neurosurgery for surgical opinion.    -MEDICATIONS: Discussed higher dose of gabapentin, patient deferred today.  Discussed side effects of medications and proper use. Patient verbalized understanding.    -PHYSICAL THERAPY: Advised patient continue with home exercises by physical therapy sessions however he is in severe pain at this time does not feel he would tolerate further physical therapy.  Discussed the importance of core strengthening, ROM, stretching exercises with the patient and how each of these entities is important in decreasing pain.  Explained to the patient that the purpose of physical therapy is to teach the patient a home exercise program.  These exercises need to be performed every day in order to decrease pain and prevent future occurrences of pain.        -PATIENT EDUCATION:  Total time of 32 minutes, on the day of service, spent with the patient, reviewing the chart, placing orders, and documenting.   -Today we also discussed the issues related to the current COVID-19 pandemic, the pros and cons of the current treatment plan, the CDC guidelines such as social distancing, washing the hands, and covering the cough.    -FOLLOW UP: Follow-up for neurosurgical consult  Advised to contact clinic if symptoms worsen or change.    Subjective:     Barry Jay is a 67 year old male who presents today for follow-up regarding right lumbar radiculopathy that is fairly significant with numbness and tingling.   Patient reports that currently his pain is fairly severe at a 10/10 with walking and bending and his pain is intolerable.  He does report weakness right lower extremity as well as some bladder retention, does have a history of prostate issues as well however.  Denies any bowel or bladder loss control, denies saddle anesthesia.    Treatment to Date: No prior spinal surgery.  Physical therapy optimum x3 sessions.  Patient continues with home exercises at this time on a daily basis.     History of bilateral L4-5 RFA [place] [facility] Dr. Green 2011 and 2013, relief at least 2 years.  Bilateral L3-4 MBB 6/30/2017.  Preprocedure pain 7/10 post 2/10.  Bilateral L4-5 facet joint steroid injection 7/7/2017 with relief.  Preprocedure pain 8/10, post 5/10.  Right L4-5 and L5-S1 TFESI 1/6/2020 with 30% lasting benefit only.  Preprocedure pain 10/10, post 7/10.  Bilateral L2, L3, L4 MBB 3/11/2020 and 7/7/2020 with positive response.  Bilateral L2, L3, L4 RFA 10/9/2020 with 90% significant relief x 8 months.  Right L4-5 TFESI 8/31/2021 with 100% relief acute right LBP and right lumbar radiculitis for 6 months, minimal relief with chronic bilateral LBP however.  Pre and post procedure pain 9/10.  Bilateral L2, L3, L4 RFA 10/19/2021 with significant relief.  Preprocedure pain 8/10, post 0/10.  Right SI joint steroid injection 5/6/2022 with minimal initial and no lasting benefit.  Preprocedure pain 10/10, post 5/10.     Medications:  Meloxicam    Patient Active Problem List   Diagnosis     Dyslipidemia     Hypertension     Diffuse Joint Pains (Arthralgias)     Joint Pain, Localized In The Wrist     Joint Pain, Localized In The Knee     Generalized Osteoarthritis Of The Hand     Osteoarthritis Of The Knee     Arthralgias In Multiple Sites       Current Outpatient Medications   Medication     ALPRAZolam (XANAX) 0.5 MG tablet     atorvastatin (LIPITOR) 10 MG tablet     celecoxib (CELEBREX) 200 MG capsule     irbesartan (AVAPRO) 150 MG  tablet     irbesartan-hydrochlorothiazide (AVALIDE) 300-12.5 mg per tablet     omeprazole (PRILOSEC) 20 MG capsule     PARoxetine (PAXIL) 20 MG tablet     zolpidem (AMBIEN) 10 mg tablet     No current facility-administered medications for this visit.       Allergies   Allergen Reactions     Iodides Unknown and Hives     Pt thinks 20+ yrs ago, Pt received omni 350 without premed and did fine 11/7/2020 [facility]  Pt thinks 20+ yrs ago  Pt received omni 350 without premed and did fine 11/7/2020 [facility]         Past Medical History:   Diagnosis Date     HTN (hypertension)      Hyperlipidemia         Review of Systems  ROS:  Specifically negative for bowel/bladder dysfunction, balance changes, headache, dizziness, foot drop, fevers, chills, appetite changes, nausea/vomiting, unexplained weight loss. Otherwise 13 systems reviewed are negative. Please see the patient's intake questionnaire from today for details.    Reviewed Social, Family, Past Medical and Past Surgical history with patient, no significant changes noted since prior visit.     Objective:     PHYSICAL EXAMINATION:    --CONSTITUTIONAL: Well developed, well nourished, healthy appearing individual.  --PSYCHIATRIC: Appropriate mood and affect. No difficulty interacting due to temper, social withdrawal, or memory issues.  --SKIN: Lumbar region is dry and intact.   --RESPIRATORY: Normal rhythm and effort. No abnormal accessory muscle breathing patterns noted.   --MUSCULOSKELETAL:  Normal lumbar lordosis noted, no lateral shift.  --GROSS MOTOR: Easily arises from a seated position. Gait is non-antalgic  --LUMBAR SPINE:  Inspection reveals no evidence of deformity.     RESULTS:   Imaging: Lumbar spine imaging was reviewed today. The images were shown to the patient and the findings were explained using a spine model.      [facility] MRI 5/10/2022 lumbar spine        XR LUMBAR SPINE FLEX AND EXT 2 OR 3 VWS  LOCATION: [facility]  DATE/TIME: 2/3/2020   INDICATION:  Flexion, extension, and neutral views please to evaluate spondylolisthesis stability at L4-L5.  COMPARISON: 04/26/2018 CT abdomen/pelvis.  IMPRESSION:   In the upright position there is approximately 0.6 cm degenerative anterolisthesis of L4 on L5. There is no change with flexion or extension to suggest segmental instability.  The vertebral body heights are preserved. Mild intervertebral disc degeneration at L3-L4, L4-L5 and L5-S1. Moderate/severe facet hypertrophic changes at L4-L5 and L5-S1.  There are vascular calcifications. Abdominal surgical clips.        MR LUMBAR SPINE WITHOUT CONTRAST  At [facility]-1/24/2020  CLINICAL INFORMATION: 64-year-old male with right low back and leg pain.  COMPARISON: Lumbar spine MRI dated January 17, 2019.  TECHNICAL INFORMATION: Sagittal T2, sagittal T1, sagittal STIR, axial T2, and axial T1-weighted MR images of the lumbar spine in the neutral, seated position on an open 0.6 Teresa magnet.  CONTRAST: None.  SEDATION: None.  INTERPRETATION: Transitional lumbosacral anatomy with partially sacralized L5, mild lumbar levocurvature centered at L3, and shallow chronic Scheuermann-type endplate Schmorl's nodes at T10-11 and T11-12 without spondylolysis, vertebral collapse, acute fracture, or destructive osseous lesion. Normal pre and paravertebral soft tissues. Enlarged prostate gland measuring at least 5 cm AP (series 2, image 7).  Conus medullaris positioned at upper L2, with normal configuration of the terminal nerve roots.  L5-S1: Hypoplastic disc and facets with mild facet degeneration on the left, an accessory articulation on the left, and no stenosis or impingement.  L4-5: Moderate disc degeneration, 3 mm spondylolisthesis, bilateral foraminal annular tears, diffuse annular bulge, advanced right/moderate left facet degeneration, moderate right greater than left subarticular stenosis with L5 nerve root impingement, and mild to moderate right/mild left foraminal stenosis with right L4  nerve root encroachment.  L3-4: Mild disc degeneration, 1-2 mm spondylolisthesis, mild diffuse annular bulge, moderate left facet degeneration, mild left subarticular stenosis, and mild bilateral foraminal stenosis.  L2-3: Mild degenerative disc desiccation with preserved disc height, mild bilateral foraminal annular bulge, normal facet morphology, and no stenosis or impingement.  L1-2 and T12-L1: Normal dorsal disc contours and normal morphology.  T11-12: Mild disc degeneration, 1 mm spondylolisthesis, normal facet morphology, and no stenosis or impingement.  No change from January 17, 2019.  CONCLUSION: Multilevel lumbar degenerative changes and mild levocurvature with specific findings as follows:  1. L4-5 subarticular impingement of the right greater than left L5 nerve roots in the setting of grade 1 degenerative spondylolisthesis. L3-4 mild left subarticular stenosis without impingement.  2. Foraminal stenosis, mild to moderate on the right at L4-5 with right L4 nerve root encroachment. Mild on the left at L4-5 and bilaterally at L3-4.  3. Facet degeneration, advanced on the right at L4-5. Moderate on the left at L4-5 and L3-4. Mild on the left at L5-S1.  4. No spondylolysis, vertebral collapse, acute fracture, or destructive osseous lesion.  5. Transitional lumbosacral anatomy with partially sacralized L5.  6. Enlarged prostate gland.        MRI LUMBAR SPINE WITHOUT CONTRAST  [facility]- 1/17/19  CLINICAL INFORMATION: 63-year-old man with low back pain.  TECHNICAL INFORMATION: MRI lumbar spine was obtained on 0.6 Teresa open upright magnet including sagittal T2, sagittal T1, sagittal STIR, axial T2 and axial T1-weighted images.  INTERPRETATION: There is normal lordotic curvature of the lumbar spine. No marrow edema within the lumbar vertebral bodies. No loss of vertebral body height. The conus is at the L1 level and is normal in appearance.  L5-S1: Transitional L5 segment with left L5 transverse process articulating  with the sacrum. Mild developmental narrowing of the disc space. Mild bilateral facet arthropathy. No central spinal canal or foraminal stenosis.  L4-5: Mild degenerative disc disease with 5 mm degenerative spondylolisthesis and disc bulge. Severe bilateral facet arthropathy with ligamentum flavum thickening contributes to mild central spinal canal stenosis and subarticular recess narrowing with encroachment upon the traversing L5 nerve roots. Moderate right foraminal stenosis.  L3-4: Mild degenerative disc disease and disc bulge. Mild bilateral facet arthropathy. Mild subarticular recess narrowing. The ganglia exit without compromise.  L2-3 through T12-L1: No focal disc herniation, central spinal canal stenosis or neural impingement.  T11-12 and T10-11: Moderate degenerative disc disease with Schmorl's nodes and endplate irregularities. No cord deformity or central spinal canal stenosis.  CONCLUSION:  1. L5-S1 transitional L5 segment with left transverse process articulating with the sacrum. Mild developmental narrowing of the disc space. Mild bilateral facet arthropathy.  2. L4-5 grade 1 spondylolisthesis with disc bulge. Severe facet arthropathy and ligamentum flavum thickening with mild central spinal canal and subarticular recess stenosis with encroachment upon the traversing L5 nerve roots. Moderate right foraminal stenosis.  3. Mild degenerative disc disease with disc bulge. Mild facet arthropathy and mild subarticular recess narrowing.                           Again, thank you for allowing me to participate in the care of your patient.        Sincerely,        Claudia Baptiste, CNP

## 2022-06-02 NOTE — PATIENT INSTRUCTIONS
~[facility] scheduling #[phone].  ~Please call our [facility] Nurse Navigation line [phone] with any questions or concerns about your treatment plan, if symptoms worsen and you would like to be seen urgently, or if you have problems controlling bladder and bowel function.       Imaging has been ordered. Radiology will call you to schedule. Please call below if you do not hear from them in the next couple of days.     [facility] Radiology Scheduling    Please call [phone] to schedule your image(s) (select option #1). There are 3 different locations, see below.     [facility]  [address] [facility] - [place]  [address] [facility], [address]   [place] [id number]    [place] [id number]       You have been referred to [facility] Neurosurgery for surgical consult.  They will callyou to schedule an appointment at the Spine Center.    Neurosurgery Scheduling phone #: [phone]

## 2022-06-03 ENCOUNTER — MEDICAL CORRESPONDENCE (OUTPATIENT)
Dept: NEUROSURGERY | Facility: CLINIC | Age: 67
End: 2022-06-03
Payer: COMMERCIAL

## 2022-06-07 ENCOUNTER — OFFICE VISIT (OUTPATIENT)
Dept: NEUROSURGERY | Facility: CLINIC | Age: 67
End: 2022-06-07
Attending: NURSE PRACTITIONER
Payer: COMMERCIAL

## 2022-06-07 VITALS
BODY MASS INDEX: 30.31 KG/M2 | WEIGHT: 200 LBS | HEIGHT: 68 IN | DIASTOLIC BLOOD PRESSURE: 87 MMHG | HEART RATE: 99 BPM | SYSTOLIC BLOOD PRESSURE: 133 MMHG | OXYGEN SATURATION: 97 %

## 2022-06-07 DIAGNOSIS — M54.16 LUMBAR RADICULOPATHY: Primary | ICD-10-CM

## 2022-06-07 PROCEDURE — 99204 OFFICE O/P NEW MOD 45 MIN: CPT | Performed by: SURGERY

## 2022-06-07 NOTE — LETTER
6/7/2022         RE: Barry Jay  [address] [facility] [address]        Dear Colleague,    Thank you for referring your patient, Barry Jay, to the [facility]. Please see a copy of my visit note below.    NEUROSURGERY CONSULTATION NOTE      Neurosurgery was asked to see this patient by Claudia Baptiste CNP for evaluation of lumbar spondylolisthesis.      CONSULTATION ASSESSMENT AND PLAN:     Patient is a 67-year-old male who presents with low back and right leg pain and numbness and tingling.  Patient's repeat lumbar x-rays show 1 mm of movement between flexion and extension at lumbar 4-5.  MRI of his lumbar spine shows a spondylolisthesis lumbar 4-5 with advanced degeneration of his bilateral facets with a right-sided facet effusion and inflammatory subchondral edema.  Also has mild central stenosis and moderate right greater than left subarticular stenosis with descending L5 nerve impingement.  No significant impingement or stenosis at lumbar 5-sacral 1.  We discussed given his facet changes at this level and spondylolisthesis he may be at risk of needing future fusion at this level after undergoing hemilaminectomy.  He would like to avoid fusion surgery at this time and appeared to understand.  Recommend right lumbar 4-5 hemilaminectomy, medial facetectomy, foraminotomy. Risks and benefits of lumbar decompression discussed including but not limited to infection, inadequate symptom relief, hematoma, nerve damage including paralysis, post op radiculitis, durotomy, risks associated with the use of general anesthesia, blood clots in the lungs or legs.     I spent more than 45 minutes in this apt, examining the pt, reviewing the scans, reviewing notes from chart, discussing treatment options with risks and benefits and coordinating care.     Shannan Mari MD      HPI: Patient is a 67-year-old male who presents with low back and right leg pain and  numbness and tingling.  Patient symptoms began about 2 months ago. Low back pain into right buttock and hip and thigh and more recently down the lateral leg to his ankle. Numbness and tingling accompany the pain. Able to sleep ok when reclining. Any extended type of activity worsens his symptoms. Numbness primarily in his foot. No significant weakness or bowel dysfunction. Has had some difficulty initiating a stream in the last few weeks.     Oxycodone and gabapentin- no relief. No longer taking.  physical therapy- has tried in past. Has been continuing exercises at home on daily basis until pain has become more severe recently.   Right SI joint steroid injection 5/6/2022- no relief  Right L4-5 TFESI on 5/19/22- no relief     Active everyday smoker       Past Medical History:   Diagnosis Date     HTN (hypertension)      Hyperlipidemia      Past surgical history-  Knee replacement 2019  No prior spine surgeries    REVIEW OF SYSTEMS:  See new patient form under media     MEDICATIONS:  Current Outpatient Medications   Medication Sig Dispense Refill     ALPRAZolam (XANAX) 0.5 MG tablet [ALPRAZOLAM (XANAX) 0.5 MG TABLET] TAKE 1 TABLET BY MOUTH ONCE A DAY AS NEEDED ORALLY  0     atorvastatin (LIPITOR) 10 MG tablet [ATORVASTATIN (LIPITOR) 10 MG TABLET] TAKE ONE TABLET BY MOUTH ONCE DIALY  3     celecoxib (CELEBREX) 200 MG capsule Take 1 capsule by mouth as needed  3     irbesartan (AVAPRO) 150 MG tablet [IRBESARTAN (AVAPRO) 150 MG TABLET] TAKE 1 TABLET BY MOUTH ONCE A DAY FOR BLOOD PRESSURE  1     irbesartan-hydrochlorothiazide (AVALIDE) 300-12.5 mg per tablet [IRBESARTAN-HYDROCHLOROTHIAZIDE (AVALIDE) 300-12.5 MG PER TABLET] Take 1 tablet by mouth daily. for blood pressure  1     omeprazole (PRILOSEC) 20 MG capsule [OMEPRAZOLE (PRILOSEC) 20 MG CAPSULE] TAKE ONE CAPSULE BY MOUTH EVERY DAY FOR STOMACH  1     PARoxetine (PAXIL) 20 MG tablet [PAROXETINE (PAXIL) 20 MG TABLET] TAKE 1 TABLET BY MOUTH ONCE DAILY FOR MOOD.  3      "zolpidem (AMBIEN) 10 mg tablet [ZOLPIDEM (AMBIEN) 10 MG TABLET] Take 1 tablet by mouth as needed.  2         ALLERGIES/SENSITIVITIES:     Allergies   Allergen Reactions     Iodides Unknown and Hives     Pt thinks 20+ yrs ago, Pt received omni 350 without premed and did fine 11/7/2020 [facility]  Pt thinks 20+ yrs ago  Pt received omni 350 without premed and did fine 11/7/2020 [facility]         PERTINENT SOCIAL HISTORY:  Social History     Socioeconomic History     Marital status: Single   Tobacco Use     Smoking status: Current Every Day Smoker     Packs/day: 1.00     Years: 30.00     Pack years: 30.00     Types: Cigarettes, Cigarettes     Smokeless tobacco: Never Used         FAMILY HISTORY:  No family history on file.     PHYSICAL EXAM:   Constitutional: /87   Pulse 99   Ht 5' 8\" (1.727 m)   Wt 200 lb (90.7 kg)   SpO2 97%   BMI 30.41 kg/m       Mental Status: A & O in no acute distress.  Affect is appropriate.  Speech is fluent.  Recent and remote memory are intact.  Attention span and concentration are normal.     Motor:  Normal bulk and tone all muscle groups of upper and lower extremities.    Strength: 5/5x4    Sensory: Sensation intact bilaterally to light touch.     Coordination; Heel/toe/tandem gait intact.  Normal gait and station.     Reflexes; supinator, biceps, triceps, knee/ ankle jerk intact. No burgess's    IMAGING:  I personally reviewed all radiographic images         Cc:   Osmel Iniguez, thank you for allowing me to participate in the care of your patient.        Sincerely,        Shannan Mari MD    "

## 2022-06-07 NOTE — PROGRESS NOTES
NEUROSURGERY CONSULTATION NOTE      Neurosurgery was asked to see this patient by Claudia Baptiste CNP for evaluation of lumbar spondylolisthesis.      CONSULTATION ASSESSMENT AND PLAN:     Patient is a 67-year-old male who presents with low back and right leg pain and numbness and tingling.  Patient's repeat lumbar x-rays show 1 mm of movement between flexion and extension at lumbar 4-5.  MRI of his lumbar spine shows a spondylolisthesis lumbar 4-5 with advanced degeneration of his bilateral facets with a right-sided facet effusion and inflammatory subchondral edema.  Also has mild central stenosis and moderate right greater than left subarticular stenosis with descending L5 nerve impingement.  No significant impingement or stenosis at lumbar 5-sacral 1.  We discussed given his facet changes at this level and spondylolisthesis he may be at risk of needing future fusion at this level after undergoing hemilaminectomy.  He would like to avoid fusion surgery at this time and appeared to understand.  Recommend right lumbar 4-5 hemilaminectomy, medial facetectomy, foraminotomy. Risks and benefits of lumbar decompression discussed including but not limited to infection, inadequate symptom relief, hematoma, nerve damage including paralysis, post op radiculitis, durotomy, risks associated with the use of general anesthesia, blood clots in the lungs or legs.     I spent more than 45 minutes in this apt, examining the pt, reviewing the scans, reviewing notes from chart, discussing treatment options with risks and benefits and coordinating care.     Shannan Mari MD      HPI: Patient is a 67-year-old male who presents with low back and right leg pain and numbness and tingling.  Patient symptoms began about 2 months ago. Low back pain into right buttock and hip and thigh and more recently down the lateral leg to his ankle. Numbness and tingling accompany the pain. Able to sleep ok when reclining. Any extended type of  activity worsens his symptoms. Numbness primarily in his foot. No significant weakness or bowel dysfunction. Has had some difficulty initiating a stream in the last few weeks.     Oxycodone and gabapentin- no relief. No longer taking.  physical therapy- has tried in past. Has been continuing exercises at home on daily basis until pain has become more severe recently.   Right SI joint steroid injection 5/6/2022- no relief  Right L4-5 TFESI on 5/19/22- no relief     Active everyday smoker       Past Medical History:   Diagnosis Date     HTN (hypertension)      Hyperlipidemia      Past surgical history-  Knee replacement 2019  No prior spine surgeries    REVIEW OF SYSTEMS:  See new patient form under media     MEDICATIONS:  Current Outpatient Medications   Medication Sig Dispense Refill     ALPRAZolam (XANAX) 0.5 MG tablet [ALPRAZOLAM (XANAX) 0.5 MG TABLET] TAKE 1 TABLET BY MOUTH ONCE A DAY AS NEEDED ORALLY  0     atorvastatin (LIPITOR) 10 MG tablet [ATORVASTATIN (LIPITOR) 10 MG TABLET] TAKE ONE TABLET BY MOUTH ONCE DIALY  3     celecoxib (CELEBREX) 200 MG capsule Take 1 capsule by mouth as needed  3     irbesartan (AVAPRO) 150 MG tablet [IRBESARTAN (AVAPRO) 150 MG TABLET] TAKE 1 TABLET BY MOUTH ONCE A DAY FOR BLOOD PRESSURE  1     irbesartan-hydrochlorothiazide (AVALIDE) 300-12.5 mg per tablet [IRBESARTAN-HYDROCHLOROTHIAZIDE (AVALIDE) 300-12.5 MG PER TABLET] Take 1 tablet by mouth daily. for blood pressure  1     omeprazole (PRILOSEC) 20 MG capsule [OMEPRAZOLE (PRILOSEC) 20 MG CAPSULE] TAKE ONE CAPSULE BY MOUTH EVERY DAY FOR STOMACH  1     PARoxetine (PAXIL) 20 MG tablet [PAROXETINE (PAXIL) 20 MG TABLET] TAKE 1 TABLET BY MOUTH ONCE DAILY FOR MOOD.  3     zolpidem (AMBIEN) 10 mg tablet [ZOLPIDEM (AMBIEN) 10 MG TABLET] Take 1 tablet by mouth as needed.  2         ALLERGIES/SENSITIVITIES:     Allergies   Allergen Reactions     Iodides Unknown and Hives     Pt thinks 20+ yrs ago, Pt received omni 350 without premed and  "did fine 11/7/2020 [facility]  Pt thinks 20+ yrs ago  Pt received omni 350 without premed and did fine 11/7/2020 [facility]         PERTINENT SOCIAL HISTORY:  Social History     Socioeconomic History     Marital status: Single   Tobacco Use     Smoking status: Current Every Day Smoker     Packs/day: 1.00     Years: 30.00     Pack years: 30.00     Types: Cigarettes, Cigarettes     Smokeless tobacco: Never Used         FAMILY HISTORY:  No family history on file.     PHYSICAL EXAM:   Constitutional: /87   Pulse 99   Ht 5' 8\" (1.727 m)   Wt 200 lb (90.7 kg)   SpO2 97%   BMI 30.41 kg/m       Mental Status: A & O in no acute distress.  Affect is appropriate.  Speech is fluent.  Recent and remote memory are intact.  Attention span and concentration are normal.     Motor:  Normal bulk and tone all muscle groups of upper and lower extremities.    Strength: 5/5x4    Sensory: Sensation intact bilaterally to light touch.     Coordination; Heel/toe/tandem gait intact.  Normal gait and station.     Reflexes; supinator, biceps, triceps, knee/ ankle jerk intact. No burgess's    IMAGING:  I personally reviewed all radiographic images         Cc:   Osmel Iniguez           "

## 2022-06-07 NOTE — NURSING NOTE
Neurosurgery consultation was requested by: Evangelista   Pain: wilks   Radicular Pain is present: lower back goes don to his right leg and foot   Lhermitte sign: no   Motor complaints: right foot   Sensory complaints: right leg   Gait and balance issues: no   Bowel or bladder issues: no   Duration of SX is: 2 months   The symptoms are worse with: walking and bending over    The symptoms are better with: laying down and ice   Injury: no   Severity is: mild   Patient has tried the following conservative measures: injection didn't help   JEWEL score is: pt didn't finish   Brown Thomas MA

## 2022-06-07 NOTE — PATIENT INSTRUCTIONS
Right lumbar 4-5 hemilaminectomy    Please review COMPLETE information about your proposed surgery, pre-operative requirements, post-operative course and expectations - available in a folder provided to you in clinic!    Your surgery scheduler will call you to begin the process of scheduling your surgery and appointments.     Pre-Operative    Pre-operative physical / Lab work with primary care physician within 30 days of surgical date. We will assist you in scheduling this.    If all pre-op appointments/test are not completed prior to your surgery date, you will be asked to reschedule your surgery.           As part of preparation for your upcoming procedure you are required to have a test for the novel Coronavirus/COVID-19.  The test needs to be completed within 4 days (96) hours of surgery.   We will assist you in scheduling this.   You may NOT receive the COVID-19 vaccine or booster 2 weeks before or after surgery.    Readiness Visits    Prior to surgery, you may have a telephone or in person readiness visit with our RN team to discuss your upcomming surgery, results of your pre-op physical, and lab work.   If you will require a collar/neck brace after surgery, you will be fitted for one at your readiness visit prior to surgery (scheduled by the surgery scheduler).     Shower procedure    Hibiclens shower: Use one packet the night before surgery and one packet the morning of surgery for a whole body shower. Avoid face, hair, and genitals.      Eating/Drinking    Stop all solid foods 8 hours before surgery.  Keep drinking clear liquids until 4 hours before surgery  Clear liquids include water, clear juice, black coffee, or clear tea without milk, Gatorade, clear soda.     Medications - please refer to the pre-operative medication instructions sheet in your folder    Hold Aspirin, NSAIDs (Advil/Ibuprofen, Indocin, Naproxen,Nuprin,Relafen/Nabumetone, Diclofenac,Meloxicam, Aleve, Celebrex) and all vitamins and  supplements x 7-10 days prior to surgical date  You can take Tylenol (Acetaminophen) for pain up until the date of your surgery   Do not exceed 3,000 mg per day   Any other medications prescribed, please discuss with your primary care provider at your pre-operative physical. Please discuss when/if it is safe for you to stop taking blood thinners with your primary care provider.   We will NOT provide pain medications prior to surgery. We will prescribe post-op pain medications for up to 6 weeks after surgery.       FMLA/Short-term disability    If you are currently employed, you will likely need to be off work for 4-6 weeks for post-op recovery and healing.  Please fax any FMLA/short term disability paperwork to [phone], mail it into the clinic, drop it off in person, or send via a [employer] message.   You may also call our clinic with the date in which you'd like to return to work, and we can provide a work letter to your employer  We will support Short-Term Disability up to 12 weeks, beginning the date of your surgery. We do NOT support Long-Term Disability/Social Security Benefits.     Pain Management after surgery    Dealing with pain    As your body heals, you might feel a stabbing, burning, or aching pain. You may also have some numbness.  Everyone feels pain differently, we may ask you to rate your pain using a pain scale. This will let us know how much pain you feel.   Keep in mind that medicine won't take away all of your pain. It helps to try other ways to relax and ease pain.     Things to help with pain    After surgery, we will give you medicine for your pain. These medications work well, but they can make you drowsy, itchy, or sick to your stomach, and constipated. Try to take narcotics with food if they cause nausea.   For mild to moderate pain, you can take medication such as Tylenol or Ibuprofen. These can be used with narcotics and muscle relaxants. *If you have had a fusion: do NOT use NSAIDs  for 6 months after surgery.   Do NOT drive while taking narcotic pain medication  Do NOT drink alcohol while using narcotic pain medication  You can utilize ice as needed (no longer than 20 minutes at one time) you may apply this over your covered incision  Utilize heat for muscle spasms, do not apply heat over your incision  If a muscle relaxer is prescribed, please do NOT take it at the same time as your narcotic pain medication. Take them at least 90 minutes apart.   You may also use pain cream/patches on sore muscles. Do NOT apply these on your incision. Patches may be cut in 1/2 if needed.     *After your surgery, if you will be staying in-patient, a nursing team will be monitoring you closely throughout your stay and communicate your health status to your surgeon and other providers.  You will be seen by Advanced Practice Providers (e.g., nurse practitioners, clinic nurse specialists, and physician assistants) who will check on you regularly to assess the status of your surgical recovery.     Incision Care    Look at your incision site every day. You  may need a mirror, or family member to help you.   Do not submerge your incision in water such as pools, hot tubs, baths for at least 6 weeks or until incision is healed  You may get your incision wet in the shower. Allow water and soap to run over incision, and gently pat dry.   Remove the dressing the day after you are discharged from the hospital. Keep the incision clean and dry at all times. This may require several bandage changes.   Contact us right away if you have:   Fever over 101 degrees farenheit  Green or yellow drainage (pus) from your incision or increased bloody drainage   Redness, swelling, or warmth at your surgery site   Notify the clinic [phone].    Activity Restrictions    For the first 6 weeks, no lifting,pushing, or pulling > 5-10 pounds, no bending, twisting.  Use the stairs in moderation   Walking: Walking is the best way to start  exercise after surgery. Take short frequent walks. You may gradually increase the distance as tolerated. If you feel pain, decrease your activity, but DO NOT stop walking. Walking will help you regain strength.  Avoid prolonged positioning for longer than 30 minutes (change positions frequently while awake)  No contact sports until after follow up visit  No high impact activities such as; running/jogging, snowmobile or 4 hdez riding or any other recreational vehicles until deemed safe by your surgeon/care team.   Please call the clinic if you develop any of the following symptoms:  Swelling and/or warmth in one or both legs  Pain or tenderness in your leg, ankle, foot, or arm   Red or discolored/pale skin     Post-Op Follow Up Appointments    We will call you to schedule these appointments after your discharge from the hospital.   Incision assessment within 2 weeks with a Registered Nurse   6 week post-op with a Nurse Practitioner/Physician Assistant. Your surgeon will be available on this day.

## 2022-06-08 ENCOUNTER — PREP FOR PROCEDURE (OUTPATIENT)
Dept: NEUROSURGERY | Facility: CLINIC | Age: 67
End: 2022-06-08
Payer: COMMERCIAL

## 2022-06-08 ENCOUNTER — TELEPHONE (OUTPATIENT)
Dept: NEUROSURGERY | Facility: CLINIC | Age: 67
End: 2022-06-08
Payer: COMMERCIAL

## 2022-06-08 DIAGNOSIS — M54.16 LUMBAR RADICULOPATHY: Primary | ICD-10-CM

## 2022-06-09 ENCOUNTER — TELEPHONE (OUTPATIENT)
Dept: NEUROSURGERY | Facility: CLINIC | Age: 67
End: 2022-06-09

## 2022-06-09 DIAGNOSIS — M79.651 PAIN IN RIGHT THIGH: ICD-10-CM

## 2022-06-09 DIAGNOSIS — Z01.818 PRE-OP TESTING: Primary | ICD-10-CM

## 2022-06-09 DIAGNOSIS — E11.9 TYPE 2 DIABETES MELLITUS WITHOUT COMPLICATIONS (H): ICD-10-CM

## 2022-06-09 DIAGNOSIS — M79.604 PAIN IN RIGHT LEG: ICD-10-CM

## 2022-06-09 NOTE — TELEPHONE ENCOUNTER
ORDER FROM: Dr. Mari     PRE AUTHORIZATION: PA pending     METHOD OF PATIENT CONTACT: Spoke to patient over the phone, best number to contact #[phone]    PROCEDURE: Right Lumbar 4 - lumbar 5 hemilaminectomy, medial facetectomies, foraminotomies    SURGICAL DATE: 6/27/22 at 12pm at [facility] TEST: @ home antigen test     READINESS VISIT: Please call     PCP, CLINIC, PHONE#: Dr. Iniguez at [facility] #[phone]. Pre-op physical 6/14/22 at 2:45pm with Dr. Mi    FILM INFO: XR Lumbar 6/7/22 at , XR Lumbar 6/2/22 at [facility], MRI Lumbar 5/10/22 at [facility]    SURGICAL LETTER: Mailed 6/10/22

## 2022-06-09 NOTE — LETTER
Dear  Pierre,    This letter will help in preparation for your upcoming surgery. Please contact us with any additional questions you may have regarding your surgery. Contact information for your surgery scheduler:   Dr. Mari: [phone] ~ Marleen Mccormick: [phone] ~ Mikayla    You are scheduled for: Lumbar Laminectomy  With: Shannan Mari MD  Date/Time: Monday, June 27, 2022 at 12:00pm (time subject to change)  Location: [place] [address]    Check in at the Welcome Desk inside the main doors of the [facility]. They will direct you to the surgery waiting area. Please arrive at 10:00am. A nurse from [facility] (surgery admit unit) at the hospital will call you with your exact arrival time to the hospital - typically two hours prior to your scheduled surgery time.     In the event of an emergency surgery case, there may be an adjustment to your start time for surgery.     PREPARING FOR YOUR SURGERY    *Pre-op Lab Work: 6/13/22 at 2:45pm at [facility]. [address] [place] [address].    *Pre-op Physical: 6/14/22 at 2:15pm with Dr. Mi at [facility].     *Please discuss the necessity of receiving a pneumococcal vaccine prior to surgery at your pre-op physical. Recommended for all patients over the age of 65 or based on certain medical conditions.     *After the pre-op physical is complete, please have your clinic fax the visit note to our office at [phone].    *Covid-19 Pre-procedure Test: Please take an antigen test at home 1-2 days prior to procedure, take a photo and bring with you to surgery.    *Readiness Visit: Dr. Mari's nurse will call you prior to the day of surgery to go over the results of your pre-op physical/lab results/covid result, along with additional information you will need for the day of surgery.    *Ensure that you have completed your pre-op physical, along with any other  necessary tests/appointments (listed above), prior to your Readiness Visit.                           ADDITIONAL INFORMATION REGARDING YOUR SURGERY    Medications    Bring a list ALL medications, including any over-the-counter vitamins and herbal supplements with you to the hospital on the day of surgery.    DO NOT bring your medications with you the day of surgery.    Please see attached third sheet for more details on medications/vitamins/herbal supplements that should be discontinued prior to your surgery date.     If you are unsure if you should discontinue a medication/ vitamin/herbal supplement, please call our office and discuss with a nurse.    Continue taking your medications/vitamins/herbal supplements unless they are on the attached list.     Failure to follow the instructions regarding medications/vitamins/herbal supplements will result in cancellation of your surgery.    Day BEFORE Surgery    DO NOT shave near your surgical site. This can cause irritation of the skin    Using a washcloth and provided bottle of Hibiclens, shower the night BEFORE surgery, using a half bottle of Hibiclens to wash your body, avoiding face and genitals. The morning OF surgery, shower and use the second half of the bottle to wash your body, avoiding face and genitals. If you are unable to take a shower the morning of surgery, please discuss your options with the nurse at your readiness visit.     NOTHING to eat after 11:00 p.m. the night prior to surgery.    CLEAR LIQUIDS: May have the following liquids up to two (2) hours before your arrival time at the hospital: water, plain black coffee (no cream or milk), plain black tea or plain green tea (no cream or milk), Gatorade or Propel Water.    SMOKING: Stop smoking as far before surgery as possible, or as directed by your surgeon. NO tobacco products of any kind (cigarettes, e-cigarettes, chewing tobacco) beginning at 11:00 p.m. the night prior to your procedure.     ALCOHOL:  You should stop drinking alcohol beginning at 11:00 p.m. the night prior to your procedure    Contact our office if you have symptoms of illness such as a fever of 101 or greater, chills, cough, sore throat, or if you develop a rash or any open sore    Day OF Surgery    If you ve been instructed to take a medication(s) on the morning of surgery, please take with a very small sip of water.    Wear loose & comfortable clothing and flat shoes, Leave jewelry/valuables at home. If you wear contact lenses, remove them at home and wear glasses. Remove any body piercings. Remove nail polish.     Planning for Discharge    Start planning for your care after discharge as soon as you receive this letter.    If you have not made arrangements for someone to take you home and stay with you for the first 48 hours after discharge, your surgery may be cancelled.                        PRE-OPERATIVE MEDICATION INSTRUCTIONS    Review this information with your primary care physician prior to discontinuing any of the medications listed below.  Notify your primary care physician that you have been instructed to discontinue these medications.    TEN (10) Days Prior to Surgery, STOP the Following Medications   Emy-Madison  Anacin  Aspirin  Excedrin  Pepto Bismol    **Before taking ANY over-the-counter medications, check the label for Aspirin   Non-steroidal   Anti-inflammatory Medications (NSAIDS)    Celebrex  Diclofenac (Cataflam)  Etodolac (Iodine)  Fenoprofen (Nalfon)  Ibuprofen (Advil, Motrin, Nuprin)  Indomethacin (Indocin)  Ketoprofen  Ketorolac (Toradol)  Meloxicam (Mobic)  Naproxen (AnaProx, Aleve, Naprosyn)  Relafen (Nabumetone)  Sulindac (Clinoril)   Herbal Supplements (this is a partial list of herbals to be discontinued)    Myron Sanchez  Ephedra  Feverfew  Fish Oil  Flaxseed Oil  Garlic  Antonina  Gingko  Ginseng  Goldenseal  Imitrex (Sumatriptan)  Kava  Krill Oil  Licorice  Multi Vitamins  [facility]  Wort  Turmeric  Valerian  Vitamin E  Yohimbe   CHECK WITH YOUR PRESCRIBING DOCTOR BEFORE STOPPING ANY BLOOD THINNERS (approximately 7 days prior to surgery)  (Coumadin, Plavix, Eliquis, Xarelto, Pradaxa, Platel, Aggrenox, Effient (Prasugrel), Ticlid)      ALWAYS CHECK WITH YOUR PRESCRIBING DOCTOR REGARDING THE MEDICATIONS LISTED BELOW; RECOMMENDED STOP TIME IS ALSO LISTED      If you are taking Lovenox, discontinue 24 HOURS prior to surgery    If you are taking weight loss medication, discontinue 7 days prior to surgery    If you are taking Metformin or Simvastatin, check with your primary care physician (or whoever has prescribed you this medication) regarding when to discontinue prior to surgery

## 2022-06-13 ENCOUNTER — LAB (OUTPATIENT)
Dept: LAB | Facility: CLINIC | Age: 67
End: 2022-06-13
Payer: COMMERCIAL

## 2022-06-13 ENCOUNTER — LAB REQUISITION (OUTPATIENT)
Dept: LAB | Facility: CLINIC | Age: 67
End: 2022-06-13

## 2022-06-13 DIAGNOSIS — Z01.818 PRE-OP TESTING: ICD-10-CM

## 2022-06-13 DIAGNOSIS — M79.651 PAIN IN RIGHT THIGH: ICD-10-CM

## 2022-06-13 DIAGNOSIS — M79.604 PAIN IN RIGHT LEG: ICD-10-CM

## 2022-06-13 DIAGNOSIS — E11.9 TYPE 2 DIABETES MELLITUS WITHOUT COMPLICATIONS (H): ICD-10-CM

## 2022-06-13 DIAGNOSIS — R97.20 ELEVATED PROSTATE SPECIFIC ANTIGEN (PSA): ICD-10-CM

## 2022-06-13 DIAGNOSIS — E78.2 MIXED HYPERLIPIDEMIA: ICD-10-CM

## 2022-06-13 DIAGNOSIS — I10 ESSENTIAL (PRIMARY) HYPERTENSION: ICD-10-CM

## 2022-06-13 LAB
ALBUMIN SERPL-MCNC: 3.8 G/DL (ref 3.5–5)
ALP SERPL-CCNC: 138 U/L (ref 45–120)
ALT SERPL W P-5'-P-CCNC: 48 U/L (ref 0–45)
ANION GAP SERPL CALCULATED.3IONS-SCNC: 13 MMOL/L (ref 5–18)
ANION GAP SERPL CALCULATED.3IONS-SCNC: 13 MMOL/L (ref 5–18)
APTT PPP: 33 SECONDS (ref 22–38)
AST SERPL W P-5'-P-CCNC: 33 U/L (ref 0–40)
BASOPHILS # BLD AUTO: 0.1 10E3/UL (ref 0–0.2)
BASOPHILS NFR BLD AUTO: 1 %
BILIRUB SERPL-MCNC: 0.8 MG/DL (ref 0–1)
BUN SERPL-MCNC: 14 MG/DL (ref 8–22)
BUN SERPL-MCNC: 15 MG/DL (ref 8–22)
CALCIUM SERPL-MCNC: 10 MG/DL (ref 8.5–10.5)
CALCIUM SERPL-MCNC: 10 MG/DL (ref 8.5–10.5)
CHLORIDE BLD-SCNC: 104 MMOL/L (ref 98–107)
CHLORIDE BLD-SCNC: 104 MMOL/L (ref 98–107)
CHOLEST SERPL-MCNC: 144 MG/DL
CO2 SERPL-SCNC: 23 MMOL/L (ref 22–31)
CO2 SERPL-SCNC: 24 MMOL/L (ref 22–31)
CREAT SERPL-MCNC: 1.06 MG/DL (ref 0.7–1.3)
CREAT SERPL-MCNC: 1.07 MG/DL (ref 0.7–1.3)
EOSINOPHIL # BLD AUTO: 0.2 10E3/UL (ref 0–0.7)
EOSINOPHIL NFR BLD AUTO: 2 %
ERYTHROCYTE [DISTWIDTH] IN BLOOD BY AUTOMATED COUNT: 13.5 % (ref 10–15)
ERYTHROCYTE [DISTWIDTH] IN BLOOD BY AUTOMATED COUNT: 14.1 % (ref 10–15)
GFR SERPL CREATININE-BSD FRML MDRD: 77 ML/MIN/1.73M2
GFR SERPL CREATININE-BSD FRML MDRD: 78 ML/MIN/1.73M2
GLUCOSE BLD-MCNC: 87 MG/DL (ref 70–125)
GLUCOSE BLD-MCNC: 96 MG/DL (ref 70–125)
HBA1C MFR BLD: 6 % (ref 0–5.6)
HCT VFR BLD AUTO: 46.4 % (ref 40–53)
HCT VFR BLD AUTO: 50 % (ref 40–53)
HDLC SERPL-MCNC: 31 MG/DL
HGB BLD-MCNC: 15.8 G/DL (ref 13.3–17.7)
HGB BLD-MCNC: 16.3 G/DL (ref 13.3–17.7)
IMM GRANULOCYTES # BLD: 0 10E3/UL
IMM GRANULOCYTES NFR BLD: 0 %
INR PPP: 1.07 (ref 0.85–1.15)
LDLC SERPL CALC-MCNC: 85 MG/DL
LYMPHOCYTES # BLD AUTO: 3 10E3/UL (ref 0.8–5.3)
LYMPHOCYTES NFR BLD AUTO: 33 %
MCH RBC QN AUTO: 30 PG (ref 26.5–33)
MCH RBC QN AUTO: 30.3 PG (ref 26.5–33)
MCHC RBC AUTO-ENTMCNC: 32.6 G/DL (ref 31.5–36.5)
MCHC RBC AUTO-ENTMCNC: 34.1 G/DL (ref 31.5–36.5)
MCV RBC AUTO: 89 FL (ref 78–100)
MCV RBC AUTO: 92 FL (ref 78–100)
MONOCYTES # BLD AUTO: 1 10E3/UL (ref 0–1.3)
MONOCYTES NFR BLD AUTO: 11 %
NEUTROPHILS # BLD AUTO: 4.9 10E3/UL (ref 1.6–8.3)
NEUTROPHILS NFR BLD AUTO: 53 %
NRBC # BLD AUTO: 0 10E3/UL
NRBC BLD AUTO-RTO: 0 /100
PLATELET # BLD AUTO: 250 10E3/UL (ref 150–450)
PLATELET # BLD AUTO: 272 10E3/UL (ref 150–450)
POTASSIUM BLD-SCNC: 4.1 MMOL/L (ref 3.5–5)
POTASSIUM BLD-SCNC: 4.2 MMOL/L (ref 3.5–5)
PROT SERPL-MCNC: 7.5 G/DL (ref 6–8)
PSA SERPL-MCNC: 6.73 UG/L (ref 0–4.5)
RBC # BLD AUTO: 5.21 10E6/UL (ref 4.4–5.9)
RBC # BLD AUTO: 5.43 10E6/UL (ref 4.4–5.9)
SODIUM SERPL-SCNC: 140 MMOL/L (ref 136–145)
SODIUM SERPL-SCNC: 141 MMOL/L (ref 136–145)
TRIGL SERPL-MCNC: 141 MG/DL
WBC # BLD AUTO: 9.1 10E3/UL (ref 4–11)
WBC # BLD AUTO: 9.7 10E3/UL (ref 4–11)

## 2022-06-13 PROCEDURE — 85730 THROMBOPLASTIN TIME PARTIAL: CPT | Performed by: INTERNAL MEDICINE

## 2022-06-13 PROCEDURE — 80053 COMPREHEN METABOLIC PANEL: CPT | Performed by: FAMILY MEDICINE

## 2022-06-13 PROCEDURE — 80061 LIPID PANEL: CPT | Performed by: FAMILY MEDICINE

## 2022-06-13 PROCEDURE — 84153 ASSAY OF PSA TOTAL: CPT | Performed by: FAMILY MEDICINE

## 2022-06-13 PROCEDURE — 85027 COMPLETE CBC AUTOMATED: CPT | Performed by: INTERNAL MEDICINE

## 2022-06-13 PROCEDURE — 80048 BASIC METABOLIC PNL TOTAL CA: CPT | Performed by: INTERNAL MEDICINE

## 2022-06-13 PROCEDURE — 85025 COMPLETE CBC W/AUTO DIFF WBC: CPT | Performed by: FAMILY MEDICINE

## 2022-06-13 PROCEDURE — 83036 HEMOGLOBIN GLYCOSYLATED A1C: CPT | Performed by: INTERNAL MEDICINE

## 2022-06-13 PROCEDURE — 85610 PROTHROMBIN TIME: CPT | Performed by: INTERNAL MEDICINE

## 2022-06-13 PROCEDURE — 36415 COLL VENOUS BLD VENIPUNCTURE: CPT | Performed by: INTERNAL MEDICINE

## 2022-06-18 ENCOUNTER — TRANSFERRED RECORDS (OUTPATIENT)
Dept: HEALTH INFORMATION MANAGEMENT | Facility: CLINIC | Age: 67
End: 2022-06-18

## 2022-06-24 ENCOUNTER — TELEPHONE (OUTPATIENT)
Dept: NEUROSURGERY | Facility: CLINIC | Age: 67
End: 2022-06-24

## 2022-06-24 ENCOUNTER — LAB (OUTPATIENT)
Dept: FAMILY MEDICINE | Facility: CLINIC | Age: 67
End: 2022-06-24
Payer: COMMERCIAL

## 2022-06-24 DIAGNOSIS — Z20.822 ENCOUNTER FOR LABORATORY TESTING FOR COVID-19 VIRUS: ICD-10-CM

## 2022-06-24 PROCEDURE — U0003 INFECTIOUS AGENT DETECTION BY NUCLEIC ACID (DNA OR RNA); SEVERE ACUTE RESPIRATORY SYNDROME CORONAVIRUS 2 (SARS-COV-2) (CORONAVIRUS DISEASE [COVID-19]), AMPLIFIED PROBE TECHNIQUE, MAKING USE OF HIGH THROUGHPUT TECHNOLOGIES AS DESCRIBED BY CMS-2020-01-R: HCPCS

## 2022-06-24 PROCEDURE — U0005 INFEC AGEN DETEC AMPLI PROBE: HCPCS

## 2022-06-24 RX ORDER — MULTIVITAMIN,THERAPEUTIC
1 TABLET ORAL DAILY
COMMUNITY

## 2022-06-24 RX ORDER — ASPIRIN 81 MG/1
81 TABLET, CHEWABLE ORAL DAILY
COMMUNITY

## 2022-06-24 RX ORDER — TADALAFIL 20 MG/1
20 TABLET ORAL DAILY PRN
COMMUNITY
End: 2023-03-03

## 2022-06-24 NOTE — TELEPHONE ENCOUNTER
Called patient, discussed surgery, post-op course, expectations, follow up plan.    Reviewed H&P from - 6/14/22 cleared for surgery.   Labs - WNL   EKG - SR with right BBB    MRI done on 5/10/22 - in [facility]    To OR as planned. Check in - 10:00 at [facility]     Nothing to eat or drink after midnight the night before surgery.     Bring all pertinent films to hospital the day of surgery.     Continue to refrain from NSAIDS (Ibuprofen, Aleve, Naprosyn), ASA, Over the counter herbal medications or supplements, anti-coagulants and blood thinners.     Shower Instructions: using a washcloth and a bottle of provided Hibiclens, wash your body, avoiding your face, hair, and genitals. Preferably, shower the night before surgery and the morning of surgery using a half a bottle each time for your whole body shower.    Patient confirmed they have help/assistance in place at home upon discharge.    Patient was informed that we will provide up to 1 week prescription of pain medication for post-operative pain.     Instructed patient about the following: After your surgery, if you will be staying in-patient, a nursing provider team will be monitoring you closely throughout your stay and communicate your health status to your surgeon and other providers.  You will be seen by Advanced Practice Providers (e.g., nurse practitioners, clinic nurse specialist, and physician assistants) who will check on you regularly to assess the status of your surgery.   All of pt's questions were answered to his satisfaction. He was informed that we will call after discharge to schedule all necessary post-op follow up appointments.     Ingris Brown RN

## 2022-06-25 LAB — SARS-COV-2 RNA RESP QL NAA+PROBE: NEGATIVE

## 2022-06-27 ENCOUNTER — HOSPITAL ENCOUNTER (OUTPATIENT)
Facility: HOSPITAL | Age: 67
Discharge: HOME | End: 2022-06-27
Attending: SURGERY | Admitting: SURGERY
Payer: COMMERCIAL

## 2022-06-27 ENCOUNTER — ANESTHESIA EVENT (OUTPATIENT)
Dept: SURGERY | Facility: HOSPITAL | Age: 67
End: 2022-06-27
Payer: COMMERCIAL

## 2022-06-27 ENCOUNTER — ANESTHESIA (OUTPATIENT)
Dept: SURGERY | Facility: HOSPITAL | Age: 67
End: 2022-06-27
Payer: COMMERCIAL

## 2022-06-27 ENCOUNTER — APPOINTMENT (OUTPATIENT)
Dept: RADIOLOGY | Facility: HOSPITAL | Age: 67
End: 2022-06-27
Attending: PHYSICIAN ASSISTANT
Payer: COMMERCIAL

## 2022-06-27 VITALS
DIASTOLIC BLOOD PRESSURE: 73 MMHG | HEART RATE: 80 BPM | BODY MASS INDEX: 30.77 KG/M2 | WEIGHT: 202.4 LBS | SYSTOLIC BLOOD PRESSURE: 153 MMHG | OXYGEN SATURATION: 93 % | TEMPERATURE: 98 F | RESPIRATION RATE: 20 BRPM

## 2022-06-27 DIAGNOSIS — M54.16 LUMBAR RADICULOPATHY: Primary | ICD-10-CM

## 2022-06-27 PROCEDURE — 250N000009 HC RX 250: Performed by: ANESTHESIOLOGY

## 2022-06-27 PROCEDURE — 258N000003 HC RX IP 258 OP 636: Performed by: NURSE ANESTHETIST, CERTIFIED REGISTERED

## 2022-06-27 PROCEDURE — 69990 MICROSURGERY ADD-ON: CPT | Mod: 59 | Performed by: SURGERY

## 2022-06-27 PROCEDURE — 250N000011 HC RX IP 250 OP 636: Performed by: PHYSICIAN ASSISTANT

## 2022-06-27 PROCEDURE — 258N000003 HC RX IP 258 OP 636: Performed by: ANESTHESIOLOGY

## 2022-06-27 PROCEDURE — 999N000141 HC STATISTIC PRE-PROCEDURE NURSING ASSESSMENT: Performed by: SURGERY

## 2022-06-27 PROCEDURE — 63047 LAM FACETEC & FORAMOT LUMBAR: CPT | Mod: AS | Performed by: PHYSICIAN ASSISTANT

## 2022-06-27 PROCEDURE — 360N000077 HC SURGERY LEVEL 4, PER MIN: Performed by: SURGERY

## 2022-06-27 PROCEDURE — 250N000013 HC RX MED GY IP 250 OP 250 PS 637: Performed by: ANESTHESIOLOGY

## 2022-06-27 PROCEDURE — 250N000011 HC RX IP 250 OP 636: Performed by: NURSE ANESTHETIST, CERTIFIED REGISTERED

## 2022-06-27 PROCEDURE — 250N000025 HC SEVOFLURANE, PER MIN: Performed by: SURGERY

## 2022-06-27 PROCEDURE — 250N000009 HC RX 250: Performed by: SURGERY

## 2022-06-27 PROCEDURE — 250N000009 HC RX 250: Performed by: NURSE ANESTHETIST, CERTIFIED REGISTERED

## 2022-06-27 PROCEDURE — 272N000001 HC OR GENERAL SUPPLY STERILE: Performed by: SURGERY

## 2022-06-27 PROCEDURE — 710N000012 HC RECOVERY PHASE 2, PER MINUTE: Performed by: SURGERY

## 2022-06-27 PROCEDURE — 63047 LAM FACETEC & FORAMOT LUMBAR: CPT | Performed by: SURGERY

## 2022-06-27 PROCEDURE — 370N000017 HC ANESTHESIA TECHNICAL FEE, PER MIN: Performed by: SURGERY

## 2022-06-27 PROCEDURE — 250N000011 HC RX IP 250 OP 636: Performed by: SURGERY

## 2022-06-27 PROCEDURE — 999N000063 XR CROSSTABLE LATERAL LUMBAR SPINE PORTABLE

## 2022-06-27 PROCEDURE — P9041 ALBUMIN (HUMAN),5%, 50ML: HCPCS | Performed by: NURSE ANESTHETIST, CERTIFIED REGISTERED

## 2022-06-27 PROCEDURE — 250N000013 HC RX MED GY IP 250 OP 250 PS 637: Performed by: PHYSICIAN ASSISTANT

## 2022-06-27 PROCEDURE — 710N000009 HC RECOVERY PHASE 1, LEVEL 1, PER MIN: Performed by: SURGERY

## 2022-06-27 PROCEDURE — 250N000011 HC RX IP 250 OP 636: Performed by: ANESTHESIOLOGY

## 2022-06-27 RX ORDER — DIPHENHYDRAMINE HYDROCHLORIDE 50 MG/ML
INJECTION INTRAMUSCULAR; INTRAVENOUS PRN
Status: DISCONTINUED | OUTPATIENT
Start: 2022-06-27 | End: 2022-06-27

## 2022-06-27 RX ORDER — ONDANSETRON 2 MG/ML
INJECTION INTRAMUSCULAR; INTRAVENOUS PRN
Status: DISCONTINUED | OUTPATIENT
Start: 2022-06-27 | End: 2022-06-27

## 2022-06-27 RX ORDER — CEFAZOLIN SODIUM/WATER 2 G/20 ML
2 SYRINGE (ML) INTRAVENOUS
Status: COMPLETED | OUTPATIENT
Start: 2022-06-27 | End: 2022-06-27

## 2022-06-27 RX ORDER — ACETAMINOPHEN 325 MG/1
975 TABLET ORAL EVERY 8 HOURS
Status: DISCONTINUED | OUTPATIENT
Start: 2022-06-27 | End: 2022-06-27 | Stop reason: HOSPADM

## 2022-06-27 RX ORDER — KETAMINE HYDROCHLORIDE 10 MG/ML
INJECTION INTRAMUSCULAR; INTRAVENOUS PRN
Status: DISCONTINUED | OUTPATIENT
Start: 2022-06-27 | End: 2022-06-27

## 2022-06-27 RX ORDER — PROPOFOL 10 MG/ML
INJECTION, EMULSION INTRAVENOUS PRN
Status: DISCONTINUED | OUTPATIENT
Start: 2022-06-27 | End: 2022-06-27

## 2022-06-27 RX ORDER — ONDANSETRON 2 MG/ML
4 INJECTION INTRAMUSCULAR; INTRAVENOUS EVERY 6 HOURS PRN
Status: DISCONTINUED | OUTPATIENT
Start: 2022-06-27 | End: 2022-06-27 | Stop reason: HOSPADM

## 2022-06-27 RX ORDER — BUPIVACAINE HYDROCHLORIDE AND EPINEPHRINE 2.5; 5 MG/ML; UG/ML
INJECTION, SOLUTION EPIDURAL; INFILTRATION; INTRACAUDAL; PERINEURAL PRN
Status: DISCONTINUED | OUTPATIENT
Start: 2022-06-27 | End: 2022-06-27 | Stop reason: HOSPADM

## 2022-06-27 RX ORDER — ACETAMINOPHEN 325 MG/1
975 TABLET ORAL EVERY 4 HOURS PRN
Status: DISCONTINUED | OUTPATIENT
Start: 2022-06-27 | End: 2022-06-27 | Stop reason: HOSPADM

## 2022-06-27 RX ORDER — LIDOCAINE HYDROCHLORIDE 10 MG/ML
INJECTION, SOLUTION INFILTRATION; PERINEURAL PRN
Status: DISCONTINUED | OUTPATIENT
Start: 2022-06-27 | End: 2022-06-27

## 2022-06-27 RX ORDER — MAGNESIUM SULFATE 4 G/50ML
4 INJECTION INTRAVENOUS ONCE
Status: COMPLETED | OUTPATIENT
Start: 2022-06-27 | End: 2022-06-27

## 2022-06-27 RX ORDER — ONDANSETRON 2 MG/ML
4 INJECTION INTRAMUSCULAR; INTRAVENOUS EVERY 30 MIN PRN
Status: DISCONTINUED | OUTPATIENT
Start: 2022-06-27 | End: 2022-06-27 | Stop reason: HOSPADM

## 2022-06-27 RX ORDER — FAMOTIDINE 20 MG/1
20 TABLET, FILM COATED ORAL ONCE
Status: COMPLETED | OUTPATIENT
Start: 2022-06-27 | End: 2022-06-27

## 2022-06-27 RX ORDER — GLYCOPYRROLATE 0.2 MG/ML
INJECTION, SOLUTION INTRAMUSCULAR; INTRAVENOUS PRN
Status: DISCONTINUED | OUTPATIENT
Start: 2022-06-27 | End: 2022-06-27

## 2022-06-27 RX ORDER — FENTANYL CITRATE 50 UG/ML
25 INJECTION, SOLUTION INTRAMUSCULAR; INTRAVENOUS EVERY 5 MIN PRN
Status: DISCONTINUED | OUTPATIENT
Start: 2022-06-27 | End: 2022-06-27 | Stop reason: HOSPADM

## 2022-06-27 RX ORDER — LIDOCAINE 40 MG/G
CREAM TOPICAL
Status: DISCONTINUED | OUTPATIENT
Start: 2022-06-27 | End: 2022-06-27 | Stop reason: HOSPADM

## 2022-06-27 RX ORDER — ALBUMIN, HUMAN INJ 5% 5 %
SOLUTION INTRAVENOUS CONTINUOUS PRN
Status: DISCONTINUED | OUTPATIENT
Start: 2022-06-27 | End: 2022-06-27

## 2022-06-27 RX ORDER — FENTANYL CITRATE 50 UG/ML
INJECTION, SOLUTION INTRAMUSCULAR; INTRAVENOUS PRN
Status: DISCONTINUED | OUTPATIENT
Start: 2022-06-27 | End: 2022-06-27

## 2022-06-27 RX ORDER — OXYCODONE HYDROCHLORIDE 5 MG/1
10 TABLET ORAL EVERY 4 HOURS PRN
Status: DISCONTINUED | OUTPATIENT
Start: 2022-06-27 | End: 2022-06-27 | Stop reason: HOSPADM

## 2022-06-27 RX ORDER — MAGNESIUM SULFATE 4 G/50ML
4 INJECTION INTRAVENOUS ONCE
Status: DISCONTINUED | OUTPATIENT
Start: 2022-06-27 | End: 2022-06-27 | Stop reason: HOSPADM

## 2022-06-27 RX ORDER — ESMOLOL HYDROCHLORIDE 10 MG/ML
INJECTION INTRAVENOUS PRN
Status: DISCONTINUED | OUTPATIENT
Start: 2022-06-27 | End: 2022-06-27

## 2022-06-27 RX ORDER — OXYCODONE HYDROCHLORIDE 5 MG/1
5 TABLET ORAL EVERY 4 HOURS PRN
Status: DISCONTINUED | OUTPATIENT
Start: 2022-06-27 | End: 2022-06-27 | Stop reason: HOSPADM

## 2022-06-27 RX ORDER — OXYCODONE HYDROCHLORIDE 5 MG/1
5 TABLET ORAL EVERY 4 HOURS PRN
Qty: 42 TABLET | Refills: 0 | Status: SHIPPED | OUTPATIENT
Start: 2022-06-27 | End: 2022-07-26

## 2022-06-27 RX ORDER — CYCLOBENZAPRINE HCL 10 MG
10 TABLET ORAL 3 TIMES DAILY PRN
Qty: 42 TABLET | Refills: 1 | Status: SHIPPED | OUTPATIENT
Start: 2022-06-27 | End: 2023-03-03

## 2022-06-27 RX ORDER — ONDANSETRON 4 MG/1
4 TABLET, ORALLY DISINTEGRATING ORAL EVERY 6 HOURS PRN
Status: DISCONTINUED | OUTPATIENT
Start: 2022-06-27 | End: 2022-06-27 | Stop reason: HOSPADM

## 2022-06-27 RX ORDER — SODIUM CHLORIDE, SODIUM LACTATE, POTASSIUM CHLORIDE, CALCIUM CHLORIDE 600; 310; 30; 20 MG/100ML; MG/100ML; MG/100ML; MG/100ML
INJECTION, SOLUTION INTRAVENOUS CONTINUOUS
Status: DISCONTINUED | OUTPATIENT
Start: 2022-06-27 | End: 2022-06-27 | Stop reason: HOSPADM

## 2022-06-27 RX ORDER — PROCHLORPERAZINE MALEATE 5 MG
5 TABLET ORAL EVERY 6 HOURS PRN
Status: DISCONTINUED | OUTPATIENT
Start: 2022-06-27 | End: 2022-06-27 | Stop reason: HOSPADM

## 2022-06-27 RX ORDER — DEXAMETHASONE SODIUM PHOSPHATE 10 MG/ML
10 INJECTION INTRAMUSCULAR; INTRAVENOUS ONCE
Status: COMPLETED | OUTPATIENT
Start: 2022-06-27 | End: 2022-06-27

## 2022-06-27 RX ORDER — ONDANSETRON 4 MG/1
4 TABLET, ORALLY DISINTEGRATING ORAL EVERY 30 MIN PRN
Status: DISCONTINUED | OUTPATIENT
Start: 2022-06-27 | End: 2022-06-27 | Stop reason: HOSPADM

## 2022-06-27 RX ORDER — CEFAZOLIN SODIUM/WATER 2 G/20 ML
2 SYRINGE (ML) INTRAVENOUS SEE ADMIN INSTRUCTIONS
Status: DISCONTINUED | OUTPATIENT
Start: 2022-06-27 | End: 2022-06-27 | Stop reason: HOSPADM

## 2022-06-27 RX ORDER — HYDROMORPHONE HYDROCHLORIDE 1 MG/ML
0.2 INJECTION, SOLUTION INTRAMUSCULAR; INTRAVENOUS; SUBCUTANEOUS EVERY 5 MIN PRN
Status: DISCONTINUED | OUTPATIENT
Start: 2022-06-27 | End: 2022-06-27 | Stop reason: HOSPADM

## 2022-06-27 RX ORDER — ACETAMINOPHEN 325 MG/1
650 TABLET ORAL EVERY 4 HOURS PRN
Status: DISCONTINUED | OUTPATIENT
Start: 2022-06-30 | End: 2022-06-27 | Stop reason: HOSPADM

## 2022-06-27 RX ADMIN — FENTANYL CITRATE 25 MCG: 50 INJECTION, SOLUTION INTRAMUSCULAR; INTRAVENOUS at 13:52

## 2022-06-27 RX ADMIN — GLYCOPYRROLATE 0.2 MG: 0.2 INJECTION, SOLUTION INTRAMUSCULAR; INTRAVENOUS at 11:41

## 2022-06-27 RX ADMIN — ESMOLOL HYDROCHLORIDE 50 MG: 10 INJECTION, SOLUTION INTRAVENOUS at 13:25

## 2022-06-27 RX ADMIN — PHENYLEPHRINE HYDROCHLORIDE 100 MCG: 10 INJECTION INTRAVENOUS at 12:18

## 2022-06-27 RX ADMIN — ACETAMINOPHEN 975 MG: 325 TABLET ORAL at 11:01

## 2022-06-27 RX ADMIN — PHENYLEPHRINE HYDROCHLORIDE 150 MCG: 10 INJECTION INTRAVENOUS at 12:41

## 2022-06-27 RX ADMIN — ONDANSETRON 4 MG: 2 INJECTION INTRAMUSCULAR; INTRAVENOUS at 13:02

## 2022-06-27 RX ADMIN — OXYCODONE HYDROCHLORIDE 5 MG: 5 TABLET ORAL at 14:35

## 2022-06-27 RX ADMIN — LIDOCAINE HYDROCHLORIDE 5 ML: 10 INJECTION, SOLUTION INFILTRATION; PERINEURAL at 11:41

## 2022-06-27 RX ADMIN — ALBUMIN HUMAN: 0.05 INJECTION, SOLUTION INTRAVENOUS at 12:40

## 2022-06-27 RX ADMIN — SODIUM CHLORIDE, POTASSIUM CHLORIDE, SODIUM LACTATE AND CALCIUM CHLORIDE: 600; 310; 30; 20 INJECTION, SOLUTION INTRAVENOUS at 12:50

## 2022-06-27 RX ADMIN — ROCURONIUM BROMIDE 30 MG: 50 INJECTION, SOLUTION INTRAVENOUS at 12:05

## 2022-06-27 RX ADMIN — SUGAMMADEX 200 MG: 100 INJECTION, SOLUTION INTRAVENOUS at 13:18

## 2022-06-27 RX ADMIN — PHENYLEPHRINE HYDROCHLORIDE 200 MCG: 10 INJECTION INTRAVENOUS at 12:31

## 2022-06-27 RX ADMIN — DIPHENHYDRAMINE HYDROCHLORIDE 12.5 MG: 50 INJECTION, SOLUTION INTRAMUSCULAR; INTRAVENOUS at 11:55

## 2022-06-27 RX ADMIN — KETAMINE HYDROCHLORIDE 15 MG: 10 INJECTION, SOLUTION INTRAMUSCULAR; INTRAVENOUS at 12:47

## 2022-06-27 RX ADMIN — FENTANYL CITRATE 25 MCG: 50 INJECTION, SOLUTION INTRAMUSCULAR; INTRAVENOUS at 14:01

## 2022-06-27 RX ADMIN — KETAMINE HYDROCHLORIDE 25 MG: 10 INJECTION, SOLUTION INTRAMUSCULAR; INTRAVENOUS at 11:41

## 2022-06-27 RX ADMIN — SODIUM CHLORIDE, POTASSIUM CHLORIDE, SODIUM LACTATE AND CALCIUM CHLORIDE: 600; 310; 30; 20 INJECTION, SOLUTION INTRAVENOUS at 11:16

## 2022-06-27 RX ADMIN — FENTANYL CITRATE 25 MCG: 50 INJECTION, SOLUTION INTRAMUSCULAR; INTRAVENOUS at 14:09

## 2022-06-27 RX ADMIN — ROCURONIUM BROMIDE 50 MG: 50 INJECTION, SOLUTION INTRAVENOUS at 11:41

## 2022-06-27 RX ADMIN — PHENYLEPHRINE HYDROCHLORIDE 100 MCG: 10 INJECTION INTRAVENOUS at 12:24

## 2022-06-27 RX ADMIN — PROPOFOL 200 MG: 10 INJECTION, EMULSION INTRAVENOUS at 11:41

## 2022-06-27 RX ADMIN — Medication 2 G: at 11:37

## 2022-06-27 RX ADMIN — ESMOLOL HYDROCHLORIDE 50 MG: 10 INJECTION, SOLUTION INTRAVENOUS at 13:18

## 2022-06-27 RX ADMIN — PHENYLEPHRINE HYDROCHLORIDE 100 MCG: 10 INJECTION INTRAVENOUS at 13:04

## 2022-06-27 RX ADMIN — FENTANYL CITRATE 100 MCG: 50 INJECTION, SOLUTION INTRAMUSCULAR; INTRAVENOUS at 11:41

## 2022-06-27 RX ADMIN — FAMOTIDINE 20 MG: 20 TABLET ORAL at 11:01

## 2022-06-27 RX ADMIN — MAGNESIUM SULFATE 4 G: 4 INJECTION INTRAVENOUS at 11:03

## 2022-06-27 RX ADMIN — MIDAZOLAM 2 MG: 1 INJECTION INTRAMUSCULAR; INTRAVENOUS at 11:34

## 2022-06-27 RX ADMIN — FENTANYL CITRATE 25 MCG: 50 INJECTION, SOLUTION INTRAMUSCULAR; INTRAVENOUS at 13:45

## 2022-06-27 RX ADMIN — DEXAMETHASONE SODIUM PHOSPHATE 10 MG: 10 INJECTION INTRAMUSCULAR; INTRAVENOUS at 11:19

## 2022-06-27 RX ADMIN — HYDROMORPHONE HYDROCHLORIDE 0.5 MG: 1 INJECTION, SOLUTION INTRAMUSCULAR; INTRAVENOUS; SUBCUTANEOUS at 13:12

## 2022-06-27 ASSESSMENT — LIFESTYLE VARIABLES: TOBACCO_USE: 1

## 2022-06-27 NOTE — ANESTHESIA PROCEDURE NOTES
Airway       Patient location during procedure: OR       Procedure Start/Stop Times: 6/27/2022 11:43 AM  Staff -        CRNA: Lissett Deluca APRN CRNA       Performed By: CRNA  Consent for Airway        Urgency: elective  Indications and Patient Condition       Indications for airway management: taylor-procedural       Induction type:intravenous       Mask difficulty assessment: 1 - vent by mask    Final Airway Details       Final airway type: endotracheal airway       Successful airway: ETT - single and Oral  Endotracheal Airway Details        ETT size (mm): 8.0       Cuffed: yes       Successful intubation technique: direct laryngoscopy       DL Blade Type: Almeida 2       Adjucts: stylet and tooth guard       Position: Right       Measured from: gums/teeth       Secured at (cm): 23       Bite block used: Soft    Post intubation assessment        Placement verified by: capnometry, equal breath sounds and chest rise        Number of attempts at approach: 2       Secured with: silk tape       Ease of procedure: easy (small mouth opening, deep and slightly anterior)       Dentition: Intact and Unchanged    Medication(s) Administered   Medication Administration Time: 6/27/2022 11:43 AM

## 2022-06-27 NOTE — INTERVAL H&P NOTE
"I have reviewed the surgical (or preoperative) H&P that is linked to this encounter, and examined the patient. There are no significant changes    Clinical Conditions Present on Arrival:  Clinically Significant Risk Factors Present on Admission                   # Obesity: Estimated body mass index is 30.77 kg/m  as calculated from the following:    Height as of 6/7/22: 5' 8\" (1.727 m).    Weight as of this encounter: 202 lb 6.4 oz (91.8 kg).       "

## 2022-06-27 NOTE — ANESTHESIA PREPROCEDURE EVALUATION
Anesthesia Pre-Procedure Evaluation    Patient: Barry Jay   MRN: [id number] : 1955        Procedure : Procedure(s):  Right Lumbar 4 - lumbar 5 hemilaminectomy, medial facetectomies, foraminotomies          Past Medical History:   Diagnosis Date     HTN (hypertension)      Hyperlipidemia       Past Surgical History:   Procedure Laterality Date     APPENDECTOMY       CHOLECYSTECTOMY       ORTHOPEDIC SURGERY        Allergies   Allergen Reactions     Iodides Unknown and Hives     Pt thinks 20+ yrs ago, Pt received omni 350 without premed and did fine 2020 JMS  Pt thinks 20+ yrs ago  Pt received omni 350 without premed and did fine 2020 JMS        Social History     Tobacco Use     Smoking status: Current Every Day Smoker     Packs/day: 1.00     Years: 30.00     Pack years: 30.00     Types: Cigarettes, Cigarettes     Smokeless tobacco: Never Used   Substance Use Topics     Alcohol use: Never      Wt Readings from Last 1 Encounters:   22 91.8 kg (202 lb 6.4 oz)        Anesthesia Evaluation            ROS/MED HX  ENT/Pulmonary:     (+) tobacco use, Current use, 1 packs/day,     Neurologic:  - neg neurologic ROS     Cardiovascular:     (+) hypertension-----    METS/Exercise Tolerance:     Hematologic:  - neg hematologic  ROS     Musculoskeletal:  - neg musculoskeletal ROS     GI/Hepatic:     (+) GERD, Asymptomatic on medication,     Renal/Genitourinary:  - neg Renal ROS     Endo:  - neg endo ROS     Psychiatric/Substance Use:  - neg psychiatric ROS     Infectious Disease:  - neg infectious disease ROS     Malignancy:  - neg malignancy ROS     Other:  - neg other ROS          Physical Exam    Airway  airway exam normal      Mallampati: II       Respiratory Devices and Support         Dental  no notable dental history         Cardiovascular   cardiovascular exam normal       Rhythm and rate: regular and normal     Pulmonary   pulmonary exam normal        breath sounds clear to auscultation            OUTSIDE LABS:  CBC:   Lab Results   Component Value Date    WBC 9.7 06/13/2022    WBC 9.1 06/13/2022    HGB 15.8 06/13/2022    HGB 16.3 06/13/2022    HCT 46.4 06/13/2022    HCT 50.0 06/13/2022     06/13/2022     06/13/2022     BMP:   Lab Results   Component Value Date     06/13/2022     06/13/2022    POTASSIUM 4.2 06/13/2022    POTASSIUM 4.1 06/13/2022    CHLORIDE 104 06/13/2022    CHLORIDE 104 06/13/2022    CO2 24 06/13/2022    CO2 23 06/13/2022    BUN 14 06/13/2022    BUN 15 06/13/2022    CR 1.06 06/13/2022    CR 1.07 06/13/2022    GLC 96 06/13/2022    GLC 87 06/13/2022     COAGS:   Lab Results   Component Value Date    PTT 33 06/13/2022    INR 1.07 06/13/2022     POC: No results found for: BGM, HCG, HCGS  HEPATIC:   Lab Results   Component Value Date    ALBUMIN 3.8 06/13/2022    PROTTOTAL 7.5 06/13/2022    ALT 48 (H) 06/13/2022    AST 33 06/13/2022    ALKPHOS 138 (H) 06/13/2022    BILITOTAL 0.8 06/13/2022     OTHER:   Lab Results   Component Value Date    A1C 6.0 (H) 06/13/2022    MIKE 10.0 06/13/2022    LIPASE 28 04/26/2018    CRP 0.2 04/26/2018       Anesthesia Plan    ASA Status:  2      Anesthesia Type: General.     - Airway: ETT   Induction: Intravenous, Propofol.   Maintenance: Balanced.        Consents    Anesthesia Plan(s) and associated risks, benefits, and realistic alternatives discussed. Questions answered and patient/representative(s) expressed understanding.    - Discussed:     - Discussed with:  Patient      - Extended Intubation/Ventilatory Support Discussed: No.      - Patient is DNR/DNI Status: No    Use of blood products discussed: No .     Postoperative Care       PONV prophylaxis: Ondansetron (or other 5HT-3), Dexamethasone or Solumedrol     Comments:    Other Comments: GAETT  Tylenol and gabapentin po pre op  Antiemetics  Ketamine after induction  Magnesium taylor op infusion            Wali Pastor MD

## 2022-06-27 NOTE — ANESTHESIA CARE TRANSFER NOTE
Patient: Barry Jay    Procedure: Procedure(s):  Right Lumbar 4 - lumbar 5 hemilaminectomy, medial facetectomies, foraminotomies       Diagnosis: Lumbar radiculopathy [M54.16]  Diagnosis Additional Information: No value filed.    Anesthesia Type:   General     Note:    Oropharynx: oropharynx clear of all foreign objects and spontaneously breathing  Level of Consciousness: awake  Oxygen Supplementation: face mask  Level of Supplemental Oxygen (L/min / FiO2): 8  Independent Airway: airway patency satisfactory and stable  Dentition: dentition unchanged  Vital Signs Stable: post-procedure vital signs reviewed and stable  Report to RN Given: handoff report given  Patient transferred to: PACU    Handoff Report: Identifed the Patient, Identified the Reponsible Provider, Reviewed the pertinent medical history, Discussed the surgical course, Reviewed Intra-OP anesthesia mangement and issues during anesthesia, Set expectations for post-procedure period and Allowed opportunity for questions and acknowledgement of understanding      Vitals:  Vitals Value Taken Time   /90    Temp     Pulse 83 06/27/22 1332   Resp 13 06/27/22 1332   SpO2 96 % 06/27/22 1332   Vitals shown include unvalidated device data.    Electronically Signed By: ATIYA Drummond CRNA  June 27, 2022  1:34 PM

## 2022-06-27 NOTE — BRIEF OP NOTE
[facility] Brief Operative Note    Pre-operative diagnosis: Lumbar radiculopathy [M54.16]   Post-operative diagnosis same   Procedure: Procedure(s):  Right Lumbar 4 - lumbar 5 hemilaminectomy, medial facetectomies, foraminotomies   Surgeon(s): Surgeon(s) and Role:     * Shannan Mari MD - Primary     * Joselyn English PA-C - Assisting   Estimated blood loss: 15 mL    Specimens: * No specimens in log *   Findings: Severe lateral recess stenosis right L4-5

## 2022-06-28 NOTE — OP NOTE
NEUROSURGERY OPERATIVE REPORT     DATE OF SERVICE: 6/27/2022    PREOPERATIVE DIAGNOSES:  Lumbar radiculopathy      POSTOPERATIVE DIAGNOSES:  Same    PROCEDURES:  1.  Right Lumbar 4 - lumbar 5 hemilaminectomy, medial facetectomies, foraminotomies  2.  Use of operating room microscope.    SURGEON:  Shannan Mari MD    ASSISTANT:  Joselyn English PA-C    INDICATIONS:  Barry Jay is a 67-year-old male who presents with low back and right leg pain and numbness and tingling.  Patient's repeat lumbar x-rays show 1 mm of movement between flexion and extension at lumbar 4-5.  MRI of his lumbar spine shows a spondylolisthesis lumbar 4-5 with advanced degeneration of his bilateral facets with a right-sided facet effusion and inflammatory subchondral edema.  Also has mild central stenosis and moderate right greater than left subarticular stenosis with descending L5 nerve impingement.  No significant impingement or stenosis at lumbar 5-sacral 1.  We discussed given his facet changes at this level and spondylolisthesis he may be at risk of needing future fusion at this level after undergoing hemilaminectomy.  He would like to avoid fusion surgery at this time and appeared to understand.  Recommend right lumbar 4-5 hemilaminectomy, medial facetectomy, foraminotomy. Risks and benefits of lumbar decompression discussed including but not limited to infection, inadequate symptom relief, hematoma, nerve damage including paralysis, post op radiculitis, durotomy, risks associated with the use of general anesthesia, blood clots in the lungs or legs.  He agreed to proceed.     PROCEDURE:  After obtaining informed consent, the patient was brought to the operating room with TEDs and pneumatic stockings in place.  IV antibiotics was administered.  He was intubated under general  endotracheal anesthesia.  He was turned into the radiolucent laminectomy frame with all pressure points well padded. He was prepped and draped in the usual  sterile fashion.  A preincisional infiltration of local anesthetic was performed along the midline and the incision was opened sharply and extended down to the fascia.  The fascia was opened in one layer with monopolar electrocautery.  A subperiosteal dissection was undertaken to expose the lamina at right lumbar 4-5.  We verified levels with a lateral x-ray.      Once levels were verified, we then proceeded to remove the lamina of right lumbar 4 with Midas drill and kerrisons.   We drilled down to the ligamentum elevated the ligamentum from the underlying thecal sac and removed it with a combination of Kerrisons and curettes.  We next drilled a partial medial facetectomy and foraminotomies opening up the lateral recess and expose the underlying impinged lateral recess and nerve roots.  Once the thecal sac and nerve roots were completely decompressed we used a small ball tip probe to verify no further pressure either in the canal or in the foramina. The lateral recess was nicely decompressed.  We then proceeded to copiously irrigate the incision with antibiotic-containing saline and achieved hemostasis.  Due to the nature and complexity of the case an assistant was required for the duration of the case for positioning, retraction, hemostasis, and closure.      The incision was closed in layers with interrupted 0 Vicryl to approximate the muscle and fascia, inverted 2-0 Vicryl to approximate the subcutaneous tissues and monocry to approximate the skin edges. Exofin was then placed.  Sponge and needle counts were correct prior to closure x2.  Sterile dressings were placed.     Patient tolerated the procedure well, was taken to the recovery room where he was extubated and found to be at his neurological baseline with no new deficits appreciated.    Estimated blood loss: 15 ml    Specimens: none    Findings: Severe lateral recess stenosis right L4-5     Shannan Mari MD    Cc: Osmel Iniguez

## 2022-06-28 NOTE — ANESTHESIA POSTPROCEDURE EVALUATION
Patient: Barry Jay    Procedure: Procedure(s):  Right Lumbar 4 - lumbar 5 hemilaminectomy, medial facetectomies, foraminotomies       Anesthesia Type:  General    Note:     Postop Pain Control: Uneventful            Sign Out: Well controlled pain   PONV: No   Neuro/Psych: Uneventful            Sign Out: Acceptable/Baseline neuro status   Airway/Respiratory: Uneventful            Sign Out: Acceptable/Baseline resp. status   CV/Hemodynamics: Uneventful            Sign Out: Acceptable CV status; No obvious hypovolemia; No obvious fluid overload   Other NRE: NONE   DID A NON-ROUTINE EVENT OCCUR? No           Last vitals:  Vitals Value Taken Time   /79 06/27/22 1445   Temp 36.7  C (98  F) 06/27/22 1445   Pulse 80 06/27/22 1451   Resp 14 06/27/22 1450   SpO2 97 % 06/27/22 1451   Vitals shown include unvalidated device data.    Electronically Signed By: Reyes Bridges MD  June 27, 2022  9:28 PM

## 2022-06-29 ENCOUNTER — TELEPHONE (OUTPATIENT)
Dept: NEUROSURGERY | Facility: CLINIC | Age: 67
End: 2022-06-29

## 2022-06-29 NOTE — TELEPHONE ENCOUNTER
PATIENT NAME:  Barry Jay  YOB: 1955  MRN: [id number]  SURGEON: Dr. Mari  DATE of SURGERY: 04/27/2022  PROCEDURE: Right Lumbar 4 - lumbar 5 hemilaminectomy, medial facetectomies, foraminotomies    FOLLOW-UP:  Wound Check: 7-10 days  Staples/Sutures Out: n/a  Post Op Visit: 6 weeks  Post-op Provider: BERNY on Dr. Mari clinic day  DIAGNOSTICS: n/a  DISPOSITION: Home same day    ADDITIONAL INSTRUCTIONS FOR MEDICAL STAFF:        Melissa Winn RN, CNRN

## 2022-07-06 ENCOUNTER — ALLIED HEALTH/NURSE VISIT (OUTPATIENT)
Dept: NEUROSURGERY | Facility: CLINIC | Age: 67
End: 2022-07-06
Payer: COMMERCIAL

## 2022-07-06 VITALS
SYSTOLIC BLOOD PRESSURE: 128 MMHG | HEART RATE: 100 BPM | RESPIRATION RATE: 16 BRPM | DIASTOLIC BLOOD PRESSURE: 84 MMHG | OXYGEN SATURATION: 97 %

## 2022-07-06 DIAGNOSIS — M54.16 LUMBAR RADICULOPATHY: Primary | ICD-10-CM

## 2022-07-06 PROCEDURE — 99207 PR NO CHARGE NURSE ONLY: CPT

## 2022-07-06 NOTE — PROGRESS NOTES
Barry Jay is s/p Right Lumbar 4 - lumbar 5 hemilaminectomy, medial facetectomies, foraminotomies with Dr. Mari on 06/27/2022.    Patient presents today for post-op incision check and is 9 days post-op. He presented preoperatively with low back pain, buttock pain, right posterior leg pain with numbness and tingling. Today, he reports significant improvement with all his pre op symtoms.  Gait and balance are normal. Patient is pleased with the outcome of the surgery.    Taking Celebrex once daily for pain with adequate relief, no other medications used. Pt is using ice for muscle stiffness/soreness.     Patient is walking frequently without difficulty. Activity restrictions reinforced at this time as outlined the After Visit Summary. Reminded pt not to golf until he is seen at his 6 week follow up.     Surgical wound WNL - C/D/I, no signs of infection or skin breakdown.  Incision well-healed: good skin approximation, no redness or visible/palpable edema, no tenderness to palpation.  Pt denies fever, chills or sweats.    All of patient's questions addressed today. He was instructed to call with any additional questions/concerns. Follow up appts made.     Ingris Brown RN

## 2022-07-06 NOTE — PATIENT INSTRUCTIONS
WOUND CARE:  A dressing is not required.      Wash your hands before touching the wound.  Ensure that anyone assisting you in the care of your wound washes her/his hands before touching the wound. Good handwashing can decrease the risk of serious infection.    If you are unable to see your wound, have someone check the wound daily for redness, swelling,or drainage.   You may shower. Wash your wound daily.  Apply mild soap directly to the wound, form a light lather and rinse with running water. Pat the wound dry.  Do not rub, pick, or otherwise help the dermabond come off more quickly.  Do not place tape or adhesive over the dermabond.    Do not submerge the wound under water. No baths or swimming for 6 weeks.     If you develop redness, swelling, drainage, or temp 101 or greater, call our office at [phone]    Activity Restrictions:  *No lifting, pushing or pulling greater than 5-10 pounds (this is about a gallon ofmilk).  *No repetitive bending, twisting, or jarring activities  *No overhead work  *No aerobic or strenuous activity  *No activities with increased risk of falls  *You may move about your home as tolerated  *You may walk up and down stairs as tolerated  *You may increase your activity slowly over the next 4-6 weeks    WALKING PROGRAM: As you can tolerate, walk daily-start with 5-10 minutes of continuous walking. This is in addition to the walking that you do as part of your daily activities. Increase the time that you walk by 5 minutes every couple of days. Do not exceed 30-45 minutes of continuous walking until seen in follow-up. Walking is the best exercise after surgery.  **Listen to your body, if you find that you are more painful or fatigued, you may need to proceed more slowly.    **Do not smoke or expose yourself to second handsmoke. Cigarette smoke can delay healing and cause complications.     DRIVING:  We recommend that you do not drive while taking medications for pain or muscle spasms.  Always read and follow the advice on your prescription bottle. If you have questions, speak with your pharmacist. We recommend that you do not drive while wearing a brace, as it could limit your range of motion.    WORK: If you plan to return to work before your 6 week appointment, call and discuss with one of the nurses in the neurosurgery office.

## 2022-07-19 ENCOUNTER — TELEPHONE (OUTPATIENT)
Dept: NEUROSURGERY | Facility: CLINIC | Age: 67
End: 2022-07-19

## 2022-07-19 DIAGNOSIS — M54.16 LUMBAR RADICULOPATHY: Primary | ICD-10-CM

## 2022-07-19 RX ORDER — METHYLPREDNISOLONE 4 MG
TABLET, DOSE PACK ORAL
Qty: 21 TABLET | Refills: 0 | Status: SHIPPED | OUTPATIENT
Start: 2022-07-19 | End: 2022-07-26

## 2022-07-19 NOTE — TELEPHONE ENCOUNTER
Patient calling  - was feeling good after surgery but now the pain has come back. Please to discuss.

## 2022-07-19 NOTE — TELEPHONE ENCOUNTER
"Pt is s/p Right Lumbar 4 - lumbar 5 hemilaminectomy, medial facetectomies, foraminotomies with Dr. Mari on 06/27/2022.    Barry reports an increase in pain in his lower back and right leg (no return of numbness/tingling). He has been doing a lot of walking, 45 minutes at a time. His pain has been increasing in intensity over one week. He has since scaled his activity down significantly.     Currently taking: no oral medications. Was taking flexeril 10mg gthe first 4 days of pain - they did not help. Oxycodone is not preferred d/t side effects. No Tylenol or ibuprofen - pt does not like to take pills. Has been using ice TID.      Discussed a tylenol/ibuprofen alternating regimen. Pt did not opt for this as it is a lot of medication to take and remember. Discussed a medrol dose pack - pt opted for this as it seemed \"simpler\". Discussed no NSAIDs while taking MDP. Pt verbalized understanding.     Will call pt to follow up on Friday.     Ingris Brown RN   "

## 2022-07-22 ENCOUNTER — TELEPHONE (OUTPATIENT)
Dept: NEUROSURGERY | Facility: CLINIC | Age: 67
End: 2022-07-22

## 2022-07-22 NOTE — TELEPHONE ENCOUNTER
Patient was returning a call he received from our clinic earlier today 7/22/2022  Please call patient back at [phone]

## 2022-07-22 NOTE — TELEPHONE ENCOUNTER
Left message for pt to return call (no details left in message).     Following up about lumbar radicular symptom improvement.     Ingris Brown RN

## 2022-07-22 NOTE — TELEPHONE ENCOUNTER
"Barry reports he resumed using oxycodone when he began the medrol dose pack. He is currently on day 4 of steroids. He reports improvement in his low back pain, and very slight improvement in his right hip pain. He continues to use ice three times per day.     Encouraged him to start 1000 mg TID of tylenol. He was in agreement.     Barry is concerned about his progress thus far and would like to return to clinic in follow up next week to discuss \"all of this in person\". Informed him that this would be with an advanced practice provider on a day when Dr. Mari is in clinic, not on Dr. Mari's schedule. He was in agreement.     Ingris Brwon RN   "

## 2022-07-26 ENCOUNTER — OFFICE VISIT (OUTPATIENT)
Dept: NEUROSURGERY | Facility: CLINIC | Age: 67
End: 2022-07-26
Payer: COMMERCIAL

## 2022-07-26 ENCOUNTER — TELEPHONE (OUTPATIENT)
Dept: NEUROSURGERY | Facility: CLINIC | Age: 67
End: 2022-07-26

## 2022-07-26 VITALS
OXYGEN SATURATION: 95 % | HEART RATE: 93 BPM | SYSTOLIC BLOOD PRESSURE: 131 MMHG | WEIGHT: 202 LBS | HEIGHT: 68 IN | BODY MASS INDEX: 30.62 KG/M2 | DIASTOLIC BLOOD PRESSURE: 73 MMHG

## 2022-07-26 DIAGNOSIS — M54.16 LUMBAR RADICULOPATHY: Primary | ICD-10-CM

## 2022-07-26 PROCEDURE — 99024 POSTOP FOLLOW-UP VISIT: CPT | Performed by: NURSE PRACTITIONER

## 2022-07-26 RX ORDER — PREGABALIN 50 MG/1
50 CAPSULE ORAL 2 TIMES DAILY
Qty: 60 CAPSULE | Refills: 0 | Status: SHIPPED | OUTPATIENT
Start: 2022-07-26 | End: 2022-08-16

## 2022-07-26 RX ORDER — OXYCODONE HYDROCHLORIDE 5 MG/1
5 TABLET ORAL 3 TIMES DAILY PRN
Qty: 15 TABLET | Refills: 0 | Status: SHIPPED | OUTPATIENT
Start: 2022-07-26 | End: 2022-08-01

## 2022-07-26 NOTE — PATIENT INSTRUCTIONS
Take your celebrex daily to try and take down some inflammation.   I would also recommend lyrica for the leg pain to see if this helps. If no help in 2-3 weeks of regular use, we can get some more imaging.   If no improvement with PT and medication, we will consider MRI lumbar spine.

## 2022-07-26 NOTE — TELEPHONE ENCOUNTER
Pt is calling back to schedule       Ingris Brown, RN  P Ns Scheduling Registration Pool  Follow up in 3-4 weeks with BERNY on Dr. aMri clinic day. Nursing will call pt to assess the need for Lumbar MRI prior.

## 2022-07-26 NOTE — LETTER
7/26/2022         RE: Barry aJy  [address] [facility] [address]        Dear Colleague,    Thank you for referring your patient, Barry Jay, to the [facility]. Please see a copy of my visit note below.      Neurosurgery Progress Note  07/26/22    A/P: Barry Jay is a 67 year old male who is s/p right L4-5 hemilaminectomy, medial facetectomy and foraminotomy with Dr. Mari on 6/27. Returns for follow up check on symptoms. Barry notes after surgery for about a week or more he had improvement of symptoms and was doing quite well. However since ~ 7/10/2022 his symptoms have gotten back to pre-procedure. He noted he was walking up to an hour prior to this date and he woke up the following day with back and leg symptoms, 60% leg, 40% back. Medrol dose pack was not helpful, takes oxycodone which can take away the sharp shooting pain. Pain goes from the right low back and onto the anterior thigh, says it can go down the knee. Rest makes it better, activity makes it worse.     Has not really taken anything for pain, medrol dose pack not particularly helpful, tried gabapentin in the past. Overall frustrated with his progress. Patient seen with Dr. Mari who recommends PT, celebrex, and trial of lyrica. If no improvement in 3-4 weeks would consider MRI lumbar spine and lumbar flex/ex films.     Plan:  1. PT for back and leg pain, rule out SI joint pathology - agreed to a few sessions  2. Celebrex daily, lyrica daily - I recommended he take this regularly for 2-3 weeks  3. If no improvement, will order lumbar MRI with and without contrast and lumbar flex/ex films  4. Appt with BERNY on EB day to follow, cancel aug 9 appt.       Neurosurgery Attending: The patient's clinical examination, laboratory data, and plan was discussed with Dr. Mari    S: Was feeling great for the first two weeks after surgery. He was walkign 30-60 minutes per day. Then all of a sudden, pain  "was worse again. Back to pre-surgery pain in the right low back and thigh. Tells me it will go back down around the knee. Does not like taking medications. Pain has not changed in the last two weeks, stayed consistently as bad as pre-op.   PMH: No interval change  PSH: No interval change    ROS:  A full 14 point review of systems was otherwise completed and is negative aside from that mentioned above in the HPI    O:  /73   Pulse 93   Ht 1.727 m (5' 8\")   Wt 91.6 kg (202 lb)   SpO2 95%   BMI 30.71 kg/m    General: Awake, alert, NAD  HEENT: AT/NC, EOMI, face symmetric, oral mucosa moist and pink  Heart: RRR: Nonlabored respirations without accessory muscle use.  Abd: S, NT, ND  Neuro: Awake, alert, speech is clear and content is appropriate. MAEW x 4 with full strength in b/l UE and LE. Sensation is intact totemperature and LT.   Coordination: intact  Musculoskeletal: Negative straight leg raise bl  Psych: Appropriatemood and affect, pleasant, cooperative, alert and oriented x 3  Skin: Incision C/D/I    Labs: personally reviewed   Lab Results   Component Value Date     06/13/2022     06/13/2022    CO2 24 06/13/2022    CO2 23 06/13/2022    BUN 14 06/13/2022    BUN 15 06/13/2022     Recent Labs   Lab Test 06/13/22  1454 06/13/22  1335 06/11/21  1634 05/28/20  1410 04/15/19  1439 01/11/19  1545 04/26/18  1830   WBC 9.7 9.1 10.4 8.5 9.5 9.7 15.9*   HGB 15.8 16.3 16.0 16.4 16.3 16.2 16.0   HCT 46.4 50.0 46.2 48.7 48.8 47.4 44.8   MCV 89 92 90 95 94 92 90    250 222 201 229 196 215     Recent Labs   Lab Test 06/13/22  1454   INR 1.07   PTT 33         I personally visualized all imaging. None new.     Melanie Alejandre CNP  [facility]  O: [phone]  P: [phone]              Again, thank you for allowing me to participate in the care of your patient.        Sincerely,        Melanie Alejandre NP    "

## 2022-07-26 NOTE — Clinical Note
Can we cancel his aug 9 appt and put one 3-4 weeks from now, check in prior to see if his pain is not improved and offer mri lumbar spine.

## 2022-07-26 NOTE — PROGRESS NOTES
Neurosurgery Progress Note  07/26/22    A/P: Barry Jay is a 67 year old male who is s/p right L4-5 hemilaminectomy, medial facetectomy and foraminotomy with Dr. Mari on 6/27. Returns for follow up check on symptoms. Barry notes after surgery for about a week or more he had improvement of symptoms and was doing quite well. However since ~ 7/10/2022 his symptoms have gotten back to pre-procedure. He noted he was walking up to an hour prior to this date and he woke up the following day with back and leg symptoms, 60% leg, 40% back. Medrol dose pack was not helpful, takes oxycodone which can take away the sharp shooting pain. Pain goes from the right low back and onto the anterior thigh, says it can go down the knee. Rest makes it better, activity makes it worse.     Has not really taken anything for pain, medrol dose pack not particularly helpful, tried gabapentin in the past. Overall frustrated with his progress. Patient seen with Dr. Mari who recommends PT, celebrex, and trial of lyrica. If no improvement in 3-4 weeks would consider MRI lumbar spine and lumbar flex/ex films.     Plan:  1. PT for back and leg pain, rule out SI joint pathology - agreed to a few sessions  2. Celebrex daily, lyrica daily - I recommended he take this regularly for 2-3 weeks  3. If no improvement, will order lumbar MRI with and without contrast and lumbar flex/ex films  4. Appt with BERNY on EB day to follow, cancel aug 9 appt.       Neurosurgery Attending: The patient's clinical examination, laboratory data, and plan was discussed with Dr. Mari    S: Was feeling great for the first two weeks after surgery. He was walkign 30-60 minutes per day. Then all of a sudden, pain was worse again. Back to pre-surgery pain in the right low back and thigh. Tells me it will go back down around the knee. Does not like taking medications. Pain has not changed in the last two weeks, stayed consistently as bad as pre-op.   PMH: No interval  "change  PSH: No interval change    ROS:  A full 14 point review of systems was otherwise completed and is negative aside from that mentioned above in the HPI    O:  /73   Pulse 93   Ht 1.727 m (5' 8\")   Wt 91.6 kg (202 lb)   SpO2 95%   BMI 30.71 kg/m    General: Awake, alert, NAD  HEENT: AT/NC, EOMI, face symmetric, oral mucosa moist and pink  Heart: RRR: Nonlabored respirations without accessory muscle use.  Abd: S, NT, ND  Neuro: Awake, alert, speech is clear and content is appropriate. MAEW x 4 with full strength in b/l UE and LE. Sensation is intact totemperature and LT.   Coordination: intact  Musculoskeletal: Negative straight leg raise bl  Psych: Appropriatemood and affect, pleasant, cooperative, alert and oriented x 3  Skin: Incision C/D/I    Labs: personally reviewed   Lab Results   Component Value Date     06/13/2022     06/13/2022    CO2 24 06/13/2022    CO2 23 06/13/2022    BUN 14 06/13/2022    BUN 15 06/13/2022     Recent Labs   Lab Test 06/13/22  1454 06/13/22  1335 06/11/21  1634 05/28/20  1410 04/15/19  1439 01/11/19  1545 04/26/18  1830   WBC 9.7 9.1 10.4 8.5 9.5 9.7 15.9*   HGB 15.8 16.3 16.0 16.4 16.3 16.2 16.0   HCT 46.4 50.0 46.2 48.7 48.8 47.4 44.8   MCV 89 92 90 95 94 92 90    250 222 201 229 196 215     Recent Labs   Lab Test 06/13/22  1454   INR 1.07   PTT 33         I personally visualized all imaging. None new.     Melanie Alejandre CNP  [facility]  O: [phone]  P: [phone]          "

## 2022-08-01 ENCOUNTER — TELEPHONE (OUTPATIENT)
Dept: NEUROSURGERY | Facility: CLINIC | Age: 67
End: 2022-08-01

## 2022-08-01 DIAGNOSIS — M54.16 LUMBAR RADICULOPATHY: ICD-10-CM

## 2022-08-01 DIAGNOSIS — M54.16 LUMBAR RADICULOPATHY: Primary | ICD-10-CM

## 2022-08-01 RX ORDER — OXYCODONE HYDROCHLORIDE 5 MG/1
5 TABLET ORAL 3 TIMES DAILY PRN
Qty: 21 TABLET | Refills: 0 | Status: SHIPPED | OUTPATIENT
Start: 2022-08-01 | End: 2022-09-28

## 2022-08-01 NOTE — TELEPHONE ENCOUNTER
Refill sent please have patient complete lumbar MRI with and without contrast for further evaluation.       Joselyn English PA-C  [facility] Neurosurgery  O: [phone]

## 2022-08-01 NOTE — TELEPHONE ENCOUNTER
Barry reports ongoing pain that goes from the right low back and onto the anterior thigh, says it can go down the knee. Lyrica has not been beneficial yet. PT will not start until beginning of September. Takes oxycodone 5 ng 3 times a day. He requested MRI and xray. He inquired a refill of oxycodone. Follow up appt in clinic on 9/16/2022.  [facility] query completed.  Printed and scanned into chart.   Melissa Winn RN, CNRN

## 2022-08-01 NOTE — TELEPHONE ENCOUNTER
Patient is needing a refill - patient states he needs 40 oxycodone     Pharmacy - [facility] in [place]

## 2022-08-01 NOTE — TELEPHONE ENCOUNTER
Called and offered Barry a Medrol dosepak - he declined stating he took it a few weeks ago and it was not beneficial.  Melissa Winn RN, CNRN

## 2022-08-05 ENCOUNTER — MEDICAL CORRESPONDENCE (OUTPATIENT)
Dept: NEUROSURGERY | Facility: CLINIC | Age: 67
End: 2022-08-05

## 2022-08-12 ENCOUNTER — HOSPITAL ENCOUNTER (OUTPATIENT)
Dept: RADIOLOGY | Facility: HOSPITAL | Age: 67
Discharge: HOME | End: 2022-08-12
Attending: SURGERY
Payer: COMMERCIAL

## 2022-08-12 ENCOUNTER — HOSPITAL ENCOUNTER (OUTPATIENT)
Dept: MRI IMAGING | Facility: HOSPITAL | Age: 67
Discharge: HOME | End: 2022-08-12
Attending: SURGERY
Payer: COMMERCIAL

## 2022-08-12 DIAGNOSIS — M54.16 LUMBAR RADICULOPATHY: ICD-10-CM

## 2022-08-12 PROCEDURE — 255N000002 HC RX 255 OP 636: Performed by: SURGERY

## 2022-08-12 PROCEDURE — 72158 MRI LUMBAR SPINE W/O & W/DYE: CPT

## 2022-08-12 PROCEDURE — A9585 GADOBUTROL INJECTION: HCPCS | Performed by: SURGERY

## 2022-08-12 PROCEDURE — 72120 X-RAY BEND ONLY L-S SPINE: CPT

## 2022-08-12 RX ORDER — GADOBUTROL 604.72 MG/ML
10 INJECTION INTRAVENOUS ONCE
Status: COMPLETED | OUTPATIENT
Start: 2022-08-12 | End: 2022-08-12

## 2022-08-12 RX ADMIN — GADOBUTROL 10 ML: 604.72 INJECTION INTRAVENOUS at 17:13

## 2022-08-16 ENCOUNTER — OFFICE VISIT (OUTPATIENT)
Dept: NEUROSURGERY | Facility: CLINIC | Age: 67
End: 2022-08-16
Payer: COMMERCIAL

## 2022-08-16 VITALS — DIASTOLIC BLOOD PRESSURE: 61 MMHG | OXYGEN SATURATION: 96 % | HEART RATE: 99 BPM | SYSTOLIC BLOOD PRESSURE: 106 MMHG

## 2022-08-16 DIAGNOSIS — M54.16 LUMBAR RADICULOPATHY: ICD-10-CM

## 2022-08-16 PROCEDURE — 99024 POSTOP FOLLOW-UP VISIT: CPT | Performed by: NURSE PRACTITIONER

## 2022-08-16 RX ORDER — PREGABALIN 50 MG/1
50 CAPSULE ORAL 2 TIMES DAILY
Qty: 60 CAPSULE | Refills: 0 | Status: SHIPPED | OUTPATIENT
Start: 2022-08-16 | End: 2022-09-15

## 2022-08-16 ASSESSMENT — PAIN SCALES - GENERAL: PAINLEVEL: EXTREME PAIN (8)

## 2022-08-16 NOTE — PROGRESS NOTES
Neurosurgery Progress Note      A/P: Barry Jay is a 67 year old male who is s/p right L4-5 hemilaminectomy, medial facetectomy and foraminotomy with Dr. Mari on 6/27/21. Returns for follow up check on symptoms. Barry notes after surgery for about a week or more he had improvement of his back and right leg pain and was doing quite well. However since ~ 7/10/2022 his symptoms have gotten back to pre-procedure.    Today he complains of ongoing significant right lower back pain into his gluteal region, right hip, down the outside of the leg to the ankle. Does not enter the foot. Worse with walking, 5-10 mins of walking or mowing the lawn. Very limiting. He has failed medrol dose pack, consistent celebrex use, and bid lyrica.     Dr Mari saw patient at time of appt for discussion of imaging and plan. The area of surgery around the R L5 nerve root appears adequately decompressed. It is possible the nerve root is still irritated by some postop swelling in the area, or scar tissue. Surgery did not destabilize him either as the xrays did not show any instability. There is no radiographic findings to support additional surgery at this time. We offered a diagnostic right TF L5-S1 REGI to target the R L5 nerve root. Start PT as planned in Sept, unfortunately could not get in sooner. Per Dr Mari, follow up with Dr Mari in her clinic 2-3 wks after next injection to evaluate response      Plan:  1. PT as planned in sept  2. Stop celebrex if it is not helping. continue lyrica daily - I will refill  3. Right TF REGI L5-S1   4. Follow up with Dr Mari (per Dr Mari) 2-3 wks after injection      O:  /61   Pulse 99   SpO2 96%   General: Awake, alert, NAD    Neuro: Awake, alert, speech is clear and content is appropriate.    MAEW x 4     full strength in b/l LE.     Mild sensory loss R anterior thigh and knee. Sensation is otherwise intact to temperature and LT throughout both lower extremities    No SI joint  tenderness  No Hip joint tenderness  No paraspinal musculature tenderness or vertebral point tenderness    Coordination: intact    Gait is wnl    R straight leg raise positive      Imaging:  Personally reviewed lumbar MRI and flexion extension XR  Imaging was also reviewed by Dr Mari  [facility]  XR LUMBAR BENDING ONLY 2/3 VIEWS  8/12/2022 5:33 PM     INDICATION: Low back pain and anterior listhesis.  COMPARISON: 6/7/2022.                                                                      IMPRESSION: There are 5 lumbar type vertebral bodies. The vertebral body heights are well-maintained throughout. There is a slight anterior listhesis of L4 in relation to L5 which is stable on the flexion, extension and neutral views. The rest of the   lumbar spine has good anatomic alignment. There is prominent facet arthropathy from L3 to the sacrum. There is degenerative disc disease from the L2-L3 to the L4-L5 disc space levels. These levels have a mild loss of height, endplate changes and small   anterior osteophyte formations. There are also degenerative changes involving the lower thoracic disc spaces visualized on this study.      EXAM: MR LUMBAR SPINE W/O and W CONTRAST  LOCATION: [facility]  DATE/TIME: 8/12/2022 4:50 PM     INDICATION:  Lumbar radiculopathy. Right-sided lower extremity pain extending to the anterior thigh. Recent L4-L5 laminectomy and medial facetectomy.  COMPARISON: MRI 05/10/2022  CONTRAST: 10ml Gadavist  TECHNIQUE: Routine Lumbar Spine MRI without and with IV contrast.     FINDINGS:   Nomenclature is based on 5 lumbar type vertebral bodies. Unchanged lumbar alignment including 2 mm anterolisthesis at L4-L5. Small multilevel Schmorl's nodes as seen previously with unchanged vertebral body heights. Presumed vertebral hemangioma at L2.   Mild Modic type one endplate edema at L4-L5 on the right with associated right L4-L5 facet complex edema. Normal  distal spinal cord and cauda equina with conus medullaris at upper L2. Postoperative changes related to right hemilaminectomy and medial   facetectomy at L4-L5. Small amount of presumed postoperative fluid or evolving blood products within the laminectomy defect. Associated enhancement the laminectomy site with mild adjacent dorsal epidural enhancement and presumed enhancing granulation   tissue within the posterior paraspinal soft tissues. Mild degenerative changes of the sacroiliac joints.     T12-L1: Normal disc height and signal. No herniation. Mild facet arthropathy. No spinal canal or neural foraminal stenosis.      L1-L2: Normal disc height and signal. No herniation. Mild facet arthropathy. No spinal canal or neural foraminal stenosis.     L2-L3: Disc desiccation preserved disc height. Slight annular bulge without focal disc herniation. Mild facet arthropathy. Low-grade narrowing of lateral recesses without central spinal canal stenosis. Mild bilateral neural foraminal stenosis.      L3-L4: Disc desiccation with minor loss of disc height. Slight annular bulge without focal disc herniation. Mild endplate spurring, greater on the left. Mild to moderate facet arthropathy, greater on the left. Mild spinal canal stenosis with asymmetric   narrowing of the left lateral recess. Mild bilateral neural foraminal stenosis.     L4-L5: Mild loss of disc height and signal. Shallow posterior disc bulge and minor endplate spurring. Right hemilaminectomy and partial medial facetectomy. Persistent moderate facet arthropathy and small right facet joint effusion. Improved patency of   the right lateral recess compared to the preoperative exam. No central spinal canal stenosis. Mild to moderate right neural foraminal stenosis similar to the prior exam. Unchanged mild left neural foraminal stenosis.     L5-S1: Minor disc desiccation with preserved disc height. Shallow broad-based right central disc protrusion. Mild facet  arthropathy. No spinal canal stenosis. Minimal right neural foraminal stenosis. No left neural foraminal stenosis.                                                                      IMPRESSION:  1.  Changes of interval right hemilaminectomy and medial facetectomy at L4-L5 with improved patency of the right lateral recess compared to the preoperative exam. Persistent mild to moderate right and mild left neural foraminal stenosis with minor   displacement of the right L4 nerve root.  2.  At L3-L4, mild spinal canal stenosis with asymmetric narrowing of the left lateral recess and mild bilateral neural foraminal stenosis.  3.  Mild lumbar spondylosis elsewhere as detailed above.      Alejandra Luna FNP-C  [facility] Neurosurgery  O. [phone]

## 2022-08-16 NOTE — LETTER
8/16/2022         RE: Barry Jay  [address] [address]        Dear Colleague,    Thank you for referring your patient, Barry Jay, to the [facility]. Please see a copy of my visit note below.      Neurosurgery Progress Note      A/P: Barry Jay is a 67 year old male who is s/p right L4-5 hemilaminectomy, medial facetectomy and foraminotomy with Dr. Mari on 6/27/21. Returns for follow up check on symptoms. Barry notes after surgery for about a week or more he had improvement of his back and right leg pain and was doing quite well. However since ~ 7/10/2022 his symptoms have gotten back to pre-procedure.    Today he complains of ongoing significant right lower back pain into his gluteal region, right hip, down the outside of the leg to the ankle. Does not enter the foot. Worse with walking, 5-10 mins of walking or mowing the lawn. Very limiting. He has failed medrol dose pack, consistent celebrex use, and bid lyrica.     Dr Mari saw patient at time of appt for discussion of imaging and plan. The area of surgery around the R L5 nerve root appears adequately decompressed. It is possible the nerve root is still irritated by some postop swelling in the area, or scar tissue. Surgery did not destabilize him either as the xrays did not show any instability. There is no radiographic findings to support additional surgery at this time. We offered a diagnostic right TF L5-S1 REGI to target the R L5 nerve root. Start PT as planned in Sept, unfortunately could not get in sooner. Per Dr Mari, follow up with Dr Mari in her clinic 2-3 wks after next injection to evaluate response      Plan:  1. PT as planned in sept  2. Stop celebrex if it is not helping. continue lyrica daily - I will refill  3. Right TF REGI L5-S1   4. Follow up with Dr Mari (per Dr Mari) 2-3 wks after injection      O:  /61   Pulse 99   SpO2 96%   General: Awake, alert,  NAD    Neuro: Awake, alert, speech is clear and content is appropriate.    MAEW x 4     full strength in b/l LE.     Mild sensory loss R anterior thigh and knee. Sensation is otherwise intact to temperature and LT throughout both lower extremities    No SI joint tenderness  No Hip joint tenderness  No paraspinal musculature tenderness or vertebral point tenderness    Coordination: intact    Gait is wnl    R straight leg raise positive      Imaging:  Personally reviewed lumbar MRI and flexion extension XR  Imaging was also reviewed by Dr Mari  [facility]  XR LUMBAR BENDING ONLY 2/3 VIEWS  8/12/2022 5:33 PM     INDICATION: Low back pain and anterior listhesis.  COMPARISON: 6/7/2022.                                                                      IMPRESSION: There are 5 lumbar type vertebral bodies. The vertebral body heights are well-maintained throughout. There is a slight anterior listhesis of L4 in relation to L5 which is stable on the flexion, extension and neutral views. The rest of the   lumbar spine has good anatomic alignment. There is prominent facet arthropathy from L3 to the sacrum. There is degenerative disc disease from the L2-L3 to the L4-L5 disc space levels. These levels have a mild loss of height, endplate changes and small   anterior osteophyte formations. There are also degenerative changes involving the lower thoracic disc spaces visualized on this study.      EXAM: MR LUMBAR SPINE W/O and W CONTRAST  LOCATION: [facility]  DATE/TIME: 8/12/2022 4:50 PM     INDICATION:  Lumbar radiculopathy. Right-sided lower extremity pain extending to the anterior thigh. Recent L4-L5 laminectomy and medial facetectomy.  COMPARISON: MRI 05/10/2022  CONTRAST: 10ml Gadavist  TECHNIQUE: Routine Lumbar Spine MRI without and with IV contrast.     FINDINGS:   Nomenclature is based on 5 lumbar type vertebral bodies. Unchanged lumbar alignment including 2 mm  anterolisthesis at L4-L5. Small multilevel Schmorl's nodes as seen previously with unchanged vertebral body heights. Presumed vertebral hemangioma at L2.   Mild Modic type one endplate edema at L4-L5 on the right with associated right L4-L5 facet complex edema. Normal distal spinal cord and cauda equina with conus medullaris at upper L2. Postoperative changes related to right hemilaminectomy and medial   facetectomy at L4-L5. Small amount of presumed postoperative fluid or evolving blood products within the laminectomy defect. Associated enhancement the laminectomy site with mild adjacent dorsal epidural enhancement and presumed enhancing granulation   tissue within the posterior paraspinal soft tissues. Mild degenerative changes of the sacroiliac joints.     T12-L1: Normal disc height and signal. No herniation. Mild facet arthropathy. No spinal canal or neural foraminal stenosis.      L1-L2: Normal disc height and signal. No herniation. Mild facet arthropathy. No spinal canal or neural foraminal stenosis.     L2-L3: Disc desiccation preserved disc height. Slight annular bulge without focal disc herniation. Mild facet arthropathy. Low-grade narrowing of lateral recesses without central spinal canal stenosis. Mild bilateral neural foraminal stenosis.      L3-L4: Disc desiccation with minor loss of disc height. Slight annular bulge without focal disc herniation. Mild endplate spurring, greater on the left. Mild to moderate facet arthropathy, greater on the left. Mild spinal canal stenosis with asymmetric   narrowing of the left lateral recess. Mild bilateral neural foraminal stenosis.     L4-L5: Mild loss of disc height and signal. Shallow posterior disc bulge and minor endplate spurring. Right hemilaminectomy and partial medial facetectomy. Persistent moderate facet arthropathy and small right facet joint effusion. Improved patency of   the right lateral recess compared to the preoperative exam. No central spinal  canal stenosis. Mild to moderate right neural foraminal stenosis similar to the prior exam. Unchanged mild left neural foraminal stenosis.     L5-S1: Minor disc desiccation with preserved disc height. Shallow broad-based right central disc protrusion. Mild facet arthropathy. No spinal canal stenosis. Minimal right neural foraminal stenosis. No left neural foraminal stenosis.                                                                      IMPRESSION:  1.  Changes of interval right hemilaminectomy and medial facetectomy at L4-L5 with improved patency of the right lateral recess compared to the preoperative exam. Persistent mild to moderate right and mild left neural foraminal stenosis with minor   displacement of the right L4 nerve root.  2.  At L3-L4, mild spinal canal stenosis with asymmetric narrowing of the left lateral recess and mild bilateral neural foraminal stenosis.  3.  Mild lumbar spondylosis elsewhere as detailed above.      Alejandra BEAUCHAMPP-C  [facility] Neurosurgery  O. [phone]                Again, thank you for allowing me to participate in the care of your patient.        Sincerely,        ATIYA Tobar CNP

## 2022-08-16 NOTE — NURSING NOTE
"Barry Jay is a 67 year old male who presents for:  Chief Complaint   Patient presents with     Surgical Followup     3/4 wk follow-up   SX: 6/27/22 R L4 - L5 hemilaminectomy, medial facetectomies, foraminotomies        Initial Vitals:  /61   Pulse 99   SpO2 96%  Estimated body mass index is 30.71 kg/m  as calculated from the following:    Height as of 7/26/22: 5' 8\" (1.727 m).    Weight as of 7/26/22: 202 lb (91.6 kg).. There is no height or weight on file to calculate BSA. BP completed using cuff size: large  Extreme Pain (8)    Nursing Comments: R LBP to LE pain w/ tingling and numbness on and off.    Ryan Thorpe  "

## 2022-08-24 ENCOUNTER — RADIOLOGY INJECTION OFFICE VISIT (OUTPATIENT)
Dept: PHYSICAL MEDICINE AND REHAB | Facility: CLINIC | Age: 67
End: 2022-08-24
Attending: NURSE PRACTITIONER
Payer: COMMERCIAL

## 2022-08-24 VITALS
SYSTOLIC BLOOD PRESSURE: 146 MMHG | TEMPERATURE: 98 F | DIASTOLIC BLOOD PRESSURE: 82 MMHG | HEART RATE: 92 BPM | OXYGEN SATURATION: 96 % | RESPIRATION RATE: 16 BRPM

## 2022-08-24 DIAGNOSIS — M54.16 LUMBAR RADICULOPATHY: ICD-10-CM

## 2022-08-24 PROCEDURE — 64483 NJX AA&/STRD TFRM EPI L/S 1: CPT | Mod: RT | Performed by: PHYSICAL MEDICINE & REHABILITATION

## 2022-08-24 RX ORDER — METHYLPREDNISOLONE ACETATE 80 MG/ML
INJECTION, SUSPENSION INTRA-ARTICULAR; INTRALESIONAL; INTRAMUSCULAR; SOFT TISSUE
Status: COMPLETED | OUTPATIENT
Start: 2022-08-24 | End: 2022-08-24

## 2022-08-24 RX ORDER — LIDOCAINE HYDROCHLORIDE 10 MG/ML
INJECTION, SOLUTION EPIDURAL; INFILTRATION; INTRACAUDAL; PERINEURAL
Status: COMPLETED | OUTPATIENT
Start: 2022-08-24 | End: 2022-08-24

## 2022-08-24 RX ADMIN — LIDOCAINE HYDROCHLORIDE 2 ML: 10 INJECTION, SOLUTION EPIDURAL; INFILTRATION; INTRACAUDAL; PERINEURAL at 14:55

## 2022-08-24 RX ADMIN — METHYLPREDNISOLONE ACETATE 80 MG: 80 INJECTION, SUSPENSION INTRA-ARTICULAR; INTRALESIONAL; INTRAMUSCULAR; SOFT TISSUE at 14:55

## 2022-08-24 ASSESSMENT — PAIN SCALES - GENERAL
PAINLEVEL: SEVERE PAIN (7)
PAINLEVEL: EXTREME PAIN (8)

## 2022-08-24 NOTE — PATIENT INSTRUCTIONS
Follow-up visit with Dr. Mari of [facility] Neuro[facility] (#[phone]) in 2 weeks to discuss injection outcome and determine care plan going forward.       DISCHARGE INSTRUCTIONS    During office hours (8:00 a.m.- 4:00 p.m.) questions or concerns may be answered  by calling Spine Center Navigation Nurses at  [phone].  Messages received after hours will be returned the following business day.      In the case of an emergency, please dial 911 or seek assistance at the nearest Emergency Room/Urgent Care facility.     All Patients:    You may experience an increase in your symptoms for the first 2 days (It may take anywhere between 2 days- 2 weeks for the steroid to have maximum effect).    You may use ice on the injection site, as frequently as 20 minutes each hour if needed.    You may take your pain medicine.    You may continue taking your regular medication after your injection. If you have had a Medial Branch Block you may resume pain medication once your pain diary is completed.    You may shower. No swimming, tub bath or hot tub for 48 hours.  You may remove your bandaid/bandage as soon as you are home.    You may resume light activities, as tolerated.    Resume your usual diet as tolerated.    It is strongly advised that you do not drive for 1-3 hours post injection.    If you have had oral sedation:  Do not drive for 8 hours post injection.      If you have had IV sedation:  Do not drive for 24 hours post injection.  Do not operate hazardous machinery or make important personal/business decisions for 24 hours.      POSSIBLE STEROID SIDE EFFECTS (If steroid/cortisone was used for your procedure)    -If you experience these symptoms, it should only last for a short period    Swelling of the legs              Skin redness (flushing)     Mouth (oral) irritation   Blood sugar (glucose) levels            Sweats                    Mood changes  Headache  Sleeplessness  Weakened immune system for up  to 14 days, which could increase the risk of navdeep the COVID-19 virus and/or experiencing more severe symptoms of the disease, if exposed.  Decreased effectiveness of the flu vaccine if given within 2 weeks of the steroid.         POSSIBLE PROCEDURE SIDE EFFECTS  -Call the Spine Center if you are concerned  Increased Pain           Increased numbness/tingling      Nausea/Vomiting          Bruising/bleeding at site      Redness or swelling                                              Difficulty walking      Weakness           Fever greater than 100.5    *In the event of a severe headache after an epidural steroid injection that is relieved by lying down, please call the [facility] to speak with a clinical staff member*

## 2022-09-08 ENCOUNTER — OFFICE VISIT (OUTPATIENT)
Dept: NEUROSURGERY | Facility: CLINIC | Age: 67
End: 2022-09-08
Payer: COMMERCIAL

## 2022-09-08 VITALS
HEART RATE: 88 BPM | OXYGEN SATURATION: 96 % | WEIGHT: 202 LBS | SYSTOLIC BLOOD PRESSURE: 130 MMHG | BODY MASS INDEX: 30.62 KG/M2 | DIASTOLIC BLOOD PRESSURE: 69 MMHG | HEIGHT: 68 IN

## 2022-09-08 DIAGNOSIS — M54.16 LUMBAR RADICULOPATHY: Primary | ICD-10-CM

## 2022-09-08 PROCEDURE — 99024 POSTOP FOLLOW-UP VISIT: CPT | Performed by: SURGERY

## 2022-09-08 PROCEDURE — 2894A VOIDCORRECT: CPT | Performed by: SURGERY

## 2022-09-08 NOTE — PROGRESS NOTES
NEUROSURGERY FOLLOW UP  NOTE  Barry Jay comes today in f/u. Leg pain improved following TFESI. It started to improve prior to the injection then continued to improve. Overall he is doing well. He is walking up to a mile. He continues lyrica. 1 in am and 1 in evening. Recommend keeping that on for another 6 weeks. He will follow up with Dr Naidu to discuss additional RFAs.     I spent more than 10 minutes in this apt, examining the pt, reviewing the scans, reviewing notes from chart, discussing treatment options with risks and benefits and coordinating care.     Shannan Mari MD      CC:     Osmel Iniguez [employer] [address]  [place] [address]

## 2022-09-08 NOTE — LETTER
9/8/2022         RE: Barry Jay  [address] [address]        Dear Colleague,    Thank you for referring your patient, Barry Jay, to the [facility]UROS[facility]ERY. Please see a copy of my visit note below.    NEUROSURGERY FOLLOW UP  NOTE  Barry Jay comes today in f/u. Leg pain improved following TFESI. It started to improve prior to the injection then continued to improve. Overall he is doing well. He is walking up to a mile. He continues lyrica. 1 in am and 1 in evening. Recommend keeping that on for another 6 weeks. He will follow up with Dr Naidu to discuss additional RFAs.     I spent more than 10 minutes in this apt, examining the pt, reviewing the scans, reviewing notes from chart, discussing treatment options with risks and benefits and coordinating care.     Shannan Mari MD      CC:     Osmel Iniguez [facility] [address]  [place] [address]        Again, thank you for allowing me to participate in the care of your patient.        Sincerely,        Shannan Mari MD

## 2022-09-15 DIAGNOSIS — M54.16 LUMBAR RADICULOPATHY: ICD-10-CM

## 2022-09-15 RX ORDER — PREGABALIN 50 MG/1
50 CAPSULE ORAL 2 TIMES DAILY
Qty: 60 CAPSULE | Refills: 0 | Status: SHIPPED | OUTPATIENT
Start: 2022-09-15 | End: 2023-03-03

## 2022-09-15 NOTE — PROGRESS NOTES
jayashree refilled. Dr Mari's last note reviewed.    Alejandra Luna FNP-C  [facility]  O. [phone]

## 2022-09-20 ENCOUNTER — HOSPITAL ENCOUNTER (OUTPATIENT)
Dept: PHYSICAL THERAPY | Facility: REHABILITATION | Age: 67
Discharge: HOME | End: 2022-09-20
Attending: NURSE PRACTITIONER
Payer: COMMERCIAL

## 2022-09-20 DIAGNOSIS — M54.16 LUMBAR RADICULOPATHY: ICD-10-CM

## 2022-09-20 PROCEDURE — 97110 THERAPEUTIC EXERCISES: CPT | Mod: GP | Performed by: PHYSICAL THERAPIST

## 2022-09-20 PROCEDURE — 97161 PT EVAL LOW COMPLEX 20 MIN: CPT | Mod: GP | Performed by: PHYSICAL THERAPIST

## 2022-09-22 NOTE — PROGRESS NOTES
[facility]    OUTPATIENT PHYSICAL THERAPY ORTHOPEDIC EVALUATION  PLAN OF TREATMENT FOR OUTPATIENT REHABILITATION  (COMPLETE FOR INITIAL CLAIMS ONLY)  Patient's Last Name, First Name, M.I.  YOB: 1955  Barry Jay    Provider s Name:  [facility]   Medical Record No.  [id number]   Start of Care Date:  09/20/22   Onset Date:      Type:     _X__PT   ___OT   ___SLP Medical Diagnosis:  (P) Lumbar radiculopathy     PT Diagnosis:  (P) Lumbar radiculopathy   Visits from SOC:  1      _________________________________________________________________________________  Plan of Treatment/Functional Goals:  (P) ROM, strengthening, stretching           Goals     Goal Description: (P) Pt will be independent with his HEP for ongoing symptom management.               Therapy Frequency:     Predicted Duration of Therapy Intervention:  (P) Eval only    Frances Simon, PT                 I CERTIFY THE NEED FOR THESE SERVICES FURNISHED UNDER        THIS PLAN OF TREATMENT AND WHILE UNDER MY CARE     (Physician co-signature of this document indicates review and certification of the therapy plan).                     Certification Date From:  (P) 09/20/22   Certification Date To:  (P) 09/20/22    Referring Provider:  Melanie Alejandre NP    Initial Assessment        See Epic Evaluation Start of Care Date: 09/20/22 09/20/22 1300   General Information   Type of Visit Initial OP Ortho PT Evaluation   Start of Care Date 09/20/22   Referring Physician Melanie Alejandre NP   Patient/Family Goals Statement Pt would like to learn exercises to help his low back stay mobile and strong.   Orders Evaluate and Treat   Date of Order 07/26/22   Certification Required? Yes   Medical Diagnosis Lumbar Radiculopathy   Surgical/Medical history reviewed Yes   Body Part(s)   Body Part(s) Lumbar  Spine/SI   Presentation and Etiology   Pertinent history of current problem (include personal factors and/or comorbidities that impact the POC) Pt had a laminectomy in June and spinal injection in August and will have a nerve ablation in low back next week.  Pt overall feels like he is doing better.  Pt leaves for [place] in the next 1-2 weeks and would like to have set of exercises to continue to do while he is in [place] to address mobility and strength.   Impairments A. Pain;D. Decreased ROM;E. Decreased flexibility   Functional Limitations perform activities of daily living   Symptom Location Low back   How/Where did it occur From Degenerative Joint Disease   Chronicity Chronic   Pain rating (0-10 point scale) Best (/10);Worst (/10);Other   Best (/10) 4/10   Worst (/10) 8/10   Pain rating comment Current: 4/10   Pain quality C. Aching   Frequency of pain/symptoms A. Constant   Pain/symptoms are: The same all the time   Pain/symptoms exacerbated by C. Lifting   Pain/symptoms eased by C. Rest;E. Changing positions   Progression of symptoms since onset: Improved   Prior Level of Function   Functional Level Prior Comment Pt enjoys walking and golf   Current Level of Function   Current Community Support Family/friend caregiver   Patient role/employment history F. Retired   [facility]   Home/community accessibility No difficulty in home   Current equipment-Gait/Locomotion None   Current equipment-ADL None   Fall Risk Screen   Fall screen completed by PT   Have you fallen 2 or more times in the past year? No   Have you fallen and had an injury in the past year? No   Is patient a fall risk? No   Abuse Screen (yes response referral indicated)   Feels Unsafe at Home or Work/School no   Feels Threatened by Someone no   Does Anyone Try to Keep You From Having Contact with Others or Doing Things Outside Your Home? no   Physical Signs of Abuse Present no   Patient needs abuse support services and resources No    Lumbar Spine/SI Objective Findings   Gait/Locomotion WNL; good pace and step length   Hamstring Flexibility WNL   Quadricep Flexibility Limited   Piriformis Flexibility WNL but feels a stretch, B'ly   Lumbar ROM Comment WNL with no c/o pain   Pelvic Screen WNL   Hip Screen WNL   Transversus Abdominus Strength (Evangelista Leg Lowering-deg) Initial decreased engagement but improved with cueing; good contraction   Lumbar/Hip/Knee/Foot Strength Comments B LE MMT is WNL   SLR -   Crossover SLR -   Segmental Mobility WNL   Slump Test -   Planned Therapy Interventions   Planned Therapy Interventions ROM;strengthening;stretching   Clinical Impression   Criteria for Skilled Therapeutic Interventions Met evaluation only;treatment indicated   PT Diagnosis Lumbar radiculopathy   Influenced by the following impairments Weakness   Functional limitations due to impairments Pain with lifting   Clinical Presentation Stable/Uncomplicated   Clinical Decision Making (Complexity) Low complexity   Predicted Duration of Therapy Intervention (days/wks) Eval only   Risk & Benefits of therapy have been explained Yes   Patient, Family & other staff in agreement with plan of care Yes   Clinical Impression Comments Pt is s/p laminectomy (June) with recent injection and an upcoming ablation.  Pt requested a HEP before he leaves for [place] in the next week or 2.  Pt did well and is encouraged to stay active.   Education Assessment   Barriers to Learning No barriers   ORTHO GOALS   PT Ortho Eval Goals 1   Ortho Goal 1   Goal Description Pt will be independent with his HEP for ongoing symptom management.   Goal Progress MET at eval   Total Evaluation Time   PT Ryan Low Complexity Minutes (04356) 15   Therapy Certification   Certification date from 09/20/22   Certification date to 09/20/22   Medical Diagnosis Lumbar radiculopathy

## 2022-09-28 ENCOUNTER — OFFICE VISIT (OUTPATIENT)
Dept: PHYSICAL MEDICINE AND REHAB | Facility: CLINIC | Age: 67
End: 2022-09-28

## 2022-09-28 VITALS — SYSTOLIC BLOOD PRESSURE: 138 MMHG | DIASTOLIC BLOOD PRESSURE: 74 MMHG

## 2022-09-28 DIAGNOSIS — M54.50 CHRONIC BILATERAL LOW BACK PAIN WITHOUT SCIATICA: Primary | ICD-10-CM

## 2022-09-28 DIAGNOSIS — G89.29 CHRONIC BILATERAL LOW BACK PAIN WITHOUT SCIATICA: Primary | ICD-10-CM

## 2022-09-28 DIAGNOSIS — M47.816 LUMBAR FACET ARTHROPATHY: ICD-10-CM

## 2022-09-28 DIAGNOSIS — Z98.890 HISTORY OF LUMBAR SURGERY: ICD-10-CM

## 2022-09-28 PROCEDURE — 99214 OFFICE O/P EST MOD 30 MIN: CPT | Performed by: NURSE PRACTITIONER

## 2022-09-28 ASSESSMENT — PAIN SCALES - GENERAL: PAINLEVEL: MILD PAIN (3)

## 2022-09-28 NOTE — PATIENT INSTRUCTIONS
~Please call our [facility] Nurse Navigation line [phone] with any questions or concerns about your treatment plan, if symptoms worsen and you would like to be seen urgently, or if you have problems controlling bladder and bowel function.       Ok to trial Low Velocity Chiropractic treatment as tolerated. No high velocity manipulation.       Importance of specialized Physical Therapy: Discussed the importance of core strengthening, ROM, stretching exercises with the patient and how each of these entities is important in decreasing pain.  Explained to the patient that the purpose of physical therapy is to teach the patient a home exercise program individualized to them and their specific health concerns.  These exercises need to be performed every day in order to decrease pain and prevent future occurrences of pain.           An injection has been ordered today to potentially help with your pain symptoms. These injections do not fix what is going on in your back, therefore they typically do not take away the pain completely, however they can many times help improve symptoms. Injections should always be completed along with other modalities such as physical therapy for the best long term outcomes. If injections alone are done, then pain will likely return.     [facility] Injection Requirements    A  is required for all fluoroscopically-guided injections.  Injection appointments may be cancelled if there are signs/symptoms of an active infection or if the patient is being actively treated with antibiotics for a diagnosed infection.  Patients may have their steroid injection cancelled if they have had another steroid injection within 2 weeks.  Diabetic patients will have their blood glucose levels checked the day of their injection and the appointment will be rescheduled if the blood glucose level is 300 or higher.  Patients with allergies to cortisone, local anesthetics,  iodine, or contrast dye should contact the Spine Center to further discuss these considerations.  Patients scheduled for medial branch block diagnostic injections should refrain from taking pain medication the day of the procedure.  The medial branch block injection appointment will be rescheduled if the patient's pain rating is not 5/10 or greater at the time of the procedure.  Patients taking warfarin/Coumadin will have their INR checked the day of the procedure and the procedure may be rescheduled if the INR is greater than 3.0.  Please contact the Spine Center (#[phone]) if you are taking any prescription blood-thinning medications (warfarin, Plavix, Lovenox, Eliquis, Brilinta, Effient, etc.) as special dosing adjustments may need to be made depending on the type of injection you are scheduled to receive.  It is recommended that you delay having your steroid injection if you have received a flu shot or shingles vaccine within 2 weeks.

## 2022-09-28 NOTE — PROGRESS NOTES
Assessment:     Diagnoses and all orders for this visit:  Chronic bilateral low back pain without sciatica  -     PAIN RFA Lumbar/Sacral Joint Nerve Bilateral; Future  History of lumbar surgery  Lumbar facet arthropathy  -     PAIN RFA Lumbar/Sacral Joint Nerve Bilateral; Future     Barry Jay is a 67 year old y.o. male with past medical history significant for hypertension, dyslipidemia, hand osteoarthritis, arthralgias who presents today for follow-up regarding:    -Chronic bilateral low back pain that did not improve with surgery.  Previous benefit with radiofrequency ablation 2021.  Facet arthropathy greatest at L4-5 and L5-S1.    -Prior right leg pain resolved with right L5-S1 TFESI.    *Right L4-5 hemilaminectomy/facetectomy/foraminotomy Dr. Mari 6/27/2022.  Last visit with Dr. Mari 9/8/2022.     Plan:     A shared decision making plan was used. The patient's values and choices were respected. Prior medical records were reviewed today. The following represents what was discussed and decided upon by the provider and the patient.        -DIAGNOSTIC TESTS: Images were personally reviewed and interpreted.   -- Postsurgical lumbar spine MRI with and without IV contrast 8/12/2022 with right hemilaminectomy/medial facetectomy changes at L4-5 with improvement in nerve compression on the right lateral recess.  Persistent mild to moderate right and mild left foraminal stenosis L4-5.  L3-4 mild spinal stenosis with mild bilateral foraminal stenosis.  L5-S1 minimal foraminal stenosis.  --Lumbar spine MRI [facility] 5/10/2022 with transitional lumbosacral anatomy with partially sacralized L5.  L4-5 grade 1 spondylolisthesis, 3 mm, with annular bulging with advanced facet arthropathy with mild to moderate right and mild left foraminal stenosis, right L4 nerve root impingement.  L3-4 1 to 2 mm spondylolisthesis with facet arthropathy with mild left greater than right foraminal stenosis.  --Lumbar spine MRI 1/24/2020  [facility] with advanced facet arthropathy L4-5 right greater than left and facet arthropathy L3-4 moderate bilaterally.  Grade 1 spondylolisthesis L4-5 with right greater than left L5 impingement.  --Lumbar spine flexion-extension x-ray 2/3/2020 was 0.6 cm spondylolisthesis L4-5, no instability noted.  --[facility] lumbar spine MRI 1/17/2019 shows severe facet arthropathy L4-5 with 5 mm spondylolisthesis, mild central and subarticular recess stenosis, mild right foraminal stenosis.  Mild facet arthropathy L3-4 and L5-S1.    -INTERVENTIONS: Ordered bilateral L2, L3, L4 radiofrequency ablation deceiving get further relief of his chronic low back pain.  He had a positive response with previous RFA 10/19/2021 with 100% relief he reports of his back pain for over 6 months.  -Message sent to Dr. Mari to confirm that this is okay as well.    -MEDICATIONS: No changes in medications advised patient to continue with Lyrica 50 mg 1 tablet twice daily.  Did discuss with patient that if neurosurgery will not refill this further we can send a refill if needed in the future.  Discussed side effects of medications and proper use. Patient verbalized understanding.    -PHYSICAL THERAPY: Encourage patient to continue working with physical therapy home exercises from recent physical therapy sessions which she is diligent about doing unfortunately minimal lasting benefit.  Discussed the importance of core strengthening, ROM, stretching exercises with the patient and how each of these entities is important in decreasing pain.  Explained to the patient that the purpose of physical therapy is to teach the patient a home exercise program.  These exercises need to be performed every day in order to decrease pain and prevent future occurrences of pain.        -PATIENT EDUCATION:  Total time of 42 minutes, on the day of service, spent with the patient, reviewing the chart, placing orders, and documenting.   -Today we also discussed the issues related to  the current COVID-19 pandemic, the pros and cons of the current treatment plan, the CDC guidelines such as social distancing, washing the hands, and covering the cough.    -FOLLOW UP: Follow-up for radiofrequency ablation  Advised to contact clinic if symptoms worsen or change.    Subjective:     Barry Jay is a 67 year old male who presents today for follow-up regarding recurrent bilateral low back pain that he describes as a same type of pain that he has had in the past that he got significant relief with previous radiofrequency ablation and is hoping for repeat ablation.    Previously he was having right leg pain but he reports that that resolved with Lyrica and post right L5-S1 TFESI.  Currently denies any leg pain.  Denies lower extremity weakness, denies any numbness or tingling sensations.  Denies recent trips or falls or balance changes.  Denies bowel or bladder loss control.    Treatment to Date: Right L4-5 hemilaminectomy, medial facetectomy, foraminotomy Dr. Mari 6/27/2022  Physical therapy optimum x3 sessions.  Patient continues with home exercises at this time on a daily basis.     History of bilateral L4-5 RFA [place] [facility] Dr. Green 2011 and 2013, relief at least 2 years.  Bilateral L3-4 MBB 6/30/2017.  Preprocedure pain 7/10 post 2/10.  Bilateral L4-5 facet joint steroid injection 7/7/2017 with relief.  Preprocedure pain 8/10, post 5/10.  Right L4-5 and L5-S1 TFESI 1/6/2020 with 30% lasting benefit only.  Preprocedure pain 10/10, post 7/10.  Bilateral L2, L3, L4 MBB 3/11/2020 and 7/7/2020 with positive response.  Bilateral L2, L3, L4 RFA 10/9/2020 with 90% significant relief x 8 months.  Right L4-5 TFESI 8/31/2021 with 100% relief acute right LBP and right lumbar radiculitis for 6 months, minimal relief with chronic bilateral LBP however.  Pre and post procedure pain 9/10.  Bilateral L2, L3, L4 RFA 10/19/2021 with significant relief.  Preprocedure pain 8/10, post 0/10.  Right SI joint steroid  injection 5/6/2022 with minimal initial and no lasting benefit.  Preprocedure pain 10/10, post 5/10.  Postsurgical injections:  Right L5-S1 TFESI 8/24/2022 with resolution of right leg pain.  Ordered by neurosurgery.  Preprocedure pain 8/10, post 7/10.     Medications:  Meloxicam    Patient Active Problem List   Diagnosis     Dyslipidemia     Hypertension     Diffuse Joint Pains (Arthralgias)     Joint Pain, Localized In The Wrist     Joint Pain, Localized In The Knee     Generalized Osteoarthritis Of The Hand     Osteoarthritis Of The Knee     Arthralgias In Multiple Sites       Current Outpatient Medications   Medication     celecoxib (CELEBREX) 200 MG capsule     cyclobenzaprine (FLEXERIL) 10 MG tablet     pregabalin (LYRICA) 50 MG capsule     ALPRAZolam (XANAX) 0.5 MG tablet     aspirin (ASA) 81 MG chewable tablet     atorvastatin (LIPITOR) 10 MG tablet     irbesartan-hydrochlorothiazide (AVALIDE) 300-12.5 mg per tablet     multivitamin, therapeutic (THERA-VIT) TABS tablet     omeprazole (PRILOSEC) 20 MG capsule     PARoxetine (PAXIL) 20 MG tablet     tadalafil (CIALIS) 20 MG tablet     zolpidem (AMBIEN) 10 mg tablet     No current facility-administered medications for this visit.       Allergies   Allergen Reactions     Iodides Unknown and Hives     Pt thinks 20+ yrs ago, Pt received omni 350 without premed and did fine 11/7/2020 [facility]  Pt thinks 20+ yrs ago  Pt received omni 350 without premed and did fine 11/7/2020 [facility]         Past Medical History:   Diagnosis Date     HTN (hypertension)      Hyperlipidemia         Review of Systems  ROS:  Specifically negative for bowel/bladder dysfunction, balance changes, headache, dizziness, foot drop, fevers, chills, appetite changes, nausea/vomiting, unexplained weight loss. Otherwise 13 systems reviewed are negative. Please see the patient's intake questionnaire from today for details.    Reviewed Social, Family, Past Medical and Past Surgical history with patient, no  significant changes noted since prior visit.     Objective:     /74 (BP Location: Right arm, Patient Position: Sitting)     PHYSICAL EXAMINATION:    --CONSTITUTIONAL: Well developed, well nourished, healthy appearing individual.  --PSYCHIATRIC: Appropriate mood and affect. No difficulty interacting due to temper, social withdrawal, or memory issues.  --SKIN: Lumbar region is dry and intact.   --RESPIRATORY: Normal rhythm and effort. No abnormal accessory muscle breathing patterns noted.   --MUSCULOSKELETAL:  Normal lumbar lordosis noted, no lateral shift.  --GROSS MOTOR: Easily arises from a seated position. Gait is non-antalgic  --LUMBAR SPINE:  Inspection reveals no evidence of deformity. Range of motion is not limited in lumbar flexion, increased pain with lumbar extension and lateral rotation simultaneously bilaterally.  --LOWER EXTREMITY MOTOR TESTING:  Plantar flexion left 5/5, right 5/5   Dorsiflexion left 5/5, right 5/5   Great toe MTP extension left 5/5, right 5/5  Knee flexion left 5/5, right 5/5  Knee extension left 5/5, right 5/5   Hip flexion left 5/5, right 5/5  Hip abduction left 5/5, right 5/5  Hip adduction left 5/5, right 5/5   --HIPS: Full range of motion bilaterally.   --NEUROLOGIC: Bilateral patellar and achilles reflexes are physiologic and symmetric. Sensation to light touch is intact in the bilateral L4, L5, and S1 dermatomes.    RESULTS:   Imaging: Spine imaging was reviewed today. The images were shown to the patient and the findings were explained using a spine model.    Post surgical MRI reviewed from August 2022.      [facility] MRI 5/10/2022 lumbar spine        XR LUMBAR SPINE FLEX AND EXT 2 OR 3 VWS  LOCATION: [facility]  DATE/TIME: 2/3/2020   INDICATION: Flexion, extension, and neutral views please to evaluate spondylolisthesis stability at L4-L5.  COMPARISON: 04/26/2018 CT abdomen/pelvis.  IMPRESSION:   In the upright position there is approximately 0.6 cm degenerative  anterolisthesis of L4 on L5. There is no change with flexion or extension to suggest segmental instability.  The vertebral body heights are preserved. Mild intervertebral disc degeneration at L3-L4, L4-L5 and L5-S1. Moderate/severe facet hypertrophic changes at L4-L5 and L5-S1.  There are vascular calcifications. Abdominal surgical clips.        MR LUMBAR SPINE WITHOUT CONTRAST  At [facility]-1/24/2020  CLINICAL INFORMATION: 64-year-old male with right low back and leg pain.  COMPARISON: Lumbar spine MRI dated January 17, 2019.  TECHNICAL INFORMATION: Sagittal T2, sagittal T1, sagittal STIR, axial T2, and axial T1-weighted MR images of the lumbar spine in the neutral, seated position on an open 0.6 Teresa magnet.  CONTRAST: None.  SEDATION: None.  INTERPRETATION: Transitional lumbosacral anatomy with partially sacralized L5, mild lumbar levocurvature centered at L3, and shallow chronic Scheuermann-type endplate Schmorl's nodes at T10-11 and T11-12 without spondylolysis, vertebral collapse, acute fracture, or destructive osseous lesion. Normal pre and paravertebral soft tissues. Enlarged prostate gland measuring at least 5 cm AP (series 2, image 7).  Conus medullaris positioned at upper L2, with normal configuration of the terminal nerve roots.  L5-S1: Hypoplastic disc and facets with mild facet degeneration on the left, an accessory articulation on the left, and no stenosis or impingement.  L4-5: Moderate disc degeneration, 3 mm spondylolisthesis, bilateral foraminal annular tears, diffuse annular bulge, advanced right/moderate left facet degeneration, moderate right greater than left subarticular stenosis with L5 nerve root impingement, and mild to moderate right/mild left foraminal stenosis with right L4 nerve root encroachment.  L3-4: Mild disc degeneration, 1-2 mm spondylolisthesis, mild diffuse annular bulge, moderate left facet degeneration, mild left subarticular stenosis, and mild bilateral foraminal  stenosis.  L2-3: Mild degenerative disc desiccation with preserved disc height, mild bilateral foraminal annular bulge, normal facet morphology, and no stenosis or impingement.  L1-2 and T12-L1: Normal dorsal disc contours and normal morphology.  T11-12: Mild disc degeneration, 1 mm spondylolisthesis, normal facet morphology, and no stenosis or impingement.  No change from January 17, 2019.  CONCLUSION: Multilevel lumbar degenerative changes and mild levocurvature with specific findings as follows:  1. L4-5 subarticular impingement of the right greater than left L5 nerve roots in the setting of grade 1 degenerative spondylolisthesis. L3-4 mild left subarticular stenosis without impingement.  2. Foraminal stenosis, mild to moderate on the right at L4-5 with right L4 nerve root encroachment. Mild on the left at L4-5 and bilaterally at L3-4.  3. Facet degeneration, advanced on the right at L4-5. Moderate on the left at L4-5 and L3-4. Mild on the left at L5-S1.  4. No spondylolysis, vertebral collapse, acute fracture, or destructive osseous lesion.  5. Transitional lumbosacral anatomy with partially sacralized L5.  6. Enlarged prostate gland.        MRI LUMBAR SPINE WITHOUT CONTRAST  [facility]- 1/17/19  CLINICAL INFORMATION: 63-year-old man with low back pain.  TECHNICAL INFORMATION: MRI lumbar spine was obtained on 0.6 Teresa open upright magnet including sagittal T2, sagittal T1, sagittal STIR, axial T2 and axial T1-weighted images.  INTERPRETATION: There is normal lordotic curvature of the lumbar spine. No marrow edema within the lumbar vertebral bodies. No loss of vertebral body height. The conus is at the L1 level and is normal in appearance.  L5-S1: Transitional L5 segment with left L5 transverse process articulating with the sacrum. Mild developmental narrowing of the disc space. Mild bilateral facet arthropathy. No central spinal canal or foraminal stenosis.  L4-5: Mild degenerative disc disease with 5 mm degenerative  spondylolisthesis and disc bulge. Severe bilateral facet arthropathy with ligamentum flavum thickening contributes to mild central spinal canal stenosis and subarticular recess narrowing with encroachment upon the traversing L5 nerve roots. Moderate right foraminal stenosis.  L3-4: Mild degenerative disc disease and disc bulge. Mild bilateral facet arthropathy. Mild subarticular recess narrowing. The ganglia exit without compromise.  L2-3 through T12-L1: No focal disc herniation, central spinal canal stenosis or neural impingement.  T11-12 and T10-11: Moderate degenerative disc disease with Schmorl's nodes and endplate irregularities. No cord deformity or central spinal canal stenosis.  CONCLUSION:  1. L5-S1 transitional L5 segment with left transverse process articulating with the sacrum. Mild developmental narrowing of the disc space. Mild bilateral facet arthropathy.  2. L4-5 grade 1 spondylolisthesis with disc bulge. Severe facet arthropathy and ligamentum flavum thickening with mild central spinal canal and subarticular recess stenosis with encroachment upon the traversing L5 nerve roots. Moderate right foraminal stenosis.  3. Mild degenerative disc disease with disc bulge. Mild facet arthropathy and mild subarticular recess narrowing.

## 2022-09-28 NOTE — LETTER
9/28/2022         RE: Barry Jay  [address] [facility] [address]        Dear Colleague,    Thank you for referring your patient, Barry Jay, to the [facility]ERY. Please see a copy of my visit note below.      Assessment:     Diagnoses and all orders for this visit:  Chronic bilateral low back pain without sciatica  -     PAIN RFA Lumbar/Sacral Joint Nerve Bilateral; Future  History of lumbar surgery  Lumbar facet arthropathy  -     PAIN RFA Lumbar/Sacral Joint Nerve Bilateral; Future     Barry Jay is a 67 year old y.o. male with past medical history significant for hypertension, dyslipidemia, hand osteoarthritis, arthralgias who presents today for follow-up regarding:    -Chronic bilateral low back pain that did not improve with surgery.  Previous benefit with radiofrequency ablation 2021.  Facet arthropathy greatest at L4-5 and L5-S1.    -Prior right leg pain resolved with right L5-S1 TFESI.    *Right L4-5 hemilaminectomy/facetectomy/foraminotomy Dr. Mari 6/27/2022.  Last visit with Dr. Mari 9/8/2022.     Plan:     A shared decision making plan was used. The patient's values and choices were respected. Prior medical records were reviewed today. The following represents what was discussed and decided upon by the provider and the patient.        -DIAGNOSTIC TESTS: Images were personally reviewed and interpreted.   -- Postsurgical lumbar spine MRI with and without IV contrast 8/12/2022 with right hemilaminectomy/medial facetectomy changes at L4-5 with improvement in nerve compression on the right lateral recess.  Persistent mild to moderate right and mild left foraminal stenosis L4-5.  L3-4 mild spinal stenosis with mild bilateral foraminal stenosis.  L5-S1 minimal foraminal stenosis.  --Lumbar spine MRI [facility] 5/10/2022 with transitional lumbosacral anatomy with partially sacralized L5.  L4-5 grade 1 spondylolisthesis, 3 mm, with annular bulging with  advanced facet arthropathy with mild to moderate right and mild left foraminal stenosis, right L4 nerve root impingement.  L3-4 1 to 2 mm spondylolisthesis with facet arthropathy with mild left greater than right foraminal stenosis.  --Lumbar spine MRI 1/24/2020 [facility] with advanced facet arthropathy L4-5 right greater than left and facet arthropathy L3-4 moderate bilaterally.  Grade 1 spondylolisthesis L4-5 with right greater than left L5 impingement.  --Lumbar spine flexion-extension x-ray 2/3/2020 was 0.6 cm spondylolisthesis L4-5, no instability noted.  --[facility] lumbar spine MRI 1/17/2019 shows severe facet arthropathy L4-5 with 5 mm spondylolisthesis, mild central and subarticular recess stenosis, mild right foraminal stenosis.  Mild facet arthropathy L3-4 and L5-S1.    -INTERVENTIONS: Ordered bilateral L2, L3, L4 radiofrequency ablation deceiving get further relief of his chronic low back pain.  He had a positive response with previous RFA 10/19/2021 with 100% relief he reports of his back pain for over 6 months.  -Message sent to Dr. Mari to confirm that this is okay as well.    -MEDICATIONS: No changes in medications advised patient to continue with Lyrica 50 mg 1 tablet twice daily.  Did discuss with patient that if neurosurgery will not refill this further we can send a refill if needed in the future.  Discussed side effects of medications and proper use. Patient verbalized understanding.    -PHYSICAL THERAPY: Encourage patient to continue working with physical therapy home exercises from recent physical therapy sessions which she is diligent about doing unfortunately minimal lasting benefit.  Discussed the importance of core strengthening, ROM, stretching exercises with the patient and how each of these entities is important in decreasing pain.  Explained to the patient that the purpose of physical therapy is to teach the patient a home exercise program.  These exercises need to be performed every day in  order to decrease pain and prevent future occurrences of pain.        -PATIENT EDUCATION:  Total time of 42 minutes, on the day of service, spent with the patient, reviewing the chart, placing orders, and documenting.   -Today we also discussed the issues related to the current COVID-19 pandemic, the pros and cons of the current treatment plan, the CDC guidelines such as social distancing, washing the hands, and covering the cough.    -FOLLOW UP: Follow-up for radiofrequency ablation  Advised to contact clinic if symptoms worsen or change.    Subjective:     Barry Jay is a 67 year old male who presents today for follow-up regarding recurrent bilateral low back pain that he describes as a same type of pain that he has had in the past that he got significant relief with previous radiofrequency ablation and is hoping for repeat ablation.    Previously he was having right leg pain but he reports that that resolved with Lyrica and post right L5-S1 TFESI.  Currently denies any leg pain.  Denies lower extremity weakness, denies any numbness or tingling sensations.  Denies recent trips or falls or balance changes.  Denies bowel or bladder loss control.    Treatment to Date: Right L4-5 hemilaminectomy, medial facetectomy, foraminotomy Dr. Mari 6/27/2022  Physical therapy optimum x3 sessions.  Patient continues with home exercises at this time on a daily basis.     History of bilateral L4-5 RFA [place] [facility] Dr. Green 2011 and 2013, relief at least 2 years.  Bilateral L3-4 MBB 6/30/2017.  Preprocedure pain 7/10 post 2/10.  Bilateral L4-5 facet joint steroid injection 7/7/2017 with relief.  Preprocedure pain 8/10, post 5/10.  Right L4-5 and L5-S1 TFESI 1/6/2020 with 30% lasting benefit only.  Preprocedure pain 10/10, post 7/10.  Bilateral L2, L3, L4 MBB 3/11/2020 and 7/7/2020 with positive response.  Bilateral L2, L3, L4 RFA 10/9/2020 with 90% significant relief x 8 months.  Right L4-5 TFESI 8/31/2021 with 100% relief  acute right LBP and right lumbar radiculitis for 6 months, minimal relief with chronic bilateral LBP however.  Pre and post procedure pain 9/10.  Bilateral L2, L3, L4 RFA 10/19/2021 with significant relief.  Preprocedure pain 8/10, post 0/10.  Right SI joint steroid injection 5/6/2022 with minimal initial and no lasting benefit.  Preprocedure pain 10/10, post 5/10.  Postsurgical injections:  Right L5-S1 TFESI 8/24/2022 with resolution of right leg pain.  Ordered by neurosurgery.  Preprocedure pain 8/10, post 7/10.     Medications:  Meloxicam    Patient Active Problem List   Diagnosis     Dyslipidemia     Hypertension     Diffuse Joint Pains (Arthralgias)     Joint Pain, Localized In The Wrist     Joint Pain, Localized In The Knee     Generalized Osteoarthritis Of The Hand     Osteoarthritis Of The Knee     Arthralgias In Multiple Sites       Current Outpatient Medications   Medication     celecoxib (CELEBREX) 200 MG capsule     cyclobenzaprine (FLEXERIL) 10 MG tablet     pregabalin (LYRICA) 50 MG capsule     ALPRAZolam (XANAX) 0.5 MG tablet     aspirin (ASA) 81 MG chewable tablet     atorvastatin (LIPITOR) 10 MG tablet     irbesartan-hydrochlorothiazide (AVALIDE) 300-12.5 mg per tablet     multivitamin, therapeutic (THERA-VIT) TABS tablet     omeprazole (PRILOSEC) 20 MG capsule     PARoxetine (PAXIL) 20 MG tablet     tadalafil (CIALIS) 20 MG tablet     zolpidem (AMBIEN) 10 mg tablet     No current facility-administered medications for this visit.       Allergies   Allergen Reactions     Iodides Unknown and Hives     Pt thinks 20+ yrs ago, Pt received omni 350 without premed and did fine 11/7/2020 S  Pt thinks 20+ yrs ago  Pt received omni 350 without premed and did fine 11/7/2020 S         Past Medical History:   Diagnosis Date     HTN (hypertension)      Hyperlipidemia         Review of Systems  ROS:  Specifically negative for bowel/bladder dysfunction, balance changes, headache, dizziness, foot drop, fevers,  chills, appetite changes, nausea/vomiting, unexplained weight loss. Otherwise 13 systems reviewed are negative. Please see the patient's intake questionnaire from today for details.    Reviewed Social, Family, Past Medical and Past Surgical history with patient, no significant changes noted since prior visit.     Objective:     /74 (BP Location: Right arm, Patient Position: Sitting)     PHYSICAL EXAMINATION:    --CONSTITUTIONAL: Well developed, well nourished, healthy appearing individual.  --PSYCHIATRIC: Appropriate mood and affect. No difficulty interacting due to temper, social withdrawal, or memory issues.  --SKIN: Lumbar region is dry and intact.   --RESPIRATORY: Normal rhythm and effort. No abnormal accessory muscle breathing patterns noted.   --MUSCULOSKELETAL:  Normal lumbar lordosis noted, no lateral shift.  --GROSS MOTOR: Easily arises from a seated position. Gait is non-antalgic  --LUMBAR SPINE:  Inspection reveals no evidence of deformity. Range of motion is not limited in lumbar flexion, increased pain with lumbar extension and lateral rotation simultaneously bilaterally.  --LOWER EXTREMITY MOTOR TESTING:  Plantar flexion left 5/5, right 5/5   Dorsiflexion left 5/5, right 5/5   Great toe MTP extension left 5/5, right 5/5  Knee flexion left 5/5, right 5/5  Knee extension left 5/5, right 5/5   Hip flexion left 5/5, right 5/5  Hip abduction left 5/5, right 5/5  Hip adduction left 5/5, right 5/5   --HIPS: Full range of motion bilaterally.   --NEUROLOGIC: Bilateral patellar and achilles reflexes are physiologic and symmetric. Sensation to light touch is intact in the bilateral L4, L5, and S1 dermatomes.    RESULTS:   Imaging: Spine imaging was reviewed today. The images were shown to the patient and the findings were explained using a spine model.    Post surgical MRI reviewed from August 2022.      [facility] MRI 5/10/2022 lumbar spine        XR LUMBAR SPINE FLEX AND EXT 2 OR 3 VWS  LOCATION: [facility]  DATE/TIME: 2/3/2020   INDICATION: Flexion, extension, and neutral views please to evaluate spondylolisthesis stability at L4-L5.  COMPARISON: 04/26/2018 CT abdomen/pelvis.  IMPRESSION:   In the upright position there is approximately 0.6 cm degenerative anterolisthesis of L4 on L5. There is no change with flexion or extension to suggest segmental instability.  The vertebral body heights are preserved. Mild intervertebral disc degeneration at L3-L4, L4-L5 and L5-S1. Moderate/severe facet hypertrophic changes at L4-L5 and L5-S1.  There are vascular calcifications. Abdominal surgical clips.        MR LUMBAR SPINE WITHOUT CONTRAST  At [facility]-1/24/2020  CLINICAL INFORMATION: 64-year-old male with right low back and leg pain.  COMPARISON: Lumbar spine MRI dated January 17, 2019.  TECHNICAL INFORMATION: Sagittal T2, sagittal T1, sagittal STIR, axial T2, and axial T1-weighted MR images of the lumbar spine in the neutral, seated position on an open 0.6 Teresa magnet.  CONTRAST: None.  SEDATION: None.  INTERPRETATION: Transitional lumbosacral anatomy with partially sacralized L5, mild lumbar levocurvature centered at L3, and shallow chronic Scheuermann-type endplate Schmorl's nodes at T10-11 and T11-12 without spondylolysis, vertebral collapse, acute fracture, or destructive osseous lesion. Normal pre and paravertebral soft tissues. Enlarged prostate gland measuring at least 5 cm AP (series 2, image 7).  Conus medullaris positioned at upper L2, with normal configuration of the terminal nerve roots.  L5-S1: Hypoplastic disc and facets with mild facet degeneration on the left, an accessory articulation on the left, and no stenosis or impingement.  L4-5: Moderate disc degeneration, 3 mm spondylolisthesis, bilateral foraminal annular tears, diffuse annular bulge, advanced right/moderate left facet degeneration, moderate right greater than left subarticular stenosis with L5 nerve root impingement, and mild to moderate  right/mild left foraminal stenosis with right L4 nerve root encroachment.  L3-4: Mild disc degeneration, 1-2 mm spondylolisthesis, mild diffuse annular bulge, moderate left facet degeneration, mild left subarticular stenosis, and mild bilateral foraminal stenosis.  L2-3: Mild degenerative disc desiccation with preserved disc height, mild bilateral foraminal annular bulge, normal facet morphology, and no stenosis or impingement.  L1-2 and T12-L1: Normal dorsal disc contours and normal morphology.  T11-12: Mild disc degeneration, 1 mm spondylolisthesis, normal facet morphology, and no stenosis or impingement.  No change from January 17, 2019.  CONCLUSION: Multilevel lumbar degenerative changes and mild levocurvature with specific findings as follows:  1. L4-5 subarticular impingement of the right greater than left L5 nerve roots in the setting of grade 1 degenerative spondylolisthesis. L3-4 mild left subarticular stenosis without impingement.  2. Foraminal stenosis, mild to moderate on the right at L4-5 with right L4 nerve root encroachment. Mild on the left at L4-5 and bilaterally at L3-4.  3. Facet degeneration, advanced on the right at L4-5. Moderate on the left at L4-5 and L3-4. Mild on the left at L5-S1.  4. No spondylolysis, vertebral collapse, acute fracture, or destructive osseous lesion.  5. Transitional lumbosacral anatomy with partially sacralized L5.  6. Enlarged prostate gland.        MRI LUMBAR SPINE WITHOUT CONTRAST  [facility]- 1/17/19  CLINICAL INFORMATION: 63-year-old man with low back pain.  TECHNICAL INFORMATION: MRI lumbar spine was obtained on 0.6 Teresa open upright magnet including sagittal T2, sagittal T1, sagittal STIR, axial T2 and axial T1-weighted images.  INTERPRETATION: There is normal lordotic curvature of the lumbar spine. No marrow edema within the lumbar vertebral bodies. No loss of vertebral body height. The conus is at the L1 level and is normal in appearance.  L5-S1: Transitional L5  segment with left L5 transverse process articulating with the sacrum. Mild developmental narrowing of the disc space. Mild bilateral facet arthropathy. No central spinal canal or foraminal stenosis.  L4-5: Mild degenerative disc disease with 5 mm degenerative spondylolisthesis and disc bulge. Severe bilateral facet arthropathy with ligamentum flavum thickening contributes to mild central spinal canal stenosis and subarticular recess narrowing with encroachment upon the traversing L5 nerve roots. Moderate right foraminal stenosis.  L3-4: Mild degenerative disc disease and disc bulge. Mild bilateral facet arthropathy. Mild subarticular recess narrowing. The ganglia exit without compromise.  L2-3 through T12-L1: No focal disc herniation, central spinal canal stenosis or neural impingement.  T11-12 and T10-11: Moderate degenerative disc disease with Schmorl's nodes and endplate irregularities. No cord deformity or central spinal canal stenosis.  CONCLUSION:  1. L5-S1 transitional L5 segment with left transverse process articulating with the sacrum. Mild developmental narrowing of the disc space. Mild bilateral facet arthropathy.  2. L4-5 grade 1 spondylolisthesis with disc bulge. Severe facet arthropathy and ligamentum flavum thickening with mild central spinal canal and subarticular recess stenosis with encroachment upon the traversing L5 nerve roots. Moderate right foraminal stenosis.  3. Mild degenerative disc disease with disc bulge. Mild facet arthropathy and mild subarticular recess narrowing.                             Again, thank you for allowing me to participate in the care of your patient.        Sincerely,        Claudia Baptiste, CNP

## 2022-10-12 ENCOUNTER — RADIOLOGY INJECTION OFFICE VISIT (OUTPATIENT)
Dept: PHYSICAL MEDICINE AND REHAB | Facility: CLINIC | Age: 67
End: 2022-10-12
Attending: NURSE PRACTITIONER
Payer: COMMERCIAL

## 2022-10-12 VITALS
TEMPERATURE: 98.2 F | HEART RATE: 90 BPM | OXYGEN SATURATION: 97 % | SYSTOLIC BLOOD PRESSURE: 132 MMHG | DIASTOLIC BLOOD PRESSURE: 78 MMHG | RESPIRATION RATE: 16 BRPM

## 2022-10-12 DIAGNOSIS — M54.50 CHRONIC BILATERAL LOW BACK PAIN WITHOUT SCIATICA: ICD-10-CM

## 2022-10-12 DIAGNOSIS — G89.29 CHRONIC BILATERAL LOW BACK PAIN WITHOUT SCIATICA: ICD-10-CM

## 2022-10-12 DIAGNOSIS — M47.816 LUMBAR FACET ARTHROPATHY: ICD-10-CM

## 2022-10-12 PROCEDURE — 64636 DESTROY L/S FACET JNT ADDL: CPT | Mod: 50 | Performed by: PHYSICAL MEDICINE & REHABILITATION

## 2022-10-12 PROCEDURE — 64635 DESTROY LUMB/SAC FACET JNT: CPT | Mod: 50 | Performed by: PHYSICAL MEDICINE & REHABILITATION

## 2022-10-12 RX ORDER — BUPIVACAINE HYDROCHLORIDE 2.5 MG/ML
INJECTION, SOLUTION EPIDURAL; INFILTRATION; INTRACAUDAL
Status: COMPLETED | OUTPATIENT
Start: 2022-10-12 | End: 2022-10-12

## 2022-10-12 RX ORDER — LIDOCAINE HYDROCHLORIDE 20 MG/ML
INJECTION, SOLUTION EPIDURAL; INFILTRATION; INTRACAUDAL; PERINEURAL
Status: COMPLETED | OUTPATIENT
Start: 2022-10-12 | End: 2022-10-12

## 2022-10-12 RX ADMIN — BUPIVACAINE HYDROCHLORIDE 5 ML: 2.5 INJECTION, SOLUTION EPIDURAL; INFILTRATION; INTRACAUDAL at 09:21

## 2022-10-12 RX ADMIN — LIDOCAINE HYDROCHLORIDE 10 ML: 20 INJECTION, SOLUTION EPIDURAL; INFILTRATION; INTRACAUDAL; PERINEURAL at 09:21

## 2022-10-12 ASSESSMENT — PAIN SCALES - GENERAL
PAINLEVEL: SEVERE PAIN (7)
PAINLEVEL: MILD PAIN (2)

## 2022-10-12 NOTE — PATIENT INSTRUCTIONS
Follow-up visit in 4 weeks with Claudia Baptiste CNP to discuss injection outcome and determine care plan going forward.  DISCHARGE INSTRUCTIONS    During office hours (8:00 a.m.- 4:30 p.m.) questions or concerns may be answered  by calling  [phone] or spine navigation [phone]. If you experience any problems after hours  please call [phone] and you will be connected to [facility]     All Patients:  You may experience an increase in your symptoms for the first 2 days, once the numbing medication wears off.    You may resume your regular medication.    You may shower. No swimming, tub bath or hot tub for 72 hours; remove bandage after 4 hours                  You should not drive for the next 3 to 5 hours (have someone drive you)           POSSIBLE PROCEDURE SIDE EFFECTS  -Call Spine Center if concerned-    Increased Pain  Increased numbness/tingling     Nausea/Vomiting  Bruising/bleeding at site (hematoma)             Swelling at site (edema) Headache  Difficulty walking  Infection        Fever greater than 100.5

## 2023-01-04 ENCOUNTER — LAB REQUISITION (OUTPATIENT)
Dept: LAB | Facility: CLINIC | Age: 68
End: 2023-01-04

## 2023-01-04 ENCOUNTER — TRANSFERRED RECORDS (OUTPATIENT)
Dept: HEALTH INFORMATION MANAGEMENT | Facility: CLINIC | Age: 68
End: 2023-01-04

## 2023-01-04 DIAGNOSIS — R42 DIZZINESS AND GIDDINESS: ICD-10-CM

## 2023-01-04 DIAGNOSIS — R06.02 SHORTNESS OF BREATH: ICD-10-CM

## 2023-01-04 LAB
ALBUMIN SERPL BCG-MCNC: 4.5 G/DL (ref 3.5–5.2)
ALP SERPL-CCNC: 136 U/L (ref 40–129)
ALT SERPL W P-5'-P-CCNC: 57 U/L (ref 10–50)
ANION GAP SERPL CALCULATED.3IONS-SCNC: 17 MMOL/L (ref 7–15)
AST SERPL W P-5'-P-CCNC: 40 U/L (ref 10–50)
BILIRUB SERPL-MCNC: 0.6 MG/DL
BUN SERPL-MCNC: 14.3 MG/DL (ref 8–23)
CALCIUM SERPL-MCNC: 10.1 MG/DL (ref 8.8–10.2)
CHLORIDE SERPL-SCNC: 103 MMOL/L (ref 98–107)
CREAT SERPL-MCNC: 1.15 MG/DL (ref 0.67–1.17)
DEPRECATED HCO3 PLAS-SCNC: 19 MMOL/L (ref 22–29)
GFR SERPL CREATININE-BSD FRML MDRD: 71 ML/MIN/1.73M2
GLUCOSE SERPL-MCNC: 81 MG/DL (ref 70–99)
NT-PROBNP SERPL-MCNC: 54 PG/ML (ref 0–900)
POTASSIUM SERPL-SCNC: 3.7 MMOL/L (ref 3.4–5.3)
PROT SERPL-MCNC: 7.5 G/DL (ref 6.4–8.3)
SODIUM SERPL-SCNC: 139 MMOL/L (ref 136–145)
TSH SERPL DL<=0.005 MIU/L-ACNC: 1.56 UIU/ML (ref 0.3–4.2)

## 2023-01-04 PROCEDURE — 80053 COMPREHEN METABOLIC PANEL: CPT | Performed by: FAMILY MEDICINE

## 2023-01-04 PROCEDURE — 84443 ASSAY THYROID STIM HORMONE: CPT | Performed by: FAMILY MEDICINE

## 2023-01-04 PROCEDURE — 83880 ASSAY OF NATRIURETIC PEPTIDE: CPT | Performed by: FAMILY MEDICINE

## 2023-01-05 ENCOUNTER — MEDICAL CORRESPONDENCE (OUTPATIENT)
Dept: HEALTH INFORMATION MANAGEMENT | Facility: CLINIC | Age: 68
End: 2023-01-05

## 2023-01-10 ENCOUNTER — MEDICAL CORRESPONDENCE (OUTPATIENT)
Dept: HEALTH INFORMATION MANAGEMENT | Facility: CLINIC | Age: 68
End: 2023-01-10

## 2023-01-11 ENCOUNTER — OFFICE VISIT (OUTPATIENT)
Dept: CARDIOLOGY | Facility: CLINIC | Age: 68
End: 2023-01-11
Attending: INTERNAL MEDICINE
Payer: COMMERCIAL

## 2023-01-11 VITALS
HEART RATE: 106 BPM | DIASTOLIC BLOOD PRESSURE: 78 MMHG | SYSTOLIC BLOOD PRESSURE: 126 MMHG | BODY MASS INDEX: 31.63 KG/M2 | OXYGEN SATURATION: 98 % | RESPIRATION RATE: 18 BRPM | WEIGHT: 208 LBS

## 2023-01-11 DIAGNOSIS — R07.89 CHEST HEAVINESS: Primary | ICD-10-CM

## 2023-01-11 DIAGNOSIS — I10 PRIMARY HYPERTENSION: ICD-10-CM

## 2023-01-11 DIAGNOSIS — E78.5 DYSLIPIDEMIA: ICD-10-CM

## 2023-01-11 DIAGNOSIS — R06.09 DYSPNEA ON EXERTION: ICD-10-CM

## 2023-01-11 DIAGNOSIS — R42 DIZZINESS: ICD-10-CM

## 2023-01-11 PROCEDURE — 99204 OFFICE O/P NEW MOD 45 MIN: CPT | Performed by: INTERNAL MEDICINE

## 2023-01-11 NOTE — PROGRESS NOTES
Click to link to [facility] HEART CARE NOTE    Thank you, Dr. Iniguez, for asking me to see Barry Jay in consultation at [facility] to evaluate chest discomfort, dyspnea on exertion and dizziness.      Assessment/Plan:   1.  Chest heaviness, dyspnea on exertion and dizziness: The patient's symptoms suddenly occurred on exertion, lasted 5 minutes.  The patient has 5 risk of factors including 40 years smoking history, hypertension, dyslipidemia, age and family history of for coronary artery disease.  We discussed further evaluation and management.  After discussion, coronary CT angiogram is requested to rule out significant coronary artery disease.  Echocardiogram is requested for evaluation of heart function and structure.    2.  Primary hypertension: Continue irbesartan-hydrochlorothiazide 300-12.5 mg daily.  His blood pressure is controlled.    3.  Dyslipidemia: LDL is controlled.  Continue atorvastatin.    4.  40 years of smoking history: Discussed smoking sensation.      Thank you for the opportunity to be involved in the care of Barry Jay. If you have any questions, please feel free to contact me.  I will see the patient again in 6 months and as needed    Much or all of the text in this note was generated through the use of Dragon Dictate voice-to-text software. Errors in spelling or words which seem out of context are unintentional.   Sound alike errors, in particular, may have escaped editing.       History of Present Illness:   It is my pleasure to see Barry Jay at the [facility] for evaluation of chest heaviness, dyspnea on exertion, dizziness. Barry Jay is a 67 year old male with a medical history of primary hypertension, dyslipidemia, obesity, 40 years of smoking history.    The patient states that he developed chest discomfort/heaviness, shortness of breath on exertion and dizziness/lightheadedness.  Every time,  he developed sudden onset of symptoms on exertion, lasted 5 minutes.  He denies palpitations, syncope, orthopnea, PND or leg edema.  His blood pressure and heart rate are controlled.    His ECG is reviewed, sinus tachycardia, complete right bundle branch block, no ischemic ST-T change.    Past Medical History:     Patient Active Problem List   Diagnosis     Dyslipidemia     Hypertension     Diffuse Joint Pains (Arthralgias)     Joint Pain, Localized In The Wrist     Joint Pain, Localized In The Knee     Generalized Osteoarthritis Of The Hand     Osteoarthritis Of The Knee     Arthralgias In Multiple Sites       Past Surgical History:     Past Surgical History:   Procedure Laterality Date     APPENDECTOMY       CHOLECYSTECTOMY       LAMINECTOMY LUMBAR ONE LEVEL Right 2022    Procedure: Right Lumbar 4 - lumbar 5 hemilaminectomy, medial facetectomies, foraminotomies;  Surgeon: Shannan Mari MD;  Location: [facility] OR     ORTHOPEDIC SURGERY         Family History:   Reviewed: The patient has a family history of for hypertension.  His brother  of car accident with autopsy, 90% stenotic coronary artery disease.    Social History:    reports that he has been smoking cigarettes. He has a 30.00 pack-year smoking history. He has never used smokeless tobacco. He reports that he does not drink alcohol and does not use drugs.    Review of Systems:   12 systems are reviewed negative except for in HPI.    Meds:     Current Outpatient Medications:      ALPRAZolam (XANAX) 0.5 MG tablet, [ALPRAZOLAM (XANAX) 0.5 MG TABLET] TAKE 1 TABLET BY MOUTH ONCE A DAY AS NEEDED ORALLY, Disp: , Rfl: 0     aspirin (ASA) 81 MG chewable tablet, Take 81 mg by mouth daily, Disp: , Rfl:      atorvastatin (LIPITOR) 10 MG tablet, Take 10 mg by mouth daily, Disp: , Rfl: 3     celecoxib (CELEBREX) 200 MG capsule, Take 200 mg by mouth daily as needed for pain, Disp: , Rfl: 3     cyclobenzaprine (FLEXERIL) 10 MG tablet, Take 1 tablet (10  mg) by mouth 3 times daily as needed for muscle spasms, Disp: 42 tablet, Rfl: 1     irbesartan-hydrochlorothiazide (AVALIDE) 300-12.5 mg per tablet, [IRBESARTAN-HYDROCHLOROTHIAZIDE (AVALIDE) 300-12.5 MG PER TABLET] Take 1 tablet by mouth daily. for blood pressure, Disp: , Rfl: 1     multivitamin, therapeutic (THERA-VIT) TABS tablet, Take 1 tablet by mouth daily, Disp: , Rfl:      omeprazole (PRILOSEC) 20 MG capsule, Take 20 mg by mouth daily, Disp: , Rfl: 1     PARoxetine (PAXIL) 20 MG tablet, Take 20 mg by mouth every morning, Disp: , Rfl: 3     pregabalin (LYRICA) 50 MG capsule, Take 1 capsule (50 mg) by mouth 2 times daily (Patient taking differently: Take 50 mg by mouth as needed), Disp: 60 capsule, Rfl: 0     tadalafil (CIALIS) 20 MG tablet, Take 20 mg by mouth daily as needed, Disp: , Rfl:      zolpidem (AMBIEN) 10 mg tablet, Take 10 mg by mouth nightly as needed for sleep, Disp: , Rfl: 2     Allergies:   Iodides    Objective:      Physical Exam  94.3 kg (208 lb)     Body mass index is 31.63 kg/m .  /78 (BP Location: Right arm, Patient Position: Sitting, Cuff Size: Adult Large)   Pulse 106   Resp 18   Wt 94.3 kg (208 lb)   SpO2 98%   BMI 31.63 kg/m      General Appearance:   Awake, Alert, No acute distress.   HEENT:  Pupil equal, reactive to light. No scleral icterus; the mucous membranes were moist. No oral ulcers or thrush.    Neck: No cervical bruits. No JVD. No thyromegaly. No lymph node enlargement or tenderness.   Chest: The spine was straight. The chest was symmetric.   Lungs:   Respirations unlabored. Lungs are clear to auscultation. No crackles. No wheezing.   Cardiovascular:   RRR, normal first and second heart sounds with no murmurs. No rubs or gallops.    Abdomen:  Obese. Soft. No tenderness. Non-distended. Bowels sounds are present   Extremities: Equal posterior tibial pulses. No leg edema.   Skin: No rashes or ulcers. Warm, Dry.   Musculoskeletal: No tenderness. No deformity.    Neurologic: Mood and affect are appropriate. No focal deficits.         EKG:  Personally reivewed  Sinus tachycardia  Complete a right bundle branch block  Abnormal ECG    Lab Review   Lab Results   Component Value Date     01/04/2023    CO2 19 01/04/2023    CO2 24 06/13/2022    BUN 14.3 01/04/2023    BUN 14 06/13/2022     Lab Results   Component Value Date    WBC 9.7 06/13/2022    HGB 15.8 06/13/2022    HCT 46.4 06/13/2022    MCV 89 06/13/2022     06/13/2022     Lab Results   Component Value Date    CHOL 144 06/13/2022    TRIG 141 06/13/2022    HDL 31 06/13/2022    LDL 85 06/13/2022     LDL Cholesterol Calculated   Date Value Ref Range Status   06/13/2022 85 <=129 mg/dL Final     Lab Results   Component Value Date    TSH 1.56 01/04/2023

## 2023-01-11 NOTE — LETTER
1/11/2023    Osmel Iniguez MD  [address]  [place] [address]    RE: Barry KADEEM Pierre       Dear Colleague,     I had the pleasure of seeing Barry Jay in the [facility].      Click to link to [facility] HEART CARE NOTE    Thank you, Dr. Iniguez, for asking me to see Barry Jay in consultation at [facility] to evaluate chest discomfort, dyspnea on exertion and dizziness.      Assessment/Plan:   1.  Chest heaviness, dyspnea on exertion and dizziness: The patient's symptoms suddenly occurred on exertion, lasted 5 minutes.  The patient has 5 risk of factors including 40 years smoking history, hypertension, dyslipidemia, age and family history of for coronary artery disease.  We discussed further evaluation and management.  After discussion, coronary CT angiogram is requested to rule out significant coronary artery disease.  Echocardiogram is requested for evaluation of heart function and structure.    2.  Primary hypertension: Continue irbesartan-hydrochlorothiazide 300-12.5 mg daily.  His blood pressure is controlled.    3.  Dyslipidemia: LDL is controlled.  Continue atorvastatin.    4.  40 years of smoking history: Discussed smoking sensation.      Thank you for the opportunity to be involved in the care of Barry Jay. If you have any questions, please feel free to contact me.  I will see the patient again in 6 months and as needed    Much or all of the text in this note was generated through the use of Dragon Dictate voice-to-text software. Errors in spelling or words which seem out of context are unintentional.   Sound alike errors, in particular, may have escaped editing.       History of Present Illness:   It is my pleasure to see Barry Jay at the [facility] for evaluation of chest heaviness, dyspnea on exertion, dizziness. Barry Jay is a 67 year old male with a medical  history of primary hypertension, dyslipidemia, obesity, 40 years of smoking history.    The patient states that he developed chest discomfort/heaviness, shortness of breath on exertion and dizziness/lightheadedness.  Every time, he developed sudden onset of symptoms on exertion, lasted 5 minutes.  He denies palpitations, syncope, orthopnea, PND or leg edema.  His blood pressure and heart rate are controlled.    His ECG is reviewed, sinus tachycardia, complete right bundle branch block, no ischemic ST-T change.    Past Medical History:     Patient Active Problem List   Diagnosis     Dyslipidemia     Hypertension     Diffuse Joint Pains (Arthralgias)     Joint Pain, Localized In The Wrist     Joint Pain, Localized In The Knee     Generalized Osteoarthritis Of The Hand     Osteoarthritis Of The Knee     Arthralgias In Multiple Sites       Past Surgical History:     Past Surgical History:   Procedure Laterality Date     APPENDECTOMY       CHOLECYSTECTOMY       LAMINECTOMY LUMBAR ONE LEVEL Right 2022    Procedure: Right Lumbar 4 - lumbar 5 hemilaminectomy, medial facetectomies, foraminotomies;  Surgeon: Shannan Mari MD;  Location: [facility]     ORTHOPEDIC SURGERY         Family History:   Reviewed: The patient has a family history of for hypertension.  His brother  of car accident with autopsy, 90% stenotic coronary artery disease.    Social History:    reports that he has been smoking cigarettes. He has a 30.00 pack-year smoking history. He has never used smokeless tobacco. He reports that he does not drink alcohol and does not use drugs.    Review of Systems:   12 systems are reviewed negative except for in HPI.    Meds:     Current Outpatient Medications:      ALPRAZolam (XANAX) 0.5 MG tablet, [ALPRAZOLAM (XANAX) 0.5 MG TABLET] TAKE 1 TABLET BY MOUTH ONCE A DAY AS NEEDED ORALLY, Disp: , Rfl: 0     aspirin (ASA) 81 MG chewable tablet, Take 81 mg by mouth daily, Disp: , Rfl:      atorvastatin  (LIPITOR) 10 MG tablet, Take 10 mg by mouth daily, Disp: , Rfl: 3     celecoxib (CELEBREX) 200 MG capsule, Take 200 mg by mouth daily as needed for pain, Disp: , Rfl: 3     cyclobenzaprine (FLEXERIL) 10 MG tablet, Take 1 tablet (10 mg) by mouth 3 times daily as needed for muscle spasms, Disp: 42 tablet, Rfl: 1     irbesartan-hydrochlorothiazide (AVALIDE) 300-12.5 mg per tablet, [IRBESARTAN-HYDROCHLOROTHIAZIDE (AVALIDE) 300-12.5 MG PER TABLET] Take 1 tablet by mouth daily. for blood pressure, Disp: , Rfl: 1     multivitamin, therapeutic (THERA-VIT) TABS tablet, Take 1 tablet by mouth daily, Disp: , Rfl:      omeprazole (PRILOSEC) 20 MG capsule, Take 20 mg by mouth daily, Disp: , Rfl: 1     PARoxetine (PAXIL) 20 MG tablet, Take 20 mg by mouth every morning, Disp: , Rfl: 3     pregabalin (LYRICA) 50 MG capsule, Take 1 capsule (50 mg) by mouth 2 times daily (Patient taking differently: Take 50 mg by mouth as needed), Disp: 60 capsule, Rfl: 0     tadalafil (CIALIS) 20 MG tablet, Take 20 mg by mouth daily as needed, Disp: , Rfl:      zolpidem (AMBIEN) 10 mg tablet, Take 10 mg by mouth nightly as needed for sleep, Disp: , Rfl: 2     Allergies:   Iodides    Objective:      Physical Exam  94.3 kg (208 lb)     Body mass index is 31.63 kg/m .  /78 (BP Location: Right arm, Patient Position: Sitting, Cuff Size: Adult Large)   Pulse 106   Resp 18   Wt 94.3 kg (208 lb)   SpO2 98%   BMI 31.63 kg/m      General Appearance:   Awake, Alert, No acute distress.   HEENT:  Pupil equal, reactive to light. No scleral icterus; the mucous membranes were moist. No oral ulcers or thrush.    Neck: No cervical bruits. No JVD. No thyromegaly. No lymph node enlargement or tenderness.   Chest: The spine was straight. The chest was symmetric.   Lungs:   Respirations unlabored. Lungs are clear to auscultation. No crackles. No wheezing.   Cardiovascular:   RRR, normal first and second heart sounds with no murmurs. No rubs or gallops.     Abdomen:  Obese. Soft. No tenderness. Non-distended. Bowels sounds are present   Extremities: Equal posterior tibial pulses. No leg edema.   Skin: No rashes or ulcers. Warm, Dry.   Musculoskeletal: No tenderness. No deformity.   Neurologic: Mood and affect are appropriate. No focal deficits.         EKG:  Personally reivewed  Sinus tachycardia  Complete a right bundle branch block  Abnormal ECG    Lab Review   Lab Results   Component Value Date     01/04/2023    CO2 19 01/04/2023    CO2 24 06/13/2022    BUN 14.3 01/04/2023    BUN 14 06/13/2022     Lab Results   Component Value Date    WBC 9.7 06/13/2022    HGB 15.8 06/13/2022    HCT 46.4 06/13/2022    MCV 89 06/13/2022     06/13/2022     Lab Results   Component Value Date    CHOL 144 06/13/2022    TRIG 141 06/13/2022    HDL 31 06/13/2022    LDL 85 06/13/2022     LDL Cholesterol Calculated   Date Value Ref Range Status   06/13/2022 85 <=129 mg/dL Final     Lab Results   Component Value Date    TSH 1.56 01/04/2023                   Thank you for allowing me to participate in the care of your patient.      Sincerely,     Marianne Brush MD     [facility]  cc:   Osmel Iniguez MD  [address] [place] [address]

## 2023-01-19 ENCOUNTER — HOSPITAL ENCOUNTER (OUTPATIENT)
Dept: CARDIOLOGY | Facility: CLINIC | Age: 68
Discharge: HOME | End: 2023-01-19
Attending: INTERNAL MEDICINE | Admitting: INTERNAL MEDICINE
Payer: COMMERCIAL

## 2023-01-19 LAB — LVEF ECHO: NORMAL

## 2023-01-19 PROCEDURE — 93306 TTE W/DOPPLER COMPLETE: CPT

## 2023-01-19 PROCEDURE — 93306 TTE W/DOPPLER COMPLETE: CPT | Mod: 26 | Performed by: INTERNAL MEDICINE

## 2023-01-25 ENCOUNTER — HOSPITAL ENCOUNTER (EMERGENCY)
Facility: HOSPITAL | Age: 68
Discharge: HOME | End: 2023-01-25
Attending: EMERGENCY MEDICINE | Admitting: EMERGENCY MEDICINE
Payer: COMMERCIAL

## 2023-01-25 VITALS
HEART RATE: 90 BPM | SYSTOLIC BLOOD PRESSURE: 136 MMHG | HEIGHT: 68 IN | DIASTOLIC BLOOD PRESSURE: 74 MMHG | WEIGHT: 200 LBS | RESPIRATION RATE: 15 BRPM | OXYGEN SATURATION: 97 % | TEMPERATURE: 98.9 F | BODY MASS INDEX: 30.31 KG/M2

## 2023-01-25 DIAGNOSIS — I47.10 SVT (SUPRAVENTRICULAR TACHYCARDIA) (H): ICD-10-CM

## 2023-01-25 LAB
ANION GAP SERPL CALCULATED.3IONS-SCNC: 14 MMOL/L (ref 7–15)
BASOPHILS # BLD AUTO: 0.1 10E3/UL (ref 0–0.2)
BASOPHILS NFR BLD AUTO: 1 %
BUN SERPL-MCNC: 15.5 MG/DL (ref 8–23)
CALCIUM SERPL-MCNC: 9.7 MG/DL (ref 8.8–10.2)
CHLORIDE SERPL-SCNC: 100 MMOL/L (ref 98–107)
CREAT SERPL-MCNC: 1.38 MG/DL (ref 0.67–1.17)
DEPRECATED HCO3 PLAS-SCNC: 22 MMOL/L (ref 22–29)
EOSINOPHIL # BLD AUTO: 0.2 10E3/UL (ref 0–0.7)
EOSINOPHIL NFR BLD AUTO: 1 %
ERYTHROCYTE [DISTWIDTH] IN BLOOD BY AUTOMATED COUNT: 14.1 % (ref 10–15)
GFR SERPL CREATININE-BSD FRML MDRD: 57 ML/MIN/1.73M2
GLUCOSE SERPL-MCNC: 169 MG/DL (ref 70–99)
HCT VFR BLD AUTO: 45.5 % (ref 40–53)
HGB BLD-MCNC: 15.6 G/DL (ref 13.3–17.7)
HOLD SPECIMEN: NORMAL
IMM GRANULOCYTES # BLD: 0.1 10E3/UL
IMM GRANULOCYTES NFR BLD: 0 %
LYMPHOCYTES # BLD AUTO: 4.7 10E3/UL (ref 0.8–5.3)
LYMPHOCYTES NFR BLD AUTO: 36 %
MAGNESIUM SERPL-MCNC: 1.9 MG/DL (ref 1.7–2.3)
MCH RBC QN AUTO: 29.9 PG (ref 26.5–33)
MCHC RBC AUTO-ENTMCNC: 34.3 G/DL (ref 31.5–36.5)
MCV RBC AUTO: 87 FL (ref 78–100)
MONOCYTES # BLD AUTO: 1.2 10E3/UL (ref 0–1.3)
MONOCYTES NFR BLD AUTO: 9 %
NEUTROPHILS # BLD AUTO: 7.1 10E3/UL (ref 1.6–8.3)
NEUTROPHILS NFR BLD AUTO: 53 %
NRBC # BLD AUTO: 0 10E3/UL
NRBC BLD AUTO-RTO: 0 /100
PLATELET # BLD AUTO: 249 10E3/UL (ref 150–450)
POTASSIUM SERPL-SCNC: 3.6 MMOL/L (ref 3.4–5.3)
RBC # BLD AUTO: 5.22 10E6/UL (ref 4.4–5.9)
SODIUM SERPL-SCNC: 136 MMOL/L (ref 136–145)
TROPONIN T SERPL HS-MCNC: 18 NG/L
WBC # BLD AUTO: 13.3 10E3/UL (ref 4–11)

## 2023-01-25 PROCEDURE — 99284 EMERGENCY DEPT VISIT MOD MDM: CPT | Mod: 25

## 2023-01-25 PROCEDURE — 96361 HYDRATE IV INFUSION ADD-ON: CPT

## 2023-01-25 PROCEDURE — 84484 ASSAY OF TROPONIN QUANT: CPT | Performed by: EMERGENCY MEDICINE

## 2023-01-25 PROCEDURE — 83735 ASSAY OF MAGNESIUM: CPT | Performed by: EMERGENCY MEDICINE

## 2023-01-25 PROCEDURE — 250N000009 HC RX 250: Performed by: EMERGENCY MEDICINE

## 2023-01-25 PROCEDURE — 258N000003 HC RX IP 258 OP 636: Performed by: EMERGENCY MEDICINE

## 2023-01-25 PROCEDURE — 93005 ELECTROCARDIOGRAM TRACING: CPT | Performed by: EMERGENCY MEDICINE

## 2023-01-25 PROCEDURE — 80048 BASIC METABOLIC PNL TOTAL CA: CPT | Performed by: EMERGENCY MEDICINE

## 2023-01-25 PROCEDURE — 96374 THER/PROPH/DIAG INJ IV PUSH: CPT

## 2023-01-25 PROCEDURE — 36415 COLL VENOUS BLD VENIPUNCTURE: CPT | Performed by: EMERGENCY MEDICINE

## 2023-01-25 PROCEDURE — 250N000013 HC RX MED GY IP 250 OP 250 PS 637: Performed by: EMERGENCY MEDICINE

## 2023-01-25 PROCEDURE — 85004 AUTOMATED DIFF WBC COUNT: CPT | Performed by: EMERGENCY MEDICINE

## 2023-01-25 RX ORDER — METOPROLOL TARTRATE 1 MG/ML
5 INJECTION, SOLUTION INTRAVENOUS ONCE
Status: COMPLETED | OUTPATIENT
Start: 2023-01-25 | End: 2023-01-25

## 2023-01-25 RX ORDER — METOPROLOL TARTRATE 25 MG/1
25 TABLET, FILM COATED ORAL ONCE
Status: COMPLETED | OUTPATIENT
Start: 2023-01-25 | End: 2023-01-25

## 2023-01-25 RX ORDER — METOPROLOL TARTRATE 25 MG/1
25 TABLET, FILM COATED ORAL 2 TIMES DAILY
Qty: 60 TABLET | Refills: 0 | Status: SHIPPED | OUTPATIENT
Start: 2023-01-26 | End: 2023-02-16

## 2023-01-25 RX ADMIN — METOPROLOL TARTRATE 5 MG: 1 INJECTION, SOLUTION INTRAVENOUS at 18:36

## 2023-01-25 RX ADMIN — SODIUM CHLORIDE 1000 ML: 9 INJECTION, SOLUTION INTRAVENOUS at 18:27

## 2023-01-25 RX ADMIN — METOPROLOL TARTRATE 25 MG: 25 TABLET, FILM COATED ORAL at 20:13

## 2023-01-25 ASSESSMENT — ACTIVITIES OF DAILY LIVING (ADL): ADLS_ACUITY_SCORE: 35

## 2023-01-26 NOTE — ED PROVIDER NOTES
EMERGENCY DEPARTMENT ENCOUNTER      NAME: Barry Jay  AGE: 67 year old male  YOB: 1955  MRN: [id number]  EVALUATION DATE & TIME: 2023  6:15 PM    PCP: Osmel Iniguez    ED PROVIDER: Esvin Marx M.D.      Chief Complaint   Patient presents with     Tachycardia       FINAL IMPRESSION:  1. SVT (supraventricular tachycardia) (H)        ED COURSE & MEDICAL DECISION MAKIN year old male presents to the Emergency Department for evaluation of palpitations and dizziness and chest pressure.  He is tachycardic in the field, medics report SVT rates to the 200s, was seen in the resuscitation room for this reason.  He was given adenosine prior to arrival here.  On the initial monitor appears to be in and out of a supraventricular tachycardia with frequent sinus beats noted.  EKG here does capture a run of what appears to be SVT.  He received 1 dose of metoprolol once he demonstrated stable blood pressures here.  Over the next hour or 2 of monitoring his heart rhythm did settle down and became entirely sinus for at least an hour of continued monitoring and he felt much better after this.  He did also receive some oral metoprolol.  I briefly reviewed the case with the on-call cardiology consultant.  Labs here primarily notable for minimal acute kidney injury, he has had some baseline borderline elevated creatinine in the past.  He was given some IV fluid here and recommended a slight increase in liquid intake at home.  I think the next step would be to have him follow-up in cardiology clinic as soon as possible to get set up for an event monitor.  Since he is demonstrating stability here I think he can be safely monitored at home.  To return to the ED for any recurrent severe sustained symptoms or other immediate concerns.    6:21 PM I introduced myself to the patient, obtained patient history, performed a physical exam, and discussed plan for ED workup including potential diagnostic  laboratory/imaging studies and interventions.   6:34 PM Nurse reports heart rate and blood pressure has decreased after medication.  7:50 PM Rechecked and updated patient.  8:14 PM Rechecked and updated the patient. We discussed the plan for discharge and the patient is agreeable. Reviewed supportive cares, symptomatic treatment, outpatient follow up, and reasons to return to the Emergency Department. Patient to be discharged by ED RN.       Medical Decision Making    History:    Supplemental history from: Documented in chart, if applicable and EMS    External Record(s) reviewed: Documented in chart, if applicable.    Work Up:    Chart documentation includes differential considered and any EKGs or imaging independently interpreted by provider, where specified.    In additional to work up documented, I considered the following work up: Documented in chart, if applicable.    External consultation:    Discussion of management with another provider: Documented in chart, if applicable    Complicating factors:    Care impacted by chronic illness: Hyperlipidemia, Hypertension and Smoking / Nicotine Use    Care affected by social determinants of health: N/A    Disposition considerations: Discharge. I prescribed additional prescription strength medication(s) as charted. I considered admission, but discharged patient after significant clinical improvement.            MEDICATIONS GIVEN IN THE EMERGENCY:  Medications   0.9% sodium chloride BOLUS (0 mLs Intravenous Stopped 1/25/23 1935)   metoprolol (LOPRESSOR) injection 5 mg (5 mg Intravenous Given 1/25/23 1836)   metoprolol tartrate (LOPRESSOR) tablet 25 mg (25 mg Oral Given 1/25/23 2013)       NEW PRESCRIPTIONS STARTED AT TODAY'S ER VISIT  Discharge Medication List as of 1/25/2023  8:10 PM      START taking these medications    Details   metoprolol tartrate (LOPRESSOR) 25 MG tablet Take 1 tablet (25 mg) by mouth 2 times daily for 30 days, Disp-60 tablet, R-0, Local Print                 =================================================================    HPI    Patient information was obtained from: patient, EMS    Use of : N/A        Barry Jay is a 67 year old male with a pertinent history of dyslipidemia, hyperlipidemia, HTN, tobacco use, s/p cholecystectomy, s/p appendectomy, and s/p laminectomy lumbar one level who presents to this ED via EMS escorted by other for evaluation of chest pain and tachycardia.    Per patient, after doing laundry, patient was walking up the stairs when he developed a sudden onset of chest pain and shortness of breath. He has had dizzy spells over the past few months. Besides the dizziness, he has had similar episodes like this in the past. He currently is still very dizzy but denies any current chest pain.    10 days ago, patient saw his PCP, and his EKG looked fine. On 1/19, he then saw Dr. Bridges, cardiologist, and he is currently awaiting the results of his echocardiogram.    Before EMS arrival, patient took 1 nitroglycerin tablet. Patient has HTN and has been taking medication for it for 15 years.    Per EMS, upon arrival, they found his heart rate to be 216. Vagal maneuvers were not successful in lower his heart rate. They gave him 12 of adenosine, which initially lowered his rate to the 130s. Since then, however, it has been oscillating between the 130s and the 200s. They noted his underlying rhythm was irregular. EMS additional administered 324 mg of aspirin.    REVIEW OF SYSTEMS   All systems reviewed and negative except as noted in HPI.    PAST MEDICAL HISTORY:  Past Medical History:   Diagnosis Date     HTN (hypertension)      Hyperlipidemia        PAST SURGICAL HISTORY:  Past Surgical History:   Procedure Laterality Date     APPENDECTOMY       CHOLECYSTECTOMY       LAMINECTOMY LUMBAR ONE LEVEL Right 6/27/2022    Procedure: Right Lumbar 4 - lumbar 5 hemilaminectomy, medial facetectomies, foraminotomies;  Surgeon: Shannan Mari  "MD CLAIRE;  Location: [facility] OR     ORTHOPEDIC SURGERY             CURRENT MEDICATIONS:    No current facility-administered medications for this encounter.     Current Outpatient Medications   Medication     [START ON 1/26/2023] metoprolol tartrate (LOPRESSOR) 25 MG tablet     ALPRAZolam (XANAX) 0.5 MG tablet     aspirin (ASA) 81 MG chewable tablet     atorvastatin (LIPITOR) 10 MG tablet     celecoxib (CELEBREX) 200 MG capsule     cyclobenzaprine (FLEXERIL) 10 MG tablet     irbesartan-hydrochlorothiazide (AVALIDE) 300-12.5 mg per tablet     multivitamin, therapeutic (THERA-VIT) TABS tablet     omeprazole (PRILOSEC) 20 MG capsule     PARoxetine (PAXIL) 20 MG tablet     pregabalin (LYRICA) 50 MG capsule     tadalafil (CIALIS) 20 MG tablet     zolpidem (AMBIEN) 10 mg tablet         ALLERGIES:  Allergies   Allergen Reactions     Iodides Unknown and Hives     Pt thinks 20+ yrs ago, Pt received omni 350 without premed and did fine 11/7/2020 JMS  Pt thinks 20+ yrs ago  Pt received omni 350 without premed and did fine 11/7/2020 S         FAMILY HISTORY:  History reviewed. No pertinent family history.    SOCIAL HISTORY:   Social History     Socioeconomic History     Marital status: Single   Tobacco Use     Smoking status: Every Day     Packs/day: 1.00     Years: 30.00     Pack years: 30.00     Types: Cigarettes     Smokeless tobacco: Never   Substance and Sexual Activity     Alcohol use: Never     Drug use: Never       VITALS:  /74   Pulse 90   Temp 98.9  F (37.2  C) (Oral)   Resp 15   Ht 1.727 m (5' 8\")   Wt 90.7 kg (200 lb)   SpO2 97%   BMI 30.41 kg/m      PHYSICAL EXAM    Constitutional: Well developed, Well nourished, mildly uncomfortable appearing on arrival.  HENT: Normocephalic, Atraumatic. Neck Supple.  Eyes: EOMI, Conjunctiva normal.  Respiratory: Breathing comfortably on room air. Speaks full sentences easily. Lungs clear to ascultation.  Cardiovascular: Tachycardic heart rate, Regular rhythm. No " peripheral edema.  Abdomen: Soft, nontender  Musculoskeletal: Good range of motion in all major joints. No major deformities noted.  Integument: Warm, diaphoretic.  Neurologic: Alert & awake, Normal motor function, Normal sensory function, No focal deficits noted.   Psychiatric: Cooperative. Affect appropriate.     LAB:  All pertinent labs reviewed and interpreted.  Labs Ordered and Resulted from Time of ED Arrival to Time of ED Departure   BASIC METABOLIC PANEL - Abnormal       Result Value    Sodium 136      Potassium 3.6      Chloride 100      Carbon Dioxide (CO2) 22      Anion Gap 14      Urea Nitrogen 15.5      Creatinine 1.38 (*)     Calcium 9.7      Glucose 169 (*)     GFR Estimate 57 (*)    CBC WITH PLATELETS AND DIFFERENTIAL - Abnormal    WBC Count 13.3 (*)     RBC Count 5.22      Hemoglobin 15.6      Hematocrit 45.5      MCV 87      MCH 29.9      MCHC 34.3      RDW 14.1      Platelet Count 249      % Neutrophils 53      % Lymphocytes 36      % Monocytes 9      % Eosinophils 1      % Basophils 1      % Immature Granulocytes 0      NRBCs per 100 WBC 0      Absolute Neutrophils 7.1      Absolute Lymphocytes 4.7      Absolute Monocytes 1.2      Absolute Eosinophils 0.2      Absolute Basophils 0.1      Absolute Immature Granulocytes 0.1      Absolute NRBCs 0.0     MAGNESIUM - Normal    Magnesium 1.9     TROPONIN T, HIGH SENSITIVITY - Normal    Troponin T, High Sensitivity 18             EKG:    Performed at: 1820 on 1/25/23    Impression: sinus tachycardia with multiple sequential supraventricular beats. Left axis deviation. Right BBB. Abnormal ECG. When compared with ECG of 12/07/2012 at 0744, PVCs are now present. PSVCs are now present. Ventricular rate has increased by 53 BPM. ST elevation now present in inferior leads. T wave inversion no longer evident in inferior leads.    Rate: 134  Rhythm: sinus  Axis: -34  IN Interval: 158  QRS Interval: 122  QTc Interval: 480  ST Changes:  When compared with ECG of  12/07/2012 at 0744, ST elevation now present in inferior leads.  Comparison: 12/07/2012 at 0744    I have independently reviewed and interpreted the EKG(s) documented above.        I, Aury Diez, am serving as a scribe to document services personally performed by Dr. Esvin Marx, based on my observation and the provider's statements to me. I, Esvin Marx MD attest that Aury Diez is acting in a scribe capacity, has observed my performance of the services and has documented them in accordance with my direction.    Esvin Marx M.D.  Emergency Medicine  [facility] EMERGENCY DEPARTMENT  [address] [place] [phone]  [phone]  Dept: [phone]     Esvin Marx MD  01/25/23 2057

## 2023-01-26 NOTE — DISCHARGE INSTRUCTIONS
You were seen in the emergency department tonight for chest pain lightheadedness and an abnormal heart rhythm.  Your evaluation included cardiac monitoring here which showed an abnormal heart rhythm, something called supraventricular tachycardia.  You improved with medications you were given in route to the hospital and here.  We are glad to see that you have settled back into a normal heart rhythm with an appropriate heart rate and are feeling better because of this.  Your lab evaluation here looked good and we reviewed your recent echocardiogram which was reassuring.  We discussed her case briefly with the cardiologist on-call.  We are going to start you on a medication called metoprolol.  He received the first dose here and should start taking this medication twice a day starting tomorrow morning.  We would like you to contact your cardiologist as soon as possible, I think the neck step in your evaluation would probably be to get set up with an event monitor as an outpatient.  If you do have recurrent severe symptoms like the ones you are experiencing tonight, we should reevaluate you in the emergency department.

## 2023-02-03 ENCOUNTER — OFFICE VISIT (OUTPATIENT)
Dept: CARDIOLOGY | Facility: CLINIC | Age: 68
End: 2023-02-03
Payer: COMMERCIAL

## 2023-02-03 VITALS
SYSTOLIC BLOOD PRESSURE: 140 MMHG | BODY MASS INDEX: 31.32 KG/M2 | RESPIRATION RATE: 18 BRPM | HEART RATE: 67 BPM | DIASTOLIC BLOOD PRESSURE: 80 MMHG | WEIGHT: 206 LBS | OXYGEN SATURATION: 99 %

## 2023-02-03 DIAGNOSIS — I47.10 PAROXYSMAL SUPRAVENTRICULAR TACHYCARDIA (H): Primary | ICD-10-CM

## 2023-02-03 DIAGNOSIS — E78.5 DYSLIPIDEMIA: ICD-10-CM

## 2023-02-03 DIAGNOSIS — R07.89 CHEST HEAVINESS: ICD-10-CM

## 2023-02-03 DIAGNOSIS — I10 PRIMARY HYPERTENSION: ICD-10-CM

## 2023-02-03 PROCEDURE — 99215 OFFICE O/P EST HI 40 MIN: CPT | Performed by: INTERNAL MEDICINE

## 2023-02-03 NOTE — PROGRESS NOTES
Click to link to [facility] HEART CARE NOTE         Assessment/Plan:   1.  Paroxysmal supraventricular tachycardia: The patient's ECG demonstrated supraventricular tachycardia with significantly elevated ventricular rate up to 205 bpm.  The patient was symptomatic with dizziness, shortness of breath, chest heaviness.  We discussed further evaluation and management.  Since the patient was very symptomatic supraventricular tachycardia and significantly elevated ventricular rate, EP ablation is recommended.  Meantime, continue metoprolol 25 mg twice a day with additional 25 mg of metoprolol if he has episode again.  The patient agreed with the plan.    2.  Chest heaviness, dyspnea on exertion and dizziness: The patient's symptoms most likely are secondary to paroxysmal supraventricular tachycardia.  However, he has a multiple risk of factors including 40 years smoking history, hypertension, dyslipidemia, age and family history of for coronary artery disease.  Coronary CT angiogram is scheduled on February 23, 2023 to rule out obstructive coronary artery disease.    3.  Primary hypertension: Continue irbesartan-hydrochlorothiazide 300-12.5 mg daily.  His blood pressure is controlled.    4.  Dyslipidemia: LDL is controlled.  Continue atorvastatin.    5.  40 years of smoking history: Discussed smoking sensation.    The patient is referred to EP cardiologist for discussion of SVT ablation as soon as possible.  We will follow-up his coronary CT angiogram report and discuss the findings with the patient.    Total 52 minutes were spent in this visit for face to face visit, physical exam, review of current labs/imaging studies, plan for ongoing treatment with >50% spent on counseling and coordination of care as documented in the above mentioned note.      Thank you for the opportunity to be involved in the care of Barry Jay. If you have any questions, please feel free to contact me.  I  will see the patient again in 6 months and as needed    Much or all of the text in this note was generated through the use of Dragon Dictate voice-to-text software. Errors in spelling or words which seem out of context are unintentional.   Sound alike errors, in particular, may have escaped editing.       History of Present Illness:   It is my pleasure to see Barry Jay at the [facility] clinic for evaluation of SVT. Barry Jay is a 67 year old male with a medical history of primary hypertension, dyslipidemia, obesity, 40 years of smoking history, paroxysmal supraventricular tachycardia.    The patient was evaluated for chest discomfort, dizziness.  His echocardiogram was reported normal heart function and structure.  His coronary CT angiogram was scheduled but not done.  He had episode of dizziness leading to ER visit.  His ECG and a telemetry monitor demonstrated supraventricular tachycardia with ventricular rate 205 bpm.  The patient was treated with adenosine and metoprolol.  Since ER visit, he has been taking metoprolol 25 mg twice a day.  He did not have recurrent episode of dizziness, chest heaviness.  He had no orthopnea, PND or leg edema.  His blood pressure and heart rate are controlled.    Past Medical History:     Patient Active Problem List   Diagnosis     Dyslipidemia     Hypertension     Diffuse Joint Pains (Arthralgias)     Joint Pain, Localized In The Wrist     Joint Pain, Localized In The Knee     Generalized Osteoarthritis Of The Hand     Osteoarthritis Of The Knee     Arthralgias In Multiple Sites       Past Surgical History:     Past Surgical History:   Procedure Laterality Date     APPENDECTOMY       CHOLECYSTECTOMY       LAMINECTOMY LUMBAR ONE LEVEL Right 6/27/2022    Procedure: Right Lumbar 4 - lumbar 5 hemilaminectomy, medial facetectomies, foraminotomies;  Surgeon: Shannan Mari MD;  Location: [facility] [place] OR     ORTHOPEDIC SURGERY         Family History:    Reviewed: The patient has a family history of for hypertension.  His brother  of car accident with autopsy, 90% stenotic coronary artery disease.    Social History:    reports that he has been smoking cigarettes. He has a 30.00 pack-year smoking history. He has never used smokeless tobacco. He reports that he does not drink alcohol and does not use drugs.    Review of Systems:   12 systems are reviewed negative except for in HPI.    Meds:     Current Outpatient Medications:      ALPRAZolam (XANAX) 0.5 MG tablet, [ALPRAZOLAM (XANAX) 0.5 MG TABLET] TAKE 1 TABLET BY MOUTH ONCE A DAY AS NEEDED ORALLY, Disp: , Rfl: 0     aspirin (ASA) 81 MG chewable tablet, Take 81 mg by mouth daily, Disp: , Rfl:      atorvastatin (LIPITOR) 10 MG tablet, Take 10 mg by mouth daily, Disp: , Rfl: 3     celecoxib (CELEBREX) 200 MG capsule, Take 200 mg by mouth daily as needed for pain, Disp: , Rfl: 3     cyclobenzaprine (FLEXERIL) 10 MG tablet, Take 1 tablet (10 mg) by mouth 3 times daily as needed for muscle spasms, Disp: 42 tablet, Rfl: 1     irbesartan-hydrochlorothiazide (AVALIDE) 300-12.5 mg per tablet, [IRBESARTAN-HYDROCHLOROTHIAZIDE (AVALIDE) 300-12.5 MG PER TABLET] Take 1 tablet by mouth daily. for blood pressure, Disp: , Rfl: 1     metoprolol tartrate (LOPRESSOR) 25 MG tablet, Take 1 tablet (25 mg) by mouth 2 times daily for 30 days, Disp: 60 tablet, Rfl: 0     multivitamin, therapeutic (THERA-VIT) TABS tablet, Take 1 tablet by mouth daily, Disp: , Rfl:      omeprazole (PRILOSEC) 20 MG capsule, Take 20 mg by mouth daily, Disp: , Rfl: 1     PARoxetine (PAXIL) 20 MG tablet, Take 20 mg by mouth every morning, Disp: , Rfl: 3     tadalafil (CIALIS) 20 MG tablet, Take 20 mg by mouth daily as needed, Disp: , Rfl:      zolpidem (AMBIEN) 10 mg tablet, Take 10 mg by mouth nightly as needed for sleep, Disp: , Rfl: 2     pregabalin (LYRICA) 50 MG capsule, Take 1 capsule (50 mg) by mouth 2 times daily (Patient not taking: Reported on  2/3/2023), Disp: 60 capsule, Rfl: 0     Allergies:   Iodides    Objective:      Physical Exam  93.4 kg (206 lb)     Body mass index is 31.32 kg/m .  BP (!) 140/80 (BP Location: Right arm, Patient Position: Sitting, Cuff Size: Adult Large)   Pulse 67   Resp 18   Wt 93.4 kg (206 lb)   SpO2 99%   BMI 31.32 kg/m      General Appearance:   Awake, Alert, No acute distress.   HEENT:  Pupil equal, reactive to light. No scleral icterus; the mucous membranes were moist. No oral ulcers or thrush.    Neck: No cervical bruits. No JVD. No thyromegaly. No lymph node enlargement or tenderness.   Chest: The spine was straight. The chest was symmetric.   Lungs:   Respirations unlabored. Lungs are clear to auscultation. No crackles. No wheezing.   Cardiovascular:   RRR, normal first and second heart sounds with no murmurs. No rubs or gallops.    Abdomen:  Obese. Soft. No tenderness. Non-distended. Bowels sounds are present   Extremities: Equal posterior tibial pulses. No leg edema.   Skin: No rashes or ulcers. Warm, Dry.   Musculoskeletal: No tenderness. No deformity.   Neurologic: Mood and affect are appropriate. No focal deficits.         EKG:  Personally reivewed  Supraventricular tachycardia with ventricular rate 205 bpm  Nonspecific ST-T abnormality  Abnormal ECG    Cardiac Image Study:  Echocardiogram on January 19, 2023:  Left ventricular size, wall motion and function are normal. The ejection  fraction is 60-65%.  Normal right ventricle size and systolic function.  IVC diameter <2.1 cm collapsing >50% with sniff suggests a normal RA pressure  of 3 mmHg.  No hemodynamically significant valvular abnormalities on 2D or color flow  imaging.    Lab Review   Lab Results   Component Value Date     01/04/2023    CO2 19 01/04/2023    CO2 24 06/13/2022    BUN 14.3 01/04/2023    BUN 14 06/13/2022     Lab Results   Component Value Date    WBC 9.7 06/13/2022    HGB 15.8 06/13/2022    HCT 46.4 06/13/2022    MCV 89 06/13/2022      06/13/2022     Lab Results   Component Value Date    CHOL 144 06/13/2022    TRIG 141 06/13/2022    HDL 31 06/13/2022    LDL 85 06/13/2022     LDL Cholesterol Calculated   Date Value Ref Range Status   06/13/2022 85 <=129 mg/dL Final     Lab Results   Component Value Date    TSH 1.56 01/04/2023

## 2023-02-03 NOTE — LETTER
2/3/2023    Osmel Iniguez MD  [address]  [place] [address]    RE: Barry Jay       Dear Colleague,     I had the pleasure of seeing Barry Jay in the [facility].      Click to link to [facility] HEART CARE NOTE         Assessment/Plan:   1.  Paroxysmal supraventricular tachycardia: The patient's ECG demonstrated supraventricular tachycardia with significantly elevated ventricular rate up to 205 bpm.  The patient was symptomatic with dizziness, shortness of breath, chest heaviness.  We discussed further evaluation and management.  Since the patient was very symptomatic supraventricular tachycardia and significantly elevated ventricular rate, EP ablation is recommended.  Meantime, continue metoprolol 25 mg twice a day with additional 25 mg of metoprolol if he has episode again.  The patient agreed with the plan.    2.  Chest heaviness, dyspnea on exertion and dizziness: The patient's symptoms most likely are secondary to paroxysmal supraventricular tachycardia.  However, he has a multiple risk of factors including 40 years smoking history, hypertension, dyslipidemia, age and family history of for coronary artery disease.  Coronary CT angiogram is scheduled on February 23, 2023 to rule out obstructive coronary artery disease.    3.  Primary hypertension: Continue irbesartan-hydrochlorothiazide 300-12.5 mg daily.  His blood pressure is controlled.    4.  Dyslipidemia: LDL is controlled.  Continue atorvastatin.    5.  40 years of smoking history: Discussed smoking sensation.    The patient is referred to EP cardiologist for discussion of SVT ablation as soon as possible.  We will follow-up his coronary CT angiogram report and discuss the findings with the patient.    Total 52 minutes were spent in this visit for face to face visit, physical exam, review of current labs/imaging studies, plan for ongoing treatment with >50% spent on  counseling and coordination of care as documented in the above mentioned note.      Thank you for the opportunity to be involved in the care of Barry Jay. If you have any questions, please feel free to contact me.  I will see the patient again in 6 months and as needed    Much or all of the text in this note was generated through the use of Dragon Dictate voice-to-text software. Errors in spelling or words which seem out of context are unintentional.   Sound alike errors, in particular, may have escaped editing.       History of Present Illness:   It is my pleasure to see Barry Jay at the [facility] clinic for evaluation of SVT. Barry aJy is a 67 year old male with a medical history of primary hypertension, dyslipidemia, obesity, 40 years of smoking history, paroxysmal supraventricular tachycardia.    The patient was evaluated for chest discomfort, dizziness.  His echocardiogram was reported normal heart function and structure.  His coronary CT angiogram was scheduled but not done.  He had episode of dizziness leading to ER visit.  His ECG and a telemetry monitor demonstrated supraventricular tachycardia with ventricular rate 205 bpm.  The patient was treated with adenosine and metoprolol.  Since ER visit, he has been taking metoprolol 25 mg twice a day.  He did not have recurrent episode of dizziness, chest heaviness.  He had no orthopnea, PND or leg edema.  His blood pressure and heart rate are controlled.    Past Medical History:     Patient Active Problem List   Diagnosis     Dyslipidemia     Hypertension     Diffuse Joint Pains (Arthralgias)     Joint Pain, Localized In The Wrist     Joint Pain, Localized In The Knee     Generalized Osteoarthritis Of The Hand     Osteoarthritis Of The Knee     Arthralgias In Multiple Sites       Past Surgical History:     Past Surgical History:   Procedure Laterality Date     APPENDECTOMY       CHOLECYSTECTOMY       LAMINECTOMY LUMBAR ONE LEVEL  Right 2022    Procedure: Right Lumbar 4 - lumbar 5 hemilaminectomy, medial facetectomies, foraminotomies;  Surgeon: Shannan Mari MD;  Location: [facility] [place]     ORTHOPEDIC SURGERY         Family History:   Reviewed: The patient has a family history of for hypertension.  His brother  of car accident with autopsy, 90% stenotic coronary artery disease.    Social History:    reports that he has been smoking cigarettes. He has a 30.00 pack-year smoking history. He has never used smokeless tobacco. He reports that he does not drink alcohol and does not use drugs.    Review of Systems:   12 systems are reviewed negative except for in HPI.    Meds:     Current Outpatient Medications:      ALPRAZolam (XANAX) 0.5 MG tablet, [ALPRAZOLAM (XANAX) 0.5 MG TABLET] TAKE 1 TABLET BY MOUTH ONCE A DAY AS NEEDED ORALLY, Disp: , Rfl: 0     aspirin (ASA) 81 MG chewable tablet, Take 81 mg by mouth daily, Disp: , Rfl:      atorvastatin (LIPITOR) 10 MG tablet, Take 10 mg by mouth daily, Disp: , Rfl: 3     celecoxib (CELEBREX) 200 MG capsule, Take 200 mg by mouth daily as needed for pain, Disp: , Rfl: 3     cyclobenzaprine (FLEXERIL) 10 MG tablet, Take 1 tablet (10 mg) by mouth 3 times daily as needed for muscle spasms, Disp: 42 tablet, Rfl: 1     irbesartan-hydrochlorothiazide (AVALIDE) 300-12.5 mg per tablet, [IRBESARTAN-HYDROCHLOROTHIAZIDE (AVALIDE) 300-12.5 MG PER TABLET] Take 1 tablet by mouth daily. for blood pressure, Disp: , Rfl: 1     metoprolol tartrate (LOPRESSOR) 25 MG tablet, Take 1 tablet (25 mg) by mouth 2 times daily for 30 days, Disp: 60 tablet, Rfl: 0     multivitamin, therapeutic (THERA-VIT) TABS tablet, Take 1 tablet by mouth daily, Disp: , Rfl:      omeprazole (PRILOSEC) 20 MG capsule, Take 20 mg by mouth daily, Disp: , Rfl: 1     PARoxetine (PAXIL) 20 MG tablet, Take 20 mg by mouth every morning, Disp: , Rfl: 3     tadalafil (CIALIS) 20 MG tablet, Take 20 mg by mouth daily as needed, Disp: , Rfl:       zolpidem (AMBIEN) 10 mg tablet, Take 10 mg by mouth nightly as needed for sleep, Disp: , Rfl: 2     pregabalin (LYRICA) 50 MG capsule, Take 1 capsule (50 mg) by mouth 2 times daily (Patient not taking: Reported on 2/3/2023), Disp: 60 capsule, Rfl: 0     Allergies:   Iodides    Objective:      Physical Exam  93.4 kg (206 lb)     Body mass index is 31.32 kg/m .  BP (!) 140/80 (BP Location: Right arm, Patient Position: Sitting, Cuff Size: Adult Large)   Pulse 67   Resp 18   Wt 93.4 kg (206 lb)   SpO2 99%   BMI 31.32 kg/m      General Appearance:   Awake, Alert, No acute distress.   HEENT:  Pupil equal, reactive to light. No scleral icterus; the mucous membranes were moist. No oral ulcers or thrush.    Neck: No cervical bruits. No JVD. No thyromegaly. No lymph node enlargement or tenderness.   Chest: The spine was straight. The chest was symmetric.   Lungs:   Respirations unlabored. Lungs are clear to auscultation. No crackles. No wheezing.   Cardiovascular:   RRR, normal first and second heart sounds with no murmurs. No rubs or gallops.    Abdomen:  Obese. Soft. No tenderness. Non-distended. Bowels sounds are present   Extremities: Equal posterior tibial pulses. No leg edema.   Skin: No rashes or ulcers. Warm, Dry.   Musculoskeletal: No tenderness. No deformity.   Neurologic: Mood and affect are appropriate. No focal deficits.         EKG:  Personally reivewed  Supraventricular tachycardia with ventricular rate 205 bpm  Nonspecific ST-T abnormality  Abnormal ECG    Cardiac Image Study:  Echocardiogram on January 19, 2023:  Left ventricular size, wall motion and function are normal. The ejection  fraction is 60-65%.  Normal right ventricle size and systolic function.  IVC diameter <2.1 cm collapsing >50% with sniff suggests a normal RA pressure  of 3 mmHg.  No hemodynamically significant valvular abnormalities on 2D or color flow  imaging.    Lab Review   Lab Results   Component Value Date     01/04/2023     CO2 19 01/04/2023    CO2 24 06/13/2022    BUN 14.3 01/04/2023    BUN 14 06/13/2022     Lab Results   Component Value Date    WBC 9.7 06/13/2022    HGB 15.8 06/13/2022    HCT 46.4 06/13/2022    MCV 89 06/13/2022     06/13/2022     Lab Results   Component Value Date    CHOL 144 06/13/2022    TRIG 141 06/13/2022    HDL 31 06/13/2022    LDL 85 06/13/2022     LDL Cholesterol Calculated   Date Value Ref Range Status   06/13/2022 85 <=129 mg/dL Final     Lab Results   Component Value Date    TSH 1.56 01/04/2023               Thank you for allowing me to participate in the care of your patient.      Sincerely,     Marianne Brush MD     [facility]  cc:   No referring provider defined for this encounter.

## 2023-02-12 NOTE — H&P (VIEW-ONLY)
HEART CARE ENCOUNTER CONSULTATON KAYLEE      [facility]  [phone]      Assessment/Recommendations   Assessment/Plan:    Barry Jay is a very pleasant 67 year old male with history of primary hypertension, dyslipidemia, obesity, 40 years of smoking history, paroxysmal supraventricular tachycardia.    1. SVT  Supraventricular tachycardia - symptomatic with palpitations.   AVNRT, vs AVRT vs AT are possible.  We reviewed SVT mechanisms and reviewed treatment options including electrophysiology study and ablation vs continued medical therapy.  We reviewed the nature of EP studies and ablation for SVT, success rates depending on the specific SVT mechanism, procedural risks (including groin hematoma, tamponade, heart block, stroke) and recovery expectations.  - currently on Metoprolol tartrate 25 mg BID  - would like to proceed with an ablation as he does not want to take medications on a long term basis  - he is getting a CT coronary artery angio next week, will schedule for EP study and ablation after the CT         History of Present Illness/Subjective    HPI: Barry Jay is a very pleasant 67 year old male with history of primary hypertension, dyslipidemia, obesity, 40 years of smoking history, paroxysmal supraventricular tachycardia.    About 3 weeks ago the patient had sudden onset of dizziness and palpitations and called 911. He was brought to the ER. He did receive Adenosine which did not affect the HR and was given Metoprolol in the ER which terminated the tachycardia.    Had similar shorter episodes last fall.    Had a normal angiogram in 2011 locally.    Recent ECG(personally reviewed):    SVT with RBBB pattern    Recent Echocardiogram Results (personally reviewed):    Left ventricular size, wall motion and function are normal. The ejection  fraction is 60-65%.  Normal right ventricle size and systolic function.  IVC diameter <2.1 cm collapsing >50% with sniff suggests a normal RA  "pressure  of 3 mmHg.  No hemodynamically significant valvular abnormalities on 2D or color flow  imaging.  ______________________________________________________________________________    Labs below reviewed personally     Physical Examination  Review of Systems   Vitals: /68 (BP Location: Left arm, Patient Position: Sitting, Cuff Size: Adult Large)   Pulse 64   Resp 14   Ht 1.727 m (5' 8\")   Wt 93.4 kg (206 lb)   BMI 31.32 kg/m    BMI= Body mass index is 31.32 kg/m .  Wt Readings from Last 3 Encounters:   02/16/23 93.4 kg (206 lb)   02/03/23 93.4 kg (206 lb)   01/25/23 90.7 kg (200 lb)       General Appearance:   no distress, normal body habitus   ENT/Mouth: membranes moist, no oral lesions or bleeding gums.      EYES:  no scleral icterus, normal conjunctivae   Neck: no carotid bruits or thyromegaly   Chest/Lungs:   lungs are clear to auscultation, no rales or wheezing, no sternal scar, equal chest wall expansion    Cardiovascular:   Regular. Normal first and second heart sounds with no murmurs, rubs, or gallops; the carotid, radial and posterior tibial pulses are intact, no edema bilaterally    Abdomen:  no organomegaly, masses, bruits, or tenderness; bowel sounds are present   Extremities: no cyanosis or clubbing   Skin: no xanthelasma, warm.    Neurologic: normal  bilateral, no tremors     Psychiatric: alert and oriented x3, calm        Please refer above for cardiac ROS details.        Medical History  Surgical History Family History Social History   Past Medical History:   Diagnosis Date     HTN (hypertension)      Hyperlipidemia      Past Surgical History:   Procedure Laterality Date     APPENDECTOMY       CHOLECYSTECTOMY       LAMINECTOMY LUMBAR ONE LEVEL Right 6/27/2022    Procedure: Right Lumbar 4 - lumbar 5 hemilaminectomy, medial facetectomies, foraminotomies;  Surgeon: Shannan Mari MD;  Location: [facility] OR     ORTHOPEDIC SURGERY       No family history on file.     Social " History     Socioeconomic History     Marital status: Single     Spouse name: Not on file     Number of children: Not on file     Years of education: Not on file     Highest education level: Not on file   Occupational History     Not on file   Tobacco Use     Smoking status: Every Day     Packs/day: 1.00     Years: 30.00     Pack years: 30.00     Types: Cigarettes     Smokeless tobacco: Never   Substance and Sexual Activity     Alcohol use: Never     Drug use: Never     Sexual activity: Not on file   Other Topics Concern     Not on file   Social History Narrative     Not on file     Social Determinants of Health     Financial Resource Strain: Not on file   Food Insecurity: Not on file   Transportation Needs: Not on file   Physical Activity: Not on file   Stress: Not on file   Social Connections: Not on file   Intimate Partner Violence: Not on file   Housing Stability: Not on file           Medications  Allergies   Current Outpatient Medications   Medication Sig Dispense Refill     ALPRAZolam (XANAX) 0.5 MG tablet [ALPRAZOLAM (XANAX) 0.5 MG TABLET] TAKE 1 TABLET BY MOUTH ONCE A DAY AS NEEDED ORALLY  0     aspirin (ASA) 81 MG chewable tablet Take 81 mg by mouth daily       atorvastatin (LIPITOR) 10 MG tablet Take 10 mg by mouth daily  3     celecoxib (CELEBREX) 200 MG capsule Take 200 mg by mouth daily as needed for pain  3     cyclobenzaprine (FLEXERIL) 10 MG tablet Take 1 tablet (10 mg) by mouth 3 times daily as needed for muscle spasms 42 tablet 1     irbesartan-hydrochlorothiazide (AVALIDE) 300-12.5 mg per tablet [IRBESARTAN-HYDROCHLOROTHIAZIDE (AVALIDE) 300-12.5 MG PER TABLET] Take 1 tablet by mouth daily. for blood pressure  1     metoprolol tartrate (LOPRESSOR) 25 MG tablet Take 1 tablet (25 mg) by mouth 2 times daily 60 tablet 0     multivitamin, therapeutic (THERA-VIT) TABS tablet Take 1 tablet by mouth daily       omeprazole (PRILOSEC) 20 MG capsule Take 20 mg by mouth daily  1     PARoxetine (PAXIL) 20 MG  tablet Take 20 mg by mouth every morning  3     tadalafil (CIALIS) 20 MG tablet Take 20 mg by mouth daily as needed       zolpidem (AMBIEN) 10 mg tablet Take 10 mg by mouth nightly as needed for sleep  2     pregabalin (LYRICA) 50 MG capsule Take 1 capsule (50 mg) by mouth 2 times daily (Patient not taking: Reported on 2/3/2023) 60 capsule 0       Allergies   Allergen Reactions     Iodides Hives and Unknown     Pt thinks 20+ yrs ago, Pt received omni 350 without premed and did fine 11/7/2020 J[facility]            Lab Results    Chemistry/lipid CBC Cardiac Enzymes/BNP/TSH/INR   Recent Labs   Lab Test 06/13/22  1335   CHOL 144   HDL 31*   LDL 85   TRIG 141     Recent Labs   Lab Test 06/13/22  1335 06/11/21  1634 05/28/20  1410   LDL 85 74 85     Recent Labs   Lab Test 01/25/23  1821      POTASSIUM 3.6   CHLORIDE 100   CO2 22   *   BUN 15.5   CR 1.38*   GFRESTIMATED 57*   MIKE 9.7     Recent Labs   Lab Test 01/25/23  1821 01/04/23  1631 06/13/22  1454   CR 1.38* 1.15 1.06     Recent Labs   Lab Test 06/13/22  1505   A1C 6.0*          Recent Labs   Lab Test 01/25/23  1821   WBC 13.3*   HGB 15.6   HCT 45.5   MCV 87        Recent Labs   Lab Test 01/25/23  1821 06/13/22  1454 06/13/22  1335   HGB 15.6 15.8 16.3    No results for input(s): TROPONINI in the last [id number] hours.  Recent Labs   Lab Test 01/04/23  1631   NTBNP 54     Recent Labs   Lab Test 01/04/23  1631   TSH 1.56     Recent Labs   Lab Test 06/13/22  1454   INR 1.07        Lorna Hobson MD

## 2023-02-12 NOTE — PROGRESS NOTES
HEART CARE ENCOUNTER CONSULTATON KAYLEE      [facility]  [phone]      Assessment/Recommendations   Assessment/Plan:    Barry Jay is a very pleasant 67 year old male with history of primary hypertension, dyslipidemia, obesity, 40 years of smoking history, paroxysmal supraventricular tachycardia.    1. SVT  Supraventricular tachycardia - symptomatic with palpitations.   AVNRT, vs AVRT vs AT are possible.  We reviewed SVT mechanisms and reviewed treatment options including electrophysiology study and ablation vs continued medical therapy.  We reviewed the nature of EP studies and ablation for SVT, success rates depending on the specific SVT mechanism, procedural risks (including groin hematoma, tamponade, heart block, stroke) and recovery expectations.  - currently on Metoprolol tartrate 25 mg BID  - would like to proceed with an ablation as he does not want to take medications on a long term basis  - he is getting a CT coronary artery angio next week, will schedule for EP study and ablation after the CT         History of Present Illness/Subjective    HPI: Barry Jay is a very pleasant 67 year old male with history of primary hypertension, dyslipidemia, obesity, 40 years of smoking history, paroxysmal supraventricular tachycardia.    About 3 weeks ago the patient had sudden onset of dizziness and palpitations and called 911. He was brought to the ER. He did receive Adenosine which did not affect the HR and was given Metoprolol in the ER which terminated the tachycardia.    Had similar shorter episodes last fall.    Had a normal angiogram in 2011 locally.    Recent ECG(personally reviewed):    SVT with RBBB pattern    Recent Echocardiogram Results (personally reviewed):    Left ventricular size, wall motion and function are normal. The ejection  fraction is 60-65%.  Normal right ventricle size and systolic function.  IVC diameter <2.1 cm collapsing >50% with sniff suggests a normal RA  "pressure  of 3 mmHg.  No hemodynamically significant valvular abnormalities on 2D or color flow  imaging.  ______________________________________________________________________________    Labs below reviewed personally     Physical Examination  Review of Systems   Vitals: /68 (BP Location: Left arm, Patient Position: Sitting, Cuff Size: Adult Large)   Pulse 64   Resp 14   Ht 1.727 m (5' 8\")   Wt 93.4 kg (206 lb)   BMI 31.32 kg/m    BMI= Body mass index is 31.32 kg/m .  Wt Readings from Last 3 Encounters:   02/16/23 93.4 kg (206 lb)   02/03/23 93.4 kg (206 lb)   01/25/23 90.7 kg (200 lb)       General Appearance:   no distress, normal body habitus   ENT/Mouth: membranes moist, no oral lesions or bleeding gums.      EYES:  no scleral icterus, normal conjunctivae   Neck: no carotid bruits or thyromegaly   Chest/Lungs:   lungs are clear to auscultation, no rales or wheezing, no sternal scar, equal chest wall expansion    Cardiovascular:   Regular. Normal first and second heart sounds with no murmurs, rubs, or gallops; the carotid, radial and posterior tibial pulses are intact, no edema bilaterally    Abdomen:  no organomegaly, masses, bruits, or tenderness; bowel sounds are present   Extremities: no cyanosis or clubbing   Skin: no xanthelasma, warm.    Neurologic: normal  bilateral, no tremors     Psychiatric: alert and oriented x3, calm        Please refer above for cardiac ROS details.        Medical History  Surgical History Family History Social History   Past Medical History:   Diagnosis Date     HTN (hypertension)      Hyperlipidemia      Past Surgical History:   Procedure Laterality Date     APPENDECTOMY       CHOLECYSTECTOMY       LAMINECTOMY LUMBAR ONE LEVEL Right 6/27/2022    Procedure: Right Lumbar 4 - lumbar 5 hemilaminectomy, medial facetectomies, foraminotomies;  Surgeon: Shannan Mari MD;  Location: [facility] [place] OR     ORTHOPEDIC SURGERY       No family history on file.     Social " History     Socioeconomic History     Marital status: Single     Spouse name: Not on file     Number of children: Not on file     Years of education: Not on file     Highest education level: Not on file   Occupational History     Not on file   Tobacco Use     Smoking status: Every Day     Packs/day: 1.00     Years: 30.00     Pack years: 30.00     Types: Cigarettes     Smokeless tobacco: Never   Substance and Sexual Activity     Alcohol use: Never     Drug use: Never     Sexual activity: Not on file   Other Topics Concern     Not on file   Social History Narrative     Not on file     Social Determinants of Health     Financial Resource Strain: Not on file   Food Insecurity: Not on file   Transportation Needs: Not on file   Physical Activity: Not on file   Stress: Not on file   Social Connections: Not on file   Intimate Partner Violence: Not on file   Housing Stability: Not on file           Medications  Allergies   Current Outpatient Medications   Medication Sig Dispense Refill     ALPRAZolam (XANAX) 0.5 MG tablet [ALPRAZOLAM (XANAX) 0.5 MG TABLET] TAKE 1 TABLET BY MOUTH ONCE A DAY AS NEEDED ORALLY  0     aspirin (ASA) 81 MG chewable tablet Take 81 mg by mouth daily       atorvastatin (LIPITOR) 10 MG tablet Take 10 mg by mouth daily  3     celecoxib (CELEBREX) 200 MG capsule Take 200 mg by mouth daily as needed for pain  3     cyclobenzaprine (FLEXERIL) 10 MG tablet Take 1 tablet (10 mg) by mouth 3 times daily as needed for muscle spasms 42 tablet 1     irbesartan-hydrochlorothiazide (AVALIDE) 300-12.5 mg per tablet [IRBESARTAN-HYDROCHLOROTHIAZIDE (AVALIDE) 300-12.5 MG PER TABLET] Take 1 tablet by mouth daily. for blood pressure  1     metoprolol tartrate (LOPRESSOR) 25 MG tablet Take 1 tablet (25 mg) by mouth 2 times daily 60 tablet 0     multivitamin, therapeutic (THERA-VIT) TABS tablet Take 1 tablet by mouth daily       omeprazole (PRILOSEC) 20 MG capsule Take 20 mg by mouth daily  1     PARoxetine (PAXIL) 20 MG  tablet Take 20 mg by mouth every morning  3     tadalafil (CIALIS) 20 MG tablet Take 20 mg by mouth daily as needed       zolpidem (AMBIEN) 10 mg tablet Take 10 mg by mouth nightly as needed for sleep  2     pregabalin (LYRICA) 50 MG capsule Take 1 capsule (50 mg) by mouth 2 times daily (Patient not taking: Reported on 2/3/2023) 60 capsule 0       Allergies   Allergen Reactions     Iodides Hives and Unknown     Pt thinks 20+ yrs ago, Pt received omni 350 without premed and did fine 11/7/2020 JMS            Lab Results    Chemistry/lipid CBC Cardiac Enzymes/BNP/TSH/INR   Recent Labs   Lab Test 06/13/22  1335   CHOL 144   HDL 31*   LDL 85   TRIG 141     Recent Labs   Lab Test 06/13/22  1335 06/11/21  1634 05/28/20  1410   LDL 85 74 85     Recent Labs   Lab Test 01/25/23  1821      POTASSIUM 3.6   CHLORIDE 100   CO2 22   *   BUN 15.5   CR 1.38*   GFRESTIMATED 57*   MIKE 9.7     Recent Labs   Lab Test 01/25/23  1821 01/04/23  1631 06/13/22  1454   CR 1.38* 1.15 1.06     Recent Labs   Lab Test 06/13/22  1505   A1C 6.0*          Recent Labs   Lab Test 01/25/23  1821   WBC 13.3*   HGB 15.6   HCT 45.5   MCV 87        Recent Labs   Lab Test 01/25/23  1821 06/13/22  1454 06/13/22  1335   HGB 15.6 15.8 16.3    No results for input(s): TROPONINI in the last 31440 hours.  Recent Labs   Lab Test 01/04/23  1631   NTBNP 54     Recent Labs   Lab Test 01/04/23  1631   TSH 1.56     Recent Labs   Lab Test 06/13/22  1454   INR 1.07        Lorna Hobson MD

## 2023-02-16 ENCOUNTER — TELEPHONE (OUTPATIENT)
Dept: CARDIOLOGY | Facility: CLINIC | Age: 68
End: 2023-02-16

## 2023-02-16 ENCOUNTER — OFFICE VISIT (OUTPATIENT)
Dept: CARDIOLOGY | Facility: CLINIC | Age: 68
End: 2023-02-16
Payer: COMMERCIAL

## 2023-02-16 VITALS
HEIGHT: 68 IN | BODY MASS INDEX: 31.22 KG/M2 | SYSTOLIC BLOOD PRESSURE: 120 MMHG | WEIGHT: 206 LBS | HEART RATE: 64 BPM | DIASTOLIC BLOOD PRESSURE: 68 MMHG | RESPIRATION RATE: 14 BRPM

## 2023-02-16 DIAGNOSIS — I47.10 PAROXYSMAL SUPRAVENTRICULAR TACHYCARDIA (H): ICD-10-CM

## 2023-02-16 PROCEDURE — 99204 OFFICE O/P NEW MOD 45 MIN: CPT | Performed by: INTERNAL MEDICINE

## 2023-02-16 RX ORDER — METOPROLOL TARTRATE 25 MG/1
25 TABLET, FILM COATED ORAL 2 TIMES DAILY
Qty: 60 TABLET | Refills: 0 | Status: SHIPPED | OUTPATIENT
Start: 2023-02-16 | End: 2023-03-27

## 2023-02-16 NOTE — LETTER
2/16/2023    Osmel Iniguez MD  [address]  [place] [address]    RE: Barry Jay       Dear Colleague,     I had the pleasure of seeing Barry Jay in the [facility].    HEART CARE ENCOUNTER CONSULTATON NOTE      [facility]  [phone]      Assessment/Recommendations   Assessment/Plan:    Barry Jay is a very pleasant 67 year old male with history of primary hypertension, dyslipidemia, obesity, 40 years of smoking history, paroxysmal supraventricular tachycardia.    1. SVT  Supraventricular tachycardia - symptomatic with palpitations.   AVNRT, vs AVRT vs AT are possible.  We reviewed SVT mechanisms and reviewed treatment options including electrophysiology study and ablation vs continued medical therapy.  We reviewed the nature of EP studies and ablation for SVT, success rates depending on the specific SVT mechanism, procedural risks (including groin hematoma, tamponade, heart block, stroke) and recovery expectations.  - currently on Metoprolol tartrate 25 mg BID  - would like to proceed with an ablation as he does not want to take medications on a long term basis  - he is getting a CT coronary artery angio next week, will schedule for EP study and ablation after the CT         History of Present Illness/Subjective    HPI: Barry Jay is a very pleasant 67 year old male with history of primary hypertension, dyslipidemia, obesity, 40 years of smoking history, paroxysmal supraventricular tachycardia.    About 3 weeks ago the patient had sudden onset of dizziness and palpitations and called 911. He was brought to the ER. He did receive Adenosine which did not affect the HR and was given Metoprolol in the ER which terminated the tachycardia.    Had similar shorter episodes last fall.    Had a normal angiogram in 2011 locally.    Recent ECG(personally reviewed):    SVT with RBBB pattern    Recent Echocardiogram Results (personally  "reviewed):    Left ventricular size, wall motion and function are normal. The ejection  fraction is 60-65%.  Normal right ventricle size and systolic function.  IVC diameter <2.1 cm collapsing >50% with sniff suggests a normal RA pressure  of 3 mmHg.  No hemodynamically significant valvular abnormalities on 2D or color flow  imaging.  ______________________________________________________________________________    Labs below reviewed personally     Physical Examination  Review of Systems   Vitals: /68 (BP Location: Left arm, Patient Position: Sitting, Cuff Size: Adult Large)   Pulse 64   Resp 14   Ht 1.727 m (5' 8\")   Wt 93.4 kg (206 lb)   BMI 31.32 kg/m    BMI= Body mass index is 31.32 kg/m .  Wt Readings from Last 3 Encounters:   02/16/23 93.4 kg (206 lb)   02/03/23 93.4 kg (206 lb)   01/25/23 90.7 kg (200 lb)       General Appearance:   no distress, normal body habitus   ENT/Mouth: membranes moist, no oral lesions or bleeding gums.      EYES:  no scleral icterus, normal conjunctivae   Neck: no carotid bruits or thyromegaly   Chest/Lungs:   lungs are clear to auscultation, no rales or wheezing, no sternal scar, equal chest wall expansion    Cardiovascular:   Regular. Normal first and second heart sounds with no murmurs, rubs, or gallops; the carotid, radial and posterior tibial pulses are intact, no edema bilaterally    Abdomen:  no organomegaly, masses, bruits, or tenderness; bowel sounds are present   Extremities: no cyanosis or clubbing   Skin: no xanthelasma, warm.    Neurologic: normal  bilateral, no tremors     Psychiatric: alert and oriented x3, calm        Please refer above for cardiac ROS details.        Medical History  Surgical History Family History Social History   Past Medical History:   Diagnosis Date     HTN (hypertension)      Hyperlipidemia      Past Surgical History:   Procedure Laterality Date     APPENDECTOMY       CHOLECYSTECTOMY       LAMINECTOMY LUMBAR ONE LEVEL Right " 6/27/2022    Procedure: Right Lumbar 4 - lumbar 5 hemilaminectomy, medial facetectomies, foraminotomies;  Surgeon: Shannan Mari MD;  Location: [facility] [place]     ORTHOPEDIC SURGERY       No family history on file.     Social History     Socioeconomic History     Marital status: Single     Spouse name: Not on file     Number of children: Not on file     Years of education: Not on file     Highest education level: Not on file   Occupational History     Not on file   Tobacco Use     Smoking status: Every Day     Packs/day: 1.00     Years: 30.00     Pack years: 30.00     Types: Cigarettes     Smokeless tobacco: Never   Substance and Sexual Activity     Alcohol use: Never     Drug use: Never     Sexual activity: Not on file   Other Topics Concern     Not on file   Social History Narrative     Not on file     Social Determinants of Health     Financial Resource Strain: Not on file   Food Insecurity: Not on file   Transportation Needs: Not on file   Physical Activity: Not on file   Stress: Not on file   Social Connections: Not on file   Intimate Partner Violence: Not on file   Housing Stability: Not on file           Medications  Allergies   Current Outpatient Medications   Medication Sig Dispense Refill     ALPRAZolam (XANAX) 0.5 MG tablet [ALPRAZOLAM (XANAX) 0.5 MG TABLET] TAKE 1 TABLET BY MOUTH ONCE A DAY AS NEEDED ORALLY  0     aspirin (ASA) 81 MG chewable tablet Take 81 mg by mouth daily       atorvastatin (LIPITOR) 10 MG tablet Take 10 mg by mouth daily  3     celecoxib (CELEBREX) 200 MG capsule Take 200 mg by mouth daily as needed for pain  3     cyclobenzaprine (FLEXERIL) 10 MG tablet Take 1 tablet (10 mg) by mouth 3 times daily as needed for muscle spasms 42 tablet 1     irbesartan-hydrochlorothiazide (AVALIDE) 300-12.5 mg per tablet [IRBESARTAN-HYDROCHLOROTHIAZIDE (AVALIDE) 300-12.5 MG PER TABLET] Take 1 tablet by mouth daily. for blood pressure  1     metoprolol tartrate (LOPRESSOR) 25 MG tablet Take  1 tablet (25 mg) by mouth 2 times daily 60 tablet 0     multivitamin, therapeutic (THERA-VIT) TABS tablet Take 1 tablet by mouth daily       omeprazole (PRILOSEC) 20 MG capsule Take 20 mg by mouth daily  1     PARoxetine (PAXIL) 20 MG tablet Take 20 mg by mouth every morning  3     tadalafil (CIALIS) 20 MG tablet Take 20 mg by mouth daily as needed       zolpidem (AMBIEN) 10 mg tablet Take 10 mg by mouth nightly as needed for sleep  2     pregabalin (LYRICA) 50 MG capsule Take 1 capsule (50 mg) by mouth 2 times daily (Patient not taking: Reported on 2/3/2023) 60 capsule 0       Allergies   Allergen Reactions     Iodides Hives and Unknown     Pt thinks 20+ yrs ago, Pt received omni 350 without premed and did fine 11/7/2020 JMS            Lab Results    Chemistry/lipid CBC Cardiac Enzymes/BNP/TSH/INR   Recent Labs   Lab Test 06/13/22  1335   CHOL 144   HDL 31*   LDL 85   TRIG 141     Recent Labs   Lab Test 06/13/22  1335 06/11/21  1634 05/28/20  1410   LDL 85 74 85     Recent Labs   Lab Test 01/25/23  1821      POTASSIUM 3.6   CHLORIDE 100   CO2 22   *   BUN 15.5   CR 1.38*   GFRESTIMATED 57*   MIKE 9.7     Recent Labs   Lab Test 01/25/23  1821 01/04/23  1631 06/13/22  1454   CR 1.38* 1.15 1.06     Recent Labs   Lab Test 06/13/22  1505   A1C 6.0*          Recent Labs   Lab Test 01/25/23  1821   WBC 13.3*   HGB 15.6   HCT 45.5   MCV 87        Recent Labs   Lab Test 01/25/23  1821 06/13/22  1454 06/13/22  1335   HGB 15.6 15.8 16.3    No results for input(s): TROPONINI in the last 18914 hours.  Recent Labs   Lab Test 01/04/23  1631   NTBNP 54     Recent Labs   Lab Test 01/04/23  1631   TSH 1.56     Recent Labs   Lab Test 06/13/22  1454   INR 1.07        Lorna Hobson MD                Thank you for allowing me to participate in the care of your patient.      Sincerely,     Lorna Hobson MD     [facility]  cc:   Marianne Brush MD  [id number]  [place] [address]

## 2023-02-16 NOTE — CONFIDENTIAL NOTE
Noted.  Pt has CTA scheduled on 2/23 of next week.  PC note created and postponed to 2/24 to review results.  Will arrange procedure pending results/review by Dr Hobson/Dr Brush  2/16/2023 1:43 PM  Delmis Gonsales, RN    Lorna Hobson MD  P [facility] Ep Support [facility]  Dear team,         Mr Pierre would like to pursue an EP study and possible ablation. He is getting a CT coronary artery angio next week, will schedule for EP study and ablation after the CT if the results don't warrant further work up.       MAC   ENSITE X with ICE(preferably Javan Smart)   CBC BMP on day of procedure   Hold Metoprolol 3 days prior to procedure     ThanksBERNICE

## 2023-02-16 NOTE — PATIENT INSTRUCTIONS
[facility]  Cardiac Electrophysiology  [address]  [place] [id number]   Office: [phone]  Fax: [phone]       Thank you for seeing us in clinic today - it is a pleasure to be a part of your care team.  Below is a summary of our plan from today's visit.      Palpitations  - Will get a CT next week and if cleared will set up for an ablation  - continue Metoprolol for now    Please do not hesitate to be in touch with our office at [phone] with any questions that may arise.      Thank you for trusting us with your care,    Lorna Hobson MD  Clinical Cardiac Electrophysiology  [facility]  [address]  [place] [address]   Office: [phone]  Fax: [phone]

## 2023-02-23 ENCOUNTER — HOSPITAL ENCOUNTER (OUTPATIENT)
Dept: CT IMAGING | Facility: CLINIC | Age: 68
Discharge: HOME | End: 2023-02-23
Attending: INTERNAL MEDICINE | Admitting: INTERNAL MEDICINE
Payer: COMMERCIAL

## 2023-02-23 VITALS — SYSTOLIC BLOOD PRESSURE: 117 MMHG | DIASTOLIC BLOOD PRESSURE: 64 MMHG

## 2023-02-23 DIAGNOSIS — R07.89 CHEST HEAVINESS: ICD-10-CM

## 2023-02-23 LAB
CREAT BLD-MCNC: 1.3 MG/DL (ref 0.7–1.3)
GFR SERPL CREATININE-BSD FRML MDRD: >60 ML/MIN/1.73M2

## 2023-02-23 PROCEDURE — 250N000009 HC RX 250: Performed by: INTERNAL MEDICINE

## 2023-02-23 PROCEDURE — 75574 CT ANGIO HRT W/3D IMAGE: CPT | Mod: 26 | Performed by: INTERNAL MEDICINE

## 2023-02-23 PROCEDURE — 82565 ASSAY OF CREATININE: CPT

## 2023-02-23 PROCEDURE — 250N000011 HC RX IP 250 OP 636: Performed by: INTERNAL MEDICINE

## 2023-02-23 PROCEDURE — 250N000013 HC RX MED GY IP 250 OP 250 PS 637: Performed by: INTERNAL MEDICINE

## 2023-02-23 PROCEDURE — 0503T CT FFR: CPT

## 2023-02-23 PROCEDURE — 75574 CT ANGIO HRT W/3D IMAGE: CPT

## 2023-02-23 RX ORDER — NITROGLYCERIN 0.4 MG/1
0.4 TABLET SUBLINGUAL ONCE
Status: COMPLETED | OUTPATIENT
Start: 2023-02-23 | End: 2023-02-23

## 2023-02-23 RX ORDER — METOPROLOL TARTRATE 1 MG/ML
5 INJECTION, SOLUTION INTRAVENOUS
Status: DISCONTINUED | OUTPATIENT
Start: 2023-02-23 | End: 2023-02-24 | Stop reason: HOSPADM

## 2023-02-23 RX ORDER — IOPAMIDOL 755 MG/ML
100 INJECTION, SOLUTION INTRAVASCULAR ONCE
Status: COMPLETED | OUTPATIENT
Start: 2023-02-23 | End: 2023-02-23

## 2023-02-23 RX ADMIN — IOPAMIDOL 100 ML: 755 INJECTION, SOLUTION INTRAVENOUS at 10:31

## 2023-02-23 RX ADMIN — NITROGLYCERIN 0.4 MG: 0.4 TABLET SUBLINGUAL at 10:30

## 2023-02-23 RX ADMIN — METOPROLOL TARTRATE 5 MG: 1 INJECTION, SOLUTION INTRAVENOUS at 10:33

## 2023-02-24 LAB
BSA FOR ECHO PROCEDURE: 0 M2
CV CALCIUM SCORE AGATSTON LM: 67
CV CALCIUM SCORING AGATSON LAD: 168
CV CALCIUM SCORING AGATSTON CX: 18
CV CALCIUM SCORING AGATSTON RCA: 121
CV CALCIUM SCORING AGATSTON TOTAL: 374

## 2023-02-28 ENCOUNTER — DOCUMENTATION ONLY (OUTPATIENT)
Dept: CARDIOLOGY | Facility: CLINIC | Age: 68
End: 2023-02-28
Payer: COMMERCIAL

## 2023-02-28 DIAGNOSIS — I47.10 PAROXYSMAL SUPRAVENTRICULAR TACHYCARDIA (H): Primary | ICD-10-CM

## 2023-02-28 RX ORDER — SODIUM CHLORIDE 9 MG/ML
100 INJECTION, SOLUTION INTRAVENOUS CONTINUOUS
Status: CANCELLED | OUTPATIENT
Start: 2023-03-14

## 2023-02-28 RX ORDER — LIDOCAINE 40 MG/G
CREAM TOPICAL
Status: CANCELLED | OUTPATIENT
Start: 2023-02-28

## 2023-02-28 RX ORDER — FENTANYL CITRATE 50 UG/ML
25 INJECTION, SOLUTION INTRAMUSCULAR; INTRAVENOUS
Status: CANCELLED | OUTPATIENT
Start: 2023-03-14

## 2023-02-28 NOTE — PROGRESS NOTES
H&P  PMD: []  Received [] Card OV: [x]  Date: BERNICE 2/16/23 Teach  []   Orders  I [x] P  [x]  Letter []   AC: None- NA AAD: Metoprolol-Hold x 3 days    All other AM Meds: Hold All     MAC   ENSITE X with ICE(preferably Javan Smart)   CBC BMP on day of procedure   Hold Metoprolol 3 days prior to procedure       1955  Home:[phone] (home) Cell:[phone] (mobile)  Emergency Contact: RANJIT ELLIS [phone]  PCP: Osmel Iniguez, [phone]    Important patient information for CSC/Cath Lab staff : None    [facility] EP Cath Lab Procedure Order   Ablation Type:Supraventricular Tachycardia  Ordering Provider: Dr Hobson  Ordering Date: 2/28/2023  Diagnosis:  SVT  Anticipated Case Duration:  Standard   Scheduling Timeframe:  Next Available  Scheduling Restrictions: None  Scheduling Contact: Please contact pt to schedule, if you are unable to schedule date within the next 24 hours please contact pt to update on scheduling process  EP RN Follow Up Apt: Dr Graham or Dr Hidalgo Ablations, do NOT need RN PC follow-up post procedure. Dr Hobson's SVT, AFL, and PVC ablations need 3-4 day EP RN PC post procedure.  Current Device/Device Co Needed for Procedure: None NoneNone  Pre-Procedural Testing needed: None  Mapping System Required:  REGI (Jazlyn Graham) Javan Smart   ICE Needed:  Yes  Anesthesia:  MAC- Monitored Anesthesia Care    [facility] EP Cath Lab Prep   H&P:  Compled by BERNICE on 2/16/23 if scheduled within 30 days, pt to schedule with PMD if procedure outside of this timeframe  Pre-op Labs: CBC, BMP, Beta HcG if appropriate, and INR if on Warfarin will be ordered AM of procedure and Review of most recent labs, WEL for procedure  T&S Pre-Procedure Review: T&S is not required for procedure  Medical Records Pertinent for Procedure:  CT/MRI 2/23/23  Allergies: Reviewed allergies, no concerns regarding orders for procedure    No Active Allergies    Current Outpatient Medications:      ALPRAZolam (XANAX) 0.5  MG tablet, [ALPRAZOLAM (XANAX) 0.5 MG TABLET] TAKE 1 TABLET BY MOUTH ONCE A DAY AS NEEDED ORALLY, Disp: , Rfl: 0     aspirin (ASA) 81 MG chewable tablet, Take 81 mg by mouth daily, Disp: , Rfl:      atorvastatin (LIPITOR) 10 MG tablet, Take 10 mg by mouth daily, Disp: , Rfl: 3     celecoxib (CELEBREX) 200 MG capsule, Take 200 mg by mouth daily as needed for pain, Disp: , Rfl: 3     cyclobenzaprine (FLEXERIL) 10 MG tablet, Take 1 tablet (10 mg) by mouth 3 times daily as needed for muscle spasms, Disp: 42 tablet, Rfl: 1     irbesartan-hydrochlorothiazide (AVALIDE) 300-12.5 mg per tablet, [IRBESARTAN-HYDROCHLOROTHIAZIDE (AVALIDE) 300-12.5 MG PER TABLET] Take 1 tablet by mouth daily. for blood pressure, Disp: , Rfl: 1     metoprolol tartrate (LOPRESSOR) 25 MG tablet, Take 1 tablet (25 mg) by mouth 2 times daily, Disp: 60 tablet, Rfl: 0     multivitamin, therapeutic (THERA-VIT) TABS tablet, Take 1 tablet by mouth daily, Disp: , Rfl:      omeprazole (PRILOSEC) 20 MG capsule, Take 20 mg by mouth daily, Disp: , Rfl: 1     PARoxetine (PAXIL) 20 MG tablet, Take 20 mg by mouth every morning, Disp: , Rfl: 3     pregabalin (LYRICA) 50 MG capsule, Take 1 capsule (50 mg) by mouth 2 times daily (Patient not taking: Reported on 2/3/2023), Disp: 60 capsule, Rfl: 0     tadalafil (CIALIS) 20 MG tablet, Take 20 mg by mouth daily as needed, Disp: , Rfl:      zolpidem (AMBIEN) 10 mg tablet, Take 10 mg by mouth nightly as needed for sleep, Disp: , Rfl: 2    Documentation Date:2/28/2023 11:56 AM  Delmis Wakefield RN

## 2023-03-03 ENCOUNTER — OFFICE VISIT (OUTPATIENT)
Dept: CARDIOLOGY | Facility: CLINIC | Age: 68
End: 2023-03-03
Payer: COMMERCIAL

## 2023-03-03 VITALS
HEART RATE: 80 BPM | DIASTOLIC BLOOD PRESSURE: 58 MMHG | WEIGHT: 207.8 LBS | HEIGHT: 68 IN | BODY MASS INDEX: 31.49 KG/M2 | SYSTOLIC BLOOD PRESSURE: 108 MMHG | RESPIRATION RATE: 16 BRPM

## 2023-03-03 DIAGNOSIS — I25.119 CORONARY ARTERY DISEASE INVOLVING NATIVE CORONARY ARTERY OF NATIVE HEART WITH ANGINA PECTORIS (H): Primary | ICD-10-CM

## 2023-03-03 DIAGNOSIS — E78.5 DYSLIPIDEMIA, GOAL LDL BELOW 70: ICD-10-CM

## 2023-03-03 DIAGNOSIS — I10 PRIMARY HYPERTENSION: ICD-10-CM

## 2023-03-03 DIAGNOSIS — I47.10 PAROXYSMAL SUPRAVENTRICULAR TACHYCARDIA (H): ICD-10-CM

## 2023-03-03 PROCEDURE — 99215 OFFICE O/P EST HI 40 MIN: CPT | Performed by: INTERNAL MEDICINE

## 2023-03-03 RX ORDER — ISOSORBIDE MONONITRATE 30 MG/1
30 TABLET, EXTENDED RELEASE ORAL DAILY
Qty: 30 TABLET | Refills: 11 | Status: SHIPPED | OUTPATIENT
Start: 2023-03-03 | End: 2023-03-09 | Stop reason: SINTOL

## 2023-03-03 RX ORDER — ATORVASTATIN CALCIUM 20 MG/1
20 TABLET, FILM COATED ORAL AT BEDTIME
Qty: 90 TABLET | Refills: 3 | Status: SHIPPED | OUTPATIENT
Start: 2023-03-03 | End: 2024-03-12

## 2023-03-03 NOTE — PROGRESS NOTES
Click to link to [facility] HEART CARE NOTE         Assessment/Plan:   1.  Paroxysmal supraventricular tachycardia: The patient's ECG demonstrated supraventricular tachycardia with significantly elevated ventricular rate up to 205 bpm.  The patient was symptomatic with dizziness, shortness of breath, chest heaviness.  We discussed further evaluation and management.  Since the patient was very symptomatic supraventricular tachycardia and significantly elevated ventricular rate, EP ablation is recommended.  Continue metoprolol 25 mg twice a day.  The patient is scheduled EP ablation of SVT with Dr. Hobson.    2.  Coronary artery disease: His coronary CT angiogram demonstrated moderate stenosis in mid LAD, no obstructive lesion based on FFR CT study.  We discussed the treatment of coronary artery disease.  Continue aspirin, atorvastatin and metoprolol.  Since the patient still has higher chest pain, start isosorbide mononitrate 30 mg daily.  Discussed the possible side effects of isosorbide mononitrate such as headaches.    3.  Primary hypertension: Continue irbesartan-hydrochlorothiazide 300-12.5 mg daily and metoprolol 25 mg twice a day.    4.  Dyslipidemia: LDL was 85.  Due to coronary artery disease, the goal of LDL is less than 70.  Increased atorvastatin from 10 mg daily to 20 mg at bedtime.    5.  40 years of smoking history: He has been working on his smoking sensation.    Total 52 minutes were spent in this visit for face to face visit, physical exam, review of current labs/imaging studies, plan for ongoing treatment with >50% spent on counseling and coordination of care as documented in the above mentioned note.      Thank you for the opportunity to be involved in the care of Barry Jay. If you have any questions, please feel free to contact me.  I will see the patient again in 6 months and as needed    Much or all of the text in this note was generated through the use  of Dragon Dictate voice-to-text software. Errors in spelling or words which seem out of context are unintentional.   Sound alike errors, in particular, may have escaped editing.       History of Present Illness:   It is my pleasure to see Barry Jay at the [facility] clinic for routine cardiology follow-up and discuss coronary CT angiogram report. Barry Jay is a 67 year old male with a medical history of primary hypertension, dyslipidemia, obesity, 40 years of smoking history, paroxysmal supraventricular tachycardia.    The patient states that his chest pain is improved with taking metoprolol 25 mg twice a day.  He still has occasional mild intermittent exertional chest discomfort.  He had no shortness of breath,, palpitations, dizziness, orthopnea, PND or leg edema.  His blood pressure and heart rate are controlled.      His coronary CT angiogram demonstrated there is moderate to stenosis in mid LAD, no obstructive lesion.    Past Medical History:     Patient Active Problem List   Diagnosis     Dyslipidemia     Hypertension     Diffuse Joint Pains (Arthralgias)     Joint Pain, Localized In The Wrist     Joint Pain, Localized In The Knee     Generalized Osteoarthritis Of The Hand     Osteoarthritis Of The Knee     Arthralgias In Multiple Sites       Past Surgical History:     Past Surgical History:   Procedure Laterality Date     APPENDECTOMY       CHOLECYSTECTOMY       LAMINECTOMY LUMBAR ONE LEVEL Right 2022    Procedure: Right Lumbar 4 - lumbar 5 hemilaminectomy, medial facetectomies, foraminotomies;  Surgeon: Shannan Mari MD;  Location: [facility] [place] OR     ORTHOPEDIC SURGERY         Family History:   Reviewed: The patient has a family history of for hypertension.  His brother  of car accident with autopsy, 90% stenotic coronary artery disease.    Social History:    reports that he has been smoking cigarettes. He has a 30.00 pack-year smoking history. He has never used  "smokeless tobacco. He reports that he does not drink alcohol and does not use drugs.    Review of Systems:   12 systems are reviewed negative except for in HPI.    Meds:     Current Outpatient Medications:      ALPRAZolam (XANAX) 0.5 MG tablet, [ALPRAZOLAM (XANAX) 0.5 MG TABLET] TAKE 1 TABLET BY MOUTH ONCE A DAY AS NEEDED ORALLY, Disp: , Rfl: 0     aspirin (ASA) 81 MG chewable tablet, Take 81 mg by mouth daily, Disp: , Rfl:      atorvastatin (LIPITOR) 20 MG tablet, Take 1 tablet (20 mg) by mouth At Bedtime, Disp: 90 tablet, Rfl: 3     celecoxib (CELEBREX) 200 MG capsule, Take 200 mg by mouth daily as needed for pain, Disp: , Rfl: 3     irbesartan-hydrochlorothiazide (AVALIDE) 300-12.5 mg per tablet, [IRBESARTAN-HYDROCHLOROTHIAZIDE (AVALIDE) 300-12.5 MG PER TABLET] Take 1 tablet by mouth daily. for blood pressure, Disp: , Rfl: 1     isosorbide mononitrate (IMDUR) 30 MG 24 hr tablet, Take 1 tablet (30 mg) by mouth daily, Disp: 30 tablet, Rfl: 11     metoprolol tartrate (LOPRESSOR) 25 MG tablet, Take 1 tablet (25 mg) by mouth 2 times daily, Disp: 60 tablet, Rfl: 0     multivitamin, therapeutic (THERA-VIT) TABS tablet, Take 1 tablet by mouth daily, Disp: , Rfl:      omeprazole (PRILOSEC) 20 MG capsule, Take 20 mg by mouth daily, Disp: , Rfl: 1     PARoxetine (PAXIL) 20 MG tablet, Take 20 mg by mouth every morning, Disp: , Rfl: 3     zolpidem (AMBIEN) 10 mg tablet, Take 10 mg by mouth nightly as needed for sleep, Disp: , Rfl: 2     Allergies:   Patient has no known allergies.    Objective:      Physical Exam  94.3 kg (207 lb 12.8 oz)  1.727 m (5' 8\")  Body mass index is 31.6 kg/m .  /58 (BP Location: Left arm, Patient Position: Sitting, Cuff Size: Adult Regular)   Pulse 80   Resp 16   Ht 1.727 m (5' 8\")   Wt 94.3 kg (207 lb 12.8 oz)   BMI 31.60 kg/m      General Appearance:   Awake, Alert, No acute distress.   HEENT:  Pupil equal, reactive to light. No scleral icterus; the mucous membranes were moist. No oral " ulcers or thrush.    Neck: No cervical bruits. No JVD. No thyromegaly. No lymph node enlargement or tenderness.   Chest: The spine was straight. The chest was symmetric.   Lungs:   Respirations unlabored. Lungs are clear to auscultation. No crackles. No wheezing.   Cardiovascular:   RRR, normal first and second heart sounds with no murmurs. No rubs or gallops.    Abdomen:  Obese. Soft. No tenderness. Non-distended. Bowels sounds are present   Extremities: Equal posterior tibial pulses. No leg edema.   Skin: No rashes or ulcers. Warm, Dry.   Musculoskeletal: No tenderness. No deformity.   Neurologic: Mood and affect are appropriate. No focal deficits.         EKG:  Personally reivewed  Supraventricular tachycardia with ventricular rate 205 bpm  Nonspecific ST-T abnormality  Abnormal ECG    Cardiac Image Study:  Echocardiogram on January 19, 2023:  Left ventricular size, wall motion and function are normal. The ejection  fraction is 60-65%.  Normal right ventricle size and systolic function.  IVC diameter <2.1 cm collapsing >50% with sniff suggests a normal RA pressure  of 3 mmHg.  No hemodynamically significant valvular abnormalities on 2D or color flow imaging.    Coronary CT angiogram on February 23, 2023:    The total Agatston score is 374. A calcium score in this range places the individual in the 61st percentile when compared to an age and gender matched control group and implies a high risk of cardiac events in the next ten years.    Moderately severe single-vessel coronary atherosclerosis    Mid LAD lesion has moderate luminal stenosis in the range of 50-69%.    CT FFR of LAD lesion suggests this is not a hemodynamically significant stenosis, with FFR 0.83 distal to the lesion.    Lab Review   Lab Results   Component Value Date     01/04/2023    CO2 19 01/04/2023    CO2 24 06/13/2022    BUN 14.3 01/04/2023    BUN 14 06/13/2022     Lab Results   Component Value Date    WBC 9.7 06/13/2022    HGB 15.8  06/13/2022    HCT 46.4 06/13/2022    MCV 89 06/13/2022     06/13/2022     Lab Results   Component Value Date    CHOL 144 06/13/2022    TRIG 141 06/13/2022    HDL 31 06/13/2022    LDL 85 06/13/2022     LDL Cholesterol Calculated   Date Value Ref Range Status   06/13/2022 85 <=129 mg/dL Final     Lab Results   Component Value Date    TSH 1.56 01/04/2023

## 2023-03-03 NOTE — LETTER
3/3/2023    Osmel Iniguez MD  [address]  [place] [address]    RE: Barry Jay       Dear Colleague,     I had the pleasure of seeing Barry Jay in the [facility].      Click to link to [facility] HEART CARE NOTE         Assessment/Plan:   1.  Paroxysmal supraventricular tachycardia: The patient's ECG demonstrated supraventricular tachycardia with significantly elevated ventricular rate up to 205 bpm.  The patient was symptomatic with dizziness, shortness of breath, chest heaviness.  We discussed further evaluation and management.  Since the patient was very symptomatic supraventricular tachycardia and significantly elevated ventricular rate, EP ablation is recommended.  Continue metoprolol 25 mg twice a day.  The patient is scheduled EP ablation of SVT with Dr. Hobson.    2.  Coronary artery disease: His coronary CT angiogram demonstrated moderate stenosis in mid LAD, no obstructive lesion based on FFR CT study.  We discussed the treatment of coronary artery disease.  Continue aspirin, atorvastatin and metoprolol.  Since the patient still has higher chest pain, start isosorbide mononitrate 30 mg daily.  Discussed the possible side effects of isosorbide mononitrate such as headaches.    3.  Primary hypertension: Continue irbesartan-hydrochlorothiazide 300-12.5 mg daily and metoprolol 25 mg twice a day.    4.  Dyslipidemia: LDL was 85.  Due to coronary artery disease, the goal of LDL is less than 70.  Increased atorvastatin from 10 mg daily to 20 mg at bedtime.    5.  40 years of smoking history: He has been working on his smoking sensation.    Total 52 minutes were spent in this visit for face to face visit, physical exam, review of current labs/imaging studies, plan for ongoing treatment with >50% spent on counseling and coordination of care as documented in the above mentioned note.      Thank you for the opportunity to be  involved in the care of Barry Jay. If you have any questions, please feel free to contact me.  I will see the patient again in 6 months and as needed    Much or all of the text in this note was generated through the use of Dragon Dictate voice-to-text software. Errors in spelling or words which seem out of context are unintentional.   Sound alike errors, in particular, may have escaped editing.       History of Present Illness:   It is my pleasure to see Barry Jay at the [facility] clinic for routine cardiology follow-up and discuss coronary CT angiogram report. Barry Jay is a 67 year old male with a medical history of primary hypertension, dyslipidemia, obesity, 40 years of smoking history, paroxysmal supraventricular tachycardia.    The patient states that his chest pain is improved with taking metoprolol 25 mg twice a day.  He still has occasional mild intermittent exertional chest discomfort.  He had no shortness of breath,, palpitations, dizziness, orthopnea, PND or leg edema.  His blood pressure and heart rate are controlled.      His coronary CT angiogram demonstrated there is moderate to stenosis in mid LAD, no obstructive lesion.    Past Medical History:     Patient Active Problem List   Diagnosis     Dyslipidemia     Hypertension     Diffuse Joint Pains (Arthralgias)     Joint Pain, Localized In The Wrist     Joint Pain, Localized In The Knee     Generalized Osteoarthritis Of The Hand     Osteoarthritis Of The Knee     Arthralgias In Multiple Sites       Past Surgical History:     Past Surgical History:   Procedure Laterality Date     APPENDECTOMY       CHOLECYSTECTOMY       LAMINECTOMY LUMBAR ONE LEVEL Right 6/27/2022    Procedure: Right Lumbar 4 - lumbar 5 hemilaminectomy, medial facetectomies, foraminotomies;  Surgeon: Shannan Mari MD;  Location: [facility] [place] OR     ORTHOPEDIC SURGERY         Family History:   Reviewed: The patient has a family history of for  "hypertension.  His brother  of car accident with autopsy, 90% stenotic coronary artery disease.    Social History:    reports that he has been smoking cigarettes. He has a 30.00 pack-year smoking history. He has never used smokeless tobacco. He reports that he does not drink alcohol and does not use drugs.    Review of Systems:   12 systems are reviewed negative except for in HPI.    Meds:     Current Outpatient Medications:      ALPRAZolam (XANAX) 0.5 MG tablet, [ALPRAZOLAM (XANAX) 0.5 MG TABLET] TAKE 1 TABLET BY MOUTH ONCE A DAY AS NEEDED ORALLY, Disp: , Rfl: 0     aspirin (ASA) 81 MG chewable tablet, Take 81 mg by mouth daily, Disp: , Rfl:      atorvastatin (LIPITOR) 20 MG tablet, Take 1 tablet (20 mg) by mouth At Bedtime, Disp: 90 tablet, Rfl: 3     celecoxib (CELEBREX) 200 MG capsule, Take 200 mg by mouth daily as needed for pain, Disp: , Rfl: 3     irbesartan-hydrochlorothiazide (AVALIDE) 300-12.5 mg per tablet, [IRBESARTAN-HYDROCHLOROTHIAZIDE (AVALIDE) 300-12.5 MG PER TABLET] Take 1 tablet by mouth daily. for blood pressure, Disp: , Rfl: 1     isosorbide mononitrate (IMDUR) 30 MG 24 hr tablet, Take 1 tablet (30 mg) by mouth daily, Disp: 30 tablet, Rfl: 11     metoprolol tartrate (LOPRESSOR) 25 MG tablet, Take 1 tablet (25 mg) by mouth 2 times daily, Disp: 60 tablet, Rfl: 0     multivitamin, therapeutic (THERA-VIT) TABS tablet, Take 1 tablet by mouth daily, Disp: , Rfl:      omeprazole (PRILOSEC) 20 MG capsule, Take 20 mg by mouth daily, Disp: , Rfl: 1     PARoxetine (PAXIL) 20 MG tablet, Take 20 mg by mouth every morning, Disp: , Rfl: 3     zolpidem (AMBIEN) 10 mg tablet, Take 10 mg by mouth nightly as needed for sleep, Disp: , Rfl: 2     Allergies:   Patient has no known allergies.    Objective:      Physical Exam  94.3 kg (207 lb 12.8 oz)  1.727 m (5' 8\")  Body mass index is 31.6 kg/m .  /58 (BP Location: Left arm, Patient Position: Sitting, Cuff Size: Adult Regular)   Pulse 80   Resp 16   Ht " "1.727 m (5' 8\")   Wt 94.3 kg (207 lb 12.8 oz)   BMI 31.60 kg/m      General Appearance:   Awake, Alert, No acute distress.   HEENT:  Pupil equal, reactive to light. No scleral icterus; the mucous membranes were moist. No oral ulcers or thrush.    Neck: No cervical bruits. No JVD. No thyromegaly. No lymph node enlargement or tenderness.   Chest: The spine was straight. The chest was symmetric.   Lungs:   Respirations unlabored. Lungs are clear to auscultation. No crackles. No wheezing.   Cardiovascular:   RRR, normal first and second heart sounds with no murmurs. No rubs or gallops.    Abdomen:  Obese. Soft. No tenderness. Non-distended. Bowels sounds are present   Extremities: Equal posterior tibial pulses. No leg edema.   Skin: No rashes or ulcers. Warm, Dry.   Musculoskeletal: No tenderness. No deformity.   Neurologic: Mood and affect are appropriate. No focal deficits.         EKG:  Personally reivewed  Supraventricular tachycardia with ventricular rate 205 bpm  Nonspecific ST-T abnormality  Abnormal ECG    Cardiac Image Study:  Echocardiogram on January 19, 2023:  Left ventricular size, wall motion and function are normal. The ejection  fraction is 60-65%.  Normal right ventricle size and systolic function.  IVC diameter <2.1 cm collapsing >50% with sniff suggests a normal RA pressure  of 3 mmHg.  No hemodynamically significant valvular abnormalities on 2D or color flow imaging.    Coronary CT angiogram on February 23, 2023:    The total Agatston score is 374. A calcium score in this range places the individual in the 61st percentile when compared to an age and gender matched control group and implies a high risk of cardiac events in the next ten years.    Moderately severe single-vessel coronary atherosclerosis    Mid LAD lesion has moderate luminal stenosis in the range of 50-69%.    CT FFR of LAD lesion suggests this is not a hemodynamically significant stenosis, with FFR 0.83 distal to the lesion.    Lab " Review   Lab Results   Component Value Date     01/04/2023    CO2 19 01/04/2023    CO2 24 06/13/2022    BUN 14.3 01/04/2023    BUN 14 06/13/2022     Lab Results   Component Value Date    WBC 9.7 06/13/2022    HGB 15.8 06/13/2022    HCT 46.4 06/13/2022    MCV 89 06/13/2022     06/13/2022     Lab Results   Component Value Date    CHOL 144 06/13/2022    TRIG 141 06/13/2022    HDL 31 06/13/2022    LDL 85 06/13/2022     LDL Cholesterol Calculated   Date Value Ref Range Status   06/13/2022 85 <=129 mg/dL Final     Lab Results   Component Value Date    TSH 1.56 01/04/2023                   Thank you for allowing me to participate in the care of your patient.      Sincerely,     Marianne Brush MD     [facility]  cc:   Marianne Brush MD  [address] [place], [place] [address]DIMPLE HANKINS [place] [phone]

## 2023-03-03 NOTE — PATIENT INSTRUCTIONS
Barry Jay,    It is my pleasure to see you today at the [facility].    My recommendations for this visit are:    Follow up with EP Dr. Kelly   Continue current medication metoprolol, aspirin.  Increase Atorvastatin from 10 mg daily to 20 mg at bedtime  Start Imdur 30 mg daily for better control chest pain  Will see you again in 6 months.  We discussed CCTA and ECHO reports      Marianne Brush MD, PhD

## 2023-03-08 ENCOUNTER — TELEPHONE (OUTPATIENT)
Dept: CARDIOLOGY | Facility: CLINIC | Age: 68
End: 2023-03-08
Payer: COMMERCIAL

## 2023-03-08 NOTE — TELEPHONE ENCOUNTER
Pre-Procedure Education    Education:   Reviewed via phone with pt  Pre-Procedure Instruction: NPO after midnight pre procedure, Defined NPO with pt, Remove all jewelry and leave all valuables at home, Shower prior to arrival, Sedation plan/orders, Transportation requirements and arrangements post procedure, Post-procedure follow up process, Post-procedure restrictions/expectations, and Pre-procedure letter sent- letter tab    3/8/2023 3:21 PM  Wendi Licona RN

## 2023-03-08 NOTE — TELEPHONE ENCOUNTER
Pre-Procedure Education    Procedure: SVT Abaltion with Dr Hobson on 3/14 with arrival time 7:00 am    COVID: COVID policy- if pt develops COVID like symptoms prior to procedure, he/she would need to complete an at home with a rapid antigen COVID test 1-2 days prior to your procedure date. If COVID + pt is aware the procedure will need to be rescheduled, and to contact CV scheduling as soon as possible    Pre-Op H&P: Completed- Available in Epic 2/16     Education:   Attempted to contact pt via phone, VM was left with request that pt return call to review above/below information  Pre-Procedure Instruction: NPO after midnight pre procedure, Defined NPO with pt, Remove all jewelry and leave all valuables at home, Shower prior to arrival, Sedation plan/orders, Transportation requirements and arrangements post procedure, Post-procedure follow up process, Post-procedure restrictions/expectations, and Pre-procedure letter sent- letter tab  Risks Reviewed:   Cardiac Catheter Ablation    <1% risk for the following: hypotension, hemorrhage, vascular injury including perforation of vein, artery or heart, thrombophlebitis, systemic or pulmonic emboli; cardiac perforation, (tamponade), infection, pneumothorax, arrhythmias, proarrhythmic effects of drugs, radiation exposure.    1-2% complete heart block (for AVNRT or septol accessory pathway).    <0.5% CVA or MI    <0.1% death    If external defibrillation is needed, 75% risk for superficial burn.    1-2% tamponade and aortic puncture with left sided transeptal approach for left side REGI - increase risk of CVA to <2%.    Late arrhythmia recurrences depends upon the primary rhythm disturbance.      Medication:   Instructions regarding anticoagulants: Pt not on AC- N/A  Instructions regarding antiarrhythmic medication: Beta Blocker; Pt instructed to hold 3 days prior to procedure  Instructions for medication, other than anticoagulants and antiarrhythmics listed above, given  to pt: to hold all morning medications     Important patient information for staff: None    3/8/2023 11:27 AM  Wendi Licona RN

## 2023-03-08 NOTE — TELEPHONE ENCOUNTER
"Phone call to pt to discuss upcoming procedure education (see previous telephone encounter) and pt stated that Dr. Brush started him on IMDUR last week and he is not tolerating it well.  Feels queasy every day he has taken it.  Today he decided not to take it and states he feels well.    He wanted to let us know that he prefers not to take this medication, writer discussed that in review of Dr. Brush's not it appears that he prescribed it for chest pain, pt states he \"feels well\" and does not complain of chest pain today, states the \"metoprolol medication seemed to work better.\"    I let him know I would forward over to Dr. Brush's team and then would reach out regarding medication.    Wendi  "

## 2023-03-09 NOTE — TELEPHONE ENCOUNTER
From: Marianne Brush MD  Sent: 3/9/2023   3:32 PM CST  To: Sheree Turner    Please stop Imdur.  Thanks  Kamran      ----- Message -----  From: Sheree Turner  Sent: 3/8/2023   3:33 PM CST  To: Marianne Hernandez MD    ----- Message from Sheree Turner sent at 3/8/2023  3:33 PM CST -----  Hi Dr. Brush,     Please see encounter.any further recommendations?

## 2023-03-14 ENCOUNTER — ANESTHESIA EVENT (OUTPATIENT)
Dept: CARDIOLOGY | Facility: HOSPITAL | Age: 68
End: 2023-03-14
Payer: COMMERCIAL

## 2023-03-14 ENCOUNTER — ANESTHESIA (OUTPATIENT)
Dept: CARDIOLOGY | Facility: HOSPITAL | Age: 68
End: 2023-03-14
Payer: COMMERCIAL

## 2023-03-14 ENCOUNTER — HOSPITAL ENCOUNTER (OUTPATIENT)
Facility: HOSPITAL | Age: 68
Discharge: HOME | End: 2023-03-14
Attending: INTERNAL MEDICINE | Admitting: INTERNAL MEDICINE
Payer: COMMERCIAL

## 2023-03-14 VITALS
TEMPERATURE: 98.1 F | DIASTOLIC BLOOD PRESSURE: 59 MMHG | WEIGHT: 206.5 LBS | RESPIRATION RATE: 17 BRPM | OXYGEN SATURATION: 94 % | BODY MASS INDEX: 31.3 KG/M2 | SYSTOLIC BLOOD PRESSURE: 124 MMHG | HEART RATE: 68 BPM | HEIGHT: 68 IN

## 2023-03-14 DIAGNOSIS — I47.10 PAROXYSMAL SUPRAVENTRICULAR TACHYCARDIA (H): ICD-10-CM

## 2023-03-14 LAB
ANION GAP SERPL CALCULATED.3IONS-SCNC: 12 MMOL/L (ref 7–15)
ATRIAL RATE - MUSE: 73 BPM
BUN SERPL-MCNC: 16 MG/DL (ref 8–23)
CALCIUM SERPL-MCNC: 9.4 MG/DL (ref 8.8–10.2)
CHLORIDE SERPL-SCNC: 100 MMOL/L (ref 98–107)
CREAT SERPL-MCNC: 1.22 MG/DL (ref 0.67–1.17)
DEPRECATED HCO3 PLAS-SCNC: 25 MMOL/L (ref 22–29)
DIASTOLIC BLOOD PRESSURE - MUSE: NORMAL MMHG
ERYTHROCYTE [DISTWIDTH] IN BLOOD BY AUTOMATED COUNT: 13.7 % (ref 10–15)
GFR SERPL CREATININE-BSD FRML MDRD: 65 ML/MIN/1.73M2
GLUCOSE SERPL-MCNC: 121 MG/DL (ref 70–99)
HCT VFR BLD AUTO: 45.6 % (ref 40–53)
HGB BLD-MCNC: 15.7 G/DL (ref 13.3–17.7)
INTERPRETATION ECG - MUSE: NORMAL
MCH RBC QN AUTO: 29.8 PG (ref 26.5–33)
MCHC RBC AUTO-ENTMCNC: 34.4 G/DL (ref 31.5–36.5)
MCV RBC AUTO: 87 FL (ref 78–100)
P AXIS - MUSE: 80 DEGREES
PLATELET # BLD AUTO: 266 10E3/UL (ref 150–450)
POTASSIUM SERPL-SCNC: 3.8 MMOL/L (ref 3.4–5.3)
PR INTERVAL - MUSE: 162 MS
QRS DURATION - MUSE: 122 MS
QT - MUSE: 428 MS
QTC - MUSE: 471 MS
R AXIS - MUSE: -24 DEGREES
RBC # BLD AUTO: 5.27 10E6/UL (ref 4.4–5.9)
SODIUM SERPL-SCNC: 137 MMOL/L (ref 136–145)
SYSTOLIC BLOOD PRESSURE - MUSE: NORMAL MMHG
T AXIS - MUSE: 32 DEGREES
VENTRICULAR RATE- MUSE: 73 BPM
WBC # BLD AUTO: 10.8 10E3/UL (ref 4–11)

## 2023-03-14 PROCEDURE — 258N000003 HC RX IP 258 OP 636: Performed by: INTERNAL MEDICINE

## 2023-03-14 PROCEDURE — 93005 ELECTROCARDIOGRAM TRACING: CPT

## 2023-03-14 PROCEDURE — C1894 INTRO/SHEATH, NON-LASER: HCPCS | Performed by: INTERNAL MEDICINE

## 2023-03-14 PROCEDURE — 36415 COLL VENOUS BLD VENIPUNCTURE: CPT | Performed by: INTERNAL MEDICINE

## 2023-03-14 PROCEDURE — 250N000009 HC RX 250: Performed by: ANESTHESIOLOGY

## 2023-03-14 PROCEDURE — 93620 COMP EP EVL R AT VEN PAC&REC: CPT | Mod: 26 | Performed by: INTERNAL MEDICINE

## 2023-03-14 PROCEDURE — C1732 CATH, EP, DIAG/ABL, 3D/VECT: HCPCS | Performed by: INTERNAL MEDICINE

## 2023-03-14 PROCEDURE — 250N000009 HC RX 250: Performed by: INTERNAL MEDICINE

## 2023-03-14 PROCEDURE — C1730 CATH, EP, 19 OR FEW ELECT: HCPCS | Performed by: INTERNAL MEDICINE

## 2023-03-14 PROCEDURE — 370N000017 HC ANESTHESIA TECHNICAL FEE, PER MIN: Performed by: INTERNAL MEDICINE

## 2023-03-14 PROCEDURE — 250N000011 HC RX IP 250 OP 636

## 2023-03-14 PROCEDURE — 258N000003 HC RX IP 258 OP 636: Performed by: ANESTHESIOLOGY

## 2023-03-14 PROCEDURE — 93623 PRGRMD STIMJ&PACG IV RX NFS: CPT | Performed by: INTERNAL MEDICINE

## 2023-03-14 PROCEDURE — 80048 BASIC METABOLIC PNL TOTAL CA: CPT | Performed by: INTERNAL MEDICINE

## 2023-03-14 PROCEDURE — 250N000013 HC RX MED GY IP 250 OP 250 PS 637: Performed by: NURSE PRACTITIONER

## 2023-03-14 PROCEDURE — 272N000001 HC OR GENERAL SUPPLY STERILE: Performed by: INTERNAL MEDICINE

## 2023-03-14 PROCEDURE — 93620 COMP EP EVL R AT VEN PAC&REC: CPT | Performed by: INTERNAL MEDICINE

## 2023-03-14 PROCEDURE — 85027 COMPLETE CBC AUTOMATED: CPT | Performed by: INTERNAL MEDICINE

## 2023-03-14 PROCEDURE — 93010 ELECTROCARDIOGRAM REPORT: CPT | Performed by: INTERNAL MEDICINE

## 2023-03-14 PROCEDURE — 999N000054 HC STATISTIC EKG NON-CHARGEABLE

## 2023-03-14 PROCEDURE — 93623 PRGRMD STIMJ&PACG IV RX NFS: CPT | Mod: 26 | Performed by: INTERNAL MEDICINE

## 2023-03-14 PROCEDURE — 250N000011 HC RX IP 250 OP 636: Performed by: ANESTHESIOLOGY

## 2023-03-14 RX ORDER — EPINEPHRINE HCL IN 0.9 % NACL 100 MCG/10
SYRINGE (ML) INTRAVENOUS PRN
Status: DISCONTINUED | OUTPATIENT
Start: 2023-03-14 | End: 2023-03-14

## 2023-03-14 RX ORDER — SODIUM CHLORIDE 9 MG/ML
100 INJECTION, SOLUTION INTRAVENOUS CONTINUOUS
Status: CANCELLED | OUTPATIENT
Start: 2023-03-14

## 2023-03-14 RX ORDER — ONDANSETRON 2 MG/ML
4 INJECTION INTRAMUSCULAR; INTRAVENOUS EVERY 6 HOURS PRN
Status: DISCONTINUED | OUTPATIENT
Start: 2023-03-14 | End: 2023-03-14 | Stop reason: HOSPADM

## 2023-03-14 RX ORDER — LIDOCAINE 40 MG/G
CREAM TOPICAL
Status: DISCONTINUED | OUTPATIENT
Start: 2023-03-14 | End: 2023-03-14 | Stop reason: HOSPADM

## 2023-03-14 RX ORDER — SODIUM CHLORIDE 9 MG/ML
100 INJECTION, SOLUTION INTRAVENOUS CONTINUOUS
Status: DISCONTINUED | OUTPATIENT
Start: 2023-03-14 | End: 2023-03-14 | Stop reason: HOSPADM

## 2023-03-14 RX ORDER — METOPROLOL TARTRATE 25 MG/1
25 TABLET, FILM COATED ORAL 2 TIMES DAILY
Status: DISCONTINUED | OUTPATIENT
Start: 2023-03-14 | End: 2023-03-14 | Stop reason: HOSPADM

## 2023-03-14 RX ORDER — FENTANYL CITRATE 50 UG/ML
INJECTION, SOLUTION INTRAMUSCULAR; INTRAVENOUS PRN
Status: DISCONTINUED | OUTPATIENT
Start: 2023-03-14 | End: 2023-03-14

## 2023-03-14 RX ORDER — ONDANSETRON 2 MG/ML
INJECTION INTRAMUSCULAR; INTRAVENOUS PRN
Status: DISCONTINUED | OUTPATIENT
Start: 2023-03-14 | End: 2023-03-14

## 2023-03-14 RX ORDER — FENTANYL CITRATE 50 UG/ML
25 INJECTION, SOLUTION INTRAMUSCULAR; INTRAVENOUS
Status: DISCONTINUED | OUTPATIENT
Start: 2023-03-14 | End: 2023-03-14 | Stop reason: HOSPADM

## 2023-03-14 RX ORDER — OXYCODONE HYDROCHLORIDE 5 MG/1
10 TABLET ORAL
Status: CANCELLED | OUTPATIENT
Start: 2023-03-14

## 2023-03-14 RX ORDER — FENTANYL CITRATE 50 UG/ML
25 INJECTION, SOLUTION INTRAMUSCULAR; INTRAVENOUS
Status: CANCELLED | OUTPATIENT
Start: 2023-03-14

## 2023-03-14 RX ORDER — SODIUM CHLORIDE, SODIUM LACTATE, POTASSIUM CHLORIDE, CALCIUM CHLORIDE 600; 310; 30; 20 MG/100ML; MG/100ML; MG/100ML; MG/100ML
INJECTION, SOLUTION INTRAVENOUS CONTINUOUS
Status: CANCELLED | OUTPATIENT
Start: 2023-03-14

## 2023-03-14 RX ORDER — LIDOCAINE 40 MG/G
CREAM TOPICAL
Status: CANCELLED | OUTPATIENT
Start: 2023-03-14

## 2023-03-14 RX ORDER — OXYCODONE HYDROCHLORIDE 5 MG/1
5 TABLET ORAL
Status: CANCELLED | OUTPATIENT
Start: 2023-03-14

## 2023-03-14 RX ORDER — ACETAMINOPHEN 325 MG/1
650 TABLET ORAL EVERY 4 HOURS PRN
Status: DISCONTINUED | OUTPATIENT
Start: 2023-03-14 | End: 2023-03-14 | Stop reason: HOSPADM

## 2023-03-14 RX ORDER — ONDANSETRON 4 MG/1
4 TABLET, ORALLY DISINTEGRATING ORAL EVERY 6 HOURS PRN
Status: DISCONTINUED | OUTPATIENT
Start: 2023-03-14 | End: 2023-03-14 | Stop reason: HOSPADM

## 2023-03-14 RX ORDER — ACETAMINOPHEN 325 MG/1
975 TABLET ORAL
Status: CANCELLED | OUTPATIENT
Start: 2023-03-14

## 2023-03-14 RX ADMIN — PHENYLEPHRINE HYDROCHLORIDE 100 MCG: 10 INJECTION INTRAVENOUS at 09:46

## 2023-03-14 RX ADMIN — PHENYLEPHRINE HYDROCHLORIDE 100 MCG: 10 INJECTION INTRAVENOUS at 10:22

## 2023-03-14 RX ADMIN — PHENYLEPHRINE HYDROCHLORIDE 100 MCG: 10 INJECTION INTRAVENOUS at 10:31

## 2023-03-14 RX ADMIN — PHENYLEPHRINE HYDROCHLORIDE 0.5 MCG/KG/MIN: 10 INJECTION INTRAVENOUS at 10:24

## 2023-03-14 RX ADMIN — PHENYLEPHRINE HYDROCHLORIDE 100 MCG: 10 INJECTION INTRAVENOUS at 10:20

## 2023-03-14 RX ADMIN — MIDAZOLAM 1 MG: 1 INJECTION INTRAMUSCULAR; INTRAVENOUS at 09:07

## 2023-03-14 RX ADMIN — ONDANSETRON 4 MG: 2 INJECTION INTRAMUSCULAR; INTRAVENOUS at 09:07

## 2023-03-14 RX ADMIN — SODIUM CHLORIDE 100 ML/HR: 9 INJECTION, SOLUTION INTRAVENOUS at 07:53

## 2023-03-14 RX ADMIN — PHENYLEPHRINE HYDROCHLORIDE 100 MCG: 10 INJECTION INTRAVENOUS at 10:01

## 2023-03-14 RX ADMIN — PHENYLEPHRINE HYDROCHLORIDE 100 MCG: 10 INJECTION INTRAVENOUS at 09:42

## 2023-03-14 RX ADMIN — FENTANYL CITRATE 25 MCG: 50 INJECTION, SOLUTION INTRAMUSCULAR; INTRAVENOUS at 09:51

## 2023-03-14 RX ADMIN — PHENYLEPHRINE HYDROCHLORIDE 200 MCG: 10 INJECTION INTRAVENOUS at 09:43

## 2023-03-14 RX ADMIN — SODIUM CHLORIDE: 9 INJECTION, SOLUTION INTRAVENOUS at 09:56

## 2023-03-14 RX ADMIN — DEXMEDETOMIDINE HYDROCHLORIDE 0.5 MCG/KG/HR: 100 INJECTION, SOLUTION INTRAVENOUS at 09:05

## 2023-03-14 RX ADMIN — PHENYLEPHRINE HYDROCHLORIDE 100 MCG: 10 INJECTION INTRAVENOUS at 10:24

## 2023-03-14 RX ADMIN — FENTANYL CITRATE 25 MCG: 50 INJECTION, SOLUTION INTRAMUSCULAR; INTRAVENOUS at 09:46

## 2023-03-14 RX ADMIN — METOPROLOL TARTRATE 25 MG: 25 TABLET, FILM COATED ORAL at 13:29

## 2023-03-14 RX ADMIN — PHENYLEPHRINE HYDROCHLORIDE 100 MCG: 10 INJECTION INTRAVENOUS at 10:18

## 2023-03-14 RX ADMIN — Medication 100 MCG: at 10:49

## 2023-03-14 RX ADMIN — MIDAZOLAM 1 MG: 1 INJECTION INTRAMUSCULAR; INTRAVENOUS at 09:24

## 2023-03-14 RX ADMIN — PHENYLEPHRINE HYDROCHLORIDE 100 MCG: 10 INJECTION INTRAVENOUS at 10:13

## 2023-03-14 ASSESSMENT — LIFESTYLE VARIABLES: TOBACCO_USE: 1

## 2023-03-14 ASSESSMENT — ACTIVITIES OF DAILY LIVING (ADL)
ADLS_ACUITY_SCORE: 35
ADLS_ACUITY_SCORE: 35
ADLS_ACUITY_SCORE: 36
ADLS_ACUITY_SCORE: 35

## 2023-03-14 NOTE — ANESTHESIA PREPROCEDURE EVALUATION
Anesthesia Pre-Procedure Evaluation    Patient: Barry Jay   MRN: [id number] : 1955        Procedure : Procedure(s):  Right Lumbar 4 - lumbar 5 hemilaminectomy, medial facetectomies, foraminotomies          Past Medical History:   Diagnosis Date     HTN (hypertension)      Hyperlipidemia       Past Surgical History:   Procedure Laterality Date     APPENDECTOMY       CHOLECYSTECTOMY       LAMINECTOMY LUMBAR ONE LEVEL Right 2022    Procedure: Right Lumbar 4 - lumbar 5 hemilaminectomy, medial facetectomies, foraminotomies;  Surgeon: Shannan Mari MD;  Location: [facility] [place] OR     ORTHOPEDIC SURGERY        No Known Allergies   Social History     Tobacco Use     Smoking status: Every Day     Packs/day: 1.00     Years: 30.00     Pack years: 30.00     Types: Cigarettes     Smokeless tobacco: Never   Substance Use Topics     Alcohol use: Never      Wt Readings from Last 1 Encounters:   23 93.7 kg (206 lb 8 oz)        Anesthesia Evaluation            ROS/MED HX  ENT/Pulmonary:     (+) tobacco use, Current use, 1 packs/day,     Neurologic:  - neg neurologic ROS     Cardiovascular:     (+) hypertension-----    METS/Exercise Tolerance:     Hematologic:  - neg hematologic  ROS     Musculoskeletal:  - neg musculoskeletal ROS     GI/Hepatic:     (+) GERD, Asymptomatic on medication,     Renal/Genitourinary:  - neg Renal ROS     Endo:  - neg endo ROS     Psychiatric/Substance Use:  - neg psychiatric ROS     Infectious Disease:  - neg infectious disease ROS     Malignancy:  - neg malignancy ROS     Other:  - neg other ROS          Physical Exam    Airway  airway exam normal      Mallampati: II       Respiratory Devices and Support         Dental  no notable dental history         Cardiovascular   cardiovascular exam normal       Rhythm and rate: regular and normal     Pulmonary   pulmonary exam normal        breath sounds clear to auscultation           OUTSIDE LABS:  CBC:   Lab Results   Component  Value Date    WBC 10.8 03/14/2023    WBC 13.3 (H) 01/25/2023    HGB 15.7 03/14/2023    HGB 15.6 01/25/2023    HCT 45.6 03/14/2023    HCT 45.5 01/25/2023     03/14/2023     01/25/2023     BMP:   Lab Results   Component Value Date     03/14/2023     01/25/2023    POTASSIUM 3.8 03/14/2023    POTASSIUM 3.6 01/25/2023    CHLORIDE 100 03/14/2023    CHLORIDE 100 01/25/2023    CO2 25 03/14/2023    CO2 22 01/25/2023    BUN 16.0 03/14/2023    BUN 15.5 01/25/2023    CR 1.22 (H) 03/14/2023    CR 1.3 02/23/2023     (H) 03/14/2023     (H) 01/25/2023     COAGS:   Lab Results   Component Value Date    PTT 33 06/13/2022    INR 1.07 06/13/2022     POC: No results found for: BGM, HCG, HCGS  HEPATIC:   Lab Results   Component Value Date    ALBUMIN 4.5 01/04/2023    PROTTOTAL 7.5 01/04/2023    ALT 57 (H) 01/04/2023    AST 40 01/04/2023    ALKPHOS 136 (H) 01/04/2023    BILITOTAL 0.6 01/04/2023     OTHER:   Lab Results   Component Value Date    A1C 6.0 (H) 06/13/2022    MIKE 9.4 03/14/2023    MAG 1.9 01/25/2023    LIPASE 28 04/26/2018    TSH 1.56 01/04/2023    CRP 0.2 04/26/2018       Anesthesia Plan    ASA Status:  2      Anesthesia Type: MAC.              Consents    Anesthesia Plan(s) and associated risks, benefits, and realistic alternatives discussed. Questions answered and patient/representative(s) expressed understanding.     - Discussed: Risks, Benefits and Alternatives for BOTH SEDATION and the PROCEDURE were discussed     - Discussed with:  Patient      - Extended Intubation/Ventilatory Support Discussed: No.      - Patient is DNR/DNI Status: No    Use of blood products discussed: No .     Postoperative Care            Comments:    Other Comments: precedex                Antonietta Burris MD

## 2023-03-14 NOTE — PROGRESS NOTES
Patient was kept comfortable during post-procedure stay.VSS. Denies pain and feeling better after eating and taking his metoprolol dose. Right and Left femoral access site remains dry & free from signs of bleeding. Stopcocks and sutures were removed without incident. Pt able to eat, drink, ambulate and void post-procedure.    Appointments made & included in AVS. Dr. Hobson was able to speak with patient post procedure. Post-op instructions given to patient. Able to ask questions. Verbalized no concerns. Belongings returned. Discharged home in stable condition.

## 2023-03-14 NOTE — ANESTHESIA POSTPROCEDURE EVALUATION
Patient: Barry Jay    Procedure: Procedure(s):  Ablation Supraventricular Tachycardia       Anesthesia Type:  MAC    Note:  Disposition: Outpatient   Postop Pain Control: Uneventful            Sign Out: Well controlled pain   PONV: No   Neuro/Psych: Uneventful            Sign Out: Acceptable/Baseline neuro status   Airway/Respiratory: Uneventful            Sign Out: Acceptable/Baseline resp. status   CV/Hemodynamics: Uneventful            Sign Out: Acceptable CV status; No obvious hypovolemia; No obvious fluid overload   Other NRE:    DID A NON-ROUTINE EVENT OCCUR?            Last vitals:  Vitals Value Taken Time   /81 03/14/23 1330   Temp     Pulse 67 03/14/23 1351   Resp 16 03/14/23 1351   SpO2 93 % 03/14/23 1351   Vitals shown include unvalidated device data.    Electronically Signed By: Antonietta Burris MD  March 14, 2023  1:52 PM

## 2023-03-14 NOTE — ANESTHESIA CARE TRANSFER NOTE
Patient: Barry Jay    Procedure: Procedure(s):  Ablation Supraventricular Tachycardia       Diagnosis: SVT  Diagnosis Additional Information: No value filed.    Anesthesia Type:   MAC     Note:    Oropharynx: oropharynx clear of all foreign objects and spontaneously breathing  Level of Consciousness: awake  Oxygen Supplementation: nasal cannula  Level of Supplemental Oxygen (L/min / FiO2): 2  Independent Airway: airway patency satisfactory and stable  Dentition: dentition unchanged  Vital Signs Stable: post-procedure vital signs reviewed and stable  Report to RN Given: handoff report given  Patient transferred to: Cardiac Special [facility]          Vitals:  Vitals Value Taken Time   BP 99/56 03/14/23 1114   Temp 36.7  C (98.1  F) 03/14/23 1114   Pulse 80 03/14/23 1114   Resp 12 03/14/23 1114   SpO2 92 % 03/14/23 1114       Electronically Signed By: ATIYA Jessica CRNA  March 14, 2023  11:14 AM

## 2023-03-14 NOTE — PROGRESS NOTES
Pt c/o feeling slightly anxious with mild chest discomfort and nausea. Pt states he sometimes feels this way at home and he will take his metoprolol dose with food and that seems to help. Offered antiemetic but pt declines, states he thinks if he takes his metoprolol it will help him feel better. Paged Dr. Hobson with update and pt request.

## 2023-03-14 NOTE — Clinical Note
Sheath prepped and inserted in the left femoral vein. Detail Level: Detailed Quality 226: Preventive Care And Screening: Tobacco Use: Screening And Cessation Intervention: Patient screened for tobacco use and is an ex/non-smoker Quality 431: Preventive Care And Screening: Unhealthy Alcohol Use - Screening: Patient not identified as an unhealthy alcohol user when screened for unhealthy alcohol use using a systematic screening method Quality 110: Preventive Care And Screening: Influenza Immunization: Influenza Immunization Administered during Influenza season

## 2023-03-14 NOTE — INTERVAL H&P NOTE
"I have reviewed the surgical (or preoperative) H&P that is linked to this encounter, and examined the patient. There are no significant changes    Clinical Conditions Present on Arrival:  Clinically Significant Risk Factors Present on Admission                    # Obesity: Estimated body mass index is 31.4 kg/m  as calculated from the following:    Height as of this encounter: 1.727 m (5' 8\").    Weight as of this encounter: 93.7 kg (206 lb 8 oz).       "

## 2023-03-14 NOTE — DISCHARGE INSTRUCTIONS
[facility]  Cardiac Electrophysiology  [address]  [place] [address]   Office: [phone]  Fax: [phone]     Cardiac Electrophysiology - Post EP study Discharge Instructions      PROCEDURE   EP study         MEDICATION INSTRUCTIONS   Continue taking metoprolol           DISCHARGE INSTRUCTIONS   General instructions  Have an adult stay with you until tomorrow.  You may resume your normal diet.    You may shower tomorrow.  Do NOT take a bath, or use a hot tub or pool for at least 1 week. Do not scrub the site. Do not use lotion or powder near the puncture site.    Groin care instructions  For the first 24 hrs - check the puncture site every 1-2 hours while awake.  You may keep a bandaid over the puncture sites for 1 or 2 days post-procedure and thereafter may keep these sites uncovered.  Change the bandaid daily.  If there is minor oozing, apply another bandaid and remove it after 12 hours.  For 2 days, when you cough, sneeze, laugh or move your bowels, hold your hand over the puncture site and press firmly.  Mild bruising at the access sites is normal.  If you notice increased swelling, external bleeding, or have other concerns regarding your access sites please consider emergency department evaluation and call your electrophysiology team's office    Activity recommendations  Do not drive for 3 days.  Avoid stooping or squatting more than 90 degrees at the hips for 7 days  Avoid repetitive motions such as loading , vacuuming, raking or shoveling  Avoid heavy lifting (greater than 25lbs) for 1 week    Post ablation instructions  You may have some irregular heartbeats following your ablation.  These may feel very strong and feel like tachycardia re-initiation is imminent - these episodes should occur less frequently over time.  Recurrent tachycardia can occur within the first 3 months post ablation while your heart recovers from the procedure.  Pleuritic  chest discomfort (chest pain worse with taking deep breaths, worse with laying flat on your back) can occur after ablation, usually coming about within the first 24-48hrs post ablation.  If this occurs and is severe enough to be troublesome to you, please call us and consider starting a course of ibuprofen 400mg three times daily for 5 to 7 days    Things to watch for  As with any type of procedure, please be more attentive to unusual symptoms post ablation (eg. fever, neurologic changes, pain with swallowing, loss of consciousness, etc) - we recommend ER evaluation for any such symptoms in the first few weeks post procedure.    Consider ER evaluation for the following:  Severe chest pain not relieved by Tylenol or Ibuprofen  You have chills or a fever greater than 101 F (38 C)  Neurologic changes (eg. leg, arm or face weakness or numbness, difficulties with speech or word finding, problems walking or with your balance, vision changes)  Severe difficulty swallowing and/or you are coughing up blood  Shortness of breath  Increased groin pain or a large or growing hard lump around the site  Groin is red, swollen, hot or tender  Blood or fluid is draining from the groin site  Any numbness, coolness or changes in color in your extremities  Groin pain not relieved by Tylenol or Advil  Recurrent tachycardia associated with sustained rapid heart rates or associated with additional concerning symptoms.    Our office will have a follow-up visit scheduled for you in approximately 6 weeks.  Please do not hesitate to call us before that time should issues arise.        [facility]    [phone]    If you are calling after hours, please listen to the entire voicemail,   a live  will answer at the end of the message.    [phone] to reach the EP nurses working with Dr Hobson

## 2023-03-14 NOTE — Clinical Note
[employer] Med system 12 lead EKG, hemodynamics 5 lead, pulse oximetery, NIBP, [employer] hands off defibrillator/external pacer, with 3 monitoring leads to patient. Baseline assessment done.

## 2023-03-14 NOTE — Clinical Note
Potential access sites were evaluated for patency using ultrasound.   The right femoral vein and left femoral vein was selected. Access was obtained under with Sonosite guidance using a micropuncture 21 gauge needle with direct visualization of needle entry.

## 2023-03-17 ENCOUNTER — VIRTUAL VISIT (OUTPATIENT)
Dept: CARDIOLOGY | Facility: CLINIC | Age: 68
End: 2023-03-17
Payer: COMMERCIAL

## 2023-03-17 DIAGNOSIS — I47.10 PAROXYSMAL SUPRAVENTRICULAR TACHYCARDIA (H): Primary | ICD-10-CM

## 2023-03-17 PROCEDURE — 99207 PR NO CHARGE NURSE ONLY: CPT

## 2023-03-17 NOTE — PROGRESS NOTES
Post PVI Procedural Follow Up Call    Pt is s/p SVT ablation from 3/14 with Dr Oscar  PC was placed to pt, spoke to pt    General Assessment:     Pain: Pt denies generalized or localized pain abnormal to healing s/p   He notes that he does still have chest pain/ chest fullness which he states is from his first ever SVT episode last fall.   He states the metoprolol helps with his pain?  PT denies increased pain since procedure.    Activity: Pt is very anxious to do anything like normal daily activities, he is concerned that he will have another episode and does not want to have to go to the ER .  Pt would like to redo the SVT ablation and would like to know when the soonest would be able to schedule, discussed that he has a follow up appointment scheduled with Ingrid Cronin NP on 4/25 to discuss next steps but pt prefers to discuss this right now as he is anxious to fix his SVT and prevent further ER visits.    Rhythm Assessment:   Pt denies palpitations and denies irregularities in HR or rhythm. Denies any SVT episodes since procedure.    Procedure Site Assessment:   Pts no visible/physical changes in groin sites, some bruising around sites without significant change from hospital discharge and normal to PVI recovery and small pea/dime size hardening under suture sites normal to PVI recovery    Anticoagulation/Medication:  Pt on ASA  continues metoprolol  tartrate 25mg BID    Education completed with pt at this visit:  when to contact EP-RN/EP-MD, contact information was given to the pt for further concerns or questions and pt verbalized understanding    Follow up  Pt scheduled with SUMAYA, Dr. Hobson- would you like this appt changed to be scheduled with you?    3/17/2023 2:56 PM  Wendi Licona RN

## 2023-03-20 NOTE — PROGRESS NOTES
Noted, will forward to scheduling to call pt and make the change.    Scheduling please see below- are you able to use the current order that is being used for [employer]'s appt?    Thanks!    Wendi

## 2023-03-20 NOTE — PROGRESS NOTES
Lorna Hobson MD Holen, Megan, ANALI  Called the patient and addressed his concerns.     Recommend canceling his appointment with Ingrid and scheduling with me in 6 months.     Thanks

## 2023-03-27 DIAGNOSIS — I47.10 PAROXYSMAL SUPRAVENTRICULAR TACHYCARDIA (H): ICD-10-CM

## 2023-03-27 RX ORDER — METOPROLOL TARTRATE 25 MG/1
25 TABLET, FILM COATED ORAL 2 TIMES DAILY
Qty: 60 TABLET | Refills: 3 | Status: SHIPPED | OUTPATIENT
Start: 2023-03-27 | End: 2023-04-07

## 2023-04-04 ENCOUNTER — TELEPHONE (OUTPATIENT)
Dept: CARDIOLOGY | Facility: CLINIC | Age: 68
End: 2023-04-04
Payer: COMMERCIAL

## 2023-04-04 NOTE — TELEPHONE ENCOUNTER
CONCETTA oHbson,     Patient s/p SVT ablation 3/14:   EP study was non inducible SVT despite isuprel, aggressive burst and extra stim pacing and epinephrine for induction attempts    He's calling to report the chest pains that were present prior to ablation, still havent resolved.    He also had 2 short dizzy spells with movement that were quite concerning to him.    He remains on metoprolol and denies any tachycardia.    Reviewed he has a history of CAD.    It sounds like he may want a follow up visit with you to discuss ongoing concerns.    Thoughts?  Thank you  Darshana

## 2023-04-04 NOTE — TELEPHONE ENCOUNTER
[facility] Call Center    Phone Message    May a detailed message be left on voicemail: yes     Reason for Call: Other: Pt called in with some questions for Ingrid prior to his visit. Please follow up     Action Taken: Other: cardio    Travel Screening: Not Applicable   Thank you!  [facility]

## 2023-04-05 NOTE — TELEPHONE ENCOUNTER
Lorna Hobson MD Westlund, Jessica, RN 19 hours ago (6:33 PM)     KA  Happy to see him in the clinic

## 2023-04-05 NOTE — TELEPHONE ENCOUNTER
Noted, previously had sent message to scheduling to contact pt and cancel Ingrid appt and schedule follow up with Dr. Hobson.      It does not look like the pt is scheduled with Dr. Hobson yet, but the Ingrid visit was canceled.    Will forward message to scheduling and have them contact pt to schedule visit to discuss concerns below.    Wendi

## 2023-04-07 ENCOUNTER — OFFICE VISIT (OUTPATIENT)
Dept: CARDIOLOGY | Facility: CLINIC | Age: 68
End: 2023-04-07
Payer: COMMERCIAL

## 2023-04-07 ENCOUNTER — TELEPHONE (OUTPATIENT)
Dept: CARDIOLOGY | Facility: CLINIC | Age: 68
End: 2023-04-07

## 2023-04-07 VITALS
RESPIRATION RATE: 16 BRPM | WEIGHT: 204 LBS | HEART RATE: 76 BPM | BODY MASS INDEX: 31.02 KG/M2 | DIASTOLIC BLOOD PRESSURE: 68 MMHG | OXYGEN SATURATION: 96 % | SYSTOLIC BLOOD PRESSURE: 110 MMHG

## 2023-04-07 DIAGNOSIS — I47.10 PAROXYSMAL SUPRAVENTRICULAR TACHYCARDIA (H): ICD-10-CM

## 2023-04-07 DIAGNOSIS — I10 ESSENTIAL HYPERTENSION: ICD-10-CM

## 2023-04-07 DIAGNOSIS — R93.1 ABNORMAL FINDINGS ON DIAGNOSTIC IMAGING OF HEART AND CORONARY CIRCULATION: ICD-10-CM

## 2023-04-07 DIAGNOSIS — I25.119 CORONARY ARTERY DISEASE INVOLVING NATIVE CORONARY ARTERY OF NATIVE HEART WITH ANGINA PECTORIS (H): Primary | ICD-10-CM

## 2023-04-07 DIAGNOSIS — I20.0 WORSENING ANGINA (H): ICD-10-CM

## 2023-04-07 PROCEDURE — 99214 OFFICE O/P EST MOD 30 MIN: CPT | Performed by: INTERNAL MEDICINE

## 2023-04-07 RX ORDER — METOPROLOL TARTRATE 25 MG/1
50 TABLET, FILM COATED ORAL 2 TIMES DAILY
Qty: 60 TABLET | Refills: 3 | Status: SHIPPED | OUTPATIENT
Start: 2023-04-07 | End: 2023-07-26

## 2023-04-07 NOTE — PATIENT INSTRUCTIONS
[facility]  Cardiac Electrophysiology  [address]  [place] [address]   Office: [phone]  Fax: [phone]       Thank you for seeing us in clinic today - it is a pleasure to be a part of your care team.  Below is a summary of our plan from today's visit.      Chest pain  - Increase metoprolol from 25 to 50 mg twce daily  - will schedule for an angiogram  - Continue all other meds    Please do not hesitate to be in touch with our office at [phone] with any questions that may arise.      Thank you for trusting us with your care,    Lorna Hobson MD  Clinical Cardiac Electrophysiology  [facility]  [address]  [place] [address]   Office: [phone]  Fax: [phone]

## 2023-04-07 NOTE — PROGRESS NOTES
HEART CARE ENCOUNTER CONSULTATON NOTE      [facility]  [phone]      Assessment/Recommendations   Assessment/Plan:    Barry Jay is a very pleasant 67 year old male with history of primary hypertension, dyslipidemia, obesity, 40 years of smoking history, paroxysmal supraventricular tachycardia.    1. Supraventricular tachycardia -   - Negative EP study on 14 March 2023 despite aggressive pacing maneuvers and use of isoproterenol  - currently on Metoprolol tartrate 25 mg BID, will increase it to 50 mg BID given angina  - Has not had prolonges palpitations since the EP study    2. Angina  - Will increase Metoprolol tartrate 50mg BID  - Did not tolerate a trial of nitrates  - Continue ASA and Atorvastatin  - Patient wants to pursue an angiogram given his ongoing angina especially on effort    3. HTN  - Continue current meds, appears well controlled.           History of Present Illness/Subjective    HPI: Barry Jay is a very pleasant 67 year old male with history of primary hypertension, dyslipidemia, obesity, 40 years of smoking history, paroxysmal supraventricular tachycardia.    About 3 weeks ago the patient had sudden onset of dizziness and palpitations and called 911. He was brought to the ER. He did receive Adenosine which did not affect the HR and was given Metoprolol in the ER which terminated the tachycardia.    Had similar shorter episodes last fall.    Had a normal angiogram in 2011 locally.    Barry underwent an EP study on 14 March which was negative for induction of any SVT despite aggressive pacing maneuvers and the use of isoproterenol.  Decision was made to continue metoprolol at that time.    April 7th 2023  On today's visit he says he still has chest pain/discomfort especially on exertion. Describes a feeling of chest heaviness in the middle of the chest. Shoveling of snow makes it more prominent.Had two episode of orthostatic hypotension. Tried Isosorbide but it did  not agree with his stomach.    Recent ECG(personally reviewed):    SVT with RBBB pattern    Recent Echocardiogram Results (personally reviewed):    Left ventricular size, wall motion and function are normal. The ejection  fraction is 60-65%.  Normal right ventricle size and systolic function.  IVC diameter <2.1 cm collapsing >50% with sniff suggests a normal RA pressure  of 3 mmHg.  No hemodynamically significant valvular abnormalities on 2D or color flow  imaging.  ______________________________________________________________________________    Labs below reviewed personally     Physical Examination  Review of Systems   Vitals: /68 (BP Location: Left arm, Patient Position: Sitting, Cuff Size: Adult Large)   Pulse 76   Resp 16   Wt 92.5 kg (204 lb)   SpO2 96%   BMI 31.02 kg/m    BMI= Body mass index is 31.02 kg/m .  Wt Readings from Last 3 Encounters:   04/07/23 92.5 kg (204 lb)   03/14/23 93.7 kg (206 lb 8 oz)   03/03/23 94.3 kg (207 lb 12.8 oz)       General Appearance:   no distress, normal body habitus   ENT/Mouth: membranes moist, no oral lesions or bleeding gums.      EYES:  no scleral icterus, normal conjunctivae   Neck: no carotid bruits or thyromegaly   Chest/Lungs:   lungs are clear to auscultation, no rales or wheezing, no sternal scar, equal chest wall expansion    Cardiovascular:   Regular. Normal first and second heart sounds with no murmurs, rubs, or gallops; the carotid, radial and posterior tibial pulses are intact, no edema bilaterally    Abdomen:  no organomegaly, masses, bruits, or tenderness; bowel sounds are present   Extremities: no cyanosis or clubbing   Skin: no xanthelasma, warm.    Neurologic: normal  bilateral, no tremors     Psychiatric: alert and oriented x3, calm        Please refer above for cardiac ROS details.        Medical History  Surgical History Family History Social History   Past Medical History:   Diagnosis Date     HTN (hypertension)      Hyperlipidemia       Past Surgical History:   Procedure Laterality Date     APPENDECTOMY       CHOLECYSTECTOMY       EP ABLATION SVT N/A 3/14/2023    Procedure: Ablation Supraventricular Tachycardia;  Surgeon: Lorna Hobson MD;  Location: [facility] CATH LAB CV     LAMINECTOMY LUMBAR ONE LEVEL Right 6/27/2022    Procedure: Right Lumbar 4 - lumbar 5 hemilaminectomy, medial facetectomies, foraminotomies;  Surgeon: Shannan Mari MD;  Location: [facility] Main OR     ORTHOPEDIC SURGERY       No family history on file.     Social History     Socioeconomic History     Marital status: Single     Spouse name: Not on file     Number of children: Not on file     Years of education: Not on file     Highest education level: Not on file   Occupational History     Not on file   Tobacco Use     Smoking status: Every Day     Packs/day: 1.00     Years: 30.00     Pack years: 30.00     Types: Cigarettes     Smokeless tobacco: Never   Vaping Use     Vaping status: Never Used   Substance and Sexual Activity     Alcohol use: Never     Drug use: Never     Sexual activity: Not on file   Other Topics Concern     Not on file   Social History Narrative     Not on file     Social Determinants of Health     Financial Resource Strain: Not on file   Food Insecurity: Not on file   Transportation Needs: Not on file   Physical Activity: Not on file   Stress: Not on file   Social Connections: Not on file   Intimate Partner Violence: Not on file   Housing Stability: Not on file           Medications  Allergies   Current Outpatient Medications   Medication Sig Dispense Refill     ALPRAZolam (XANAX) 0.5 MG tablet [ALPRAZOLAM (XANAX) 0.5 MG TABLET] TAKE 1 TABLET BY MOUTH ONCE A DAY AS NEEDED ORALLY  0     aspirin (ASA) 81 MG chewable tablet Take 81 mg by mouth daily       atorvastatin (LIPITOR) 20 MG tablet Take 1 tablet (20 mg) by mouth At Bedtime 90 tablet 3     celecoxib (CELEBREX) 200 MG capsule Take 200 mg by mouth daily as needed for pain  3      irbesartan-hydrochlorothiazide (AVALIDE) 300-12.5 mg per tablet [IRBESARTAN-HYDROCHLOROTHIAZIDE (AVALIDE) 300-12.5 MG PER TABLET] Take 1 tablet by mouth daily. for blood pressure  1     metoprolol tartrate (LOPRESSOR) 25 MG tablet Take 2 tablets (50 mg) by mouth 2 times daily 60 tablet 3     multivitamin, therapeutic (THERA-VIT) TABS tablet Take 1 tablet by mouth daily       omeprazole (PRILOSEC) 20 MG capsule Take 20 mg by mouth daily  1     PARoxetine (PAXIL) 20 MG tablet Take 20 mg by mouth every morning  3     zolpidem (AMBIEN) 10 mg tablet Take 10 mg by mouth nightly as needed for sleep  2       No Known Allergies       Lab Results    Chemistry/lipid CBC Cardiac Enzymes/BNP/TSH/INR   Recent Labs   Lab Test 06/13/22  1335   CHOL 144   HDL 31*   LDL 85   TRIG 141     Recent Labs   Lab Test 06/13/22  1335 06/11/21  1634 05/28/20  1410   LDL 85 74 85     Recent Labs   Lab Test 01/25/23  1821      POTASSIUM 3.6   CHLORIDE 100   CO2 22   *   BUN 15.5   CR 1.38*   GFRESTIMATED 57*   MIKE 9.7     Recent Labs   Lab Test 01/25/23  1821 01/04/23  1631 06/13/22  1454   CR 1.38* 1.15 1.06     Recent Labs   Lab Test 06/13/22  1505   A1C 6.0*          Recent Labs   Lab Test 01/25/23  1821   WBC 13.3*   HGB 15.6   HCT 45.5   MCV 87        Recent Labs   Lab Test 01/25/23  1821 06/13/22  1454 06/13/22  1335   HGB 15.6 15.8 16.3    No results for input(s): TROPONINI in the last 15262 hours.  Recent Labs   Lab Test 01/04/23  1631   NTBNP 54     Recent Labs   Lab Test 01/04/23  1631   TSH 1.56     Recent Labs   Lab Test 06/13/22  1454   INR 1.07        Lorna Hobson MD

## 2023-04-07 NOTE — TELEPHONE ENCOUNTER
----- Message from Lorna Hobson MD sent at 4/7/2023  2:09 PM CDT -----  Dr Brush,       Mr. Jay continues to have chest pain especially with exertion on metoprolol and he was not able to tolerate nitrates.  Discussed medications versus an angiogram and he is strongly wants to pursue an angiogram.    EP Rns,         Could you please schedule Mr Jay for a coronary angiogram?    Thanks,  KA

## 2023-04-07 NOTE — TELEPHONE ENCOUNTER
Marianne Brush MD Agnihotri, Kanishk, MD; [facility] Ep Support Pool - e  Called the patient, agreed.  Thanks   Kamran

## 2023-04-07 NOTE — LETTER
4/7/2023    Osmel Iniguez MD  [address]  [place] [address]    RE: Barry Jay       Dear Colleague,     I had the pleasure of seeing Barry Jay in the [facility].    HEART CARE ENCOUNTER CONSULTATON NOTE      [facility]  [phone]      Assessment/Recommendations   Assessment/Plan:    Barry Jay is a very pleasant 67 year old male with history of primary hypertension, dyslipidemia, obesity, 40 years of smoking history, paroxysmal supraventricular tachycardia.    1. Supraventricular tachycardia -   - Negative EP study on 14 March 2023 despite aggressive pacing maneuvers and use of isoproterenol  - currently on Metoprolol tartrate 25 mg BID, will increase it to 50 mg BID given angina  - Has not had prolonges palpitations since the EP study    2. Angina  - Will increase Metoprolol tartrate 50mg BID  - Did not tolerate a trial of nitrates  - Continue ASA and Atorvastatin  - Patient wants to pursue an angiogram given his ongoing angina especially on effort    3. HTN  - Continue current meds, appears well controlled.           History of Present Illness/Subjective    HPI: Barry Jay is a very pleasant 67 year old male with history of primary hypertension, dyslipidemia, obesity, 40 years of smoking history, paroxysmal supraventricular tachycardia.    About 3 weeks ago the patient had sudden onset of dizziness and palpitations and called 911. He was brought to the ER. He did receive Adenosine which did not affect the HR and was given Metoprolol in the ER which terminated the tachycardia.    Had similar shorter episodes last fall.    Had a normal angiogram in 2011 locally.    Barry underwent an EP study on 14 March which was negative for induction of any SVT despite aggressive pacing maneuvers and the use of isoproterenol.  Decision was made to continue metoprolol at that time.    April 7th 2023  On today's visit he says he still has  chest pain/discomfort especially on exertion. Describes a feeling of chest heaviness in the middle of the chest. Shoveling of snow makes it more prominent.Had two episode of orthostatic hypotension. Tried Isosorbide but it did not agree with his stomach.    Recent ECG(personally reviewed):    SVT with RBBB pattern    Recent Echocardiogram Results (personally reviewed):    Left ventricular size, wall motion and function are normal. The ejection  fraction is 60-65%.  Normal right ventricle size and systolic function.  IVC diameter <2.1 cm collapsing >50% with sniff suggests a normal RA pressure  of 3 mmHg.  No hemodynamically significant valvular abnormalities on 2D or color flow  imaging.  ______________________________________________________________________________    Labs below reviewed personally     Physical Examination  Review of Systems   Vitals: /68 (BP Location: Left arm, Patient Position: Sitting, Cuff Size: Adult Large)   Pulse 76   Resp 16   Wt 92.5 kg (204 lb)   SpO2 96%   BMI 31.02 kg/m    BMI= Body mass index is 31.02 kg/m .  Wt Readings from Last 3 Encounters:   04/07/23 92.5 kg (204 lb)   03/14/23 93.7 kg (206 lb 8 oz)   03/03/23 94.3 kg (207 lb 12.8 oz)       General Appearance:   no distress, normal body habitus   ENT/Mouth: membranes moist, no oral lesions or bleeding gums.      EYES:  no scleral icterus, normal conjunctivae   Neck: no carotid bruits or thyromegaly   Chest/Lungs:   lungs are clear to auscultation, no rales or wheezing, no sternal scar, equal chest wall expansion    Cardiovascular:   Regular. Normal first and second heart sounds with no murmurs, rubs, or gallops; the carotid, radial and posterior tibial pulses are intact, no edema bilaterally    Abdomen:  no organomegaly, masses, bruits, or tenderness; bowel sounds are present   Extremities: no cyanosis or clubbing   Skin: no xanthelasma, warm.    Neurologic: normal  bilateral, no tremors     Psychiatric: alert and  oriented x3, calm        Please refer above for cardiac ROS details.        Medical History  Surgical History Family History Social History   Past Medical History:   Diagnosis Date    HTN (hypertension)     Hyperlipidemia      Past Surgical History:   Procedure Laterality Date    APPENDECTOMY      CHOLECYSTECTOMY      EP ABLATION SVT N/A 3/14/2023    Procedure: Ablation Supraventricular Tachycardia;  Surgeon: Lorna Hobson MD;  Location: [facility] CATH LAB CV    LAMINECTOMY LUMBAR ONE LEVEL Right 6/27/2022    Procedure: Right Lumbar 4 - lumbar 5 hemilaminectomy, medial facetectomies, foraminotomies;  Surgeon: Shannan Mari MD;  Location: [facility] Main OR    ORTHOPEDIC SURGERY       No family history on file.     Social History     Socioeconomic History    Marital status: Single     Spouse name: Not on file    Number of children: Not on file    Years of education: Not on file    Highest education level: Not on file   Occupational History    Not on file   Tobacco Use    Smoking status: Every Day     Packs/day: 1.00     Years: 30.00     Pack years: 30.00     Types: Cigarettes    Smokeless tobacco: Never   Vaping Use    Vaping status: Never Used   Substance and Sexual Activity    Alcohol use: Never    Drug use: Never    Sexual activity: Not on file   Other Topics Concern    Not on file   Social History Narrative    Not on file     Social Determinants of Health     Financial Resource Strain: Not on file   Food Insecurity: Not on file   Transportation Needs: Not on file   Physical Activity: Not on file   Stress: Not on file   Social Connections: Not on file   Intimate Partner Violence: Not on file   Housing Stability: Not on file           Medications  Allergies   Current Outpatient Medications   Medication Sig Dispense Refill    ALPRAZolam (XANAX) 0.5 MG tablet [ALPRAZOLAM (XANAX) 0.5 MG TABLET] TAKE 1 TABLET BY MOUTH ONCE A DAY AS NEEDED ORALLY  0    aspirin (ASA) 81 MG chewable tablet Take 81 mg by mouth  daily      atorvastatin (LIPITOR) 20 MG tablet Take 1 tablet (20 mg) by mouth At Bedtime 90 tablet 3    celecoxib (CELEBREX) 200 MG capsule Take 200 mg by mouth daily as needed for pain  3    irbesartan-hydrochlorothiazide (AVALIDE) 300-12.5 mg per tablet [IRBESARTAN-HYDROCHLOROTHIAZIDE (AVALIDE) 300-12.5 MG PER TABLET] Take 1 tablet by mouth daily. for blood pressure  1    metoprolol tartrate (LOPRESSOR) 25 MG tablet Take 2 tablets (50 mg) by mouth 2 times daily 60 tablet 3    multivitamin, therapeutic (THERA-VIT) TABS tablet Take 1 tablet by mouth daily      omeprazole (PRILOSEC) 20 MG capsule Take 20 mg by mouth daily  1    PARoxetine (PAXIL) 20 MG tablet Take 20 mg by mouth every morning  3    zolpidem (AMBIEN) 10 mg tablet Take 10 mg by mouth nightly as needed for sleep  2       No Known Allergies       Lab Results    Chemistry/lipid CBC Cardiac Enzymes/BNP/TSH/INR   Recent Labs   Lab Test 06/13/22  1335   CHOL 144   HDL 31*   LDL 85   TRIG 141     Recent Labs   Lab Test 06/13/22  1335 06/11/21  1634 05/28/20  1410   LDL 85 74 85     Recent Labs   Lab Test 01/25/23  1821      POTASSIUM 3.6   CHLORIDE 100   CO2 22   *   BUN 15.5   CR 1.38*   GFRESTIMATED 57*   MIKE 9.7     Recent Labs   Lab Test 01/25/23  1821 01/04/23  1631 06/13/22  1454   CR 1.38* 1.15 1.06     Recent Labs   Lab Test 06/13/22  1505   A1C 6.0*          Recent Labs   Lab Test 01/25/23  1821   WBC 13.3*   HGB 15.6   HCT 45.5   MCV 87        Recent Labs   Lab Test 01/25/23  1821 06/13/22  1454 06/13/22  1335   HGB 15.6 15.8 16.3    No results for input(s): TROPONINI in the last 89659 hours.  Recent Labs   Lab Test 01/04/23  1631   NTBNP 54     Recent Labs   Lab Test 01/04/23  1631   TSH 1.56     Recent Labs   Lab Test 06/13/22  1454   INR 1.07        Lorna Hobson MD      Thank you for allowing me to participate in the care of your patient.      Sincerely,     MD [facility]  cc:   No referring provider defined for this encounter.

## 2023-04-07 NOTE — H&P (VIEW-ONLY)
HEART CARE ENCOUNTER CONSULTATON NOTE      [facility]  [phone]      Assessment/Recommendations   Assessment/Plan:    Barry Jay is a very pleasant 67 year old male with history of primary hypertension, dyslipidemia, obesity, 40 years of smoking history, paroxysmal supraventricular tachycardia.    1. Supraventricular tachycardia -   - Negative EP study on 14 March 2023 despite aggressive pacing maneuvers and use of isoproterenol  - currently on Metoprolol tartrate 25 mg BID, will increase it to 50 mg BID given angina  - Has not had prolonges palpitations since the EP study    2. Angina  - Will increase Metoprolol tartrate 50mg BID  - Did not tolerate a trial of nitrates  - Continue ASA and Atorvastatin  - Patient wants to pursue an angiogram given his ongoing angina especially on effort    3. HTN  - Continue current meds, appears well controlled.           History of Present Illness/Subjective    HPI: Barry Jay is a very pleasant 67 year old male with history of primary hypertension, dyslipidemia, obesity, 40 years of smoking history, paroxysmal supraventricular tachycardia.    About 3 weeks ago the patient had sudden onset of dizziness and palpitations and called 911. He was brought to the ER. He did receive Adenosine which did not affect the HR and was given Metoprolol in the ER which terminated the tachycardia.    Had similar shorter episodes last fall.    Had a normal angiogram in 2011 locally.    Barry underwent an EP study on 14 March which was negative for induction of any SVT despite aggressive pacing maneuvers and the use of isoproterenol.  Decision was made to continue metoprolol at that time.    April 7th 2023  On today's visit he says he still has chest pain/discomfort especially on exertion. Describes a feeling of chest heaviness in the middle of the chest. Shoveling of snow makes it more prominent.Had two episode of orthostatic hypotension. Tried Isosorbide but it did  not agree with his stomach.    Recent ECG(personally reviewed):    SVT with RBBB pattern    Recent Echocardiogram Results (personally reviewed):    Left ventricular size, wall motion and function are normal. The ejection  fraction is 60-65%.  Normal right ventricle size and systolic function.  IVC diameter <2.1 cm collapsing >50% with sniff suggests a normal RA pressure  of 3 mmHg.  No hemodynamically significant valvular abnormalities on 2D or color flow  imaging.  ______________________________________________________________________________    Labs below reviewed personally     Physical Examination  Review of Systems   Vitals: /68 (BP Location: Left arm, Patient Position: Sitting, Cuff Size: Adult Large)   Pulse 76   Resp 16   Wt 92.5 kg (204 lb)   SpO2 96%   BMI 31.02 kg/m    BMI= Body mass index is 31.02 kg/m .  Wt Readings from Last 3 Encounters:   04/07/23 92.5 kg (204 lb)   03/14/23 93.7 kg (206 lb 8 oz)   03/03/23 94.3 kg (207 lb 12.8 oz)       General Appearance:   no distress, normal body habitus   ENT/Mouth: membranes moist, no oral lesions or bleeding gums.      EYES:  no scleral icterus, normal conjunctivae   Neck: no carotid bruits or thyromegaly   Chest/Lungs:   lungs are clear to auscultation, no rales or wheezing, no sternal scar, equal chest wall expansion    Cardiovascular:   Regular. Normal first and second heart sounds with no murmurs, rubs, or gallops; the carotid, radial and posterior tibial pulses are intact, no edema bilaterally    Abdomen:  no organomegaly, masses, bruits, or tenderness; bowel sounds are present   Extremities: no cyanosis or clubbing   Skin: no xanthelasma, warm.    Neurologic: normal  bilateral, no tremors     Psychiatric: alert and oriented x3, calm        Please refer above for cardiac ROS details.        Medical History  Surgical History Family History Social History   Past Medical History:   Diagnosis Date     HTN (hypertension)      Hyperlipidemia       Past Surgical History:   Procedure Laterality Date     APPENDECTOMY       CHOLECYSTECTOMY       EP ABLATION SVT N/A 3/14/2023    Procedure: Ablation Supraventricular Tachycardia;  Surgeon: Lorna Hobson MD;  Location: [facility] CATH LAB CV     LAMINECTOMY LUMBAR ONE LEVEL Right 6/27/2022    Procedure: Right Lumbar 4 - lumbar 5 hemilaminectomy, medial facetectomies, foraminotomies;  Surgeon: Shannan Mari MD;  Location: [facility] Main OR     ORTHOPEDIC SURGERY       No family history on file.     Social History     Socioeconomic History     Marital status: Single     Spouse name: Not on file     Number of children: Not on file     Years of education: Not on file     Highest education level: Not on file   Occupational History     Not on file   Tobacco Use     Smoking status: Every Day     Packs/day: 1.00     Years: 30.00     Pack years: 30.00     Types: Cigarettes     Smokeless tobacco: Never   Vaping Use     Vaping status: Never Used   Substance and Sexual Activity     Alcohol use: Never     Drug use: Never     Sexual activity: Not on file   Other Topics Concern     Not on file   Social History Narrative     Not on file     Social Determinants of Health     Financial Resource Strain: Not on file   Food Insecurity: Not on file   Transportation Needs: Not on file   Physical Activity: Not on file   Stress: Not on file   Social Connections: Not on file   Intimate Partner Violence: Not on file   Housing Stability: Not on file           Medications  Allergies   Current Outpatient Medications   Medication Sig Dispense Refill     ALPRAZolam (XANAX) 0.5 MG tablet [ALPRAZOLAM (XANAX) 0.5 MG TABLET] TAKE 1 TABLET BY MOUTH ONCE A DAY AS NEEDED ORALLY  0     aspirin (ASA) 81 MG chewable tablet Take 81 mg by mouth daily       atorvastatin (LIPITOR) 20 MG tablet Take 1 tablet (20 mg) by mouth At Bedtime 90 tablet 3     celecoxib (CELEBREX) 200 MG capsule Take 200 mg by mouth daily as needed for pain  3      irbesartan-hydrochlorothiazide (AVALIDE) 300-12.5 mg per tablet [IRBESARTAN-HYDROCHLOROTHIAZIDE (AVALIDE) 300-12.5 MG PER TABLET] Take 1 tablet by mouth daily. for blood pressure  1     metoprolol tartrate (LOPRESSOR) 25 MG tablet Take 2 tablets (50 mg) by mouth 2 times daily 60 tablet 3     multivitamin, therapeutic (THERA-VIT) TABS tablet Take 1 tablet by mouth daily       omeprazole (PRILOSEC) 20 MG capsule Take 20 mg by mouth daily  1     PARoxetine (PAXIL) 20 MG tablet Take 20 mg by mouth every morning  3     zolpidem (AMBIEN) 10 mg tablet Take 10 mg by mouth nightly as needed for sleep  2       No Known Allergies       Lab Results    Chemistry/lipid CBC Cardiac Enzymes/BNP/TSH/INR   Recent Labs   Lab Test 06/13/22  1335   CHOL 144   HDL 31*   LDL 85   TRIG 141     Recent Labs   Lab Test 06/13/22  1335 06/11/21  1634 05/28/20  1410   LDL 85 74 85     Recent Labs   Lab Test 01/25/23  1821      POTASSIUM 3.6   CHLORIDE 100   CO2 22   *   BUN 15.5   CR 1.38*   GFRESTIMATED 57*   MIKE 9.7     Recent Labs   Lab Test 01/25/23  1821 01/04/23  1631 06/13/22  1454   CR 1.38* 1.15 1.06     Recent Labs   Lab Test 06/13/22  1505   A1C 6.0*          Recent Labs   Lab Test 01/25/23  1821   WBC 13.3*   HGB 15.6   HCT 45.5   MCV 87        Recent Labs   Lab Test 01/25/23  1821 06/13/22  1454 06/13/22  1335   HGB 15.6 15.8 16.3    No results for input(s): TROPONINI in the last 61572 hours.  Recent Labs   Lab Test 01/04/23  1631   NTBNP 54     Recent Labs   Lab Test 01/04/23  1631   TSH 1.56     Recent Labs   Lab Test 06/13/22  1454   INR 1.07        Lorna Hobson MD

## 2023-04-07 NOTE — TELEPHONE ENCOUNTER
Noted, order for angiogram sent to CV group to place.  Phone call to pt to discuss, he states understanding and will await call from CV team.      Wendi

## 2023-04-10 ENCOUNTER — PREP FOR PROCEDURE (OUTPATIENT)
Dept: CARDIOLOGY | Facility: CLINIC | Age: 68
End: 2023-04-10
Payer: COMMERCIAL

## 2023-04-10 ENCOUNTER — DOCUMENTATION ONLY (OUTPATIENT)
Dept: CARDIOLOGY | Facility: CLINIC | Age: 68
End: 2023-04-10
Payer: COMMERCIAL

## 2023-04-10 DIAGNOSIS — R93.1 ABNORMAL FINDINGS ON DIAGNOSTIC IMAGING OF HEART AND CORONARY CIRCULATION: ICD-10-CM

## 2023-04-10 DIAGNOSIS — I10 ESSENTIAL HYPERTENSION: ICD-10-CM

## 2023-04-10 DIAGNOSIS — I25.119 CORONARY ARTERY DISEASE INVOLVING NATIVE CORONARY ARTERY OF NATIVE HEART WITH ANGINA PECTORIS (H): Primary | ICD-10-CM

## 2023-04-10 DIAGNOSIS — I20.0 WORSENING ANGINA (H): ICD-10-CM

## 2023-04-10 DIAGNOSIS — R06.09 DYSPNEA ON EXERTION: ICD-10-CM

## 2023-04-10 LAB
ABO/RH(D): NORMAL
ANTIBODY SCREEN: NEGATIVE
SPECIMEN EXPIRATION DATE: NORMAL

## 2023-04-10 RX ORDER — FENTANYL CITRATE 50 UG/ML
25 INJECTION, SOLUTION INTRAMUSCULAR; INTRAVENOUS
Status: CANCELLED | OUTPATIENT
Start: 2023-04-12

## 2023-04-10 RX ORDER — ASPIRIN 325 MG
325 TABLET ORAL ONCE
Status: CANCELLED | OUTPATIENT
Start: 2023-04-12 | End: 2023-04-10

## 2023-04-10 RX ORDER — SODIUM CHLORIDE 9 MG/ML
INJECTION, SOLUTION INTRAVENOUS CONTINUOUS
Status: CANCELLED | OUTPATIENT
Start: 2023-04-12

## 2023-04-10 RX ORDER — ASPIRIN 81 MG/1
243 TABLET, CHEWABLE ORAL ONCE
Status: CANCELLED | OUTPATIENT
Start: 2023-04-12

## 2023-04-10 RX ORDER — LIDOCAINE 40 MG/G
CREAM TOPICAL
Status: CANCELLED | OUTPATIENT
Start: 2023-04-10

## 2023-04-10 NOTE — PROGRESS NOTES
From: Jocy Villegas  Sent: 4/10/2023   9:38 AM CDT  To: Sheree Turner  Subject: RE: Dr. Frances pt                            Case type: CA W/ POSS PCI  Procedure Physician(s): SHOAIB/MT  Procedure Date and Patient Arrival Time: Wednesday 4/12, with arrival time of 0700  H&P: Completed on 4/7 W/ KA  Pre-Procedure Lab Appt: Tuesday 4/11 at [facility]  Alerts/Important Info: had angio 10 years ago- unsure of facility where it was completed, no interventions, renal protocol    THANK YOU,  JOCY    ----- Message -----  From: Sheree Turner  Sent: 4/7/2023   4:28 PM CDT  To: [facility]  [facility] [place]  Subject: Dr. Frances pt                                Case request: Coronary Angiogram with Possible Percutaneous Coronary Intervention  Ordering provider: Dr. Hobson  H/p Dr. Hobson 04/07  Alerts: had angio 10 years ago- unsure of facility where it was completed, no interventions, renal protocol.  Scheduling next week. Lab appt .     Thanks!      Barry Jay  [address] [address]  [phone] (home)     PCP:  Osmel Iniguez  H&P completed by: Dr. Hobson  Admit date 04/12 Arrival time:  07:00 am  Anticoagulation:  NA  Previous PCI: No  Bypass Grafts: N/A  Renal Issues: Yes  Diabetic?: No  Device?: No  Type:    Ambulation status: independent    Reason for Visit:  Patient seen for pre-procedure education in preparation for: Coronary Angiogram with Possible PCI    Procedure Prep:  EKG results obtained, dated: To be completed on day of cath lab procedure  Hemogram results obtained: To be completed on day of cath lab procedure  Basic Metabolic Panel results obtained: Completed on 04/11    Patient Education    1. Your arrival time is 07:00 am.  [place] [address] - Main Entrance of the Hospital  2. Please plan on being at the hospital all day.  3. At any time, emergencies and/or urgent cases may come up  which could delay the start of your procedure.    COVID Testing Instructions  *Mandatory COVID Testing:   ALL Patients will need to complete a COVID test no sooner than 4 days prior to their procedure (regardless of vaccination status).      To schedule COVID testing Please call [phone]    If you want to complete this at an outside facility please call them to find out if they will have the results within the appropriate time frame and their fax number.  You will need to provide us with that information so we can send the order.    The facility completing the test will need to fax the results to [phone]    If you are running into and issues please call us.     Pre-procedure instructions  Take your temperature in the morning prior to coming in.  If your temperature is 100 F please call  [facility] [phone] and notify them.  If you do not have access to a thermometer at home, please come in for testing.  If you are running a temperature your procedure may be rescheduled.  Patient instructed to not Eat or Drink after midnight.  Patient instructed to shower the evening before or the morning of the procedure.  Patient instructed to arrange for transportation home following procedure from a responsible family member or friend. No driving for at least 24 hours.  Patient instructed to have a responsible adult with them for 24 hours post-procedure.  Post-procedure follow up process.  Conscious sedation discussed.      Pre-procedure medication instructions.  Continue medications as scheduled, with a small amount of water on the day of the procedure unless indicated. (NO Diabetic Medications or Blood Thinners)  Pt instructed not to consume Alcohol, Tobacco, Caffeine, or Carbonated beverages 12 hours prior to procedure.  Patient instructed to take 324 mg of Aspirin the morning of procedure: Yes  Other medication: instructed to only take Metoprolol Tartrate 50 mg a.m. of the procedure.        Patient states  understanding of procedure and agrees to proceed.    *PATIENTS RECORDS AVAILABLE IN [facility] UNLESS OTHERWISE INDICATED*      Patient Active Problem List   Diagnosis     Dyslipidemia, goal LDL below 70     Hypertension     Diffuse Joint Pains (Arthralgias)     Joint Pain, Localized In The Wrist     Joint Pain, Localized In The Knee     Generalized Osteoarthritis Of The Hand     Osteoarthritis Of The Knee     Arthralgias In Multiple Sites     Paroxysmal supraventricular tachycardia (H)     Coronary artery disease involving native coronary artery of native heart with angina pectoris (H)       Current Outpatient Medications   Medication Sig Dispense Refill     ALPRAZolam (XANAX) 0.5 MG tablet [ALPRAZOLAM (XANAX) 0.5 MG TABLET] TAKE 1 TABLET BY MOUTH ONCE A DAY AS NEEDED ORALLY  0     aspirin (ASA) 81 MG chewable tablet Take 81 mg by mouth daily       atorvastatin (LIPITOR) 20 MG tablet Take 1 tablet (20 mg) by mouth At Bedtime 90 tablet 3     celecoxib (CELEBREX) 200 MG capsule Take 200 mg by mouth daily as needed for pain  3     irbesartan-hydrochlorothiazide (AVALIDE) 300-12.5 mg per tablet [IRBESARTAN-HYDROCHLOROTHIAZIDE (AVALIDE) 300-12.5 MG PER TABLET] Take 1 tablet by mouth daily. for blood pressure  1     metoprolol tartrate (LOPRESSOR) 25 MG tablet Take 2 tablets (50 mg) by mouth 2 times daily 60 tablet 3     multivitamin, therapeutic (THERA-VIT) TABS tablet Take 1 tablet by mouth daily       omeprazole (PRILOSEC) 20 MG capsule Take 20 mg by mouth daily  1     PARoxetine (PAXIL) 20 MG tablet Take 20 mg by mouth every morning  3     zolpidem (AMBIEN) 10 mg tablet Take 10 mg by mouth nightly as needed for sleep  2       No Known Allergies    Sheree Turner on 4/10/2023 at 11:24 AM

## 2023-04-11 ENCOUNTER — LAB (OUTPATIENT)
Dept: CARDIOLOGY | Facility: CLINIC | Age: 68
End: 2023-04-11
Payer: COMMERCIAL

## 2023-04-11 DIAGNOSIS — I10 ESSENTIAL HYPERTENSION: ICD-10-CM

## 2023-04-11 DIAGNOSIS — R93.1 ABNORMAL FINDINGS ON DIAGNOSTIC IMAGING OF HEART AND CORONARY CIRCULATION: ICD-10-CM

## 2023-04-11 DIAGNOSIS — I20.0 WORSENING ANGINA (H): ICD-10-CM

## 2023-04-11 DIAGNOSIS — I25.119 CORONARY ARTERY DISEASE INVOLVING NATIVE CORONARY ARTERY OF NATIVE HEART WITH ANGINA PECTORIS (H): ICD-10-CM

## 2023-04-11 LAB
ANION GAP SERPL CALCULATED.3IONS-SCNC: 11 MMOL/L (ref 7–15)
BUN SERPL-MCNC: 19.9 MG/DL (ref 8–23)
CALCIUM SERPL-MCNC: 9.2 MG/DL (ref 8.8–10.2)
CHLORIDE SERPL-SCNC: 105 MMOL/L (ref 98–107)
CREAT SERPL-MCNC: 1.26 MG/DL (ref 0.67–1.17)
DEPRECATED HCO3 PLAS-SCNC: 23 MMOL/L (ref 22–29)
ERYTHROCYTE [DISTWIDTH] IN BLOOD BY AUTOMATED COUNT: 13.7 % (ref 10–15)
GFR SERPL CREATININE-BSD FRML MDRD: 63 ML/MIN/1.73M2
GLUCOSE SERPL-MCNC: 116 MG/DL (ref 70–99)
HCT VFR BLD AUTO: 41.6 % (ref 40–53)
HGB BLD-MCNC: 13.9 G/DL (ref 13.3–17.7)
MCH RBC QN AUTO: 29.3 PG (ref 26.5–33)
MCHC RBC AUTO-ENTMCNC: 33.4 G/DL (ref 31.5–36.5)
MCV RBC AUTO: 88 FL (ref 78–100)
PLATELET # BLD AUTO: 261 10E3/UL (ref 150–450)
POTASSIUM SERPL-SCNC: 4.2 MMOL/L (ref 3.4–5.3)
RBC # BLD AUTO: 4.75 10E6/UL (ref 4.4–5.9)
SODIUM SERPL-SCNC: 139 MMOL/L (ref 136–145)
WBC # BLD AUTO: 8.6 10E3/UL (ref 4–11)

## 2023-04-11 PROCEDURE — 85027 COMPLETE CBC AUTOMATED: CPT

## 2023-04-11 PROCEDURE — 86850 RBC ANTIBODY SCREEN: CPT

## 2023-04-11 PROCEDURE — 80048 BASIC METABOLIC PNL TOTAL CA: CPT

## 2023-04-11 PROCEDURE — 86901 BLOOD TYPING SEROLOGIC RH(D): CPT

## 2023-04-11 PROCEDURE — 36415 COLL VENOUS BLD VENIPUNCTURE: CPT

## 2023-04-11 PROCEDURE — 86900 BLOOD TYPING SEROLOGIC ABO: CPT

## 2023-04-12 ENCOUNTER — HOSPITAL ENCOUNTER (OUTPATIENT)
Facility: HOSPITAL | Age: 68
Discharge: HOME | End: 2023-04-12
Attending: INTERNAL MEDICINE | Admitting: INTERNAL MEDICINE
Payer: COMMERCIAL

## 2023-04-12 VITALS
HEART RATE: 63 BPM | SYSTOLIC BLOOD PRESSURE: 132 MMHG | BODY MASS INDEX: 30.92 KG/M2 | HEIGHT: 68 IN | RESPIRATION RATE: 21 BRPM | DIASTOLIC BLOOD PRESSURE: 65 MMHG | TEMPERATURE: 98 F | WEIGHT: 204 LBS | OXYGEN SATURATION: 94 %

## 2023-04-12 DIAGNOSIS — I47.10 PAROXYSMAL SUPRAVENTRICULAR TACHYCARDIA (H): ICD-10-CM

## 2023-04-12 DIAGNOSIS — R06.09 DYSPNEA ON EXERTION: ICD-10-CM

## 2023-04-12 DIAGNOSIS — I25.119 CORONARY ARTERY DISEASE INVOLVING NATIVE CORONARY ARTERY OF NATIVE HEART WITH ANGINA PECTORIS (H): ICD-10-CM

## 2023-04-12 DIAGNOSIS — R93.1 ABNORMAL FINDINGS ON DIAGNOSTIC IMAGING OF HEART AND CORONARY CIRCULATION: ICD-10-CM

## 2023-04-12 DIAGNOSIS — I20.0 WORSENING ANGINA (H): ICD-10-CM

## 2023-04-12 DIAGNOSIS — I10 ESSENTIAL HYPERTENSION: ICD-10-CM

## 2023-04-12 PROBLEM — Z98.890 STATUS POST CORONARY ANGIOGRAM: Status: ACTIVE | Noted: 2023-04-12

## 2023-04-12 LAB
ATRIAL RATE - MUSE: 66 BPM
CHOLEST SERPL-MCNC: 123 MG/DL
DIASTOLIC BLOOD PRESSURE - MUSE: NORMAL MMHG
HDLC SERPL-MCNC: 28 MG/DL
INTERPRETATION ECG - MUSE: NORMAL
LDLC SERPL CALC-MCNC: 66 MG/DL
NONHDLC SERPL-MCNC: 95 MG/DL
P AXIS - MUSE: 77 DEGREES
PR INTERVAL - MUSE: 166 MS
QRS DURATION - MUSE: 126 MS
QT - MUSE: 436 MS
QTC - MUSE: 457 MS
R AXIS - MUSE: -21 DEGREES
SYSTOLIC BLOOD PRESSURE - MUSE: NORMAL MMHG
T AXIS - MUSE: 9 DEGREES
TRIGL SERPL-MCNC: 143 MG/DL
VENTRICULAR RATE- MUSE: 66 BPM

## 2023-04-12 PROCEDURE — 93010 ELECTROCARDIOGRAM REPORT: CPT | Performed by: INTERNAL MEDICINE

## 2023-04-12 PROCEDURE — 255N000002 HC RX 255 OP 636: Performed by: INTERNAL MEDICINE

## 2023-04-12 PROCEDURE — 250N000011 HC RX IP 250 OP 636: Performed by: INTERNAL MEDICINE

## 2023-04-12 PROCEDURE — 99152 MOD SED SAME PHYS/QHP 5/>YRS: CPT | Performed by: INTERNAL MEDICINE

## 2023-04-12 PROCEDURE — C1769 GUIDE WIRE: HCPCS | Performed by: INTERNAL MEDICINE

## 2023-04-12 PROCEDURE — C1894 INTRO/SHEATH, NON-LASER: HCPCS | Performed by: INTERNAL MEDICINE

## 2023-04-12 PROCEDURE — 80061 LIPID PANEL: CPT | Performed by: INTERNAL MEDICINE

## 2023-04-12 PROCEDURE — 258N000003 HC RX IP 258 OP 636: Performed by: INTERNAL MEDICINE

## 2023-04-12 PROCEDURE — 999N000054 HC STATISTIC EKG NON-CHARGEABLE

## 2023-04-12 PROCEDURE — 250N000009 HC RX 250: Performed by: INTERNAL MEDICINE

## 2023-04-12 PROCEDURE — 250N000013 HC RX MED GY IP 250 OP 250 PS 637: Performed by: NURSE PRACTITIONER

## 2023-04-12 PROCEDURE — 93005 ELECTROCARDIOGRAM TRACING: CPT

## 2023-04-12 PROCEDURE — 36415 COLL VENOUS BLD VENIPUNCTURE: CPT | Performed by: INTERNAL MEDICINE

## 2023-04-12 PROCEDURE — 250N000013 HC RX MED GY IP 250 OP 250 PS 637: Performed by: INTERNAL MEDICINE

## 2023-04-12 PROCEDURE — 272N000001 HC OR GENERAL SUPPLY STERILE: Performed by: INTERNAL MEDICINE

## 2023-04-12 PROCEDURE — 93458 L HRT ARTERY/VENTRICLE ANGIO: CPT | Performed by: INTERNAL MEDICINE

## 2023-04-12 PROCEDURE — 93458 L HRT ARTERY/VENTRICLE ANGIO: CPT | Mod: 26 | Performed by: INTERNAL MEDICINE

## 2023-04-12 RX ORDER — FLUMAZENIL 0.1 MG/ML
0.2 INJECTION, SOLUTION INTRAVENOUS
Status: DISCONTINUED | OUTPATIENT
Start: 2023-04-12 | End: 2023-04-12 | Stop reason: HOSPADM

## 2023-04-12 RX ORDER — ATROPINE SULFATE 0.1 MG/ML
0.5 INJECTION INTRAVENOUS
Status: DISCONTINUED | OUTPATIENT
Start: 2023-04-12 | End: 2023-04-12 | Stop reason: HOSPADM

## 2023-04-12 RX ORDER — FENTANYL CITRATE 50 UG/ML
INJECTION, SOLUTION INTRAMUSCULAR; INTRAVENOUS
Status: DISCONTINUED | OUTPATIENT
Start: 2023-04-12 | End: 2023-04-12 | Stop reason: HOSPADM

## 2023-04-12 RX ORDER — FENTANYL CITRATE 50 UG/ML
25 INJECTION, SOLUTION INTRAMUSCULAR; INTRAVENOUS
Status: DISCONTINUED | OUTPATIENT
Start: 2023-04-12 | End: 2023-04-12 | Stop reason: HOSPADM

## 2023-04-12 RX ORDER — NALOXONE HYDROCHLORIDE 0.4 MG/ML
0.4 INJECTION, SOLUTION INTRAMUSCULAR; INTRAVENOUS; SUBCUTANEOUS
Status: DISCONTINUED | OUTPATIENT
Start: 2023-04-12 | End: 2023-04-12 | Stop reason: HOSPADM

## 2023-04-12 RX ORDER — SODIUM CHLORIDE 9 MG/ML
100 INJECTION, SOLUTION INTRAVENOUS CONTINUOUS
Status: DISCONTINUED | OUTPATIENT
Start: 2023-04-12 | End: 2023-04-12 | Stop reason: HOSPADM

## 2023-04-12 RX ORDER — NALOXONE HYDROCHLORIDE 0.4 MG/ML
0.2 INJECTION, SOLUTION INTRAMUSCULAR; INTRAVENOUS; SUBCUTANEOUS
Status: DISCONTINUED | OUTPATIENT
Start: 2023-04-12 | End: 2023-04-12 | Stop reason: HOSPADM

## 2023-04-12 RX ORDER — ASPIRIN 325 MG
325 TABLET ORAL ONCE
Status: COMPLETED | OUTPATIENT
Start: 2023-04-12 | End: 2023-04-12

## 2023-04-12 RX ORDER — SODIUM CHLORIDE 9 MG/ML
INJECTION, SOLUTION INTRAVENOUS CONTINUOUS
Status: DISCONTINUED | OUTPATIENT
Start: 2023-04-12 | End: 2023-04-12 | Stop reason: HOSPADM

## 2023-04-12 RX ORDER — DIAZEPAM 5 MG
5 TABLET ORAL ONCE
Status: COMPLETED | OUTPATIENT
Start: 2023-04-12 | End: 2023-04-12

## 2023-04-12 RX ORDER — IODIXANOL 320 MG/ML
INJECTION, SOLUTION INTRAVASCULAR
Status: DISCONTINUED | OUTPATIENT
Start: 2023-04-12 | End: 2023-04-12 | Stop reason: HOSPADM

## 2023-04-12 RX ORDER — HEPARIN SODIUM 1000 [USP'U]/ML
INJECTION, SOLUTION INTRAVENOUS; SUBCUTANEOUS
Status: DISCONTINUED | OUTPATIENT
Start: 2023-04-12 | End: 2023-04-12 | Stop reason: HOSPADM

## 2023-04-12 RX ORDER — ASPIRIN 81 MG/1
243 TABLET, CHEWABLE ORAL ONCE
Status: COMPLETED | OUTPATIENT
Start: 2023-04-12 | End: 2023-04-12

## 2023-04-12 RX ORDER — LIDOCAINE 40 MG/G
CREAM TOPICAL
Status: DISCONTINUED | OUTPATIENT
Start: 2023-04-12 | End: 2023-04-12 | Stop reason: HOSPADM

## 2023-04-12 RX ORDER — ACETAMINOPHEN 325 MG/1
650 TABLET ORAL EVERY 4 HOURS PRN
Status: DISCONTINUED | OUTPATIENT
Start: 2023-04-12 | End: 2023-04-12 | Stop reason: HOSPADM

## 2023-04-12 RX ORDER — VERAPAMIL HYDROCHLORIDE 2.5 MG/ML
INJECTION, SOLUTION INTRAVENOUS
Status: DISCONTINUED | OUTPATIENT
Start: 2023-04-12 | End: 2023-04-12 | Stop reason: HOSPADM

## 2023-04-12 RX ADMIN — DIAZEPAM 5 MG: 5 TABLET ORAL at 09:09

## 2023-04-12 RX ADMIN — ASPIRIN 325 MG ORAL TABLET 325 MG: 325 PILL ORAL at 07:36

## 2023-04-12 RX ADMIN — SODIUM CHLORIDE: 9 INJECTION, SOLUTION INTRAVENOUS at 07:36

## 2023-04-12 ASSESSMENT — ACTIVITIES OF DAILY LIVING (ADL)
ADLS_ACUITY_SCORE: 35

## 2023-04-12 ASSESSMENT — EJECTION FRACTION: EF_VALUE: .28

## 2023-04-12 NOTE — INTERVAL H&P NOTE
"I have reviewed the surgical (or preoperative) H&P that is linked to this encounter, and examined the patient. There are no significant changes    Clinical Conditions Present on Arrival:  Clinically Significant Risk Factors Present on Admission                    # Obesity: Estimated body mass index is 31.02 kg/m  as calculated from the following:    Height as of this encounter: 1.727 m (5' 8\").    Weight as of this encounter: 92.5 kg (204 lb).       "

## 2023-04-12 NOTE — PRE-PROCEDURE
GENERAL PRE-PROCEDURE:   Procedure:  Coronary angiogram with possible PCI  Date/Time:  4/12/2023 7:43 AM    Written consent obtained?: Yes    Risks and benefits: Risks, benefits and alternatives were discussed    Consent given by:  Patient  Patient states understanding of procedure being performed: Yes    Patient's understanding of procedure matches consent: Yes    Procedure consent matches procedure scheduled: Yes    Expected level of sedation:  Moderate  Appropriately NPO:  Yes  ASA Class:  3 (angina, HTN, HLD, SVT, tobacco use, Class I obesity; BMI 31.02kg/m2)  Mallampati  :  Grade 2- soft palate, base of uvula, tonsillar pillars, and portion of posterior pharyngeal wall visible  Lungs:  Lungs clear with good breath sounds bilaterally  Heart:  Normal heart sounds and rate  History & Physical reviewed:  History and physical reviewed and no updates needed  Statement of review:  I have reviewed the lab findings, diagnostic data, medications, and the plan for sedation  I agree with the note above  Teena Laguna MD on 4/12/2023 at 9:48 AM

## 2023-04-12 NOTE — DISCHARGE INSTRUCTIONS

## 2023-04-12 NOTE — PLAN OF CARE
Goal Outcome Evaluation:             Pt admitted for CA/P.PCI due to chest pain on exertion. Pt  also was in the ER recently with c/o dizziness and palpitations. Pt has an angiogram in 2011 with no interventions. Pt prepped and ready for procedure.    He has a ride from a friend Torri, she is not able to pick him up until after 1600.      Valarie Briggs RN

## 2023-05-15 ENCOUNTER — OFFICE VISIT (OUTPATIENT)
Dept: PHYSICAL MEDICINE AND REHAB | Facility: CLINIC | Age: 68
End: 2023-05-15
Payer: COMMERCIAL

## 2023-05-15 VITALS — SYSTOLIC BLOOD PRESSURE: 121 MMHG | OXYGEN SATURATION: 96 % | HEART RATE: 81 BPM | DIASTOLIC BLOOD PRESSURE: 66 MMHG

## 2023-05-15 DIAGNOSIS — Z98.890 HISTORY OF LUMBAR SURGERY: ICD-10-CM

## 2023-05-15 DIAGNOSIS — M48.061 LUMBAR FORAMINAL STENOSIS: ICD-10-CM

## 2023-05-15 DIAGNOSIS — M54.16 RIGHT LUMBAR RADICULOPATHY: Primary | ICD-10-CM

## 2023-05-15 PROCEDURE — 99214 OFFICE O/P EST MOD 30 MIN: CPT | Performed by: NURSE PRACTITIONER

## 2023-05-15 ASSESSMENT — PAIN SCALES - GENERAL: PAINLEVEL: WORST PAIN (10)

## 2023-05-15 NOTE — LETTER
5/15/2023         RE: Barry Jay  [address] [facility] [address]        Dear Colleague,    Thank you for referring your patient, Barry Jay, to the [facility]. Please see a copy of my visit note below.      Assessment:     Diagnoses and all orders for this visit:  Right lumbar radiculopathy  -     PAIN Transforaminal REGI Inj Lumbosacral Right; Future  History of lumbar surgery  -     PAIN Transforaminal REGI Inj Lumbosacral Right; Future  Lumbar foraminal stenosis  -     PAIN Transforaminal REGI Inj Lumbosacral Right; Future     Barry Jay is a 68 year old y.o. male with past medical history significant for hypertension, dyslipidemia, hand osteoarthritis, arthralgias who presents today for follow-up regarding:    -Recurrent RIGHT LBP and right lumbar radiculopathy, previous resolution with right L5-S1 TFESI 2022.    *Right L4-5 hemilaminectomy/facetectomy/foraminotomy Dr. Mari 6/27/2022.  Last visit with Dr. Mari 9/8/2022.     Plan:     A shared decision making plan was used. The patient's values and choices were respected. Prior medical records were reviewed today. The following represents what was discussed and decided upon by the provider and the patient.        -DIAGNOSTIC TESTS: Images were personally reviewed and interpreted.   -- Postsurgical lumbar spine MRI with and without IV contrast 8/12/2022 with right hemilaminectomy/medial facetectomy changes at L4-5 with improvement in nerve compression on the right lateral recess.  Persistent mild to moderate right and mild left foraminal stenosis L4-5.  L3-4 mild spinal stenosis with mild bilateral foraminal stenosis.  L5-S1 minimal foraminal stenosis.  --Lumbar spine MRI [facility] 5/10/2022 with transitional lumbosacral anatomy with partially sacralized L5.  L4-5 grade 1 spondylolisthesis, 3 mm, with annular bulging with advanced facet arthropathy with mild to moderate right and mild left foraminal stenosis,  right L4 nerve root impingement.  L3-4 1 to 2 mm spondylolisthesis with facet arthropathy with mild left greater than right foraminal stenosis.  --Lumbar spine MRI 1/24/2020 [facility] with advanced facet arthropathy L4-5 right greater than left and facet arthropathy L3-4 moderate bilaterally.  Grade 1 spondylolisthesis L4-5 with right greater than left L5 impingement.  --Lumbar spine flexion-extension x-ray 2/3/2020 was 0.6 cm spondylolisthesis L4-5, no instability noted.  --[facility] lumbar spine MRI 1/17/2019 shows severe facet arthropathy L4-5 with 5 mm spondylolisthesis, mild central and subarticular recess stenosis, mild right foraminal stenosis.  Mild facet arthropathy L3-4 and L5-S1.    -INTERVENTIONS: Ordered right L5-S1 transforaminal epidural steroid injection to see if we can improve lumbar radiculopathy as he did have significant relief with leg pain previously with this injection and has recurrent symptoms.  Discussed with patient that if the right L5-S1 TFESI does not improve his back pain we can repeat radiofrequency ablation as he did get significant relief with bilateral L2, L3, L4 RFA x6 months as well.  He does have facet arthropathy at L3-4 and L4-5.  -We did discuss down the road if further injections fail to provide lasting benefit he may be a candidate for either further spinal surgery options with lumbar fusion versus spinal cord stimulator.    -MEDICATIONS: No medication changes today.    Discussed side effects of medications and proper use. Patient verbalized understanding.    -PHYSICAL THERAPY: Encourage patient to continue with home exercises from prior physical therapy sessions which she is part of a home PT program ongoing.  Discussed the importance of core strengthening, ROM, stretching exercises with the patient and how each of these entities is important in decreasing pain.  Explained to the patient that the purpose of physical therapy is to teach the patient a home exercise program.  These  exercises need to be performed every day in order to decrease pain and prevent future occurrences of pain.        -PATIENT EDUCATION:  Total time of 32 minutes, on the day of service, spent with the patient, reviewing the chart, placing orders, and documenting.   -Today we also discussed the issues related to the current COVID-19 pandemic, the pros and cons of the current treatment plan, the CDC guidelines such as social distancing, washing the hands, and covering the cough.    -FOLLOW UP: Follow-up for right L5-S1 TFESI  Advised to contact clinic if symptoms worsen or change.    Subjective:     Barry Jay is a 68 year old male who presents today for follow-up regarding recurrent right low back pain that radiates to the right lateral hip which is the biggest concern but also having pain into the right posterior lateral thigh posterior lateral calf with associated numbness and tingling again ongoing for the last month.  His back pain and right lateral hip is currently greater than his right leg pain.  Currently his pain is a 10/10 fairly significant at this time.  Denies any recent trips or falls or balance changes.  Denies bowel or bladder loss control.    Treatment to Date:   Right L4-5 hemilaminectomy, medial facetectomy, foraminotomy Dr. Mari 6/27/2022  Physical therapy [facility] x3 sessions.  Patient continues with home exercises at this time on a daily basis.     History of bilateral L4-5 RFA [place] [facility] Dr. Green 2011 and 2013, relief at least 2 years.  Bilateral L3-4 MBB 6/30/2017.  Preprocedure pain 7/10 post 2/10.  Bilateral L4-5 facet joint steroid injection 7/7/2017 with relief.  Preprocedure pain 8/10, post 5/10.  Right L4-5 and L5-S1 TFESI 1/6/2020 with 30% lasting benefit only.  Preprocedure pain 10/10, post 7/10.  Bilateral L2, L3, L4 MBB 3/11/2020 and 7/7/2020 with positive response.  Bilateral L2, L3, L4 RFA 10/9/2020 with 90% significant relief x 8 months.  Right L4-5 TFESI 8/31/2021  with 100% relief acute right LBP and right lumbar radiculitis for 6 months, minimal relief with chronic bilateral LBP however.  Pre and post procedure pain 9/10.  Bilateral L2, L3, L4 RFA 10/19/2021 with significant relief.  Preprocedure pain 8/10, post 0/10.  Right SI joint steroid injection 5/6/2022 with minimal initial and no lasting benefit.  Preprocedure pain 10/10, post 5/10.  Postsurgical injections:  Right L5-S1 TFESI 8/24/2022 with resolution of right leg pain.  Ordered by neurosurgery.  Preprocedure pain 8/10, post 7/10.  Bilateral L2, L3, L4 RFA 10/12/2022 with significant relief x6 months.     Medications:  Meloxicam    Patient Active Problem List   Diagnosis     Dyslipidemia, goal LDL below 70     Hypertension     Diffuse Joint Pains (Arthralgias)     Joint Pain, Localized In The Wrist     Joint Pain, Localized In The Knee     Generalized Osteoarthritis Of The Hand     Osteoarthritis Of The Knee     Arthralgias In Multiple Sites     Paroxysmal supraventricular tachycardia (H)     Coronary artery disease involving native coronary artery of native heart with angina pectoris (H)     Dyspnea on exertion     Worsening angina (H)     Status post coronary angiogram     Abnormal findings on diagnostic imaging of heart and coronary circulation       Current Outpatient Medications   Medication     celecoxib (CELEBREX) 200 MG capsule     ALPRAZolam (XANAX) 0.5 MG tablet     aspirin (ASA) 81 MG chewable tablet     atorvastatin (LIPITOR) 20 MG tablet     irbesartan-hydrochlorothiazide (AVALIDE) 300-12.5 mg per tablet     metoprolol tartrate (LOPRESSOR) 25 MG tablet     multivitamin, therapeutic (THERA-VIT) TABS tablet     omeprazole (PRILOSEC) 20 MG capsule     PARoxetine (PAXIL) 20 MG tablet     zolpidem (AMBIEN) 10 mg tablet     No current facility-administered medications for this visit.       No Known Allergies    Past Medical History:   Diagnosis Date     HTN (hypertension)      Hyperlipidemia         Review of  Systems  ROS:  Specifically negative for bowel/bladder dysfunction, balance changes, headache, dizziness, foot drop, fevers, chills, appetite changes, nausea/vomiting, unexplained weight loss. Otherwise 13 systems reviewed are negative. Please see the patient's intake questionnaire from today for details.    Reviewed Social, Family, Past Medical and Past Surgical history with patient, no significant changes noted since prior visit.     Objective:     /66 (BP Location: Right arm, Patient Position: Sitting)   Pulse 81   SpO2 96%     PHYSICAL EXAMINATION:    --CONSTITUTIONAL: Well developed, well nourished, healthy appearing individual.  --PSYCHIATRIC: Appropriate mood and affect. No difficulty interacting due to temper, social withdrawal, or memory issues.  --SKIN: Lumbar region is dry and intact.   --RESPIRATORY: Normal rhythm and effort. No abnormal accessory muscle breathing patterns noted.   --MUSCULOSKELETAL:  Normal lumbar lordosis noted, no lateral shift.  --GROSS MOTOR: Easily arises from a seated position. Gait is non-antalgic  --LUMBAR SPINE:  Inspection reveals no evidence of deformity.     RESULTS:   Imaging: Spine imaging was reviewed today. The images were shown to the patient and the findings were explained using a spine model.      Post surgical MRI reviewed from August 2022.        [facility] MRI 5/10/2022 lumbar spine        XR LUMBAR SPINE FLEX AND EXT 2 OR 3 VWS  LOCATION: [facility]  DATE/TIME: 2/3/2020   INDICATION: Flexion, extension, and neutral views please to evaluate spondylolisthesis stability at L4-L5.  COMPARISON: 04/26/2018 CT abdomen/pelvis.  IMPRESSION:   In the upright position there is approximately 0.6 cm degenerative anterolisthesis of L4 on L5. There is no change with flexion or extension to suggest segmental instability.  The vertebral body heights are preserved. Mild intervertebral disc degeneration at L3-L4, L4-L5 and L5-S1. Moderate/severe facet hypertrophic  changes at L4-L5 and L5-S1.  There are vascular calcifications. Abdominal surgical clips.        MR LUMBAR SPINE WITHOUT CONTRAST  At [facility]-1/24/2020  CLINICAL INFORMATION: 64-year-old male with right low back and leg pain.  COMPARISON: Lumbar spine MRI dated January 17, 2019.  TECHNICAL INFORMATION: Sagittal T2, sagittal T1, sagittal STIR, axial T2, and axial T1-weighted MR images of the lumbar spine in the neutral, seated position on an open 0.6 Teresa magnet.  CONTRAST: None.  SEDATION: None.  INTERPRETATION: Transitional lumbosacral anatomy with partially sacralized L5, mild lumbar levocurvature centered at L3, and shallow chronic Scheuermann-type endplate Schmorl's nodes at T10-11 and T11-12 without spondylolysis, vertebral collapse, acute fracture, or destructive osseous lesion. Normal pre and paravertebral soft tissues. Enlarged prostate gland measuring at least 5 cm AP (series 2, image 7).  Conus medullaris positioned at upper L2, with normal configuration of the terminal nerve roots.  L5-S1: Hypoplastic disc and facets with mild facet degeneration on the left, an accessory articulation on the left, and no stenosis or impingement.  L4-5: Moderate disc degeneration, 3 mm spondylolisthesis, bilateral foraminal annular tears, diffuse annular bulge, advanced right/moderate left facet degeneration, moderate right greater than left subarticular stenosis with L5 nerve root impingement, and mild to moderate right/mild left foraminal stenosis with right L4 nerve root encroachment.  L3-4: Mild disc degeneration, 1-2 mm spondylolisthesis, mild diffuse annular bulge, moderate left facet degeneration, mild left subarticular stenosis, and mild bilateral foraminal stenosis.  L2-3: Mild degenerative disc desiccation with preserved disc height, mild bilateral foraminal annular bulge, normal facet morphology, and no stenosis or impingement.  L1-2 and T12-L1: Normal dorsal disc contours and normal morphology.  T11-12: Mild disc  degeneration, 1 mm spondylolisthesis, normal facet morphology, and no stenosis or impingement.  No change from January 17, 2019.  CONCLUSION: Multilevel lumbar degenerative changes and mild levocurvature with specific findings as follows:  1. L4-5 subarticular impingement of the right greater than left L5 nerve roots in the setting of grade 1 degenerative spondylolisthesis. L3-4 mild left subarticular stenosis without impingement.  2. Foraminal stenosis, mild to moderate on the right at L4-5 with right L4 nerve root encroachment. Mild on the left at L4-5 and bilaterally at L3-4.  3. Facet degeneration, advanced on the right at L4-5. Moderate on the left at L4-5 and L3-4. Mild on the left at L5-S1.  4. No spondylolysis, vertebral collapse, acute fracture, or destructive osseous lesion.  5. Transitional lumbosacral anatomy with partially sacralized L5.  6. Enlarged prostate gland.        MRI LUMBAR SPINE WITHOUT CONTRAST  [facility]- 1/17/19  CLINICAL INFORMATION: 63-year-old man with low back pain.  TECHNICAL INFORMATION: MRI lumbar spine was obtained on 0.6 Teresa open upright magnet including sagittal T2, sagittal T1, sagittal STIR, axial T2 and axial T1-weighted images.  INTERPRETATION: There is normal lordotic curvature of the lumbar spine. No marrow edema within the lumbar vertebral bodies. No loss of vertebral body height. The conus is at the L1 level and is normal in appearance.  L5-S1: Transitional L5 segment with left L5 transverse process articulating with the sacrum. Mild developmental narrowing of the disc space. Mild bilateral facet arthropathy. No central spinal canal or foraminal stenosis.  L4-5: Mild degenerative disc disease with 5 mm degenerative spondylolisthesis and disc bulge. Severe bilateral facet arthropathy with ligamentum flavum thickening contributes to mild central spinal canal stenosis and subarticular recess narrowing with encroachment upon the traversing L5 nerve roots. Moderate right foraminal  stenosis.  L3-4: Mild degenerative disc disease and disc bulge. Mild bilateral facet arthropathy. Mild subarticular recess narrowing. The ganglia exit without compromise.  L2-3 through T12-L1: No focal disc herniation, central spinal canal stenosis or neural impingement.  T11-12 and T10-11: Moderate degenerative disc disease with Schmorl's nodes and endplate irregularities. No cord deformity or central spinal canal stenosis.  CONCLUSION:  1. L5-S1 transitional L5 segment with left transverse process articulating with the sacrum. Mild developmental narrowing of the disc space. Mild bilateral facet arthropathy.  2. L4-5 grade 1 spondylolisthesis with disc bulge. Severe facet arthropathy and ligamentum flavum thickening with mild central spinal canal and subarticular recess stenosis with encroachment upon the traversing L5 nerve roots. Moderate right foraminal stenosis.  3. Mild degenerative disc disease with disc bulge. Mild facet arthropathy and mild subarticular recess narrowing.                             Again, thank you for allowing me to participate in the care of your patient.        Sincerely,        Claudia Baptiste, CNP

## 2023-05-15 NOTE — PATIENT INSTRUCTIONS
~Please call our [facility] Nurse Navigation line [phone] with any questions or concerns about your treatment plan, if symptoms worsen and you would like to be seen urgently, or if you have problems controlling bladder and bowel function.  ~Please note that any My Chart messages may take multiple days for a response due to the high volume of patients seen in clinic.   Anything sent Thursday night or after will be answered the following week when able, as Claudia Baptiste CNP does not work in clinic on Fridays.        Importance of specialized Physical Therapy: Discussed the importance of core strengthening, ROM, stretching exercises with the patient and how each of these entities is important in decreasing pain.  Explained to the patient that the purpose of physical therapy is to teach the patient a home exercise program individualized to them and their specific health concerns.  These exercises need to be performed every day in order to decrease pain and prevent future occurrences of pain.           An injection has been ordered today to potentially help with your pain symptoms. These injections do not fix what is going on in your back, therefore they typically do not take away the pain completely, however they can many times help improve symptoms. Injections should always be completed along with other modalities such as physical therapy for the best long term outcomes. If injections alone are done, then pain will likely return.     [facility] Injection Requirements    A  is required for all fluoroscopically-guided injections.  Injection appointments may be cancelled if there are signs/symptoms of an active infection or if the patient is being actively treated with antibiotics for a diagnosed infection.  Patients may have their steroid injection cancelled if they have had another steroid injection within 2 weeks.  Diabetic patients will have their blood glucose  levels checked the day of their injection and the appointment will be rescheduled if the blood glucose level is 300 or higher.  Patients with allergies to cortisone, local anesthetics, iodine, or contrast dye should contact the Spine Center to further discuss these considerations.  Patients scheduled for medial branch block diagnostic injections should refrain from taking pain medication the day of the procedure.  The medial branch block injection appointment will be rescheduled if the patient's pain rating is not 5/10 or greater at the time of the procedure.  Patients taking warfarin/Coumadin will have their INR checked the day of the procedure and the procedure may be rescheduled if the INR is greater than 3.0.  Please contact the Spine Center (#[phone]) if you are taking any prescription blood-thinning medications (warfarin, Plavix, Lovenox, Eliquis, Brilinta, Effient, etc.) as special dosing adjustments may need to be made depending on the type of injection you are scheduled to receive.  It is recommended that you delay having your steroid injection if you have received a flu shot or shingles vaccine within 2 weeks.

## 2023-05-15 NOTE — PROGRESS NOTES
Assessment:     Diagnoses and all orders for this visit:  Right lumbar radiculopathy  -     PAIN Transforaminal REGI Inj Lumbosacral Right; Future  History of lumbar surgery  -     PAIN Transforaminal REGI Inj Lumbosacral Right; Future  Lumbar foraminal stenosis  -     PAIN Transforaminal REGI Inj Lumbosacral Right; Future     Barry Jay is a 68 year old y.o. male with past medical history significant for hypertension, dyslipidemia, hand osteoarthritis, arthralgias who presents today for follow-up regarding:    -Recurrent RIGHT LBP and right lumbar radiculopathy, previous resolution with right L5-S1 TFESI 2022.    *Right L4-5 hemilaminectomy/facetectomy/foraminotomy Dr. Mari 6/27/2022.  Last visit with Dr. Mari 9/8/2022.     Plan:     A shared decision making plan was used. The patient's values and choices were respected. Prior medical records were reviewed today. The following represents what was discussed and decided upon by the provider and the patient.        -DIAGNOSTIC TESTS: Images were personally reviewed and interpreted.   -- Postsurgical lumbar spine MRI with and without IV contrast 8/12/2022 with right hemilaminectomy/medial facetectomy changes at L4-5 with improvement in nerve compression on the right lateral recess.  Persistent mild to moderate right and mild left foraminal stenosis L4-5.  L3-4 mild spinal stenosis with mild bilateral foraminal stenosis.  L5-S1 minimal foraminal stenosis.  --Lumbar spine MRI [facility] 5/10/2022 with transitional lumbosacral anatomy with partially sacralized L5.  L4-5 grade 1 spondylolisthesis, 3 mm, with annular bulging with advanced facet arthropathy with mild to moderate right and mild left foraminal stenosis, right L4 nerve root impingement.  L3-4 1 to 2 mm spondylolisthesis with facet arthropathy with mild left greater than right foraminal stenosis.  --Lumbar spine MRI 1/24/2020 [facility] with advanced facet arthropathy L4-5 right greater than left and facet  arthropathy L3-4 moderate bilaterally.  Grade 1 spondylolisthesis L4-5 with right greater than left L5 impingement.  --Lumbar spine flexion-extension x-ray 2/3/2020 was 0.6 cm spondylolisthesis L4-5, no instability noted.  --[facility] lumbar spine MRI 1/17/2019 shows severe facet arthropathy L4-5 with 5 mm spondylolisthesis, mild central and subarticular recess stenosis, mild right foraminal stenosis.  Mild facet arthropathy L3-4 and L5-S1.    -INTERVENTIONS: Ordered right L5-S1 transforaminal epidural steroid injection to see if we can improve lumbar radiculopathy as he did have significant relief with leg pain previously with this injection and has recurrent symptoms.  Discussed with patient that if the right L5-S1 TFESI does not improve his back pain we can repeat radiofrequency ablation as he did get significant relief with bilateral L2, L3, L4 RFA x6 months as well.  He does have facet arthropathy at L3-4 and L4-5.  -We did discuss down the road if further injections fail to provide lasting benefit he may be a candidate for either further spinal surgery options with lumbar fusion versus spinal cord stimulator.    -MEDICATIONS: No medication changes today.    Discussed side effects of medications and proper use. Patient verbalized understanding.    -PHYSICAL THERAPY: Encourage patient to continue with home exercises from prior physical therapy sessions which she is part of a home PT program ongoing.  Discussed the importance of core strengthening, ROM, stretching exercises with the patient and how each of these entities is important in decreasing pain.  Explained to the patient that the purpose of physical therapy is to teach the patient a home exercise program.  These exercises need to be performed every day in order to decrease pain and prevent future occurrences of pain.        -PATIENT EDUCATION:  Total time of 32 minutes, on the day of service, spent with the patient, reviewing the chart, placing orders, and  documenting.   -Today we also discussed the issues related to the current COVID-19 pandemic, the pros and cons of the current treatment plan, the CDC guidelines such as social distancing, washing the hands, and covering the cough.    -FOLLOW UP: Follow-up for right L5-S1 TFESI  Advised to contact clinic if symptoms worsen or change.    Subjective:     Barry Jay is a 68 year old male who presents today for follow-up regarding recurrent right low back pain that radiates to the right lateral hip which is the biggest concern but also having pain into the right posterior lateral thigh posterior lateral calf with associated numbness and tingling again ongoing for the last month.  His back pain and right lateral hip is currently greater than his right leg pain.  Currently his pain is a 10/10 fairly significant at this time.  Denies any recent trips or falls or balance changes.  Denies bowel or bladder loss control.    Treatment to Date:   Right L4-5 hemilaminectomy, medial facetectomy, foraminotomy Dr. Mari 6/27/2022  Physical therapy optimum x3 sessions.  Patient continues with home exercises at this time on a daily basis.     History of bilateral L4-5 RFA [facility] Dr. Green 2011 and 2013, relief at least 2 years.  Bilateral L3-4 MBB 6/30/2017.  Preprocedure pain 7/10 post 2/10.  Bilateral L4-5 facet joint steroid injection 7/7/2017 with relief.  Preprocedure pain 8/10, post 5/10.  Right L4-5 and L5-S1 TFESI 1/6/2020 with 30% lasting benefit only.  Preprocedure pain 10/10, post 7/10.  Bilateral L2, L3, L4 MBB 3/11/2020 and 7/7/2020 with positive response.  Bilateral L2, L3, L4 RFA 10/9/2020 with 90% significant relief x 8 months.  Right L4-5 TFESI 8/31/2021 with 100% relief acute right LBP and right lumbar radiculitis for 6 months, minimal relief with chronic bilateral LBP however.  Pre and post procedure pain 9/10.  Bilateral L2, L3, L4 RFA 10/19/2021 with significant relief.  Preprocedure pain 8/10, post  0/10.  Right SI joint steroid injection 5/6/2022 with minimal initial and no lasting benefit.  Preprocedure pain 10/10, post 5/10.  Postsurgical injections:  Right L5-S1 TFESI 8/24/2022 with resolution of right leg pain.  Ordered by neurosurgery.  Preprocedure pain 8/10, post 7/10.  Bilateral L2, L3, L4 RFA 10/12/2022 with significant relief x6 months.     Medications:  Meloxicam    Patient Active Problem List   Diagnosis     Dyslipidemia, goal LDL below 70     Hypertension     Diffuse Joint Pains (Arthralgias)     Joint Pain, Localized In The Wrist     Joint Pain, Localized In The Knee     Generalized Osteoarthritis Of The Hand     Osteoarthritis Of The Knee     Arthralgias In Multiple Sites     Paroxysmal supraventricular tachycardia (H)     Coronary artery disease involving native coronary artery of native heart with angina pectoris (H)     Dyspnea on exertion     Worsening angina (H)     Status post coronary angiogram     Abnormal findings on diagnostic imaging of heart and coronary circulation       Current Outpatient Medications   Medication     celecoxib (CELEBREX) 200 MG capsule     ALPRAZolam (XANAX) 0.5 MG tablet     aspirin (ASA) 81 MG chewable tablet     atorvastatin (LIPITOR) 20 MG tablet     irbesartan-hydrochlorothiazide (AVALIDE) 300-12.5 mg per tablet     metoprolol tartrate (LOPRESSOR) 25 MG tablet     multivitamin, therapeutic (THERA-VIT) TABS tablet     omeprazole (PRILOSEC) 20 MG capsule     PARoxetine (PAXIL) 20 MG tablet     zolpidem (AMBIEN) 10 mg tablet     No current facility-administered medications for this visit.       No Known Allergies    Past Medical History:   Diagnosis Date     HTN (hypertension)      Hyperlipidemia         Review of Systems  ROS:  Specifically negative for bowel/bladder dysfunction, balance changes, headache, dizziness, foot drop, fevers, chills, appetite changes, nausea/vomiting, unexplained weight loss. Otherwise 13 systems reviewed are negative. Please see the  patient's intake questionnaire from today for details.    Reviewed Social, Family, Past Medical and Past Surgical history with patient, no significant changes noted since prior visit.     Objective:     /66 (BP Location: Right arm, Patient Position: Sitting)   Pulse 81   SpO2 96%     PHYSICAL EXAMINATION:    --CONSTITUTIONAL: Well developed, well nourished, healthy appearing individual.  --PSYCHIATRIC: Appropriate mood and affect. No difficulty interacting due to temper, social withdrawal, or memory issues.  --SKIN: Lumbar region is dry and intact.   --RESPIRATORY: Normal rhythm and effort. No abnormal accessory muscle breathing patterns noted.   --MUSCULOSKELETAL:  Normal lumbar lordosis noted, no lateral shift.  --GROSS MOTOR: Easily arises from a seated position. Gait is non-antalgic  --LUMBAR SPINE:  Inspection reveals no evidence of deformity.     RESULTS:   Imaging: Spine imaging was reviewed today. The images were shown to the patient and the findings were explained using a spine model.      Post surgical MRI reviewed from August 2022.        [facility] MRI 5/10/2022 lumbar spine        XR LUMBAR SPINE FLEX AND EXT 2 OR 3 VWS  LOCATION: [facility]  DATE/TIME: 2/3/2020   INDICATION: Flexion, extension, and neutral views please to evaluate spondylolisthesis stability at L4-L5.  COMPARISON: 04/26/2018 CT abdomen/pelvis.  IMPRESSION:   In the upright position there is approximately 0.6 cm degenerative anterolisthesis of L4 on L5. There is no change with flexion or extension to suggest segmental instability.  The vertebral body heights are preserved. Mild intervertebral disc degeneration at L3-L4, L4-L5 and L5-S1. Moderate/severe facet hypertrophic changes at L4-L5 and L5-S1.  There are vascular calcifications. Abdominal surgical clips.        MR LUMBAR SPINE WITHOUT CONTRAST  At [facility]-1/24/2020  CLINICAL INFORMATION: 64-year-old male with right low back and leg pain.  COMPARISON: Lumbar spine MRI  dated January 17, 2019.  TECHNICAL INFORMATION: Sagittal T2, sagittal T1, sagittal STIR, axial T2, and axial T1-weighted MR images of the lumbar spine in the neutral, seated position on an open 0.6 Teresa magnet.  CONTRAST: None.  SEDATION: None.  INTERPRETATION: Transitional lumbosacral anatomy with partially sacralized L5, mild lumbar levocurvature centered at L3, and shallow chronic Scheuermann-type endplate Schmorl's nodes at T10-11 and T11-12 without spondylolysis, vertebral collapse, acute fracture, or destructive osseous lesion. Normal pre and paravertebral soft tissues. Enlarged prostate gland measuring at least 5 cm AP (series 2, image 7).  Conus medullaris positioned at upper L2, with normal configuration of the terminal nerve roots.  L5-S1: Hypoplastic disc and facets with mild facet degeneration on the left, an accessory articulation on the left, and no stenosis or impingement.  L4-5: Moderate disc degeneration, 3 mm spondylolisthesis, bilateral foraminal annular tears, diffuse annular bulge, advanced right/moderate left facet degeneration, moderate right greater than left subarticular stenosis with L5 nerve root impingement, and mild to moderate right/mild left foraminal stenosis with right L4 nerve root encroachment.  L3-4: Mild disc degeneration, 1-2 mm spondylolisthesis, mild diffuse annular bulge, moderate left facet degeneration, mild left subarticular stenosis, and mild bilateral foraminal stenosis.  L2-3: Mild degenerative disc desiccation with preserved disc height, mild bilateral foraminal annular bulge, normal facet morphology, and no stenosis or impingement.  L1-2 and T12-L1: Normal dorsal disc contours and normal morphology.  T11-12: Mild disc degeneration, 1 mm spondylolisthesis, normal facet morphology, and no stenosis or impingement.  No change from January 17, 2019.  CONCLUSION: Multilevel lumbar degenerative changes and mild levocurvature with specific findings as follows:  1. L4-5  subarticular impingement of the right greater than left L5 nerve roots in the setting of grade 1 degenerative spondylolisthesis. L3-4 mild left subarticular stenosis without impingement.  2. Foraminal stenosis, mild to moderate on the right at L4-5 with right L4 nerve root encroachment. Mild on the left at L4-5 and bilaterally at L3-4.  3. Facet degeneration, advanced on the right at L4-5. Moderate on the left at L4-5 and L3-4. Mild on the left at L5-S1.  4. No spondylolysis, vertebral collapse, acute fracture, or destructive osseous lesion.  5. Transitional lumbosacral anatomy with partially sacralized L5.  6. Enlarged prostate gland.        MRI LUMBAR SPINE WITHOUT CONTRAST  [facility]- 1/17/19  CLINICAL INFORMATION: 63-year-old man with low back pain.  TECHNICAL INFORMATION: MRI lumbar spine was obtained on 0.6 Teresa open upright magnet including sagittal T2, sagittal T1, sagittal STIR, axial T2 and axial T1-weighted images.  INTERPRETATION: There is normal lordotic curvature of the lumbar spine. No marrow edema within the lumbar vertebral bodies. No loss of vertebral body height. The conus is at the L1 level and is normal in appearance.  L5-S1: Transitional L5 segment with left L5 transverse process articulating with the sacrum. Mild developmental narrowing of the disc space. Mild bilateral facet arthropathy. No central spinal canal or foraminal stenosis.  L4-5: Mild degenerative disc disease with 5 mm degenerative spondylolisthesis and disc bulge. Severe bilateral facet arthropathy with ligamentum flavum thickening contributes to mild central spinal canal stenosis and subarticular recess narrowing with encroachment upon the traversing L5 nerve roots. Moderate right foraminal stenosis.  L3-4: Mild degenerative disc disease and disc bulge. Mild bilateral facet arthropathy. Mild subarticular recess narrowing. The ganglia exit without compromise.  L2-3 through T12-L1: No focal disc herniation, central spinal canal  stenosis or neural impingement.  T11-12 and T10-11: Moderate degenerative disc disease with Schmorl's nodes and endplate irregularities. No cord deformity or central spinal canal stenosis.  CONCLUSION:  1. L5-S1 transitional L5 segment with left transverse process articulating with the sacrum. Mild developmental narrowing of the disc space. Mild bilateral facet arthropathy.  2. L4-5 grade 1 spondylolisthesis with disc bulge. Severe facet arthropathy and ligamentum flavum thickening with mild central spinal canal and subarticular recess stenosis with encroachment upon the traversing L5 nerve roots. Moderate right foraminal stenosis.  3. Mild degenerative disc disease with disc bulge. Mild facet arthropathy and mild subarticular recess narrowing.

## 2023-05-16 ENCOUNTER — RADIOLOGY INJECTION OFFICE VISIT (OUTPATIENT)
Dept: PHYSICAL MEDICINE AND REHAB | Facility: CLINIC | Age: 68
End: 2023-05-16
Attending: NURSE PRACTITIONER
Payer: COMMERCIAL

## 2023-05-16 VITALS
SYSTOLIC BLOOD PRESSURE: 110 MMHG | OXYGEN SATURATION: 93 % | DIASTOLIC BLOOD PRESSURE: 60 MMHG | HEART RATE: 78 BPM | RESPIRATION RATE: 18 BRPM | TEMPERATURE: 98 F

## 2023-05-16 DIAGNOSIS — M54.16 RIGHT LUMBAR RADICULOPATHY: ICD-10-CM

## 2023-05-16 DIAGNOSIS — M48.061 LUMBAR FORAMINAL STENOSIS: ICD-10-CM

## 2023-05-16 DIAGNOSIS — Z98.890 HISTORY OF LUMBAR SURGERY: ICD-10-CM

## 2023-05-16 PROCEDURE — 64483 NJX AA&/STRD TFRM EPI L/S 1: CPT | Mod: RT | Performed by: PAIN MEDICINE

## 2023-05-16 RX ORDER — DEXAMETHASONE SODIUM PHOSPHATE 10 MG/ML
INJECTION, SOLUTION INTRAMUSCULAR; INTRAVENOUS
Status: COMPLETED | OUTPATIENT
Start: 2023-05-16 | End: 2023-05-16

## 2023-05-16 RX ORDER — LIDOCAINE HYDROCHLORIDE 10 MG/ML
INJECTION, SOLUTION EPIDURAL; INFILTRATION; INTRACAUDAL; PERINEURAL
Status: COMPLETED | OUTPATIENT
Start: 2023-05-16 | End: 2023-05-16

## 2023-05-16 RX ORDER — ATORVASTATIN CALCIUM 10 MG/1
TABLET, FILM COATED ORAL
COMMUNITY
Start: 2023-03-21 | End: 2023-05-16

## 2023-05-16 RX ADMIN — DEXAMETHASONE SODIUM PHOSPHATE 10 MG: 10 INJECTION, SOLUTION INTRAMUSCULAR; INTRAVENOUS at 15:34

## 2023-05-16 RX ADMIN — LIDOCAINE HYDROCHLORIDE 2 ML: 10 INJECTION, SOLUTION EPIDURAL; INFILTRATION; INTRACAUDAL; PERINEURAL at 15:33

## 2023-05-16 ASSESSMENT — PAIN SCALES - GENERAL
PAINLEVEL: WORST PAIN (10)
PAINLEVEL: WORST PAIN (10)

## 2023-05-16 NOTE — PATIENT INSTRUCTIONS
DISCHARGE INSTRUCTIONS    During office hours (8:00 a.m.- 4:00 p.m.) questions or concerns may be answered  by calling Spine Center Navigation Nurses at  [phone].  Messages received after hours will be returned the following business day.      In the case of an emergency, please dial 911 or seek assistance at the nearest Emergency Room/Urgent Care facility.     All Patients:    You may experience an increase in your symptoms for the first 2 days (It may take anywhere between 2 days- 2 weeks for the steroid to have maximum effect).    You may use ice on the injection site, as frequently as 20 minutes each hour if needed.    You may take your pain medicine.    You may continue taking your regular medication after your injection. If you have had a Medial Branch Block you may resume pain medication once your pain diary is completed.    You may shower. No swimming, tub bath or hot tub for 48 hours.  You may remove your bandaid/bandage as soon as you are home.    You may resume light activities, as tolerated.    Resume your usual diet as tolerated.    It is strongly advised that you do not drive for 1-3 hours post injection.    If you have had oral sedation:  Do not drive for 8 hours post injection.      If you have had IV sedation:  Do not drive for 24 hours post injection.  Do not operate hazardous machinery or make important personal/business decisions for 24 hours.      POSSIBLE STEROID SIDE EFFECTS (If steroid/cortisone was used for your procedure)    -If you experience these symptoms, it should only last for a short period    Swelling of the legs              Skin redness (flushing)     Mouth (oral) irritation   Blood sugar (glucose) levels            Sweats                    Mood changes  Headache  Sleeplessness  Weakened immune system for up to 14 days, which could increase the risk of navdeep the COVID-19 virus and/or experiencing more severe symptoms of the disease, if exposed.  Decreased  effectiveness of the flu vaccine if given within 2 weeks of the steroid.         POSSIBLE PROCEDURE SIDE EFFECTS  -Call the Spine Center if you are concerned  Increased Pain           Increased numbness/tingling      Nausea/Vomiting          Bruising/bleeding at site      Redness or swelling                                              Difficulty walking      Weakness           Fever greater than 100.5    *In the event of a severe headache after an epidural steroid injection that is relieved by lying down, please call the [facility] to speak with a clinical staff member*

## 2023-05-30 ENCOUNTER — TELEPHONE (OUTPATIENT)
Dept: PHYSICAL MEDICINE AND REHAB | Facility: CLINIC | Age: 68
End: 2023-05-30
Payer: COMMERCIAL

## 2023-05-30 DIAGNOSIS — M54.50 CHRONIC BILATERAL LOW BACK PAIN WITHOUT SCIATICA: Primary | ICD-10-CM

## 2023-05-30 DIAGNOSIS — G89.29 CHRONIC BILATERAL LOW BACK PAIN WITHOUT SCIATICA: Primary | ICD-10-CM

## 2023-05-30 DIAGNOSIS — M47.816 LUMBAR FACET ARTHROPATHY: ICD-10-CM

## 2023-05-30 NOTE — TELEPHONE ENCOUNTER
Call placed to pt. Pt reports 100% relief of his symptoms after his last Bilateral L2, L3, L4 RFA.

## 2023-05-30 NOTE — TELEPHONE ENCOUNTER
"PSP:  Claudia Baptiste CNP  Last clinic visit:  5/16/2023 Right L5-S1 TFESI  Reason for call: Update  Clinical information:  Call received from pt. Pt reports 0% relief of his symptoms after the right L5-S1 TFESIs. Pt endorses \"extreme spasms\" and inquires about urgent repeat RFA (bilateral L2, L3, L4 RFA).   Advice given to patient: Will update PSP with patients status and request. Pt will be called back with a response.   Provider to address: Please advise    "

## 2023-06-01 ENCOUNTER — RADIOLOGY INJECTION OFFICE VISIT (OUTPATIENT)
Dept: PHYSICAL MEDICINE AND REHAB | Facility: CLINIC | Age: 68
End: 2023-06-01
Attending: NURSE PRACTITIONER
Payer: COMMERCIAL

## 2023-06-01 VITALS
RESPIRATION RATE: 18 BRPM | DIASTOLIC BLOOD PRESSURE: 74 MMHG | OXYGEN SATURATION: 97 % | SYSTOLIC BLOOD PRESSURE: 120 MMHG | TEMPERATURE: 97.2 F | HEART RATE: 76 BPM

## 2023-06-01 DIAGNOSIS — M54.50 CHRONIC BILATERAL LOW BACK PAIN WITHOUT SCIATICA: ICD-10-CM

## 2023-06-01 DIAGNOSIS — G89.29 CHRONIC BILATERAL LOW BACK PAIN WITHOUT SCIATICA: ICD-10-CM

## 2023-06-01 DIAGNOSIS — M47.816 LUMBAR FACET ARTHROPATHY: ICD-10-CM

## 2023-06-01 PROCEDURE — 64635 DESTROY LUMB/SAC FACET JNT: CPT | Mod: LT | Performed by: PAIN MEDICINE

## 2023-06-01 PROCEDURE — 64636 DESTROY L/S FACET JNT ADDL: CPT | Mod: RT | Performed by: PAIN MEDICINE

## 2023-06-01 RX ORDER — BUPIVACAINE HYDROCHLORIDE 2.5 MG/ML
INJECTION, SOLUTION EPIDURAL; INFILTRATION; INTRACAUDAL
Status: COMPLETED | OUTPATIENT
Start: 2023-06-01 | End: 2023-06-01

## 2023-06-01 RX ORDER — LIDOCAINE HYDROCHLORIDE 10 MG/ML
INJECTION, SOLUTION EPIDURAL; INFILTRATION; INTRACAUDAL; PERINEURAL
Status: COMPLETED | OUTPATIENT
Start: 2023-06-01 | End: 2023-06-01

## 2023-06-01 RX ADMIN — BUPIVACAINE HYDROCHLORIDE 6 ML: 2.5 INJECTION, SOLUTION EPIDURAL; INFILTRATION; INTRACAUDAL at 14:23

## 2023-06-01 RX ADMIN — LIDOCAINE HYDROCHLORIDE 6 ML: 10 INJECTION, SOLUTION EPIDURAL; INFILTRATION; INTRACAUDAL; PERINEURAL at 14:22

## 2023-06-01 ASSESSMENT — PAIN SCALES - GENERAL
PAINLEVEL: MILD PAIN (2)
PAINLEVEL: WORST PAIN (10)

## 2023-06-01 NOTE — PATIENT INSTRUCTIONS
Please follow up in four weeks post procedure with your ordering provider to evaluate your plan of care.      DISCHARGE INSTRUCTIONS    During office hours (8:00 a.m.-4:00 p.m.) questions or concerns may be answered  by calling Spine Center Navigation Nurses at  [phone].  Messages received after hours will be returned the following business day.      In the case of an emergency,please dial 911 or seek assistance at the nearest Emergency Room/Urgent Care facility.     All Patients:  You may experience an increase in your symptoms forthe first 5-7 days. Soreness may persist during the first few weeks as it takes 4-6 weeks for the nerves to fully heal.    You may use ice on the injection site,as frequently as 20 minutes each hour if needed. It is recommended NOT to apply heat during the first 24 hours.    You may take your pain medicine.    You may continue taking your regular medication.    You may shower. No swimming, tub bath or hot tub for 48hours. This also includes no lotions, ointments or patches to the needle sites as well. You may remove your bandaid/bandage as soon as you are home.    You mayresume light activities, as tolerated.    Resume your usual diet as tolerated.    It is strongly advisedthat you do not drive for 1-3 hours post injection.    If you have had oral sedation:  Do not drive for 8 hours post injection.             POSSIBLE PROCEDURE SIDE EFFECTS    -Call the Spine Center if you are concerned    IncreasedPain           Increased numbness/tingling      Nausea/Vomiting          Bruising/bleeding at site      Redness or swelling  Difficulty walking      Weakness          Fever greater than 100.5

## 2023-06-05 ENCOUNTER — TELEPHONE (OUTPATIENT)
Dept: PHYSICAL MEDICINE AND REHAB | Facility: CLINIC | Age: 68
End: 2023-06-05
Payer: COMMERCIAL

## 2023-06-05 DIAGNOSIS — M79.18 MYOFASCIAL PAIN: ICD-10-CM

## 2023-06-05 DIAGNOSIS — M54.50 CHRONIC BILATERAL LOW BACK PAIN WITHOUT SCIATICA: Primary | ICD-10-CM

## 2023-06-05 DIAGNOSIS — G89.29 CHRONIC BILATERAL LOW BACK PAIN WITHOUT SCIATICA: Primary | ICD-10-CM

## 2023-06-05 RX ORDER — METHOCARBAMOL 500 MG/1
500 TABLET, FILM COATED ORAL 3 TIMES DAILY PRN
Qty: 30 TABLET | Refills: 3 | Status: SHIPPED | OUTPATIENT
Start: 2023-06-05 | End: 2023-06-29

## 2023-06-05 NOTE — TELEPHONE ENCOUNTER
"Call placed to pt to discuss. Pt had Bilateral L2, L3, L4 RFA on 6/1/23. Pt reports \"normally I have instant relief with this procedure, maybe some soreness at the injection sites. I've had 0 relief so far this time\".     Explained to pt that it can take time for this procedure to take effect. Pt inquiring about any recommendations in the meantime?    Pt states he has been icing multiples times daily, taking Tylenol PRN and this has not helped. He states he has also tried gabapentin without relief.     Pt endorses the same symptoms as prior to the procedure, bilateral low back pain and it does radiate down his right leg.     Informed pt that provider would be updated and he will be called with a response. Pharmacy verified should medication be prescribed.                 "

## 2023-06-05 NOTE — TELEPHONE ENCOUNTER
Call placed to pt with provider's response. Pt stated understanding and will try the new medication. He will call with any questions/concerns.

## 2023-06-05 NOTE — TELEPHONE ENCOUNTER
Unfortunately it is very common for the ablation to take 4 to 6 weeks to provide benefit, there is no other injections we can do in the meantime.  We can trial a muscle relaxant methocarbamol 3 times daily as needed along with his acetaminophen to see if that gives him some benefit in the meantime.  Prescription sent to his pharmacy.

## 2023-06-05 NOTE — TELEPHONE ENCOUNTER
[facility] Call Center    Phone Message    May a detailed message be left on voicemail: yes     Reason for Call Procedure done last week. Patient is in Pain after Injection. Looking for Options for Relief.   Action Taken: Message routed to:  Other: [facility]    Travel Screening: Not Applicable

## 2023-06-09 ENCOUNTER — LAB REQUISITION (OUTPATIENT)
Dept: LAB | Facility: CLINIC | Age: 68
End: 2023-06-09

## 2023-06-09 DIAGNOSIS — R50.9 FEVER, UNSPECIFIED: ICD-10-CM

## 2023-06-09 PROCEDURE — 87081 CULTURE SCREEN ONLY: CPT | Performed by: FAMILY MEDICINE

## 2023-06-12 LAB — BACTERIA SPEC CULT: NORMAL

## 2023-06-13 ENCOUNTER — TELEPHONE (OUTPATIENT)
Dept: PHYSICAL MEDICINE AND REHAB | Facility: CLINIC | Age: 68
End: 2023-06-13
Payer: COMMERCIAL

## 2023-06-13 DIAGNOSIS — G89.29 CHRONIC BILATERAL LOW BACK PAIN WITHOUT SCIATICA: Primary | ICD-10-CM

## 2023-06-13 DIAGNOSIS — M54.50 CHRONIC BILATERAL LOW BACK PAIN WITHOUT SCIATICA: Primary | ICD-10-CM

## 2023-06-13 DIAGNOSIS — Z98.890 HISTORY OF LUMBAR SURGERY: ICD-10-CM

## 2023-06-13 DIAGNOSIS — M54.16 RIGHT LUMBAR RADICULOPATHY: ICD-10-CM

## 2023-06-13 NOTE — TELEPHONE ENCOUNTER
"PSP:  Claudia Baptiste CNP   Last clinic visit:  5/15/23 OV; 6/1/23 RFA  Reason for call: Next steps  Clinical information:  Call received from patient. Pt reports he continues to have the same bilateral low back pain that radiates down his right leg. He reports he has now been getting intermittent right foot numbness.  Pt aware the effects of the RFA can take 4-6 weeks to reach full potential, \"but I just cannot keep waiting. I am running out of patience\".   Pt inquiring about neurosurgery consult to discuss surgical options or if there is anything else he could try.   Pt also notes the prescribed Methocarbamol was not effective for him.   Advice given to patient: Informed pt that provider would be updated with his status and he would be contact with a response.   Provider to address: Please advise    "

## 2023-06-15 ENCOUNTER — OFFICE VISIT (OUTPATIENT)
Dept: NEUROSURGERY | Facility: CLINIC | Age: 68
End: 2023-06-15
Attending: NURSE PRACTITIONER
Payer: COMMERCIAL

## 2023-06-15 VITALS — OXYGEN SATURATION: 94 % | SYSTOLIC BLOOD PRESSURE: 127 MMHG | DIASTOLIC BLOOD PRESSURE: 79 MMHG | HEART RATE: 76 BPM

## 2023-06-15 DIAGNOSIS — M54.16 LUMBAR RADICULOPATHY: Primary | ICD-10-CM

## 2023-06-15 PROCEDURE — 99214 OFFICE O/P EST MOD 30 MIN: CPT | Performed by: SURGERY

## 2023-06-15 RX ORDER — METHYLPREDNISOLONE 4 MG
TABLET, DOSE PACK ORAL
Qty: 21 TABLET | Refills: 0 | Status: SHIPPED | OUTPATIENT
Start: 2023-06-15 | End: 2023-06-29

## 2023-06-15 ASSESSMENT — PAIN SCALES - GENERAL: PAINLEVEL: WORST PAIN (10)

## 2023-06-15 NOTE — PROGRESS NOTES
NEUROSURGERY FOLLOWUP  NOTE    Barry Jay comes today in f/u.  69 yo male who is s/p right L4-5 hemilaminectomy/facetectomy/foraminotomy on 6/27/2022.  Right after surgery was doing well for 2-3 weeks. 5 months ago symptoms worsened again. S/p right lumbar 5-sacral 1 5/16/23 without relief.  Also underwent L2, L3 and L4 medial branch and dorsal rami RFA on 6/1/23. Currently pain right posterolateral leg. Foot numbness again. This may be new.   Struggling with pain now. Gabapentin and lyrica no relief. Muscle relaxants no relief.     PHYSICAL EXAM:   Constitutional: /79   Pulse 76   SpO2 94%      Mental Status: A & O in no acute distress.  Affect is appropriate.  Speech is fluent.  Recent and remote memory are intact.  Attention span and concentration are normal.     Motor:  Normal bulk and tone all muscle groups of lower extremities.     Sensory: Sensation intact bilaterally to light touch except diminished in right L5 nerve distally      Reflexes: knee/ ankle jerk intact.      IMAGING:   I personally reviewed all radiographic images        CONSULTATION ASSESSMENT AND PLAN:    Recommend new MRI lumbar wwo contrast and new lumbar xray's given change of symptoms and worsening symptoms. Also will start medrol dose pack. Will call with results of MRI and discuss next steps.       Shannan Mari MD      CC:     Osmel Iniguez  [address]  [address] [place] [address]

## 2023-06-15 NOTE — NURSING NOTE
"Barry Jay is a 68 year old male who presents for:  Chief Complaint   Patient presents with     Consult     Surgery last fall  -scar tissue impacting nerve  Same pain as pre op  Extreme pain for 4 to 5 months        Initial Vitals:  /79   Pulse 76   SpO2 94%  Estimated body mass index is 31.02 kg/m  as calculated from the following:    Height as of 4/12/23: 5' 8\" (1.727 m).    Weight as of 4/12/23: 204 lb (92.5 kg).. There is no height or weight on file to calculate BSA. BP completed using cuff size: regular  Worst Pain (10)      Yovani Nixon  "

## 2023-06-15 NOTE — LETTER
6/15/2023         RE: Barry Jay  [id number] [id number]        Dear Colleague,    Thank you for referring your patient, Barry Jay, to the [facility]. Please see a copy of my visit note below.    NEUROSURGERY FOLLOWUP  NOTE    Barry Jay comes today in f/u.  69 yo male who is s/p right L4-5 hemilaminectomy/facetectomy/foraminotomy on 6/27/2022.  Right after surgery was doing well for 2-3 weeks. 5 months ago symptoms worsened again. S/p right lumbar 5-sacral 1 5/16/23 without relief.  Also underwent L2, L3 and L4 medial branch and dorsal rami RFA on 6/1/23. Currently pain right posterolateral leg. Foot numbness again. This may be new.   Struggling with pain now. Gabapentin and lyrica no relief. Muscle relaxants no relief.     PHYSICAL EXAM:   Constitutional: /79   Pulse 76   SpO2 94%      Mental Status: A & O in no acute distress.  Affect is appropriate.  Speech is fluent.  Recent and remote memory are intact.  Attention span and concentration are normal.     Motor:  Normal bulk and tone all muscle groups of lower extremities.     Sensory: Sensation intact bilaterally to light touch except diminished in right L5 nerve distally      Reflexes: knee/ ankle jerk intact.      IMAGING:   I personally reviewed all radiographic images        CONSULTATION ASSESSMENT AND PLAN:    Recommend new MRI lumbar wwo contrast and new lumbar xray's given change of symptoms and worsening symptoms. Also will start medrol dose pack. Will call with results of MRI and discuss next steps.       Shannan Mari MD      CC:     Osmel Iniguez  [address]  [place] [address]        Again, thank you for allowing me to participate in the care of your patient.        Sincerely,        Shannan Mari MD

## 2023-06-22 ENCOUNTER — TRANSFERRED RECORDS (OUTPATIENT)
Dept: HEALTH INFORMATION MANAGEMENT | Facility: CLINIC | Age: 68
End: 2023-06-22

## 2023-06-29 ENCOUNTER — OFFICE VISIT (OUTPATIENT)
Dept: PHYSICAL MEDICINE AND REHAB | Facility: CLINIC | Age: 68
End: 2023-06-29
Payer: COMMERCIAL

## 2023-06-29 VITALS — SYSTOLIC BLOOD PRESSURE: 115 MMHG | DIASTOLIC BLOOD PRESSURE: 60 MMHG

## 2023-06-29 DIAGNOSIS — Z98.890 HISTORY OF LUMBAR SURGERY: ICD-10-CM

## 2023-06-29 DIAGNOSIS — G89.29 CHRONIC BILATERAL LOW BACK PAIN WITH RIGHT-SIDED SCIATICA: Primary | ICD-10-CM

## 2023-06-29 DIAGNOSIS — M54.41 CHRONIC BILATERAL LOW BACK PAIN WITH RIGHT-SIDED SCIATICA: Primary | ICD-10-CM

## 2023-06-29 DIAGNOSIS — M43.16 SPONDYLOLISTHESIS OF LUMBAR REGION: ICD-10-CM

## 2023-06-29 DIAGNOSIS — M54.16 RIGHT LUMBAR RADICULOPATHY: ICD-10-CM

## 2023-06-29 PROCEDURE — 99214 OFFICE O/P EST MOD 30 MIN: CPT | Performed by: NURSE PRACTITIONER

## 2023-06-29 ASSESSMENT — PAIN SCALES - GENERAL: PAINLEVEL: EXTREME PAIN (9)

## 2023-06-29 NOTE — PROGRESS NOTES
Assessment:     Diagnoses and all orders for this visit:  Chronic bilateral low back pain with right-sided sciatica  Right lumbar radiculopathy  History of lumbar surgery  Spondylolisthesis of lumbar region     Barry Jay is a 68 year old y.o. male with past medical history significant for hypertension, dyslipidemia, hand osteoarthritis, arthralgias, history lumbar surgery who presents today for follow-up regarding:    -Recurrent right low back pain with right lumbar radiculopathy.  No benefit with right L5-S1 TFESI or recent RFA.     Plan:     A shared decision making plan was used. The patient's values and choices were respected. Prior medical records were reviewed today. The following represents what was discussed and decided upon by the provider and the patient.        -DIAGNOSTIC TESTS: Images were personally reviewed and interpreted.   --Dr. Mari did order updated lumbar spine MRI with and without IV contrast as well as flexion-extension x-ray, he has this scheduled with [facility] for 7/3/2023.  -- Postsurgical lumbar spine MRI with and without IV contrast 8/12/2022 with right hemilaminectomy/medial facetectomy changes at L4-5 with improvement in nerve compression on the right lateral recess.  Persistent mild to moderate right and mild left foraminal stenosis L4-5.  L3-4 mild spinal stenosis with mild bilateral foraminal stenosis.  L5-S1 minimal foraminal stenosis.  --Lumbar spine MRI [facility] 5/10/2022 with transitional lumbosacral anatomy with partially sacralized L5.  L4-5 grade 1 spondylolisthesis, 3 mm, with annular bulging with advanced facet arthropathy with mild to moderate right and mild left foraminal stenosis, right L4 nerve root impingement.  L3-4 1 to 2 mm spondylolisthesis with facet arthropathy with mild left greater than right foraminal stenosis.  --Lumbar spine MRI 1/24/2020 [facility] with advanced facet arthropathy L4-5 right greater than left and facet arthropathy L3-4 moderate bilaterally.   Grade 1 spondylolisthesis L4-5 with right greater than left L5 impingement.  --Lumbar spine flexion-extension x-ray 2/3/2020 was 0.6 cm spondylolisthesis L4-5, no instability noted.  --[facility] lumbar spine MRI 1/17/2019 shows severe facet arthropathy L4-5 with 5 mm spondylolisthesis, mild central and subarticular recess stenosis, mild right foraminal stenosis.  Mild facet arthropathy L3-4 and L5-S1.    -INTERVENTIONS: No further injection recommendations at this time we will see pending imaging if further injections potential L4-5 TFESI versus surgical intervention as recommended by Dr. Mari.    -MEDICATIONS: No changes in medications, he is got no benefit with gabapentin or Lyrica in the past and is not interested in trialing other medications at this time.  Discussed side effects of medications and proper use. Patient verbalized understanding.    -PHYSICAL THERAPY: Did advise patient continue with home exercises for prior physical therapy sessions.  Discussed the importance of core strengthening, ROM, stretching exercises with the patient and how each of these entities is important in decreasing pain.  Explained to the patient that the purpose of physical therapy is to teach the patient a home exercise program.  These exercises need to be performed every day in order to decrease pain and prevent future occurrences of pain.        -PATIENT EDUCATION:  Total time of 32 minutes, on the day of service, spent with the patient, reviewing the chart, placing orders, and documenting.   -Today we also discussed the issues related to the current COVID-19 pandemic, the pros and cons of the current treatment plan, the CDC guidelines such as social distancing, washing the hands, and covering the cough.    -FOLLOW UP: Follow-up with Dr. Mari for imaging results review and plan.  Advised to contact clinic if symptoms worsen or change.    Subjective:     Barry Jay is a 68 year old male who presents today for follow-up  regarding ongoing right low back pain that radiates to the right posterior lateral thigh as well as anterior thigh and lateral shin with associated numbness and tingling.  He does report that his pain is a constant 9/10 at 10 at its worst aggravated with standing and walking, does become more tolerable with sitting but he is frustrated by the pain and is hoping for surgical intervention at this time since injections have really given him short-term relief at best in the past.  Currently denies left leg symptoms peer denies lower extremity weakness.    *Patient was evaluated again by Dr. Mari 6/15/2023 who recommended updated MRI.    Treatment to Date:   Right L4-5 hemilaminectomy, medial facetectomy, foraminotomy Dr. Mari 6/27/2022  Physical therapy [facility] x3 sessions.  Patient continues with home exercises at this time on a daily basis.     History of bilateral L4-5 RFA [facility] Dr. Green 2011 and 2013, relief at least 2 years.  Bilateral L3-4 MBB 6/30/2017.  Preprocedure pain 7/10 post 2/10.  Bilateral L4-5 facet joint steroid injection 7/7/2017 with relief.  Preprocedure pain 8/10, post 5/10.  Right L4-5 and L5-S1 TFESI 1/6/2020 with 30% lasting benefit only.  Preprocedure pain 10/10, post 7/10.  Bilateral L2, L3, L4 MBB 3/11/2020 and 7/7/2020 with positive response.  Bilateral L2, L3, L4 RFA 10/9/2020 with 90% significant relief x 8 months.  Right L4-5 TFESI 8/31/2021 with 100% relief acute right LBP and right lumbar radiculitis for 6 months, minimal relief with chronic bilateral LBP however.  Pre and post procedure pain 9/10.  Bilateral L2, L3, L4 RFA 10/19/2021 with significant relief.  Preprocedure pain 8/10, post 0/10.  Right SI joint steroid injection 5/6/2022 with minimal initial and no lasting benefit.  Preprocedure pain 10/10, post 5/10.  Postsurgical injections:  Right L5-S1 TFESI 8/24/2022 with resolution of right leg pain.  Ordered by neurosurgery.  Preprocedure pain 8/10, post  7/10.  Bilateral L2, L3, L4 RFA 10/12/2022 with significant relief x6 months.  Right L5-S1 TFESI 5/16/2023 with no lasting benefit.  Bilateral L2, L3, L4 RFA 6/1/2023 with no relief at 4-week postinjection.     Medications:  Meloxicam    Patient Active Problem List   Diagnosis     Dyslipidemia, goal LDL below 70     Hypertension     Diffuse Joint Pains (Arthralgias)     Joint Pain, Localized In The Wrist     Joint Pain, Localized In The Knee     Generalized Osteoarthritis Of The Hand     Osteoarthritis Of The Knee     Arthralgias In Multiple Sites     Paroxysmal supraventricular tachycardia (H)     Coronary artery disease involving native coronary artery of native heart with angina pectoris (H)     Dyspnea on exertion     Worsening angina (H)     Status post coronary angiogram     Abnormal findings on diagnostic imaging of heart and coronary circulation       Current Outpatient Medications   Medication     celecoxib (CELEBREX) 200 MG capsule     ALPRAZolam (XANAX) 0.5 MG tablet     aspirin (ASA) 81 MG chewable tablet     atorvastatin (LIPITOR) 20 MG tablet     irbesartan-hydrochlorothiazide (AVALIDE) 300-12.5 mg per tablet     metoprolol tartrate (LOPRESSOR) 25 MG tablet     multivitamin, therapeutic (THERA-VIT) TABS tablet     omeprazole (PRILOSEC) 20 MG capsule     PARoxetine (PAXIL) 20 MG tablet     zolpidem (AMBIEN) 10 mg tablet     No current facility-administered medications for this visit.       No Known Allergies    Past Medical History:   Diagnosis Date     HTN (hypertension)      Hyperlipidemia         Review of Systems  ROS:  Specifically negative for bowel/bladder dysfunction, balance changes, headache, dizziness, foot drop, fevers, chills, appetite changes, nausea/vomiting, unexplained weight loss. Otherwise 13 systems reviewed are negative. Please see the patient's intake questionnaire from today for details.    Reviewed Social, Family, Past Medical and Past Surgical history with patient, no  significant changes noted since prior visit.     Objective:     /60 (BP Location: Right arm, Patient Position: Sitting)     PHYSICAL EXAMINATION:    --CONSTITUTIONAL: Well developed, well nourished, healthy appearing individual.  --PSYCHIATRIC: Appropriate mood and affect. No difficulty interacting due to temper, social withdrawal, or memory issues.  --SKIN: Lumbar region is dry and intact.   --RESPIRATORY: Normal rhythm and effort. No abnormal accessory muscle breathing patterns noted.   --MUSCULOSKELETAL:  Normal lumbar lordosis noted, no lateral shift.  --GROSS MOTOR: Easily arises from a seated position. Gait is non-antalgic  --LUMBAR SPINE:  Inspection reveals no evidence of deformity.   --LOWER EXTREMITY MOTOR TESTING:  Plantar flexion left 5/5, right 5/5   Dorsiflexion left 5/5, right 5/5   Great toe MTP extension left 5/5, right 5/5  Knee flexion left 5/5, right 5/5  Knee extension left 5/5, right 5/5   Hip flexion left 5/5, right 5/5  Sensation to light touch is intact in the bilateral L4, L5, and S1 dermatomes.    RESULTS:   Imaging: Spine imaging was reviewed today. The images were shown to the patient and the findings were explained using a spine model.    PAIN RFA Lumbar/Sacral Joint Nerve Bilateral    Result Date: 6/1/2023  LUMBAR RADIOFREQUENCY ABLATION/NEUROTOMY PROCEDURE Performed on: 6/1/23 Pre Procedure Diagnosis: Lumbar spondylosis, Low back pain, Lumbar facet syndrome Post Procedure Diagnosis:  Same Procedure Performed:  Lumbar Radiofrequency ablation/neurotomy procedure Clinical Scenario:  As per office notes Anesthesia/Fluids:  As per intra-procedure documentation Vital Signs:  As per intra-procedure documentation Level Injected: L2, L3, [id number] branch and dorsal rami Side Injected:  Bilateral CC: Barry Jay is a 68 year-old male who presents today for a L2, L3, [id number] branch/dorsal rami radiofrequency ablation/neurotomy procedure.  The patient has had significant relief with two  sets of diagnostic injections and would like to proceed with definitive treatment today.  Imaging reviewed.  The procedure of lumbar radiofrequency ablation/neurotomy was discussed in detail along with th risks, including but not limited to:   nerve injury, infection, bleeding, allergic reaction,  worsening of pain along with risk of stroke, paralysis and death.  The effectiveness of this procedure is approximately 80% per the medical literature, meaning that 20% of patients will not have any relief, despite having good response to the medial branch blocks.  If effective, the patient may receive pain relief for 6-9 months or longer.  The patient decided to proceed and signed informed consent.  The patient denies any symptoms of an active infection and denies taking antibiotics. The patient denies taking any prescription blood thinning medications. The patient denies any allergies to iodine or iodine contrast.  The patient was placed  in the prone position in the fluoroscopy table.  A procedural pause was performed to verify patient identify, site location and side for the procedure.   The low back was prepped and draped in the usual sterile fashion.  After anesthetizing the skin with 1% lidocaine, a 100 mm,18 gauge spinal needle was introduced at the aforementioned levels on the right side using fluoroscopic guidance.  Lateral views were used to confirm placement.  After fluoroscopic views confirmed placement, motor stimulation was performed.  Please refer to the nursing intra-procedure note for details.  No radicular symptoms were appreciated by the patient during stimulation and there was observable muscle twitching during motor stimulation.  After aspiration for blood was negative, 1.5 mL of 2% lidocaine was injected was injected at each level.  After waiting 2 minutes for the local anesthetic to take effect, the probes were brought to a final temperature of 80 degrees celsius and burning occurred for 90 seconds.  The needles were then rotated 180 degrees and then the probes were again brought to a temperature of 80 degrees celsius for 90 seconds.  The needles were then flushed with 1 cc of 0.25% bupivacaine was they were with drawn.   The exact same procedure was repeated on the left side.   With stimulation on the left side, the patient denied experiencing any radicular symptoms.   The needles were then flushed with 1 cc of 0.25% bupivacaine as they were withdrawn on the left side.  Pre-procedure pain score:  10/10 Post-procedure pain score:  2/10  The patient tolerated the procedure well. The patient was instructed to call if there are any questions/concerns after the procedure.  After a short period of observation, the patient was discharged.  Patient will follow-up with Claudia Baptiste in 2 to 4 weeks.

## 2023-06-29 NOTE — LETTER
6/29/2023         RE: Barry Jay  [address] [facility] [address]        Dear Colleague,    Thank you for referring your patient, Barry Jay, to the [facility]. Please see a copy of my visit note below.      Assessment:     Diagnoses and all orders for this visit:  Chronic bilateral low back pain with right-sided sciatica  Right lumbar radiculopathy  History of lumbar surgery  Spondylolisthesis of lumbar region     Barry Jay is a 68 year old y.o. male with past medical history significant for hypertension, dyslipidemia, hand osteoarthritis, arthralgias, history lumbar surgery who presents today for follow-up regarding:    -Recurrent right low back pain with right lumbar radiculopathy.  No benefit with right L5-S1 TFESI or recent RFA.     Plan:     A shared decision making plan was used. The patient's values and choices were respected. Prior medical records were reviewed today. The following represents what was discussed and decided upon by the provider and the patient.        -DIAGNOSTIC TESTS: Images were personally reviewed and interpreted.   --Dr. Mari did order updated lumbar spine MRI with and without IV contrast as well as flexion-extension x-ray, he has this scheduled with [facility] for 7/3/2023.  -- Postsurgical lumbar spine MRI with and without IV contrast 8/12/2022 with right hemilaminectomy/medial facetectomy changes at L4-5 with improvement in nerve compression on the right lateral recess.  Persistent mild to moderate right and mild left foraminal stenosis L4-5.  L3-4 mild spinal stenosis with mild bilateral foraminal stenosis.  L5-S1 minimal foraminal stenosis.  --Lumbar spine MRI [facility] 5/10/2022 with transitional lumbosacral anatomy with partially sacralized L5.  L4-5 grade 1 spondylolisthesis, 3 mm, with annular bulging with advanced facet arthropathy with mild to moderate right and mild left foraminal stenosis, right L4 nerve root impingement.   L3-4 1 to 2 mm spondylolisthesis with facet arthropathy with mild left greater than right foraminal stenosis.  --Lumbar spine MRI 1/24/2020 [facility] with advanced facet arthropathy L4-5 right greater than left and facet arthropathy L3-4 moderate bilaterally.  Grade 1 spondylolisthesis L4-5 with right greater than left L5 impingement.  --Lumbar spine flexion-extension x-ray 2/3/2020 was 0.6 cm spondylolisthesis L4-5, no instability noted.  --[facility] lumbar spine MRI 1/17/2019 shows severe facet arthropathy L4-5 with 5 mm spondylolisthesis, mild central and subarticular recess stenosis, mild right foraminal stenosis.  Mild facet arthropathy L3-4 and L5-S1.    -INTERVENTIONS: No further injection recommendations at this time we will see pending imaging if further injections potential L4-5 TFESI versus surgical intervention as recommended by Dr. Mari.    -MEDICATIONS: No changes in medications, he is got no benefit with gabapentin or Lyrica in the past and is not interested in trialing other medications at this time.  Discussed side effects of medications and proper use. Patient verbalized understanding.    -PHYSICAL THERAPY: Did advise patient continue with home exercises for prior physical therapy sessions.  Discussed the importance of core strengthening, ROM, stretching exercises with the patient and how each of these entities is important in decreasing pain.  Explained to the patient that the purpose of physical therapy is to teach the patient a home exercise program.  These exercises need to be performed every day in order to decrease pain and prevent future occurrences of pain.        -PATIENT EDUCATION:  Total time of 32 minutes, on the day of service, spent with the patient, reviewing the chart, placing orders, and documenting.   -Today we also discussed the issues related to the current COVID-19 pandemic, the pros and cons of the current treatment plan, the CDC guidelines such as social distancing, washing the  hands, and covering the cough.    -FOLLOW UP: Follow-up with Dr. Mari for imaging results review and plan.  Advised to contact clinic if symptoms worsen or change.    Subjective:     Barry Jay is a 68 year old male who presents today for follow-up regarding ongoing right low back pain that radiates to the right posterior lateral thigh as well as anterior thigh and lateral shin with associated numbness and tingling.  He does report that his pain is a constant 9/10 at 10 at its worst aggravated with standing and walking, does become more tolerable with sitting but he is frustrated by the pain and is hoping for surgical intervention at this time since injections have really given him short-term relief at best in the past.  Currently denies left leg symptoms peer denies lower extremity weakness.    *Patient was evaluated again by Dr. Mari 6/15/2023 who recommended updated MRI.    Treatment to Date:   Right L4-5 hemilaminectomy, medial facetectomy, foraminotomy Dr. Mari 6/27/2022  Physical therapy [facility] x3 sessions.  Patient continues with home exercises at this time on a daily basis.     History of bilateral L4-5 RFA [facility] [facility] Dr. Green 2011 and 2013, relief at least 2 years.  Bilateral L3-4 MBB 6/30/2017.  Preprocedure pain 7/10 post 2/10.  Bilateral L4-5 facet joint steroid injection 7/7/2017 with relief.  Preprocedure pain 8/10, post 5/10.  Right L4-5 and L5-S1 TFESI 1/6/2020 with 30% lasting benefit only.  Preprocedure pain 10/10, post 7/10.  Bilateral L2, L3, L4 MBB 3/11/2020 and 7/7/2020 with positive response.  Bilateral L2, L3, L4 RFA 10/9/2020 with 90% significant relief x 8 months.  Right L4-5 TFESI 8/31/2021 with 100% relief acute right LBP and right lumbar radiculitis for 6 months, minimal relief with chronic bilateral LBP however.  Pre and post procedure pain 9/10.  Bilateral L2, L3, L4 RFA 10/19/2021 with significant relief.  Preprocedure pain 8/10, post 0/10.  Right SI joint steroid  injection 5/6/2022 with minimal initial and no lasting benefit.  Preprocedure pain 10/10, post 5/10.  Postsurgical injections:  Right L5-S1 TFESI 8/24/2022 with resolution of right leg pain.  Ordered by neurosurgery.  Preprocedure pain 8/10, post 7/10.  Bilateral L2, L3, L4 RFA 10/12/2022 with significant relief x6 months.  Right L5-S1 TFESI 5/16/2023 with no lasting benefit.  Bilateral L2, L3, L4 RFA 6/1/2023 with no relief at 4-week postinjection.     Medications:  Meloxicam    Patient Active Problem List   Diagnosis     Dyslipidemia, goal LDL below 70     Hypertension     Diffuse Joint Pains (Arthralgias)     Joint Pain, Localized In The Wrist     Joint Pain, Localized In The Knee     Generalized Osteoarthritis Of The Hand     Osteoarthritis Of The Knee     Arthralgias In Multiple Sites     Paroxysmal supraventricular tachycardia (H)     Coronary artery disease involving native coronary artery of native heart with angina pectoris (H)     Dyspnea on exertion     Worsening angina (H)     Status post coronary angiogram     Abnormal findings on diagnostic imaging of heart and coronary circulation       Current Outpatient Medications   Medication     celecoxib (CELEBREX) 200 MG capsule     ALPRAZolam (XANAX) 0.5 MG tablet     aspirin (ASA) 81 MG chewable tablet     atorvastatin (LIPITOR) 20 MG tablet     irbesartan-hydrochlorothiazide (AVALIDE) 300-12.5 mg per tablet     metoprolol tartrate (LOPRESSOR) 25 MG tablet     multivitamin, therapeutic (THERA-VIT) TABS tablet     omeprazole (PRILOSEC) 20 MG capsule     PARoxetine (PAXIL) 20 MG tablet     zolpidem (AMBIEN) 10 mg tablet     No current facility-administered medications for this visit.       No Known Allergies    Past Medical History:   Diagnosis Date     HTN (hypertension)      Hyperlipidemia         Review of Systems  ROS:  Specifically negative for bowel/bladder dysfunction, balance changes, headache, dizziness, foot drop, fevers, chills, appetite changes,  nausea/vomiting, unexplained weight loss. Otherwise 13 systems reviewed are negative. Please see the patient's intake questionnaire from today for details.    Reviewed Social, Family, Past Medical and Past Surgical history with patient, no significant changes noted since prior visit.     Objective:     /60 (BP Location: Right arm, Patient Position: Sitting)     PHYSICAL EXAMINATION:    --CONSTITUTIONAL: Well developed, well nourished, healthy appearing individual.  --PSYCHIATRIC: Appropriate mood and affect. No difficulty interacting due to temper, social withdrawal, or memory issues.  --SKIN: Lumbar region is dry and intact.   --RESPIRATORY: Normal rhythm and effort. No abnormal accessory muscle breathing patterns noted.   --MUSCULOSKELETAL:  Normal lumbar lordosis noted, no lateral shift.  --GROSS MOTOR: Easily arises from a seated position. Gait is non-antalgic  --LUMBAR SPINE:  Inspection reveals no evidence of deformity.   --LOWER EXTREMITY MOTOR TESTING:  Plantar flexion left 5/5, right 5/5   Dorsiflexion left 5/5, right 5/5   Great toe MTP extension left 5/5, right 5/5  Knee flexion left 5/5, right 5/5  Knee extension left 5/5, right 5/5   Hip flexion left 5/5, right 5/5  Sensation to light touch is intact in the bilateral L4, L5, and S1 dermatomes.    RESULTS:   Imaging: Spine imaging was reviewed today. The images were shown to the patient and the findings were explained using a spine model.    PAIN RFA Lumbar/Sacral Joint Nerve Bilateral    Result Date: 6/1/2023  LUMBAR RADIOFREQUENCY ABLATION/NEUROTOMY PROCEDURE Performed on: 6/1/23 Pre Procedure Diagnosis: Lumbar spondylosis, Low back pain, Lumbar facet syndrome Post Procedure Diagnosis:  Same Procedure Performed:  Lumbar Radiofrequency ablation/neurotomy procedure Clinical Scenario:  As per office notes Anesthesia/Fluids:  As per intra-procedure documentation Vital Signs:  As per intra-procedure documentation Level Injected: L2, L3, [id number]  branch and dorsal rami Side Injected:  Bilateral CC: Barry Jay is a 68 year-old male who presents today for a L2, L3, S8fpcylm branch/dorsal rami radiofrequency ablation/neurotomy procedure.  The patient has had significant relief with two sets of diagnostic injections and would like to proceed with definitive treatment today.  Imaging reviewed.  The procedure of lumbar radiofrequency ablation/neurotomy was discussed in detail along with th risks, including but not limited to:   nerve injury, infection, bleeding, allergic reaction,  worsening of pain along with risk of stroke, paralysis and death.  The effectiveness of this procedure is approximately 80% per the medical literature, meaning that 20% of patients will not have any relief, despite having good response to the medial branch blocks.  If effective, the patient may receive pain relief for 6-9 months or longer.  The patient decided to proceed and signed informed consent.  The patient denies any symptoms of an active infection and denies taking antibiotics. The patient denies taking any prescription blood thinning medications. The patient denies any allergies to iodine or iodine contrast.  The patient was placed  in the prone position in the fluoroscopy table.  A procedural pause was performed to verify patient identify, site location and side for the procedure.   The low back was prepped and draped in the usual sterile fashion.  After anesthetizing the skin with 1% lidocaine, a 100 mm,18 gauge spinal needle was introduced at the aforementioned levels on the right side using fluoroscopic guidance.  Lateral views were used to confirm placement.  After fluoroscopic views confirmed placement, motor stimulation was performed.  Please refer to the nursing intra-procedure note for details.  No radicular symptoms were appreciated by the patient during stimulation and there was observable muscle twitching during motor stimulation.  After aspiration for blood was  negative, 1.5 mL of 2% lidocaine was injected was injected at each level.  After waiting 2 minutes for the local anesthetic to take effect, the probes were brought to a final temperature of 80 degrees celsius and burning occurred for 90 seconds. The needles were then rotated 180 degrees and then the probes were again brought to a temperature of 80 degrees celsius for 90 seconds.  The needles were then flushed with 1 cc of 0.25% bupivacaine was they were with drawn.   The exact same procedure was repeated on the left side.   With stimulation on the left side, the patient denied experiencing any radicular symptoms.   The needles were then flushed with 1 cc of 0.25% bupivacaine as they were withdrawn on the left side.  Pre-procedure pain score:  10/10 Post-procedure pain score:  2/10  The patient tolerated the procedure well. The patient was instructed to call if there are any questions/concerns after the procedure.  After a short period of observation, the patient was discharged.  Patient will follow-up with Claudia Baptiste in 2 to 4 weeks.                            Again, thank you for allowing me to participate in the care of your patient.        Sincerely,        Claudia Baptiste, CNP

## 2023-06-29 NOTE — PATIENT INSTRUCTIONS
~Spine Center Scheduling #[phone].  ~Please call our [facility] Spine Nurse Navigation #[phone] with any questions or concerns about your treatment plan, if symptoms worsen and you would like to be seen urgently, or if you have problems controlling bladder and bowel function.  ~Please note that any My Chart messages may take multiple days for a response due to the high volume of patients seen in clinic.  Anything sent Thursday night or after will be answered the following week when able, as Claudia Baptiste CNP does not work in clinic on Fridays.   ~Claudia Baptiste CNP is at the [facility] on the first and third Tuesdays of the month only, otherwise primarily at the [facility].        Importance of specialized Physical Therapy: Discussed the importance of core strengthening, ROM, stretching exercises with the patient and how each of these entities is important in decreasing pain.  Explained to the patient that the purpose of physical therapy is to teach the patient a home exercise program individualized to them and their specific health concerns.  These exercises need to be performed every day in order to decrease pain and prevent future occurrences of pain.

## 2023-07-03 ENCOUNTER — ANCILLARY PROCEDURE (OUTPATIENT)
Dept: RADIOLOGY | Facility: CLINIC | Age: 68
End: 2023-07-03
Payer: COMMERCIAL

## 2023-07-11 ENCOUNTER — OFFICE VISIT (OUTPATIENT)
Dept: NEUROSURGERY | Facility: CLINIC | Age: 68
End: 2023-07-11
Payer: COMMERCIAL

## 2023-07-11 VITALS — DIASTOLIC BLOOD PRESSURE: 73 MMHG | OXYGEN SATURATION: 96 % | SYSTOLIC BLOOD PRESSURE: 117 MMHG | HEART RATE: 69 BPM

## 2023-07-11 DIAGNOSIS — M54.16 LUMBAR RADICULOPATHY: Primary | ICD-10-CM

## 2023-07-11 PROCEDURE — 99213 OFFICE O/P EST LOW 20 MIN: CPT | Performed by: SURGERY

## 2023-07-11 ASSESSMENT — PAIN SCALES - GENERAL: PAINLEVEL: EXTREME PAIN (8)

## 2023-07-11 NOTE — NURSING NOTE
"Barry Jay is a 68 year old male who presents for:  Chief Complaint   Patient presents with     Follow Up     Image review  Low back pain to right leg  Numbness in right foot starting 1 month ago        Initial Vitals:  /73   Pulse 69   SpO2 96%  Estimated body mass index is 31.02 kg/m  as calculated from the following:    Height as of 4/12/23: 5' 8\" (1.727 m).    Weight as of 4/12/23: 204 lb (92.5 kg).. There is no height or weight on file to calculate BSA. BP completed using cuff size: regular  Extreme Pain (8)      Yovani Nixon  "

## 2023-07-11 NOTE — PROGRESS NOTES
NEUROSURGERY FOLLOWUP  NOTE    Barry Jay comes today in f/u. Patient is a 67 yo male who is s/p right L4-5 hemilaminectomy/facetectomy/foraminotomy on 6/27/2022. ongoing right leg pain. Improved after surgery for several weeks then returned. Medrol dose pack with no relief.   RFA also underwent had helped a little bit on lower back pain but not the leg.      We reviewed repeat lumbar MRI wwo. Doesn't appear to have any ongoing nerve impingement. Recommend  EMG right lower extremity. Also consider Dr Baeza consult for SCS trial consultation.    Shannan Mari MD      CC:     Osmel Iniguez  [address]  [address] [place] [address]

## 2023-07-11 NOTE — LETTER
7/11/2023         RE: Barry Jay  [address] [facility] [address]        Dear Colleague,    Thank you for referring your patient, Barry Jay, to the [facility] SPINE AND NEUROSURGERY. Please see a copy of my visit note below.    NEUROSURGERY FOLLOWUP  NOTE    Barry Jay comes today in f/u. Patient is a 69 yo male who is s/p right L4-5 hemilaminectomy/facetectomy/foraminotomy on 6/27/2022. ongoing right leg pain. Improved after surgery for several weeks then returned. Medrol dose pack with no relief.   RFA also underwent had helped a little bit on lower back pain but not the leg.      We reviewed repeat lumbar MRI wwo. Doesn't appear to have any ongoing nerve impingement. Recommend  EMG right lower extremity. Also consider Dr Baeza consult for SCS trial consultation.    Shannan Mari MD      CC:     Osmel Iniguez  [address]  [place] [address]        Again, thank you for allowing me to participate in the care of your patient.        Sincerely,        Shannan Mari MD

## 2023-07-26 DIAGNOSIS — I47.10 PAROXYSMAL SUPRAVENTRICULAR TACHYCARDIA (H): ICD-10-CM

## 2023-07-26 RX ORDER — METOPROLOL TARTRATE 25 MG/1
50 TABLET, FILM COATED ORAL 2 TIMES DAILY
Qty: 180 TABLET | Refills: 2 | Status: SHIPPED | OUTPATIENT
Start: 2023-07-26 | End: 2023-07-26

## 2023-07-26 RX ORDER — METOPROLOL TARTRATE 25 MG/1
50 TABLET, FILM COATED ORAL 2 TIMES DAILY
Qty: 360 TABLET | Refills: 2 | Status: SHIPPED | OUTPATIENT
Start: 2023-07-26 | End: 2023-07-27

## 2023-07-27 DIAGNOSIS — I47.10 PAROXYSMAL SUPRAVENTRICULAR TACHYCARDIA (H): ICD-10-CM

## 2023-07-27 RX ORDER — METOPROLOL TARTRATE 50 MG
50 TABLET ORAL 2 TIMES DAILY
Qty: 180 TABLET | Refills: 3 | Status: SHIPPED | OUTPATIENT
Start: 2023-07-27 | End: 2024-07-01

## 2023-08-03 ENCOUNTER — OFFICE VISIT (OUTPATIENT)
Dept: PHYSICAL MEDICINE AND REHAB | Facility: CLINIC | Age: 68
End: 2023-08-03
Attending: SURGERY
Payer: COMMERCIAL

## 2023-08-03 ENCOUNTER — TELEPHONE (OUTPATIENT)
Dept: NEUROSURGERY | Facility: CLINIC | Age: 68
End: 2023-08-03

## 2023-08-03 DIAGNOSIS — M54.16 LUMBAR RADICULOPATHY: ICD-10-CM

## 2023-08-03 DIAGNOSIS — M54.16 LUMBAR RADICULOPATHY: Primary | ICD-10-CM

## 2023-08-03 PROCEDURE — 95886 MUSC TEST DONE W/N TEST COMP: CPT | Mod: RT | Performed by: PHYSICAL MEDICINE & REHABILITATION

## 2023-08-03 PROCEDURE — 95909 NRV CNDJ TST 5-6 STUDIES: CPT | Performed by: PHYSICAL MEDICINE & REHABILITATION

## 2023-08-03 NOTE — LETTER
8/3/2023         RE: Barry Jay  [address] [facility] [address]        Dear Colleague,    Thank you for referring your patient, Barry Jay, to the [facility] SPINE AND NEUROSURGERY. Please see a copy of my visit note below.    [facility]  [address] [place] [id number]  Office: [phone] Fax: [phone]    Electromyography and Nerve Conduction Study Report        Indication: Patient presents at the request of Dr. Mari for a right lower extremity EMG.  He has right-sided low back pain with right lower extremity pain and paresthesias on the lateral right thigh and calf to the bottom of the right foot.  Has had lumbar decompression.  On exam he has decree sensation to light touch right lateral malleolus and dorsal right foot first webspace.  2+ patellar  and Achilles reflexes bilaterally, and normal muscle strength throughout the major muscle groups of the bilateral lower extremities.      Pt Exam Discussion (Communication Barriers):  Electromyography and nerve conduction testing, including associated discomfort, risks, benefits, and alternatives was discussed with the patient prior to the procedure.  No learning/ communication barriers; patient verbalized understanding of procedure.  Informed consent was obtained.           Pt Assessment:  Testing was successfully completed; patient tolerated testing well.       Blood Thinners: ASA Skin Temperature: Warmed 31.5                   EMG/NCS  results:     Nerve Conduction Studies  Motor Sites      Segment Distal Latency Neg. Amp CV F-Latency F-Estimate Comment   Site  (ms) mV m/s ms ms    Right Fibular (EDB) Motor   Ankle Ankle-EDB 4.8 5.3       Knee Knee-Ankle 14.7 5.3 43      Right Tibial (AH) Motor   Ankle Ankle-AH 3.6 7.4       Knee Knee-Ankle 13.3 7.0 44        Sensory Sites      Onset Lat Peak Lat Amp CV Comment   Site (ms) (ms)  V m/s    Left Sural Sensory   B-Ankle 3.4 3.9 6  41    Right Sural Sensory   B-Ankle 3.0 3.7 4 47      H-Reflex Sites      M-Lat H Lat H Neg Amp   Site (ms) (ms) mV   Left Tibial (Soleus) H-Reflex   Pop Fossa 6.6 32.2 3.3   Right Tibial (Soleus) H-Reflex   Pop Fossa 6.9 33.0 2.9     H-Reflex Sites      Lt. H Lat Rt. H Lat L-R H Lat L-R H Neg Amp   Site (ms) (ms) (ms) Norm mV   Tibial (Soleus) H-Reflex   Pop Fossa 32.2 33.0 0.80  < 3.0 0.40       NCS Waveforms:    Motor         Sensory         H-Reflex           Electromyography     Side Muscle Nerve Root Ins Act Fibs Psw Fasc Recrt Dur Amp Poly Comment   Right AntTibialis Dp Br Fibular L4-5 *Incr *1+ *1+ Nml Nml *>12ms Nml 0    Right Gastroc Tibial S1-2 Nml Nml Nml Nml Nml Nml Nml 0    Right Fibularis Long Sup Br Fibular L5-S1 Nml Nml Nml Nml Nml Nml Nml 0    Right VastusLat Femoral L2-4 Nml Nml Nml Nml Nml Nml Nml 0    Right TensorFascLat SupGluteal L4-5, S1 Nml Nml Nml Nml Nml Nml Nml 0    Right RectFemoris Femoral L2-4 Nml Nml Nml Nml Nml Nml Nml 0    Right GluteusMax InfGluteal L5-S2 Nml Nml Nml Nml Nml Nml Nml 0    Right PostTibialis Tibial L5, S1 Nml Nml Nml Nml Nml Nml Nml 0              Comment NCS: Normal study:  1.  Borderline bilateral sural SNAP amplitude was normal for patient's age.  2.  Normal right peroneal and tibial CMAP's  3.  Symmetric tibial H reflexes    Comment EMG: Abnormal study  1.  Mild increased insertional activity the positive waves and fibrillation potentials and mild prolonged motor unit potential duration of the tibialis anterior.  Remainder right lower extremity needle EMG normal.  Lumbar paraspinals not tested secondary to prior surgery.      Interpretation: Abnormal study: There is electrodiagnostic evidence of:    1.  Mild increased insertional activity and mildly prolonged motor unit potential duration of the right tibialis anterior muscle.  In isolation, these findings are nonspecific and nondiagnostic.  Under the correct clinical condition, this can be observed in a chronic  and active right L4 versus L5 radiculopathy.      2.  There is no electrodiagnostic evidence of  focal neuropathy or peripheral polyneuropathy in the right lower extremity.    The testing was completed in its entirety by the physician.      It was our pleasure caring for your patient today, if there any questions or concerns please do not hesitate to contact us.      Again, thank you for allowing me to participate in the care of your patient.        Sincerely,        Narinder Wakefield, DO

## 2023-08-03 NOTE — TELEPHONE ENCOUNTER
Called and let Barry know Dr. Mari ordered a CT/Myelogram for more specific visualization of his lumbar spine.   Melissa Winn RN, CNRN

## 2023-08-03 NOTE — PATIENT INSTRUCTIONS
Thank you for choosing the [facility] for your EMG testing.    The ordering provider will receive your final EMG results within the next few days.  Please follow up with your provider for the results and further treatment recommendations.

## 2023-08-03 NOTE — PROGRESS NOTES
[facility]  [address] [place] [address] [address]  [place] [address]  Office: [phone] Fax: [phone]    Electromyography and Nerve Conduction Study Report        Indication: Patient presents at the request of Dr. Mari for a right lower extremity EMG.  He has right-sided low back pain with right lower extremity pain and paresthesias on the lateral right thigh and calf to the bottom of the right foot.  Has had lumbar decompression.  On exam he has decree sensation to light touch right lateral malleolus and dorsal right foot first webspace.  2+ patellar  and Achilles reflexes bilaterally, and normal muscle strength throughout the major muscle groups of the bilateral lower extremities.      Pt Exam Discussion (Communication Barriers):  Electromyography and nerve conduction testing, including associated discomfort, risks, benefits, and alternatives was discussed with the patient prior to the procedure.  No learning/ communication barriers; patient verbalized understanding of procedure.  Informed consent was obtained.           Pt Assessment:  Testing was successfully completed; patient tolerated testing well.       Blood Thinners: ASA Skin Temperature: Warmed 31.5                   EMG/NCS  results:     Nerve Conduction Studies  Motor Sites      Segment Distal Latency Neg. Amp CV F-Latency F-Estimate Comment   Site  (ms) mV m/s ms ms    Right Fibular (EDB) Motor   Ankle Ankle-EDB 4.8 5.3       Knee Knee-Ankle 14.7 5.3 43      Right Tibial (AH) Motor   Ankle Ankle-AH 3.6 7.4       Knee Knee-Ankle 13.3 7.0 44        Sensory Sites      Onset Lat Peak Lat Amp CV Comment   Site (ms) (ms)  V m/s    Left Sural Sensory   B-Ankle 3.4 3.9 6 41    Right Sural Sensory   B-Ankle 3.0 3.7 4 47      H-Reflex Sites      M-Lat H Lat H Neg Amp   Site (ms) (ms) mV   Left Tibial (Soleus) H-Reflex   Pop Fossa 6.6 32.2 3.3   Right Tibial (Soleus) H-Reflex   Pop Fossa 6.9 33.0 2.9     H-Reflex Sites      Lt.  H Lat Rt. H Lat L-R H Lat L-R H Neg Amp   Site (ms) (ms) (ms) Norm mV   Tibial (Soleus) H-Reflex   Pop Fossa 32.2 33.0 0.80  < 3.0 0.40       NCS Waveforms:    Motor         Sensory         H-Reflex           Electromyography     Side Muscle Nerve Root Ins Act Fibs Psw Fasc Recrt Dur Amp Poly Comment   Right AntTibialis Dp Br Fibular L4-5 *Incr *1+ *1+ Nml Nml *>12ms Nml 0    Right Gastroc Tibial S1-2 Nml Nml Nml Nml Nml Nml Nml 0    Right Fibularis Long Sup Br Fibular L5-S1 Nml Nml Nml Nml Nml Nml Nml 0    Right VastusLat Femoral L2-4 Nml Nml Nml Nml Nml Nml Nml 0    Right TensorFascLat SupGluteal L4-5, S1 Nml Nml Nml Nml Nml Nml Nml 0    Right RectFemoris Femoral L2-4 Nml Nml Nml Nml Nml Nml Nml 0    Right GluteusMax InfGluteal L5-S2 Nml Nml Nml Nml Nml Nml Nml 0    Right PostTibialis Tibial L5, S1 Nml Nml Nml Nml Nml Nml Nml 0              Comment NCS: Normal study:  1.  Borderline bilateral sural SNAP amplitude was normal for patient's age.  2.  Normal right peroneal and tibial CMAP's  3.  Symmetric tibial H reflexes    Comment EMG: Abnormal study  1.  Mild increased insertional activity the positive waves and fibrillation potentials and mild prolonged motor unit potential duration of the tibialis anterior.  Remainder right lower extremity needle EMG normal.  Lumbar paraspinals not tested secondary to prior surgery.      Interpretation: Abnormal study: There is electrodiagnostic evidence of:    1.  Mild increased insertional activity and mildly prolonged motor unit potential duration of the right tibialis anterior muscle.  In isolation, these findings are nonspecific and nondiagnostic.  Under the correct clinical condition, this can be observed in a chronic and active right L4 versus L5 radiculopathy.      2.  There is no electrodiagnostic evidence of  focal neuropathy or peripheral polyneuropathy in the right lower extremity.    The testing was completed in its entirety by the physician.      It was our  pleasure caring for your patient today, if there any questions or concerns please do not hesitate to contact us.

## 2023-08-24 ENCOUNTER — OFFICE VISIT (OUTPATIENT)
Dept: PHYSICAL MEDICINE AND REHAB | Facility: CLINIC | Age: 68
End: 2023-08-24
Attending: SURGERY
Payer: COMMERCIAL

## 2023-08-24 VITALS — DIASTOLIC BLOOD PRESSURE: 86 MMHG | HEART RATE: 75 BPM | SYSTOLIC BLOOD PRESSURE: 120 MMHG

## 2023-08-24 DIAGNOSIS — M96.1 FAILED BACK SURGICAL SYNDROME: ICD-10-CM

## 2023-08-24 DIAGNOSIS — M54.16 LUMBAR RADICULOPATHY: Primary | ICD-10-CM

## 2023-08-24 PROCEDURE — 99214 OFFICE O/P EST MOD 30 MIN: CPT | Performed by: PAIN MEDICINE

## 2023-08-24 ASSESSMENT — PAIN SCALES - GENERAL: PAINLEVEL: EXTREME PAIN (8)

## 2023-08-24 NOTE — PROGRESS NOTES
Assessment:     Diagnoses and all orders for this visit:  Lumbar radiculopathy  -     Spine  Referral  -     PAIN Transforaminal REGI Inj Lumbosacral One Level Right; Future  -     MR Thoracic Spine w/o Contrast; Future  -     PAIN PHD EVAL/TREAT/FOLLOW UP; Future  Failed back surgical syndrome  -     MR Thoracic Spine w/o Contrast; Future  -     PAIN PHD EVAL/TREAT/FOLLOW UP; Future     Barry Jay is a 68 year old y.o. male with past medical history significant for hypertension, dyslipidemia, hand osteoarthritis, arthralgias, history lumbar surgery who presents today for follow-up regarding right low back and lower extremity pain:    -Unfortunately the patient did not receive any relief with his radiofrequency ablation.  He does have chronic right L4 and L5 radiculopathy on EMG.  I recommend right L4-5 transforaminal epidural steroid injection.  If this is not helpful we had a discussion about spinal cord stimulator trial and potential implant.  I have ordered an MRI of his thoracic spine and a behavioral health evaluation to qualify for spinal cord stimulator trial.    PSP: Claudia Baptiste  Plan:     A shared decision making plan was used. The patient's values and choices were respected. Prior medical records from Claudia Baptiste's last note on 6/29/2023 as well as Dr. Mari's note on 7/11/2023 and Dr. Wakefield note on 8/3/2023 were reviewed today. The following represents what was discussed and decided upon by the provider and the patient.        -DIAGNOSTIC TESTS: Images were personally reviewed and interpreted.   --Postsurgical lumbar spine MRI with and without IV contrast 8/12/2022 with right hemilaminectomy/medial facetectomy changes at L4-5 with improvement in nerve compression on the right lateral recess.  Persistent mild to moderate right and mild left foraminal stenosis L4-5.  L3-4 mild spinal stenosis with mild bilateral foraminal stenosis.  L5-S1 minimal foraminal stenosis.  --Lumbar  spine MRI [facility] 5/10/2022 with transitional lumbosacral anatomy with partially sacralized L5.  L4-5 grade 1 spondylolisthesis, 3 mm, with annular bulging with advanced facet arthropathy with mild to moderate right and mild left foraminal stenosis, right L4 nerve root impingement.  L3-4 1 to 2 mm spondylolisthesis with facet arthropathy with mild left greater than right foraminal stenosis.  --Lumbar spine MRI 1/24/2020 [facility] with advanced facet arthropathy L4-5 right greater than left and facet arthropathy L3-4 moderate bilaterally.  Grade 1 spondylolisthesis L4-5 with right greater than left L5 impingement.  --Lumbar spine flexion-extension x-ray 2/3/2020 was 0.6 cm spondylolisthesis L4-5, no instability noted.  --[facility] lumbar spine MRI 1/17/2019 shows severe facet arthropathy L4-5 with 5 mm spondylolisthesis, mild central and subarticular recess stenosis, mild right foraminal stenosis.  Mild facet arthropathy L3-4 and L5-S1.  -- MRI of lumbar spine dated 7/6/2023 at [facility] is personally reviewed images interpreted and discussed with the patient.  There is transitional anatomy at L5.  There is a prominent right L4-5 disc herniation.  At L5-S1 there is also a disc herniation that is more rightward.  There is no impingement.  There is moderate to severe facet arthropathy from L3-4 through L5-S1.  -- EMG nerve conduction study dated 8/3/2023 report is reviewed and shows right L4 versus L5 radiculopathy which is chronic.    -INTERVENTIONS:  I ordered right L4-5 transforaminal epidural steroid injection.  I told the patient that he will need a .  If this is not helpful we will go down the route of a nephro spinal cord stimulator trial with potential implant.    -MEDICATIONS: He is trialed gabapentin in the past with no relief.  -  Discussed side effects of medications and proper use. Patient verbalized understanding.    -PHYSICAL THERAPY: Recommended continue with home exercises on a consistent basis.      -PATIENT  EDUCATION: We discussed pain management in a multiple fashion including physical therapy, medication management, possible future injections.    -FOLLOW UP: Patient will follow-up after injection.  Advised to contact clinic if symptoms worsen or change.    Subjective:     Barry Jay is a 68 year old male who presents today for follow-up regarding primary low back pain with intermittent right lower extremity pain.  Patient notes that he continues to have low back pain and right lower extremity pain.  He was evaluated by Dr. Mari in neurosurgery who recommended see me to discuss spinal cord stimulators.  He notes that his pain is the worst when he is standing for any period of time.  Walking is also difficult if it is more than 10 minutes.  If he sits down his pain is somewhat relieved.  He is beginning to be very frustrated with his pain and decrease function.  Pain today is 8/10 at its worst is 8/10 as best as 5/10.  He is trialed gabapentin in the past and was not able to take high doses of this secondary to side effects and I was not helpful.  He takes Celebrex from time to time which is helpful.  He denies any bowel or bladder changes, fevers, chills, unintentional weight loss.    Treatment to Date:   Right L4-5 hemilaminectomy, medial facetectomy, foraminotomy Dr. Mari 6/27/2022  Physical therapy [facility] x3 sessions.  Patient continues with home exercises at this time on a daily basis.     History of bilateral L4-5 RFA [facility] Dr. Green 2011 and 2013, relief at least 2 years.  Bilateral L3-4 MBB 6/30/2017.  Preprocedure pain 7/10 post 2/10.  Bilateral L4-5 facet joint steroid injection 7/7/2017 with relief.  Preprocedure pain 8/10, post 5/10.  Right L4-5 and L5-S1 TFESI 1/6/2020 with 30% lasting benefit only.  Preprocedure pain 10/10, post 7/10.  Bilateral L2, L3, L4 MBB 3/11/2020 and 7/7/2020 with positive response.  Bilateral L2, L3, L4 RFA 10/9/2020 with 90% significant relief x 8  months.  Right L4-5 TFESI 8/31/2021 with 100% relief acute right LBP and right lumbar radiculitis for 6 months, minimal relief with chronic bilateral LBP however.  Pre and post procedure pain 9/10.  Bilateral L2, L3, L4 RFA 10/19/2021 with significant relief.  Preprocedure pain 8/10, post 0/10.  Right SI joint steroid injection 5/6/2022 with minimal initial and no lasting benefit.  Preprocedure pain 10/10, post 5/10.  Postsurgical injections:  Right L5-S1 TFESI 8/24/2022 with resolution of right leg pain.  Ordered by neurosurgery.  Preprocedure pain 8/10, post 7/10.  Bilateral L2, L3, L4 RFA 10/12/2022 with significant relief x6 months.  Right L5-S1 TFESI 5/16/2023 with no lasting benefit.  Bilateral L2, L3, L4 RFA 6/1/2023 with no relief at 4-week postinjection.     Medications:  Meloxicam       Patient Active Problem List   Diagnosis    Dyslipidemia, goal LDL below 70    Hypertension    Diffuse Joint Pains (Arthralgias)    Joint Pain, Localized In The Wrist    Joint Pain, Localized In The Knee    Generalized Osteoarthritis Of The Hand    Osteoarthritis Of The Knee    Arthralgias In Multiple Sites    Paroxysmal supraventricular tachycardia (H)    Coronary artery disease involving native coronary artery of native heart with angina pectoris (H)    Dyspnea on exertion    Worsening angina (H)    Status post coronary angiogram    Abnormal findings on diagnostic imaging of heart and coronary circulation       Current Outpatient Medications   Medication    ALPRAZolam (XANAX) 0.5 MG tablet    aspirin (ASA) 81 MG chewable tablet    atorvastatin (LIPITOR) 20 MG tablet    celecoxib (CELEBREX) 200 MG capsule    irbesartan-hydrochlorothiazide (AVALIDE) 300-12.5 mg per tablet    metoprolol tartrate (LOPRESSOR) 50 MG tablet    multivitamin, therapeutic (THERA-VIT) TABS tablet    omeprazole (PRILOSEC) 20 MG capsule    PARoxetine (PAXIL) 20 MG tablet    zolpidem (AMBIEN) 10 mg tablet     No current facility-administered medications  for this visit.       No Known Allergies    Past Medical History:   Diagnosis Date    HTN (hypertension)     Hyperlipidemia         Review of Systems  ROS:  Specifically negative for bowel/bladder dysfunction, balance changes, headache, dizziness, foot drop, fevers, chills, appetite changes, nausea/vomiting, unexplained weight loss. Otherwise 13 systems reviewed are negative. Please see the patient's intake questionnaire from today for details.    Reviewed Social, Family, Past Medical and Past Surgical history with patient, no significant changes noted since prior visit.     Objective:     /86   Pulse 75   PF 97 L/min     PHYSICAL EXAMINATION:    --CONSTITUTIONAL: Well developed, well nourished, healthy appearing individual.  --PSYCHIATRIC: Appropriate mood and affect. No difficulty interacting due to temper, social withdrawal, or memory issues.  --SKIN: Lumbar region is dry and intact.   --RESPIRATORY: Normal rhythm and effort. No abnormal accessory muscle breathing patterns noted.   --MUSCULOSKELETAL:  Normal lumbar lordosis noted, no lateral shift.  --GROSS MOTOR: Easily arises from a seated position. Gait is non-antalgic  --LUMBAR SPINE:  Inspection reveals no evidence of deformity. Range of motion is mildly limited in lumbar flexion, extension, lateral rotation.  Tenderness to palpation across the right low back.  Straight leg raising in the seated position is negative for radicular pain.  --SACROILIAC JOINT:  One Finger point test negative.  --LOWER EXTREMITY MOTOR TESTING:  Plantar flexion left 5/5, right 5/5   Dorsiflexion left 5/5, right 5/5   Great toe MTP extension left 5/5, right 5/5  Knee flexion left 5/5, right 5/5  Knee extension left 5/5, right 5/5   Hip flexion left 5/5, right 5/5  Hip abduction left 5/5, right 5/5  Hip adduction left 5/5, right 5/5   --NEUROLOGIC:  Sensation to light touch is intact in the bilateral L4, L5, and S1 dermatomes.    RESULTS:   Imaging: Lumbar spine imaging was  reviewed today.

## 2023-08-24 NOTE — PATIENT INSTRUCTIONS
[facility] Injection Requirements    A  is required for all fluoroscopically-guided injections.  Injection appointments may be cancelled if there are signs/symptoms of an active infection or if the patient is being actively treated with antibiotics for a diagnosed infection.  Patients may have their steroid injection cancelled if they have had another steroid injection within 2 weeks.  Diabetic patients will have their blood glucose levels checked the day of their injection and the appointment will be rescheduled if the blood glucose level is 300 or higher.  Patients with allergies to cortisone, local anesthetics, iodine, or contrast dye should contact the Spine Center to further discuss these considerations.  Patients scheduled for medial branch block diagnostic injections should refrain from taking pain medication the day of the procedure.  The medial branch block injection appointment will be rescheduled if the patient's pain rating is not 5/10 or greater at the time of the procedure.  Patients taking warfarin/Coumadin will have their INR checked the day of the procedure and the procedure may be rescheduled if the INR is greater than 3.0.  Please contact the Spine Center [phone]) if you are taking any prescription blood-thinning medications (warfarin, Plavix, Lovenox, Eliquis, Brilinta, Effient, etc.) as special dosing adjustments may need to be made depending on the type of injection you are scheduled to receive.  It is recommended that you delay having your steroid injection if you have received any vaccines within 2 weeks.

## 2023-08-24 NOTE — LETTER
8/24/2023         RE: Barry Jay  [address] [facility], [place] [address]        Dear Colleague,    Thank you for referring your patient, Barry Jay, to the [facility]UROSURGERY. Please see a copy of my visit note below.      Assessment:     Diagnoses and all orders for this visit:  Lumbar radiculopathy  -     Spine  Referral  -     PAIN Transforaminal REGI Inj Lumbosacral One Level Right; Future  -     MR Thoracic Spine w/o Contrast; Future  -     PAIN PHD EVAL/TREAT/FOLLOW UP; Future  Failed back surgical syndrome  -     MR Thoracic Spine w/o Contrast; Future  -     PAIN PHD EVAL/TREAT/FOLLOW UP; Future     Barry Jay is a 68 year old y.o. male with past medical history significant for hypertension, dyslipidemia, hand osteoarthritis, arthralgias, history lumbar surgery who presents today for follow-up regarding right low back and lower extremity pain:    -Unfortunately the patient did not receive any relief with his radiofrequency ablation.  He does have chronic right L4 and L5 radiculopathy on EMG.  I recommend right L4-5 transforaminal epidural steroid injection.  If this is not helpful we had a discussion about spinal cord stimulator trial and potential implant.  I have ordered an MRI of his thoracic spine and a behavioral health evaluation to qualify for spinal cord stimulator trial.    PSP: Claudia Baptiste  Plan:     A shared decision making plan was used. The patient's values and choices were respected. Prior medical records from Claudia Baptiste's last note on 6/29/2023 as well as Dr. Mari's note on 7/11/2023 and Dr. Wakefield note on 8/3/2023 were reviewed today. The following represents what was discussed and decided upon by the provider and the patient.        -DIAGNOSTIC TESTS: Images were personally reviewed and interpreted.   --Postsurgical lumbar spine MRI with and without IV contrast 8/12/2022 with right hemilaminectomy/medial facetectomy changes at  L4-5 with improvement in nerve compression on the right lateral recess.  Persistent mild to moderate right and mild left foraminal stenosis L4-5.  L3-4 mild spinal stenosis with mild bilateral foraminal stenosis.  L5-S1 minimal foraminal stenosis.  --Lumbar spine MRI [facility] 5/10/2022 with transitional lumbosacral anatomy with partially sacralized L5.  L4-5 grade 1 spondylolisthesis, 3 mm, with annular bulging with advanced facet arthropathy with mild to moderate right and mild left foraminal stenosis, right L4 nerve root impingement.  L3-4 1 to 2 mm spondylolisthesis with facet arthropathy with mild left greater than right foraminal stenosis.  --Lumbar spine MRI 1/24/2020 [facility] with advanced facet arthropathy L4-5 right greater than left and facet arthropathy L3-4 moderate bilaterally.  Grade 1 spondylolisthesis L4-5 with right greater than left L5 impingement.  --Lumbar spine flexion-extension x-ray 2/3/2020 was 0.6 cm spondylolisthesis L4-5, no instability noted.  --[facility] lumbar spine MRI 1/17/2019 shows severe facet arthropathy L4-5 with 5 mm spondylolisthesis, mild central and subarticular recess stenosis, mild right foraminal stenosis.  Mild facet arthropathy L3-4 and L5-S1.  -- MRI of lumbar spine dated 7/6/2023 at [facility] is personally reviewed images interpreted and discussed with the patient.  There is transitional anatomy at L5.  There is a prominent right L4-5 disc herniation.  At L5-S1 there is also a disc herniation that is more rightward.  There is no impingement.  There is moderate to severe facet arthropathy from L3-4 through L5-S1.  -- EMG nerve conduction study dated 8/3/2023 report is reviewed and shows right L4 versus L5 radiculopathy which is chronic.    -INTERVENTIONS:  I ordered right L4-5 transforaminal epidural steroid injection.  I told the patient that he will need a .  If this is not helpful we will go down the route of a nephro spinal cord stimulator trial with potential  implant.    -MEDICATIONS: He is trialed gabapentin in the past with no relief.  -  Discussed side effects of medications and proper use. Patient verbalized understanding.    -PHYSICAL THERAPY: Recommended continue with home exercises on a consistent basis.      -PATIENT EDUCATION: We discussed pain management in a multiple fashion including physical therapy, medication management, possible future injections.    -FOLLOW UP: Patient will follow-up after injection.  Advised to contact clinic if symptoms worsen or change.    Subjective:     Barry Jay is a 68 year old male who presents today for follow-up regarding primary low back pain with intermittent right lower extremity pain.  Patient notes that he continues to have low back pain and right lower extremity pain.  He was evaluated by Dr. Mari in neurosurgery who recommended see me to discuss spinal cord stimulators.  He notes that his pain is the worst when he is standing for any period of time.  Walking is also difficult if it is more than 10 minutes.  If he sits down his pain is somewhat relieved.  He is beginning to be very frustrated with his pain and decrease function.  Pain today is 8/10 at its worst is 8/10 as best as 5/10.  He is trialed gabapentin in the past and was not able to take high doses of this secondary to side effects and I was not helpful.  He takes Celebrex from time to time which is helpful.  He denies any bowel or bladder changes, fevers, chills, unintentional weight loss.    Treatment to Date:   Right L4-5 hemilaminectomy, medial facetectomy, foraminotomy Dr. Mari 6/27/2022  Physical therapy [facility] x3 sessions.  Patient continues with home exercises at this time on a daily basis.     History of bilateral L4-5 RFA [place] [facility] Dr. Green 2011 and 2013, relief at least 2 years.  Bilateral L3-4 MBB 6/30/2017.  Preprocedure pain 7/10 post 2/10.  Bilateral L4-5 facet joint steroid injection 7/7/2017 with relief.  Preprocedure pain 8/10,  post 5/10.  Right L4-5 and L5-S1 TFESI 1/6/2020 with 30% lasting benefit only.  Preprocedure pain 10/10, post 7/10.  Bilateral L2, L3, L4 MBB 3/11/2020 and 7/7/2020 with positive response.  Bilateral L2, L3, L4 RFA 10/9/2020 with 90% significant relief x 8 months.  Right L4-5 TFESI 8/31/2021 with 100% relief acute right LBP and right lumbar radiculitis for 6 months, minimal relief with chronic bilateral LBP however.  Pre and post procedure pain 9/10.  Bilateral L2, L3, L4 RFA 10/19/2021 with significant relief.  Preprocedure pain 8/10, post 0/10.  Right SI joint steroid injection 5/6/2022 with minimal initial and no lasting benefit.  Preprocedure pain 10/10, post 5/10.  Postsurgical injections:  Right L5-S1 TFESI 8/24/2022 with resolution of right leg pain.  Ordered by neurosurgery.  Preprocedure pain 8/10, post 7/10.  Bilateral L2, L3, L4 RFA 10/12/2022 with significant relief x6 months.  Right L5-S1 TFESI 5/16/2023 with no lasting benefit.  Bilateral L2, L3, L4 RFA 6/1/2023 with no relief at 4-week postinjection.     Medications:  Meloxicam       Patient Active Problem List   Diagnosis     Dyslipidemia, goal LDL below 70     Hypertension     Diffuse Joint Pains (Arthralgias)     Joint Pain, Localized In The Wrist     Joint Pain, Localized In The Knee     Generalized Osteoarthritis Of The Hand     Osteoarthritis Of The Knee     Arthralgias In Multiple Sites     Paroxysmal supraventricular tachycardia (H)     Coronary artery disease involving native coronary artery of native heart with angina pectoris (H)     Dyspnea on exertion     Worsening angina (H)     Status post coronary angiogram     Abnormal findings on diagnostic imaging of heart and coronary circulation       Current Outpatient Medications   Medication     ALPRAZolam (XANAX) 0.5 MG tablet     aspirin (ASA) 81 MG chewable tablet     atorvastatin (LIPITOR) 20 MG tablet     celecoxib (CELEBREX) 200 MG capsule     irbesartan-hydrochlorothiazide (AVALIDE)  300-12.5 mg per tablet     metoprolol tartrate (LOPRESSOR) 50 MG tablet     multivitamin, therapeutic (THERA-VIT) TABS tablet     omeprazole (PRILOSEC) 20 MG capsule     PARoxetine (PAXIL) 20 MG tablet     zolpidem (AMBIEN) 10 mg tablet     No current facility-administered medications for this visit.       No Known Allergies    Past Medical History:   Diagnosis Date     HTN (hypertension)      Hyperlipidemia         Review of Systems  ROS:  Specifically negative for bowel/bladder dysfunction, balance changes, headache, dizziness, foot drop, fevers, chills, appetite changes, nausea/vomiting, unexplained weight loss. Otherwise 13 systems reviewed are negative. Please see the patient's intake questionnaire from today for details.    Reviewed Social, Family, Past Medical and Past Surgical history with patient, no significant changes noted since prior visit.     Objective:     /86   Pulse 75   PF 97 L/min     PHYSICAL EXAMINATION:    --CONSTITUTIONAL: Well developed, well nourished, healthy appearing individual.  --PSYCHIATRIC: Appropriate mood and affect. No difficulty interacting due to temper, social withdrawal, or memory issues.  --SKIN: Lumbar region is dry and intact.   --RESPIRATORY: Normal rhythm and effort. No abnormal accessory muscle breathing patterns noted.   --MUSCULOSKELETAL:  Normal lumbar lordosis noted, no lateral shift.  --GROSS MOTOR: Easily arises from a seated position. Gait is non-antalgic  --LUMBAR SPINE:  Inspection reveals no evidence of deformity. Range of motion is mildly limited in lumbar flexion, extension, lateral rotation.  Tenderness to palpation across the right low back.  Straight leg raising in the seated position is negative for radicular pain.  --SACROILIAC JOINT:  One Finger point test negative.  --LOWER EXTREMITY MOTOR TESTING:  Plantar flexion left 5/5, right 5/5   Dorsiflexion left 5/5, right 5/5   Great toe MTP extension left 5/5, right 5/5  Knee flexion left 5/5, right  5/5  Knee extension left 5/5, right 5/5   Hip flexion left 5/5, right 5/5  Hip abduction left 5/5, right 5/5  Hip adduction left 5/5, right 5/5   --NEUROLOGIC:  Sensation to light touch is intact in the bilateral L4, L5, and S1 dermatomes.    RESULTS:   Imaging: Lumbar spine imaging was reviewed today.                           Again, thank you for allowing me to participate in the care of your patient.        Sincerely,        Rocky Wolff, DO

## 2023-09-02 ENCOUNTER — HOSPITAL ENCOUNTER (OUTPATIENT)
Dept: MRI IMAGING | Facility: HOSPITAL | Age: 68
Discharge: HOME | End: 2023-09-02
Attending: PAIN MEDICINE | Admitting: PAIN MEDICINE
Payer: COMMERCIAL

## 2023-09-02 DIAGNOSIS — M96.1 FAILED BACK SURGICAL SYNDROME: ICD-10-CM

## 2023-09-02 DIAGNOSIS — M54.16 LUMBAR RADICULOPATHY: ICD-10-CM

## 2023-09-02 PROCEDURE — 72146 MRI CHEST SPINE W/O DYE: CPT

## 2023-09-05 ENCOUNTER — TELEPHONE (OUTPATIENT)
Dept: PHYSICAL MEDICINE AND REHAB | Facility: CLINIC | Age: 68
End: 2023-09-05
Payer: COMMERCIAL

## 2023-09-05 NOTE — TELEPHONE ENCOUNTER
----- Message from Rocky Wolff DO sent at 9/5/2023  7:00 AM CDT -----  Please call the patient and let him know that I reviewed MRI of his thoracic spine.  It does show some stenosis or narrowing around the spinal cord at T11-12, however does appear would be adequate for spinal cord stimulator trial if we do go this route.  Recommend that he keep your appointment for epidural steroid injection as well as behavioral health evaluation for spinal cord stimulator.

## 2023-09-05 NOTE — TELEPHONE ENCOUNTER
Call placed to patient with provider's results and recommendations.  Pt stated understanding. Pt scheduled for injection on 9/13 and behavioral health eval on 10/24 which he will proceed with.

## 2023-09-06 ENCOUNTER — TELEPHONE (OUTPATIENT)
Dept: INTERVENTIONAL RADIOLOGY/VASCULAR | Facility: CLINIC | Age: 68
End: 2023-09-06
Payer: COMMERCIAL

## 2023-09-13 ENCOUNTER — RADIOLOGY INJECTION OFFICE VISIT (OUTPATIENT)
Dept: PHYSICAL MEDICINE AND REHAB | Facility: CLINIC | Age: 68
End: 2023-09-13
Attending: PAIN MEDICINE
Payer: COMMERCIAL

## 2023-09-13 VITALS
TEMPERATURE: 97.9 F | RESPIRATION RATE: 16 BRPM | SYSTOLIC BLOOD PRESSURE: 132 MMHG | HEART RATE: 70 BPM | OXYGEN SATURATION: 98 % | DIASTOLIC BLOOD PRESSURE: 64 MMHG

## 2023-09-13 DIAGNOSIS — M54.16 LUMBAR RADICULOPATHY: ICD-10-CM

## 2023-09-13 PROCEDURE — 64483 NJX AA&/STRD TFRM EPI L/S 1: CPT | Mod: RT | Performed by: PAIN MEDICINE

## 2023-09-13 RX ORDER — DEXAMETHASONE SODIUM PHOSPHATE 10 MG/ML
INJECTION, SOLUTION INTRAMUSCULAR; INTRAVENOUS
Status: COMPLETED | OUTPATIENT
Start: 2023-09-13 | End: 2023-09-13

## 2023-09-13 RX ORDER — LIDOCAINE HYDROCHLORIDE 10 MG/ML
INJECTION, SOLUTION EPIDURAL; INFILTRATION; INTRACAUDAL; PERINEURAL
Status: COMPLETED | OUTPATIENT
Start: 2023-09-13 | End: 2023-09-13

## 2023-09-13 RX ADMIN — DEXAMETHASONE SODIUM PHOSPHATE 10 MG: 10 INJECTION, SOLUTION INTRAMUSCULAR; INTRAVENOUS at 13:25

## 2023-09-13 RX ADMIN — LIDOCAINE HYDROCHLORIDE 2 ML: 10 INJECTION, SOLUTION EPIDURAL; INFILTRATION; INTRACAUDAL; PERINEURAL at 13:24

## 2023-09-13 ASSESSMENT — PAIN SCALES - GENERAL
PAINLEVEL: SEVERE PAIN (7)
PAINLEVEL: MILD PAIN (3)

## 2023-09-13 NOTE — PATIENT INSTRUCTIONS
DISCHARGE INSTRUCTIONS    During office hours (8:00 a.m.- 4:00 p.m.) questions or concerns may be answered  by calling Spine Center Navigation Nurses at  [phone].  Messages received after hours will be returned the following business day.      In the case of an emergency, please dial 911 or seek assistance at the nearest Emergency Room/Urgent Care facility.     All Patients:    You may experience an increase in your symptoms for the first 2 days (It may take anywhere between 2 days- 2 weeks for the steroid to have maximum effect).    You may use ice on the injection site, as frequently as 20 minutes each hour if needed.    You may take your pain medicine.    You may continue taking your regular medication after your injection. If you have had a Medial Branch Block you may resume pain medication once your pain diary is completed.    You may shower. No swimming, tub bath or hot tub for 48 hours.  You may remove your bandaid/bandage as soon as you are home.    You may resume light activities, as tolerated.    Resume your usual diet as tolerated.    It is strongly advised that you do not drive for 1-3 hours post injection.    If you have had oral sedation:  Do not drive for 8 hours post injection.      If you have had IV sedation:  Do not drive for 24 hours post injection.  Do not operate hazardous machinery or make important personal/business decisions for 24 hours.      POSSIBLE STEROID SIDE EFFECTS (If steroid/cortisone was used for your procedure)    -If you experience these symptoms, it should only last for a short period    Swelling of the legs              Skin redness (flushing)     Mouth (oral) irritation   Blood sugar (glucose) levels            Sweats                    Mood changes  Headache  Sleeplessness  Weakened immune system for up to 14 days, which could increase the risk of navdeep the COVID-19 virus and/or experiencing more severe symptoms of the disease, if exposed.  Decreased  effectiveness of the flu vaccine if given within 2 weeks of the steroid.         POSSIBLE PROCEDURE SIDE EFFECTS  -Call the Spine Center if you are concerned  Increased Pain           Increased numbness/tingling      Nausea/Vomiting          Bruising/bleeding at site      Redness or swelling                                              Difficulty walking      Weakness           Fever greater than 100.5    *In the event of a severe headache after an epidural steroid injection that is relieved by lying down, please call the [facility] to speak with a clinical staff member*

## 2023-09-14 ENCOUNTER — HOSPITAL ENCOUNTER (OUTPATIENT)
Dept: RADIOLOGY | Facility: HOSPITAL | Age: 68
Discharge: HOME | End: 2023-09-14
Attending: SURGERY
Payer: COMMERCIAL

## 2023-09-14 ENCOUNTER — HOSPITAL ENCOUNTER (OUTPATIENT)
Dept: CT IMAGING | Facility: HOSPITAL | Age: 68
Discharge: HOME | End: 2023-09-14
Attending: SURGERY
Payer: COMMERCIAL

## 2023-09-14 VITALS
HEIGHT: 68 IN | BODY MASS INDEX: 30.92 KG/M2 | DIASTOLIC BLOOD PRESSURE: 58 MMHG | RESPIRATION RATE: 18 BRPM | SYSTOLIC BLOOD PRESSURE: 129 MMHG | TEMPERATURE: 98.1 F | OXYGEN SATURATION: 97 % | WEIGHT: 204 LBS | HEART RATE: 78 BPM

## 2023-09-14 DIAGNOSIS — M54.16 LUMBAR RADICULOPATHY: ICD-10-CM

## 2023-09-14 PROCEDURE — 62304 MYELOGRAPHY LUMBAR INJECTION: CPT

## 2023-09-14 PROCEDURE — 255N000002 HC RX 255 OP 636: Performed by: SURGERY

## 2023-09-14 PROCEDURE — 72132 CT LUMBAR SPINE W/DYE: CPT

## 2023-09-14 RX ADMIN — IOHEXOL 20 ML: 180 INJECTION INTRAVENOUS at 14:58

## 2023-09-14 NOTE — DISCHARGE INSTRUCTIONS
The contrast used during myelography is water-based and will be absorbed by your body and removed in your urine. It may cause some side effects including nausea, vomiting, headaches, and rarely a seizure. If a headache develops, it is usually mild by can be more severe, and can last a few days to a week.     The day of the myelogram:   1. A responsible adult must stay with you overnight. It is very important that someone is with you or is available if you become ill.   2. Do not drive or operate mechanical equipment.   3. Stay in bed with your head elevated 20-30 degrees (two pillows) for the rest of the day. You may get up to go to the bathroom, but immediately return to bed.   4. Refrain from excessive exertion and movements. Excessive movement increases the possibility of severity of the headache.   5. Return to your normal diet.   6. Drink plenty of fluids. This helps to flush the contrast through your kidneys. You are encouraged to drink several glasses of fluid each hour.   7. Do not drink alcoholic beverages.     The day after the myelogram:   1. You may return to normal daily activities, but avoid strenuous work or exercise.   2. If you develop a severe headache, lie absolutely flat for 4-6 hours. If it continues or worsens, please call your referring physician.    The radiologist will study and interpret the films, and will report these findings to your doctor. Your own personal doctor will explain the results with you.    If you have questions or concerns about the myelogram, you may call the [facility] Radiology Department listed below:    [facility]    [phone]

## 2023-09-14 NOTE — PROCEDURES
Neurointerventional Surgery    Procedure: Lumbar myelogram    Radiologist: Narinder Orozco MD    Contrast: 20 ml omnipaque 180  Fluoro Time: 0.5 minutes  Number of images: 2     EBL: Minimal  Complications: None    Preliminary findings: (see dictation for full detail)  Technically successful lumbar myelogram with lumbar puncture at L4-5.    Assess/Plan:   Routine post procedure monitoring  Bedrest for 1 hour    Narinder Orozco MD  Pager: [phone]  Emergency pager: [phone]  Office: [phone]

## 2023-09-14 NOTE — IR NOTE
Pt is stable at this time. He does not have any sxs beside pain of 5/10, which he said was tolerable.  Pt given discharge instructions and verbalized understanding. Pt left stable with his friend.

## 2023-09-18 ENCOUNTER — TELEPHONE (OUTPATIENT)
Dept: NEUROSURGERY | Facility: CLINIC | Age: 68
End: 2023-09-18
Payer: COMMERCIAL

## 2023-09-18 NOTE — TELEPHONE ENCOUNTER
Date: 9/18/2023  (Provide the date when patient was called)  Provider: Kamaljit  (with whom patient needs to schedule with)  Detailed message: LDVM for patient to call and follow up appt to review images.

## 2023-09-26 ENCOUNTER — OFFICE VISIT (OUTPATIENT)
Dept: NEUROSURGERY | Facility: CLINIC | Age: 68
End: 2023-09-26
Payer: COMMERCIAL

## 2023-09-26 VITALS — SYSTOLIC BLOOD PRESSURE: 128 MMHG | DIASTOLIC BLOOD PRESSURE: 61 MMHG | OXYGEN SATURATION: 96 % | HEART RATE: 71 BPM

## 2023-09-26 DIAGNOSIS — M54.16 LUMBAR RADICULOPATHY: Primary | ICD-10-CM

## 2023-09-26 PROCEDURE — 99213 OFFICE O/P EST LOW 20 MIN: CPT | Performed by: SURGERY

## 2023-09-26 ASSESSMENT — PAIN SCALES - GENERAL: PAINLEVEL: EXTREME PAIN (8)

## 2023-09-26 NOTE — PROGRESS NOTES
NEUROSURGERY FOLLOW UP  NOTE    Barry Jay comes today in follow-up.  Reviewed results of myelogram. Oes not appear to have signfciant comrpesion of L5. Symtpms in L5 distbution. Does not appear to be in L4. Right now he is gettigna round. Sleeping ok. Played golf recently. Went ok. Foot is feeling more numb with some giving out. Imgaging was reassuring. If symptoms in lumbar 4 distribution could consider decompressing that nerve. He wants to think about SCS versus continuing conservative management. He will follow up with new or worsening neurological symptoms.    Shannan Mari MD      CC:     Osmel Iniguez  [address]  [address] [place] [address]

## 2023-09-26 NOTE — LETTER
9/26/2023         RE: Barry Jay  [address] [facility] [address]        Dear Colleague,    Thank you for referring your patient, Barry Jay, to the [facility] SPINE AND NEUROSURGERY. Please see a copy of my visit note below.    NEUROSURGERY FOLLOW UP  NOTE    Barry Jay comes today in follow-up.  Reviewed results of myelogram. Oes not appear to have signfciant comrpesion of L5. Symtpms in L5 distbution. Does not appear to be in L4. Right now he is gettigna round. Sleeping ok. Played golf recently. Went ok. Foot is feeling more numb with some giving out. Imgaging was reassuring. If symptoms in lumbar 4 distribution could consider decompressing that nerve. He wants to think about SCS versus continuing conservative management. He will follow up with new or worsening neurological symptoms.    Shannan Mari MD      CC:     Osmel Iniguez  [address]  [address]  [place] [address]      Again, thank you for allowing me to participate in the care of your patient.        Sincerely,        Shannan Mari MD

## 2023-10-11 ENCOUNTER — OFFICE VISIT (OUTPATIENT)
Dept: PHYSICAL MEDICINE AND REHAB | Facility: CLINIC | Age: 68
End: 2023-10-11
Payer: COMMERCIAL

## 2023-10-11 VITALS — OXYGEN SATURATION: 98 % | DIASTOLIC BLOOD PRESSURE: 76 MMHG | SYSTOLIC BLOOD PRESSURE: 139 MMHG | HEART RATE: 81 BPM

## 2023-10-11 DIAGNOSIS — Z98.890 HISTORY OF LUMBAR SURGERY: ICD-10-CM

## 2023-10-11 DIAGNOSIS — M96.1 FAILED BACK SURGICAL SYNDROME: Primary | ICD-10-CM

## 2023-10-11 DIAGNOSIS — M54.16 LUMBAR RADICULOPATHY: ICD-10-CM

## 2023-10-11 PROCEDURE — 99214 OFFICE O/P EST MOD 30 MIN: CPT | Performed by: PAIN MEDICINE

## 2023-10-11 ASSESSMENT — PAIN SCALES - GENERAL: PAINLEVEL: EXTREME PAIN (8)

## 2023-10-11 NOTE — LETTER
10/11/2023         RE: Barry Jay  [address] [facility] [facility] ([place] [address]        Dear Colleague,    Thank you for referring your patient, Barry Jay, to the [facility]. Please see a copy of my visit note below.      Assessment:     Diagnoses and all orders for this visit:  Failed back surgical syndrome  Lumbar radiculopathy  History of lumbar surgery     Baryr Jay is a 68 year old y.o. male with past medical history significant for  hypertension, dyslipidemia, hand osteoarthritis, arthralgias, history lumbar surgery  who presents today for follow-up regarding low back and right lower extremity pain:    -Patient had 50% relief with his last epidural steroid injection, however relief is already waning.  We a long discussion about spinal cord stimulator trial and implant.  He would like to continue thinking about this possibility between now and when he sees Ashley Thomas in behavioral health.    PSP: Claudia Baptiste     Plan:     A shared decision making plan was used. The patient's values and choices were respected. Prior medical records from our last visit on 8/24/2023 were reviewed today. The following represents what was discussed and decided upon by the provider and the patient.        -DIAGNOSTIC TESTS: Images were personally reviewed and interpreted.   --Postsurgical lumbar spine MRI with and without IV contrast 8/12/2022 with right hemilaminectomy/medial facetectomy changes at L4-5 with improvement in nerve compression on the right lateral recess.  Persistent mild to moderate right and mild left foraminal stenosis L4-5.  L3-4 mild spinal stenosis with mild bilateral foraminal stenosis.  L5-S1 minimal foraminal stenosis.  --Lumbar spine MRI [facility] 5/10/2022 with transitional lumbosacral anatomy with partially sacralized L5.  L4-5 grade 1 spondylolisthesis, 3 mm, with annular bulging with advanced facet arthropathy with mild to moderate right and mild left foraminal  stenosis, right L4 nerve root impingement.  L3-4 1 to 2 mm spondylolisthesis with facet arthropathy with mild left greater than right foraminal stenosis.  --Lumbar spine MRI 1/24/2020 [facility] with advanced facet arthropathy L4-5 right greater than left and facet arthropathy L3-4 moderate bilaterally.  Grade 1 spondylolisthesis L4-5 with right greater than left L5 impingement.  --Lumbar spine flexion-extension x-ray 2/3/2020 was 0.6 cm spondylolisthesis L4-5, no instability noted.  --[facility] lumbar spine MRI 1/17/2019 shows severe facet arthropathy L4-5 with 5 mm spondylolisthesis, mild central and subarticular recess stenosis, mild right foraminal stenosis.  Mild facet arthropathy L3-4 and L5-S1.  -- MRI of lumbar spine dated 7/6/2023 at [facility] is personally reviewed images interpreted and discussed with the patient.  There is transitional anatomy at L5.  There is a prominent right L4-5 disc herniation.  At L5-S1 there is also a disc herniation that is more rightward.  There is no impingement.  There is moderate to severe facet arthropathy from L3-4 through L5-S1.  -- EMG nerve conduction study dated 8/3/2023 report is reviewed and shows right L4 versus L5 radiculopathy which is chronic.  -- CT myelogram dated 9/14/2023 is personally reviewed images interpreted and discussed with the patient.  There is multilevel spondylosis with a status post right L4-5 hemilaminectomy.  There is moderate to severe right and moderate left foraminal stenosis at L4-5.  There is mild to moderate left foraminal stenosis at L3-4.  There is mild to moderate spinal canal stenosis at L3-4 with mild spinal canal stenosis at L2-3.  The left L5 is in the sacralized.  -- MRI thoracic spine dated 9/2/2023 is personally reviewed images interpreted and discussed with the patient.  Does show mild to moderate spinal canal stenosis at T10-11 with mild canal stenosis at T11-12.  There is also foraminal stenosis at multilevels.    -INTERVENTIONS: After the  patient sees Ashley Thomas in behavioral health I will have one of our nurses give him a call to see if he would like to move forward with spinal cord stimulator trial with [employer] or hold off at this time.  Patient will need to hold aspirin for 7 days before the trial and during the trial.    -MEDICATIONS: No changes to medications.  -  Discussed side effects of medications and proper use. Patient verbalized understanding.    -PHYSICAL THERAPY: Recommended continue with home exercises on a consistent basis.        -PATIENT EDUCATION: We discussed pain management in a multiple fashion including physical therapy, medication management, possible future injections.  30 minutes of total time including face-to-face time and coordination of care/charting was taken today.    -FOLLOW UP: Patient will follow-up as needed.  Advised to contact clinic if symptoms worsen or change.    Subjective:     Barry Jay is a 68 year old male who presents today for follow-up regarding low back and right lower extremity pain.  Patient notes that he received 50% relief with his epidural steroid injection, however pain is already worsening.  He also met with Dr. Mari who do not have a surgical option for his pain at this time.  He notes that he is on the fence regarding the spinal cord stimulator trial and would like to think about this more.  He likes the idea of it, however does not like the idea of having something implanted into his body.  Pain continues to be on the right side with right low back and buttock pain.  He also has paresthesias in his right lower extremity.  He notes that he can stand for roughly 10 minutes before having to sit down.  He can walk for about 10 minutes and then even farther sometimes but if he sits down or lays down pain is improved.  Pain today is 8/10 at its worst is 9/10 as best as 5/10.  He continues to do physical therapy and exercises at the gym on an almost daily basis.  He is not taking  any medications for pain at this time.  He denies any bowel or bladder changes, fevers, chills, unintentional weight loss.    Treatment to Date:   Right L4-5 hemilaminectomy, medial facetectomy, foraminotomy Dr. Mari 6/27/2022  Physical therapy [facility] x3 sessions.  Patient continues with home exercises at this time on a daily basis.     History of bilateral L4-5 RFA [place] [facility] Dr. Green 2011 and 2013, relief at least 2 years.  Bilateral L3-4 MBB 6/30/2017.  Preprocedure pain 7/10 post 2/10.  Bilateral L4-5 facet joint steroid injection 7/7/2017 with relief.  Preprocedure pain 8/10, post 5/10.  Right L4-5 and L5-S1 TFESI 1/6/2020 with 30% lasting benefit only.  Preprocedure pain 10/10, post 7/10.  Bilateral L2, L3, L4 MBB 3/11/2020 and 7/7/2020 with positive response.  Bilateral L2, L3, L4 RFA 10/9/2020 with 90% significant relief x 8 months.  Right L4-5 TFESI 8/31/2021 with 100% relief acute right LBP and right lumbar radiculitis for 6 months, minimal relief with chronic bilateral LBP however.  Pre and post procedure pain 9/10.  Bilateral L2, L3, L4 RFA 10/19/2021 with significant relief.  Preprocedure pain 8/10, post 0/10.  Right SI joint steroid injection 5/6/2022 with minimal initial and no lasting benefit.  Preprocedure pain 10/10, post 5/10.  Postsurgical injections:  Right L5-S1 TFESI 8/24/2022 with resolution of right leg pain.  Ordered by neurosurgery.  Preprocedure pain 8/10, post 7/10.  Bilateral L2, L3, L4 RFA 10/12/2022 with significant relief x6 months.  Right L5-S1 TFESI 5/16/2023 with no lasting benefit.  Bilateral L2, L3, L4 RFA 6/1/2023 with no relief at 4-week postinjection.  Right L4-5 transforaminal epidural steroid injection done on 9/13/2023.     Medications:  Meloxicam    Patient Active Problem List   Diagnosis     Dyslipidemia, goal LDL below 70     Hypertension     Diffuse Joint Pains (Arthralgias)     Joint Pain, Localized In The Wrist     Joint Pain, Localized In The Knee      Generalized Osteoarthritis Of The Hand     Osteoarthritis Of The Knee     Arthralgias In Multiple Sites     Paroxysmal supraventricular tachycardia     Coronary artery disease involving native coronary artery of native heart with angina pectoris (H24)     Dyspnea on exertion     Worsening angina (H)     Status post coronary angiogram     Abnormal findings on diagnostic imaging of heart and coronary circulation       Current Outpatient Medications   Medication     ALPRAZolam (XANAX) 0.5 MG tablet     aspirin (ASA) 81 MG chewable tablet     atorvastatin (LIPITOR) 20 MG tablet     celecoxib (CELEBREX) 200 MG capsule     irbesartan-hydrochlorothiazide (AVALIDE) 300-12.5 mg per tablet     metoprolol tartrate (LOPRESSOR) 50 MG tablet     multivitamin, therapeutic (THERA-VIT) TABS tablet     omeprazole (PRILOSEC) 20 MG capsule     PARoxetine (PAXIL) 20 MG tablet     zolpidem (AMBIEN) 10 mg tablet     No current facility-administered medications for this visit.       No Known Allergies    Past Medical History:   Diagnosis Date     HTN (hypertension)      Hyperlipidemia         Review of Systems  ROS:  Specifically negative for bowel/bladder dysfunction, balance changes, headache, dizziness, foot drop, fevers, chills, appetite changes, nausea/vomiting, unexplained weight loss. Otherwise 13 systems reviewed are negative. Please see the patient's intake questionnaire from today for details.    Reviewed Social, Family, Past Medical and Past Surgical history with patient, no significant changes noted since prior visit.     Objective:     /76   Pulse 81   SpO2 98%     PHYSICAL EXAMINATION:    --CONSTITUTIONAL: Well developed, well nourished, healthy appearing individual.  --PSYCHIATRIC: Appropriate mood and affect. No difficulty interacting due to temper, social withdrawal, or memory issues.  --RESPIRATORY: Normal rhythm and effort. No abnormal accessory muscle breathing patterns noted.   --MUSCULOSKELETAL:  Normal lumbar  lordosis noted, no lateral shift.  --GROSS MOTOR: Easily arises from a seated position. Gait is non-antalgic  --LUMBAR SPINE:  Inspection reveals no evidence of deformity. Range of motion is mildly limited in lumbar flexion, extension, lateral rotation.  --SACROILIAC JOINT: One Finger point test negative.  --LOWER EXTREMITY MOTOR TESTING:  Plantar flexion left 5/5, right 5/5   Dorsiflexion left 5/5, right 5/5   Great toe MTP extension left 5/5, right 5/5  Knee flexion left 5/5, right 5/5  Knee extension left 5/5, right 5/5   Hip flexion left 5/5, right 5/5  Hip abduction left 5/5, right 5/5  Hip adduction left 5/5, right 5/5   --NEUROLOGIC:  Sensation to light touch is intact in the bilateral L4, L5, and S1 dermatomes.    RESULTS:   Imaging: Lumbar spine imaging was reviewed today. The images were shown to the patient and the findings were explained using a spine model.    CT Lumbar Spine w Contrast    Result Date: 9/15/2023  EXAM: CT LUMBAR SPINE W CONTRAST LOCATION: [facility] DATE: 9/14/2023 INDICATION: Low back pain; hx spine surgery; no r o hardware failure; new acute radiculopathy; no known automatically detected potential contraindications to imaging. COMPARISON: Lumbar spine MRI 7/3/2023. CONTRAST: Iohexol (Omnipaque) 180 mg mL injection 20 mL TECHNIQUE: Routine CT Lumbar Spine with intrathecal contrast administered in a separate procedure. Multiplanar reformats. Dose reduction techniques were used. FINDINGS: There is excellent opacification of the lumbar subarachnoid space with intrathecal contrast. Counting down from the presumed 12th ribs, there are 5 lumbar-type vertebrae including a normal variant transitional lumbosacral junction segment designated as a left hemisacralized L5 with a well-formed L5-S1 disc. The tip of the conus medullaris is at L1-L2. The cauda equina nerve roots and visualized lower thoracic spinal cord are within normal limits. Normal alignment of the lumbar  vertebrae. Normal lumbar lordosis. No fracture, destructive bone lesion or infection. Cholecystectomy. Calcified rafi hepatis lymph nodes, consistent with sequelae of prior granulomatous disease. Postsurgical changes of right hemilaminectomy at L4-L5. Moderate asymmetric right dorsal paraspinous muscular atrophy L4-S1. The visualized bony pelvis is unremarkable. Findings on a level-by-level basis are as follows: T12-L1: Normal disc height. No disc herniation. Mild facet arthrosis. No significant spinal canal or neural foraminal stenosis. L1-L2: Normal disc height. No disc herniation. Mild facet arthrosis and ligamentum flavum thickening. No significant spinal canal or neural foraminal stenosis. L2-L3: Minimal disc height loss. Shallow disc bulge. No disc herniation. Facet arthrosis and ligamentum flavum thickening. Mild bilateral neural foraminal stenosis. Mild spinal canal stenosis. L3-L4: Mild to moderate disc height loss. Loss of disc signal. Disc bulge. No disc herniation. Facet arthrosis and ligamentum flavum thickening. Mild to moderate left and mild right neural foraminal stenosis. Mild to moderate spinal canal stenosis. L4-L5: Right hemilaminectomy defect. Mild to moderate disc height loss. Vacuum disc phenomenon. Eccentric right disc bulge. No disc herniation. Facet arthrosis. Moderate to severe right and moderate left neural foraminal stenosis. No significant spinal canal stenosis. L5-S1: Mild disc height loss. Shallow disc bulge. No disc herniation. Facet arthrosis. Mild bilateral neural foraminal stenosis. No significant spinal canal stenosis.     IMPRESSION: 1.  Multilevel lumbar spondylosis status post right L4-L5 hemilaminectomy. 2.  Moderate to severe right and moderate left neural foraminal stenosis at L4-L5. Mild to moderate left neural foraminal stenosis at L3-L4. 3.  Mild to moderate spinal canal stenosis at L3-L4. Mild spinal canal stenosis at L2-L3. 4.  Wide surgical decompression of the  spinal canal at L4-L5. 5.  Normal variant transitional lumbosacral junction anatomy. Recommend close correlation with plain films prior to any intervention.    XR Myelography Lumbar Spine    Result Date: 9/15/2023  [facility] NEUROINTER[facility]GERY [facility] 9/14/2023 2:55 PM CDT FLUOROSCOPICALLY-GUIDED LUMBAR PUNCTURE FOR CT MYELOGRAPHY INDICATION: History of lumbar radiculopathy. Myelography has been requested by the patient's surgeon for further characterization of spinal anatomy and degenerative changes for treatment planning. CONSENT: The procedure and its indications, major risks, benefits, and alternatives were discussed. Risks including, but not limited to, pain, headache, hemorrhage, infection, nerve damage, spinal cord damage, seizure and allergic reaction were discussed. Understanding was acknowledged and a signed informed consent was obtained. FLUOROSCOPIC TIME: 0.5 minutes (2 images) DOSE: Air Kerma: 9.6 mGy ESTIMATED BLOOD LOSS: Minimal PERFORMING PHYSICIAN(S): Narinder Orozco M.D. PROCEDURE: The patient was placed in the prone position upon the fluoroscopic table. The skin of the back was prepped and draped in sterile fashion. Using fluoroscopic guidance, the L4-5 level was localized. The skin was infiltrated with 1% lidocaine. Using direct fluoroscopic visualization and a posterior interlaminar approach, a 22 gauge spinal needle was advanced into the thecal sac at the L4-5 level. 20 cc of Omnipaque 180 was then injected under fluoroscopic monitoring to confirm subarachnoid location. With good opacification confirmed, the needle was then removed. A dressing was applied. The patient was then positioned to promote the collection of contrast in the area of interest. The procedure appeared well-tolerated. There was no apparent complication. The patient was then sent for CT myelography. FINDINGS: Fluoroscopy demonstrates appropriate needle tip position and good  opacification of the lumbar subarachnoid space.     CONCLUSION: 1. Fluoroscopically-guided lumbar puncture for CT myelography. 2. Fluoroscopy demonstrates good opacification of the lumbar subarachnoid space. 3. Please see CT myelogram report. _____________________________________________ Narinder Orozco M.D. Neurointerventional Surgery [facility] Office: [phone] Pager: [phone] Cell: [phone] CPT codes included for physician reference only: 69765    PAIN Transforaminal REGI Inj Lumbosacral One Level Right    Result Date: 9/13/2023  LUMBAR EPIDURAL STEROID INJECTION TRANSFORAMINAL APPROACH WITH FLUOROSCOPIC GUIDANCE Performed on: 9/13/23 Pre Procedure Diagnosis:  LBP, Lumbar radiculitis Post Procedure Diagnosis:  Same Procedure Performed:  Lumbar Transforaminal Epidural Steroid Injection with Fluoroscopic Guidance Clinical Scenario:  As per office notes Anesthesia/Fluids:  As per intra-procedure documentation Vital Signs:  As per intra-procedure documentation Level Injected: L4-5 Side Injected: Right CC: Barry Jay  is a 68 year-old male who presents today for a right L4-5 transforaminal epidural steroid injection as ordered by Dr. Wolff.  The patient complains of right lower extremity pain.    Lumbar MRI was reviewed today.  The patient wanted to proceed with the injection today.  The patient denies feeling ill today or having an active infection (denies taking antibiotics).  The patient does not take any prescription blood thinning medications.  The patient denies any allergies to contrast.    The procedure of epidural steroid injection was discussed in detail along with the attendant risks, including but not limited to: infection, bleeding, nerve injury, paralysis.   The patient's questions were answered and they understood the information given.   The patient elected to proceed and signed informed consent.  . The patient was placed in a prone position on the fluoroscopy table. A  "procedural pause was conducted to verify: correct patient identity, procedure to be performed and as applicable, correct side and site, correct patient position, and availability of implants, special equipment and special requirements.  The lower back was prepped and draped in usual fashion.  After anesthetizing the skin with 1% lidocaine, a 5\", 22 gauge needle was introduced at the Right L4 pedicle under fluoroscopic guidance.  After aspiration was negative, 1 mL of  Omnipaque 300 was injected under continuous fluoroscopy.  Epidural flow without vascular uptake was seen.  1 mL of 1% lidocaine was injected as a test dose. After an appropriate period of observation, a directed neurological exam was performed which revealed no new neurologic deficits.    Subsequently, 10 mgs of Dexamethasone was slowly injected. Following the injection the needle was withdrawn slightly and flushed with local anesthetic as it was fully extracted.  The patient tolerated the procedure well and there were no apparent complications.  The patient did not develop any new neurologic deficits.  After appropriate observation, the patient was dismissed in good condition under their own power. PRE-PROCEDURE PAIN SCORE:  7/10 POST-PROCEDURE PAIN SCORE:  3/10 The patient tolerated the procedure well.  The patient was instructed to call the [facility] if any questions or concerns arise after the procedure. After a short period of observation the patient was discharged.  Patient will follow-up with Dr. Wolff in 2 to 4 weeks.                            Again, thank you for allowing me to participate in the care of your patient.        Sincerely,        Rocky Wolff, DO  "

## 2023-10-11 NOTE — PROGRESS NOTES
Assessment:     Diagnoses and all orders for this visit:  Failed back surgical syndrome  Lumbar radiculopathy  History of lumbar surgery     Barry Jay is a 68 year old y.o. male with past medical history significant for  hypertension, dyslipidemia, hand osteoarthritis, arthralgias, history lumbar surgery  who presents today for follow-up regarding low back and right lower extremity pain:    -Patient had 50% relief with his last epidural steroid injection, however relief is already waning.  We a long discussion about spinal cord stimulator trial and implant.  He would like to continue thinking about this possibility between now and when he sees Ashley Thomas in behavioral [facility].    PSP: Claudia Baptiste     Plan:     A shared decision making plan was used. The patient's values and choices were respected. Prior medical records from our last visit on 8/24/2023 were reviewed today. The following represents what was discussed and decided upon by the provider and the patient.        -DIAGNOSTIC TESTS: Images were personally reviewed and interpreted.   --Postsurgical lumbar spine MRI with and without IV contrast 8/12/2022 with right hemilaminectomy/medial facetectomy changes at L4-5 with improvement in nerve compression on the right lateral recess.  Persistent mild to moderate right and mild left foraminal stenosis L4-5.  L3-4 mild spinal stenosis with mild bilateral foraminal stenosis.  L5-S1 minimal foraminal stenosis.  --Lumbar spine MRI [facility] 5/10/2022 with transitional lumbosacral anatomy with partially sacralized L5.  L4-5 grade 1 spondylolisthesis, 3 mm, with annular bulging with advanced facet arthropathy with mild to moderate right and mild left foraminal stenosis, right L4 nerve root impingement.  L3-4 1 to 2 mm spondylolisthesis with facet arthropathy with mild left greater than right foraminal stenosis.  --Lumbar spine MRI 1/24/2020 [facility] with advanced facet arthropathy L4-5 right greater than left and facet  arthropathy L3-4 moderate bilaterally.  Grade 1 spondylolisthesis L4-5 with right greater than left L5 impingement.  --Lumbar spine flexion-extension x-ray 2/3/2020 was 0.6 cm spondylolisthesis L4-5, no instability noted.  --[facility] lumbar spine MRI 1/17/2019 shows severe facet arthropathy L4-5 with 5 mm spondylolisthesis, mild central and subarticular recess stenosis, mild right foraminal stenosis.  Mild facet arthropathy L3-4 and L5-S1.  -- MRI of lumbar spine dated 7/6/2023 at [facility] is personally reviewed images interpreted and discussed with the patient.  There is transitional anatomy at L5.  There is a prominent right L4-5 disc herniation.  At L5-S1 there is also a disc herniation that is more rightward.  There is no impingement.  There is moderate to severe facet arthropathy from L3-4 through L5-S1.  -- EMG nerve conduction study dated 8/3/2023 report is reviewed and shows right L4 versus L5 radiculopathy which is chronic.  -- CT myelogram dated 9/14/2023 is personally reviewed images interpreted and discussed with the patient.  There is multilevel spondylosis with a status post right L4-5 hemilaminectomy.  There is moderate to severe right and moderate left foraminal stenosis at L4-5.  There is mild to moderate left foraminal stenosis at L3-4.  There is mild to moderate spinal canal stenosis at L3-4 with mild spinal canal stenosis at L2-3.  The left L5 is in the sacralized.  -- MRI thoracic spine dated 9/2/2023 is personally reviewed images interpreted and discussed with the patient.  Does show mild to moderate spinal canal stenosis at T10-11 with mild canal stenosis at T11-12.  There is also foraminal stenosis at multilevels.    -INTERVENTIONS: After the patient sees Ashley Thomas in behavioral health I will have one of our nurses give him a call to see if he would like to move forward with spinal cord stimulator trial with [employer] or hold off at this time.  Patient will need to hold aspirin for 7 days  before the trial and during the trial.    -MEDICATIONS: No changes to medications.  -  Discussed side effects of medications and proper use. Patient verbalized understanding.    -PHYSICAL THERAPY: Recommended continue with home exercises on a consistent basis.        -PATIENT EDUCATION: We discussed pain management in a multiple fashion including physical therapy, medication management, possible future injections.  30 minutes of total time including face-to-face time and coordination of care/charting was taken today.    -FOLLOW UP: Patient will follow-up as needed.  Advised to contact clinic if symptoms worsen or change.    Subjective:     Barry Jay is a 68 year old male who presents today for follow-up regarding low back and right lower extremity pain.  Patient notes that he received 50% relief with his epidural steroid injection, however pain is already worsening.  He also met with Dr. Mari who do not have a surgical option for his pain at this time.  He notes that he is on the fence regarding the spinal cord stimulator trial and would like to think about this more.  He likes the idea of it, however does not like the idea of having something implanted into his body.  Pain continues to be on the right side with right low back and buttock pain.  He also has paresthesias in his right lower extremity.  He notes that he can stand for roughly 10 minutes before having to sit down.  He can walk for about 10 minutes and then even farther sometimes but if he sits down or lays down pain is improved.  Pain today is 8/10 at its worst is 9/10 as best as 5/10.  He continues to do physical therapy and exercises at the gym on an almost daily basis.  He is not taking any medications for pain at this time.  He denies any bowel or bladder changes, fevers, chills, unintentional weight loss.    Treatment to Date:   Right L4-5 hemilaminectomy, medial facetectomy, foraminotomy Dr. Mari 6/27/2022  Physical therapy [facility] x3  sessions.  Patient continues with home exercises at this time on a daily basis.     History of bilateral L4-5 RFA [place] [facility] Dr. Green 2011 and 2013, relief at least 2 years.  Bilateral L3-4 MBB 6/30/2017.  Preprocedure pain 7/10 post 2/10.  Bilateral L4-5 facet joint steroid injection 7/7/2017 with relief.  Preprocedure pain 8/10, post 5/10.  Right L4-5 and L5-S1 TFESI 1/6/2020 with 30% lasting benefit only.  Preprocedure pain 10/10, post 7/10.  Bilateral L2, L3, L4 MBB 3/11/2020 and 7/7/2020 with positive response.  Bilateral L2, L3, L4 RFA 10/9/2020 with 90% significant relief x 8 months.  Right L4-5 TFESI 8/31/2021 with 100% relief acute right LBP and right lumbar radiculitis for 6 months, minimal relief with chronic bilateral LBP however.  Pre and post procedure pain 9/10.  Bilateral L2, L3, L4 RFA 10/19/2021 with significant relief.  Preprocedure pain 8/10, post 0/10.  Right SI joint steroid injection 5/6/2022 with minimal initial and no lasting benefit.  Preprocedure pain 10/10, post 5/10.  Postsurgical injections:  Right L5-S1 TFESI 8/24/2022 with resolution of right leg pain.  Ordered by neurosurgery.  Preprocedure pain 8/10, post 7/10.  Bilateral L2, L3, L4 RFA 10/12/2022 with significant relief x6 months.  Right L5-S1 TFESI 5/16/2023 with no lasting benefit.  Bilateral L2, L3, L4 RFA 6/1/2023 with no relief at 4-week postinjection.  Right L4-5 transforaminal epidural steroid injection done on 9/13/2023.     Medications:  Meloxicam    Patient Active Problem List   Diagnosis    Dyslipidemia, goal LDL below 70    Hypertension    Diffuse Joint Pains (Arthralgias)    Joint Pain, Localized In The Wrist    Joint Pain, Localized In The Knee    Generalized Osteoarthritis Of The Hand    Osteoarthritis Of The Knee    Arthralgias In Multiple Sites    Paroxysmal supraventricular tachycardia    Coronary artery disease involving native coronary artery of native heart with angina pectoris (H24)    Dyspnea on exertion     Worsening angina (H)    Status post coronary angiogram    Abnormal findings on diagnostic imaging of heart and coronary circulation       Current Outpatient Medications   Medication    ALPRAZolam (XANAX) 0.5 MG tablet    aspirin (ASA) 81 MG chewable tablet    atorvastatin (LIPITOR) 20 MG tablet    celecoxib (CELEBREX) 200 MG capsule    irbesartan-hydrochlorothiazide (AVALIDE) 300-12.5 mg per tablet    metoprolol tartrate (LOPRESSOR) 50 MG tablet    multivitamin, therapeutic (THERA-VIT) TABS tablet    omeprazole (PRILOSEC) 20 MG capsule    PARoxetine (PAXIL) 20 MG tablet    zolpidem (AMBIEN) 10 mg tablet     No current facility-administered medications for this visit.       No Known Allergies    Past Medical History:   Diagnosis Date    HTN (hypertension)     Hyperlipidemia         Review of Systems  ROS:  Specifically negative for bowel/bladder dysfunction, balance changes, headache, dizziness, foot drop, fevers, chills, appetite changes, nausea/vomiting, unexplained weight loss. Otherwise 13 systems reviewed are negative. Please see the patient's intake questionnaire from today for details.    Reviewed Social, Family, Past Medical and Past Surgical history with patient, no significant changes noted since prior visit.     Objective:     /76   Pulse 81   SpO2 98%     PHYSICAL EXAMINATION:    --CONSTITUTIONAL: Well developed, well nourished, healthy appearing individual.  --PSYCHIATRIC: Appropriate mood and affect. No difficulty interacting due to temper, social withdrawal, or memory issues.  --RESPIRATORY: Normal rhythm and effort. No abnormal accessory muscle breathing patterns noted.   --MUSCULOSKELETAL:  Normal lumbar lordosis noted, no lateral shift.  --GROSS MOTOR: Easily arises from a seated position. Gait is non-antalgic  --LUMBAR SPINE:  Inspection reveals no evidence of deformity. Range of motion is mildly limited in lumbar flexion, extension, lateral rotation.  --SACROILIAC JOINT: One Finger  point test negative.  --LOWER EXTREMITY MOTOR TESTING:  Plantar flexion left 5/5, right 5/5   Dorsiflexion left 5/5, right 5/5   Great toe MTP extension left 5/5, right 5/5  Knee flexion left 5/5, right 5/5  Knee extension left 5/5, right 5/5   Hip flexion left 5/5, right 5/5  Hip abduction left 5/5, right 5/5  Hip adduction left 5/5, right 5/5   --NEUROLOGIC:  Sensation to light touch is intact in the bilateral L4, L5, and S1 dermatomes.    RESULTS:   Imaging: Lumbar spine imaging was reviewed today. The images were shown to the patient and the findings were explained using a spine model.    CT Lumbar Spine w Contrast    Result Date: 9/15/2023  EXAM: CT LUMBAR SPINE W CONTRAST LOCATION: [facility] DATE: 9/14/2023 INDICATION: Low back pain; hx spine surgery; no r o hardware failure; new acute radiculopathy; no known automatically detected potential contraindications to imaging. COMPARISON: Lumbar spine MRI 7/3/2023. CONTRAST: Iohexol (Omnipaque) 180 mg mL injection 20 mL TECHNIQUE: Routine CT Lumbar Spine with intrathecal contrast administered in a separate procedure. Multiplanar reformats. Dose reduction techniques were used. FINDINGS: There is excellent opacification of the lumbar subarachnoid space with intrathecal contrast. Counting down from the presumed 12th ribs, there are 5 lumbar-type vertebrae including a normal variant transitional lumbosacral junction segment designated as a left hemisacralized L5 with a well-formed L5-S1 disc. The tip of the conus medullaris is at L1-L2. The cauda equina nerve roots and visualized lower thoracic spinal cord are within normal limits. Normal alignment of the lumbar vertebrae. Normal lumbar lordosis. No fracture, destructive bone lesion or infection. Cholecystectomy. Calcified rafi hepatis lymph nodes, consistent with sequelae of prior granulomatous disease. Postsurgical changes of right hemilaminectomy at L4-L5. Moderate asymmetric right dorsal  paraspinous muscular atrophy L4-S1. The visualized bony pelvis is unremarkable. Findings on a level-by-level basis are as follows: T12-L1: Normal disc height. No disc herniation. Mild facet arthrosis. No significant spinal canal or neural foraminal stenosis. L1-L2: Normal disc height. No disc herniation. Mild facet arthrosis and ligamentum flavum thickening. No significant spinal canal or neural foraminal stenosis. L2-L3: Minimal disc height loss. Shallow disc bulge. No disc herniation. Facet arthrosis and ligamentum flavum thickening. Mild bilateral neural foraminal stenosis. Mild spinal canal stenosis. L3-L4: Mild to moderate disc height loss. Loss of disc signal. Disc bulge. No disc herniation. Facet arthrosis and ligamentum flavum thickening. Mild to moderate left and mild right neural foraminal stenosis. Mild to moderate spinal canal stenosis. L4-L5: Right hemilaminectomy defect. Mild to moderate disc height loss. Vacuum disc phenomenon. Eccentric right disc bulge. No disc herniation. Facet arthrosis. Moderate to severe right and moderate left neural foraminal stenosis. No significant spinal canal stenosis. L5-S1: Mild disc height loss. Shallow disc bulge. No disc herniation. Facet arthrosis. Mild bilateral neural foraminal stenosis. No significant spinal canal stenosis.     IMPRESSION: 1.  Multilevel lumbar spondylosis status post right L4-L5 hemilaminectomy. 2.  Moderate to severe right and moderate left neural foraminal stenosis at L4-L5. Mild to moderate left neural foraminal stenosis at L3-L4. 3.  Mild to moderate spinal canal stenosis at L3-L4. Mild spinal canal stenosis at L2-L3. 4.  Wide surgical decompression of the spinal canal at L4-L5. 5.  Normal variant transitional lumbosacral junction anatomy. Recommend close correlation with plain films prior to any intervention.    XR Myelography Lumbar Spine    Result Date: 9/15/2023  [facility] NEUROINTERVENTIONAL SURGERY [place]  RADIOLOGY 9/14/2023 2:55 PM CDT FLUOROSCOPICALLY-GUIDED LUMBAR PUNCTURE FOR CT MYELOGRAPHY INDICATION: History of lumbar radiculopathy. Myelography has been requested by the patient's surgeon for further characterization of spinal anatomy and degenerative changes for treatment planning. CONSENT: The procedure and its indications, major risks, benefits, and alternatives were discussed. Risks including, but not limited to, pain, headache, hemorrhage, infection, nerve damage, spinal cord damage, seizure and allergic reaction were discussed. Understanding was acknowledged and a signed informed consent was obtained. FLUOROSCOPIC TIME: 0.5 minutes (2 images) DOSE: Air Kerma: 9.6 mGy ESTIMATED BLOOD LOSS: Minimal PERFORMING PHYSICIAN(S): Narinder Orozco M.D. PROCEDURE: The patient was placed in the prone position upon the fluoroscopic table. The skin of the back was prepped and draped in sterile fashion. Using fluoroscopic guidance, the L4-5 level was localized. The skin was infiltrated with 1% lidocaine. Using direct fluoroscopic visualization and a posterior interlaminar approach, a 22 gauge spinal needle was advanced into the thecal sac at the L4-5 level. 20 cc of Omnipaque 180 was then injected under fluoroscopic monitoring to confirm subarachnoid location. With good opacification confirmed, the needle was then removed. A dressing was applied. The patient was then positioned to promote the collection of contrast in the area of interest. The procedure appeared well-tolerated. There was no apparent complication. The patient was then sent for CT myelography. FINDINGS: Fluoroscopy demonstrates appropriate needle tip position and good opacification of the lumbar subarachnoid space.     CONCLUSION: 1. Fluoroscopically-guided lumbar puncture for CT myelography. 2. Fluoroscopy demonstrates good opacification of the lumbar subarachnoid space. 3. Please see CT myelogram report. _____________________________________________ Narinder  KRISTIAN Orozco. Neurointerventional Surgery [facility] Office: [phone] Pager: [phone] Cell: [phone] CPT codes included for physician reference only: 39773    PAIN Transforaminal REGI Inj Lumbosacral One Level Right    Result Date: 9/13/2023  LUMBAR EPIDURAL STEROID INJECTION TRANSFORAMINAL APPROACH WITH FLUOROSCOPIC GUIDANCE Performed on: 9/13/23 Pre Procedure Diagnosis:  LBP, Lumbar radiculitis Post Procedure Diagnosis:  Same Procedure Performed:  Lumbar Transforaminal Epidural Steroid Injection with Fluoroscopic Guidance Clinical Scenario:  As per office notes Anesthesia/Fluids:  As per intra-procedure documentation Vital Signs:  As per intra-procedure documentation Level Injected: L4-5 Side Injected: Right CC: Barry Jay  is a 68 year-old male who presents today for a right L4-5 transforaminal epidural steroid injection as ordered by Dr. Wolff.  The patient complains of right lower extremity pain.    Lumbar MRI was reviewed today.  The patient wanted to proceed with the injection today.  The patient denies feeling ill today or having an active infection (denies taking antibiotics).  The patient does not take any prescription blood thinning medications.  The patient denies any allergies to contrast.    The procedure of epidural steroid injection was discussed in detail along with the attendant risks, including but not limited to: infection, bleeding, nerve injury, paralysis.   The patient's questions were answered and they understood the information given.   The patient elected to proceed and signed informed consent.  . The patient was placed in a prone position on the fluoroscopy table. A procedural pause was conducted to verify: correct patient identity, procedure to be performed and as applicable, correct side and site, correct patient position, and availability of implants, special equipment and special requirements.  The lower back was prepped and draped in usual fashion.   "After anesthetizing the skin with 1% lidocaine, a 5\", 22 gauge needle was introduced at the Right L4 pedicle under fluoroscopic guidance.  After aspiration was negative, 1 mL of  Omnipaque 300 was injected under continuous fluoroscopy.  Epidural flow without vascular uptake was seen.  1 mL of 1% lidocaine was injected as a test dose. After an appropriate period of observation, a directed neurological exam was performed which revealed no new neurologic deficits.    Subsequently, 10 mgs of Dexamethasone was slowly injected. Following the injection the needle was withdrawn slightly and flushed with local anesthetic as it was fully extracted.  The patient tolerated the procedure well and there were no apparent complications.  The patient did not develop any new neurologic deficits.  After appropriate observation, the patient was dismissed in good condition under their own power. PRE-PROCEDURE PAIN SCORE:  7/10 POST-PROCEDURE PAIN SCORE:  3/10 The patient tolerated the procedure well.  The patient was instructed to call the [facility] if any questions or concerns arise after the procedure. After a short period of observation the patient was discharged.  Patient will follow-up with Dr. Wolff in 2 to 4 weeks.                          "

## 2023-10-11 NOTE — PATIENT INSTRUCTIONS
After you have met with Ashley Thomas I will have one of our nurses give you a call to see if he would like to move forward with a spinal cord stimulator trial or not.  You could always determine to do this in the future also if now is not the right time.    ~Please call Nurse Navigation line [phone] with any questions or concerns about your treatment plan, if symptoms worsen and you would like to be seen urgently, or if you have problems controlling bladder and bowel function.

## 2024-03-12 DIAGNOSIS — E78.5 DYSLIPIDEMIA, GOAL LDL BELOW 70: ICD-10-CM

## 2024-03-12 RX ORDER — ATORVASTATIN CALCIUM 20 MG/1
20 TABLET, FILM COATED ORAL AT BEDTIME
Qty: 90 TABLET | Refills: 0 | Status: SHIPPED | OUTPATIENT
Start: 2024-03-12 | End: 2024-06-07

## 2024-03-12 NOTE — TELEPHONE ENCOUNTER
Pt last seen 3/03/23 - rec 6 mo follow-up.  90 day supply sent.  Follow-up order extended.  Message sent to scheduling.  -ral

## 2024-04-26 ENCOUNTER — MEDICAL CORRESPONDENCE (OUTPATIENT)
Dept: HEALTH INFORMATION MANAGEMENT | Facility: CLINIC | Age: 69
End: 2024-04-26
Payer: COMMERCIAL

## 2024-04-26 ENCOUNTER — LAB REQUISITION (OUTPATIENT)
Dept: LAB | Facility: CLINIC | Age: 69
End: 2024-04-26

## 2024-04-26 ENCOUNTER — TRANSFERRED RECORDS (OUTPATIENT)
Dept: HEALTH INFORMATION MANAGEMENT | Facility: CLINIC | Age: 69
End: 2024-04-26
Payer: COMMERCIAL

## 2024-04-26 DIAGNOSIS — E78.2 MIXED HYPERLIPIDEMIA: ICD-10-CM

## 2024-04-26 DIAGNOSIS — D69.6 THROMBOCYTOPENIA, UNSPECIFIED (H): ICD-10-CM

## 2024-04-26 DIAGNOSIS — N18.31 CHRONIC KIDNEY DISEASE, STAGE 3A (H): ICD-10-CM

## 2024-04-26 DIAGNOSIS — I12.9 HYPERTENSIVE CHRONIC KIDNEY DISEASE WITH STAGE 1 THROUGH STAGE 4 CHRONIC KIDNEY DISEASE, OR UNSPECIFIED CHRONIC KIDNEY DISEASE: ICD-10-CM

## 2024-04-26 PROCEDURE — 84443 ASSAY THYROID STIM HORMONE: CPT | Performed by: FAMILY MEDICINE

## 2024-04-26 PROCEDURE — 80053 COMPREHEN METABOLIC PANEL: CPT | Performed by: FAMILY MEDICINE

## 2024-04-26 PROCEDURE — 84153 ASSAY OF PSA TOTAL: CPT | Performed by: FAMILY MEDICINE

## 2024-04-26 PROCEDURE — 80061 LIPID PANEL: CPT | Performed by: FAMILY MEDICINE

## 2024-04-27 LAB
ALBUMIN SERPL BCG-MCNC: 4.3 G/DL (ref 3.5–5.2)
ALP SERPL-CCNC: 126 U/L (ref 40–150)
ALT SERPL W P-5'-P-CCNC: 40 U/L (ref 0–70)
ANION GAP SERPL CALCULATED.3IONS-SCNC: 12 MMOL/L (ref 7–15)
AST SERPL W P-5'-P-CCNC: 30 U/L (ref 0–45)
BILIRUB SERPL-MCNC: 0.5 MG/DL
BUN SERPL-MCNC: 18.3 MG/DL (ref 8–23)
CALCIUM SERPL-MCNC: 9.6 MG/DL (ref 8.8–10.2)
CHLORIDE SERPL-SCNC: 105 MMOL/L (ref 98–107)
CHOLEST SERPL-MCNC: 118 MG/DL
CREAT SERPL-MCNC: 1.23 MG/DL (ref 0.67–1.17)
DEPRECATED HCO3 PLAS-SCNC: 23 MMOL/L (ref 22–29)
EGFRCR SERPLBLD CKD-EPI 2021: 64 ML/MIN/1.73M2
FASTING STATUS PATIENT QL REPORTED: ABNORMAL
GLUCOSE SERPL-MCNC: 83 MG/DL (ref 70–99)
HDLC SERPL-MCNC: 29 MG/DL
LDLC SERPL CALC-MCNC: 44 MG/DL
NONHDLC SERPL-MCNC: 89 MG/DL
POTASSIUM SERPL-SCNC: 4.4 MMOL/L (ref 3.4–5.3)
PROT SERPL-MCNC: 7.6 G/DL (ref 6.4–8.3)
PSA SERPL DL<=0.01 NG/ML-MCNC: 6.36 NG/ML (ref 0–4.5)
SODIUM SERPL-SCNC: 140 MMOL/L (ref 135–145)
TRIGL SERPL-MCNC: 226 MG/DL
TSH SERPL DL<=0.005 MIU/L-ACNC: 1.48 UIU/ML (ref 0.3–4.2)

## 2024-04-30 ENCOUNTER — TRANSCRIBE ORDERS (OUTPATIENT)
Dept: OTHER | Age: 69
End: 2024-04-30

## 2024-04-30 DIAGNOSIS — R20.0 ARM NUMBNESS: Primary | ICD-10-CM

## 2024-05-01 ENCOUNTER — LAB REQUISITION (OUTPATIENT)
Dept: LAB | Facility: CLINIC | Age: 69
End: 2024-05-01

## 2024-05-01 ENCOUNTER — TRANSFERRED RECORDS (OUTPATIENT)
Dept: HEALTH INFORMATION MANAGEMENT | Facility: CLINIC | Age: 69
End: 2024-05-01

## 2024-05-01 DIAGNOSIS — D69.6 THROMBOCYTOPENIA, UNSPECIFIED (H): ICD-10-CM

## 2024-05-01 LAB
ACANTHOCYTES BLD QL SMEAR: NORMAL
AUER BODIES BLD QL SMEAR: NORMAL
BASO STIPL BLD QL SMEAR: NORMAL
BASOPHILS # BLD AUTO: 0.1 10E3/UL (ref 0–0.2)
BASOPHILS NFR BLD AUTO: 1 %
BITE CELLS BLD QL SMEAR: NORMAL
BLISTER CELLS BLD QL SMEAR: NORMAL
BURR CELLS BLD QL SMEAR: NORMAL
DACRYOCYTES BLD QL SMEAR: NORMAL
ELLIPTOCYTES BLD QL SMEAR: NORMAL
EOSINOPHIL # BLD AUTO: 0.3 10E3/UL (ref 0–0.7)
EOSINOPHIL NFR BLD AUTO: 2 %
ERYTHROCYTE [DISTWIDTH] IN BLOOD BY AUTOMATED COUNT: 15.8 % (ref 10–15)
FRAGMENTS BLD QL SMEAR: NORMAL
HCT VFR BLD AUTO: 40.6 % (ref 40–53)
HGB BLD-MCNC: 12.6 G/DL (ref 13.3–17.7)
HGB C CRYSTALS: NORMAL
HOWELL-JOLLY BOD BLD QL SMEAR: NORMAL
IMM GRANULOCYTES # BLD: 0 10E3/UL
IMM GRANULOCYTES NFR BLD: 0 %
LYMPHOCYTES # BLD AUTO: 3.9 10E3/UL (ref 0.8–5.3)
LYMPHOCYTES NFR BLD AUTO: 33 %
MCH RBC QN AUTO: 25.7 PG (ref 26.5–33)
MCHC RBC AUTO-ENTMCNC: 31 G/DL (ref 31.5–36.5)
MCV RBC AUTO: 83 FL (ref 78–100)
MONOCYTES # BLD AUTO: 1.4 10E3/UL (ref 0–1.3)
MONOCYTES NFR BLD AUTO: 12 %
NEUTROPHILS # BLD AUTO: 6.2 10E3/UL (ref 1.6–8.3)
NEUTROPHILS NFR BLD AUTO: 52 %
NEUTS HYPERSEG BLD QL SMEAR: NORMAL
NRBC # BLD AUTO: 0 10E3/UL
NRBC BLD AUTO-RTO: 0 /100
PLAT MORPH BLD: NORMAL
PLATELET # BLD AUTO: 259 10E3/UL (ref 150–450)
POLYCHROMASIA BLD QL SMEAR: NORMAL
RBC # BLD AUTO: 4.9 10E6/UL (ref 4.4–5.9)
RBC AGGLUT BLD QL: NORMAL
RBC MORPH BLD: NORMAL
ROULEAUX BLD QL SMEAR: NORMAL
SICKLE CELLS BLD QL SMEAR: NORMAL
SMUDGE CELLS BLD QL SMEAR: NORMAL
SPHEROCYTES BLD QL SMEAR: NORMAL
STOMATOCYTES BLD QL SMEAR: NORMAL
TARGETS BLD QL SMEAR: NORMAL
TOXIC GRANULES BLD QL SMEAR: NORMAL
VARIANT LYMPHS BLD QL SMEAR: NORMAL
WBC # BLD AUTO: 11.9 10E3/UL (ref 4–11)

## 2024-05-01 PROCEDURE — 85025 COMPLETE CBC W/AUTO DIFF WBC: CPT | Performed by: FAMILY MEDICINE

## 2024-06-07 DIAGNOSIS — E78.5 DYSLIPIDEMIA, GOAL LDL BELOW 70: ICD-10-CM

## 2024-06-07 RX ORDER — ATORVASTATIN CALCIUM 20 MG/1
20 TABLET, FILM COATED ORAL AT BEDTIME
Qty: 90 TABLET | Refills: 0 | Status: SHIPPED | OUTPATIENT
Start: 2024-06-07 | End: 2024-09-27

## 2024-07-01 DIAGNOSIS — I47.10 PAROXYSMAL SUPRAVENTRICULAR TACHYCARDIA (H): ICD-10-CM

## 2024-07-01 RX ORDER — METOPROLOL TARTRATE 50 MG
50 TABLET ORAL 2 TIMES DAILY
Qty: 180 TABLET | Refills: 3 | Status: SHIPPED | OUTPATIENT
Start: 2024-07-01

## 2024-07-10 ENCOUNTER — TRANSFERRED RECORDS (OUTPATIENT)
Dept: HEALTH INFORMATION MANAGEMENT | Facility: CLINIC | Age: 69
End: 2024-07-10
Payer: COMMERCIAL

## 2024-08-12 ENCOUNTER — OFFICE VISIT (OUTPATIENT)
Dept: PHYSICAL MEDICINE AND REHAB | Facility: CLINIC | Age: 69
End: 2024-08-12
Payer: COMMERCIAL

## 2024-08-12 VITALS — SYSTOLIC BLOOD PRESSURE: 118 MMHG | DIASTOLIC BLOOD PRESSURE: 62 MMHG | HEART RATE: 84 BPM

## 2024-08-12 DIAGNOSIS — M48.061 LUMBAR FORAMINAL STENOSIS: ICD-10-CM

## 2024-08-12 DIAGNOSIS — R20.2 NUMBNESS AND TINGLING OF BOTH FEET: ICD-10-CM

## 2024-08-12 DIAGNOSIS — Z98.890 HISTORY OF LUMBAR SURGERY: ICD-10-CM

## 2024-08-12 DIAGNOSIS — R20.0 NUMBNESS AND TINGLING OF BOTH FEET: ICD-10-CM

## 2024-08-12 DIAGNOSIS — M54.16 LUMBAR RADICULOPATHY: Primary | ICD-10-CM

## 2024-08-12 PROCEDURE — 99214 OFFICE O/P EST MOD 30 MIN: CPT | Performed by: NURSE PRACTITIONER

## 2024-08-12 ASSESSMENT — PAIN SCALES - GENERAL: PAINLEVEL: EXTREME PAIN (8)

## 2024-08-12 NOTE — LETTER
8/12/2024      Barry Jay  [address] [facility] [address]      Dear Colleague,    Thank you for referring your patient, Barry Jay, to the [facility]. Please see a copy of my visit note below.      Assessment:     Diagnoses and all orders for this visit:  Lumbar radiculopathy  -     PAIN Transforaminal REGI Inj Lumbosacral Angel; Future  -     Physical Therapy  Referral; Future  History of lumbar surgery  -     Physical Therapy  Referral; Future  Numbness and tingling of both feet  -     Physical Therapy  Referral; Future  Lumbar foraminal stenosis  -     PAIN Transforaminal REGI Inj Lumbosacral Angel; Future  -     Physical Therapy  Referral; Future     Barry Jay is a 69 year old y.o. male with past medical history significant for hypertension, dyslipidemia, hand osteoarthritis, arthralgias, history lumbar surgery who presents today for follow-up regarding:    -Chronic progressive bilateral low back pain with bilateral lumbar radiculopathy with numbness and tingling bilateral lower extremities.     Plan:     A shared decision making plan was used. The patient's values and choices were respected. Prior medical records were reviewed today. The following represents what was discussed and decided upon by the provider and the patient.        -DIAGNOSTIC TESTS: Images were personally reviewed and interpreted.   --Bilateral lower extremity EMG 8/3/2023 with potentially mild chronic right L4 and L5 radiculopathy.  No clear acute radiculopathy.  No peripheral neuropathy noted.  -- Postsurgical lumbar spine MRI with and without IV contrast 8/12/2022 with right hemilaminectomy/medial facetectomy changes at L4-5 with improvement in nerve compression on the right lateral recess.  Persistent mild to moderate right and mild left foraminal stenosis L4-5.  L3-4 mild spinal stenosis with mild bilateral foraminal stenosis.  L5-S1 minimal foraminal  stenosis.  --Lumbar spine MRI [facility] 5/10/2022 with transitional lumbosacral anatomy with partially sacralized L5.  L4-5 grade 1 spondylolisthesis, 3 mm, with annular bulging with advanced facet arthropathy with mild to moderate right and mild left foraminal stenosis, right L4 nerve root impingement.  L3-4 1 to 2 mm spondylolisthesis with facet arthropathy with mild left greater than right foraminal stenosis.  --Lumbar spine MRI 1/24/2020 [facility] with advanced facet arthropathy L4-5 right greater than left and facet arthropathy L3-4 moderate bilaterally.  Grade 1 spondylolisthesis L4-5 with right greater than left L5 impingement.  --Lumbar spine flexion-extension x-ray 2/3/2020 was 0.6 cm spondylolisthesis L4-5, no instability noted.  --[facility] lumbar spine MRI 1/17/2019 shows severe facet arthropathy L4-5 with 5 mm spondylolisthesis, mild central and subarticular recess stenosis, mild right foraminal stenosis.  Mild facet arthropathy L3-4 and L5-S1.    -INTERVENTIONS: Order placed for bilateral L4-5 transforaminal epidural steroid injection to see if we can get further relief of lumbar radiculopathy as he does have bilateral foraminal stenosis at this level.    -MEDICATIONS: No changes in medications.  Discussed side effects of medications and proper use. Patient verbalized understanding.    -PHYSICAL THERAPY: Referral to physical therapy placed to address home exercises for lumbar core strengthening for home program.  Discussed the importance of core strengthening, ROM, stretching exercises with the patient and how each of these entities is important in decreasing pain.  Explained to the patient that the purpose of physical therapy is to teach the patient a home exercise program.  These exercises need to be performed every day in order to decrease pain and prevent future occurrences of pain.        -PATIENT EDUCATION:  Total time of 32 minutes, on the day of service, spent with the patient, reviewing the chart, placing  orders, and documenting.     -FOLLOW UP: Follow-up for injection at the spine center   Advised to contact clinic if symptoms worsen or change.    Subjective:     Barry Jay is a 69 year old male who presents today for follow-up regarding ongoing chronic bilateral low back pain with progressive numbness and tingling bilateral feet that is more equal bilaterally at this time whereas historically it was worse on the right.  He does report that his pain is a constant 8/10, a 6 at its best.  Aggravated with standing and bending, does improve with sitting and sleeping.  He denies any lower extremity weakness.  Denies any episodes of his leg giving out on him.    *Patient was evaluated again by Dr. Mari 6/15/2023 who recommended updated MRI.     Treatment to Date:   Right L4-5 hemilaminectomy, medial facetectomy, foraminotomy Dr. Mari 6/27/2022  Physical therapy [facility] x3 sessions.  Patient continues with home exercises at this time on a daily basis.     History of bilateral L4-5 RFA [place] [facility] Dr. Green 2011 and 2013, relief at least 2 years.  Bilateral L3-4 MBB 6/30/2017.  Preprocedure pain 7/10 post 2/10.  Bilateral L4-5 facet joint steroid injection 7/7/2017 with relief.  Preprocedure pain 8/10, post 5/10.  Right L4-5 and L5-S1 TFESI 1/6/2020 with 30% lasting benefit only.  Preprocedure pain 10/10, post 7/10.  Bilateral L2, L3, L4 MBB 3/11/2020 and 7/7/2020 with positive response.  Bilateral L2, L3, L4 RFA 10/9/2020 with 90% significant relief x 8 months.  Right L4-5 TFESI 8/31/2021 with 100% relief acute right LBP and right lumbar radiculitis for 6 months, minimal relief with chronic bilateral LBP however.  Pre and post procedure pain 9/10.  Bilateral L2, L3, L4 RFA 10/19/2021 with significant relief.  Preprocedure pain 8/10, post 0/10.  Right SI joint steroid injection 5/6/2022 with minimal initial and no lasting benefit.  Preprocedure pain 10/10, post 5/10.  Postsurgical injections:  Right L5-S1 TFESI  8/24/2022 with resolution of right leg pain.  Ordered by neurosurgery.  Preprocedure pain 8/10, post 7/10.  Bilateral L2, L3, L4 RFA 10/12/2022 with significant relief x6 months.  Right L5-S1 TFESI 5/16/2023 with no lasting benefit.  Bilateral L2, L3, L4 RFA 6/1/2023 with no lasting relief.  Right L4-5 TFESI 9/13/2023 with over 50% relief for 6 months     Medications:  Meloxicam    Patient Active Problem List   Diagnosis     Dyslipidemia, goal LDL below 70     Hypertension     Diffuse Joint Pains (Arthralgias)     Joint Pain, Localized In The Wrist     Joint Pain, Localized In The Knee     Generalized Osteoarthritis Of The Hand     Osteoarthritis Of The Knee     Arthralgias In Multiple Sites     Paroxysmal supraventricular tachycardia (H24)     Coronary artery disease involving native coronary artery of native heart with angina pectoris (H24)     Dyspnea on exertion     Worsening angina (H)     Status post coronary angiogram     Abnormal findings on diagnostic imaging of heart and coronary circulation       Current Outpatient Medications   Medication Sig Dispense Refill     ALPRAZolam (XANAX) 0.5 MG tablet [ALPRAZOLAM (XANAX) 0.5 MG TABLET] TAKE 1 TABLET BY MOUTH ONCE A DAY AS NEEDED ORALLY  0     aspirin (ASA) 81 MG chewable tablet Take 81 mg by mouth daily       atorvastatin (LIPITOR) 20 MG tablet Take 1 tablet (20 mg) by mouth at bedtime 90 tablet 0     celecoxib (CELEBREX) 200 MG capsule Take 200 mg by mouth daily as needed for pain  3     irbesartan-hydrochlorothiazide (AVALIDE) 300-12.5 mg per tablet [IRBESARTAN-HYDROCHLOROTHIAZIDE (AVALIDE) 300-12.5 MG PER TABLET] Take 1 tablet by mouth daily. for blood pressure  1     metoprolol tartrate (LOPRESSOR) 50 MG tablet Take 1 tablet (50 mg) by mouth 2 times daily 180 tablet 3     multivitamin, therapeutic (THERA-VIT) TABS tablet Take 1 tablet by mouth daily       omeprazole (PRILOSEC) 20 MG capsule Take 20 mg by mouth daily  1     PARoxetine (PAXIL) 20 MG tablet  Take 20 mg by mouth every morning  3     zolpidem (AMBIEN) 10 mg tablet Take 10 mg by mouth nightly as needed for sleep  2     No current facility-administered medications for this visit.       No Known Allergies    Past Medical History:   Diagnosis Date     HTN (hypertension)      Hyperlipidemia         Review of Systems  ROS:  Specifically negative for bowel/bladder dysfunction, balance changes, headache, dizziness, foot drop, fevers, chills, appetite changes, nausea/vomiting, unexplained weight loss. Otherwise 13 systems reviewed are negative. Please see the patient's intake questionnaire from today for details.    Reviewed Social, Family, Past Medical and Past Surgical history with patient, no significant changes noted since prior visit.     Objective:     /62   Pulse 84     PHYSICAL EXAMINATION:    --CONSTITUTIONAL: Well developed, well nourished, healthy appearing individual.  --PSYCHIATRIC: Appropriate mood and affect. No difficulty interacting due to temper, social withdrawal, or memory issues.  --SKIN: Lumbar region is dry and intact.   --RESPIRATORY: Normal rhythm and effort. No abnormal accessory muscle breathing patterns noted.   --MUSCULOSKELETAL:  Normal lumbar lordosis noted, no lateral shift.  --GROSS MOTOR: Easily arises from a seated position. Gait is non-antalgic  --LUMBAR SPINE:  Inspection reveals no evidence of deformity.     RESULTS:   Imaging: Spine imaging was reviewed today. The images were shown to the patient and the findings were explained using a spine model.        Lumbar spine MRI reviewed                    Again, thank you for allowing me to participate in the care of your patient.        Sincerely,        Claudia Baptiste, CNP

## 2024-08-12 NOTE — PROGRESS NOTES
Assessment:     Diagnoses and all orders for this visit:  Lumbar radiculopathy  -     PAIN Transforaminal REGI Inj Lumbosacral Angel; Future  -     Physical Therapy  Referral; Future  History of lumbar surgery  -     Physical Therapy  Referral; Future  Numbness and tingling of both feet  -     Physical Therapy  Referral; Future  Lumbar foraminal stenosis  -     PAIN Transforaminal REGI Inj Lumbosacral Angel; Future  -     Physical Therapy  Referral; Future     Barry KADEEM Jay is a 69 year old y.o. male with past medical history significant for hypertension, dyslipidemia, hand osteoarthritis, arthralgias, history lumbar surgery who presents today for follow-up regarding:    -Chronic progressive bilateral low back pain with bilateral lumbar radiculopathy with numbness and tingling bilateral lower extremities.     Plan:     A shared decision making plan was used. The patient's values and choices were respected. Prior medical records were reviewed today. The following represents what was discussed and decided upon by the provider and the patient.        -DIAGNOSTIC TESTS: Images were personally reviewed and interpreted.   --Bilateral lower extremity EMG 8/3/2023 with potentially mild chronic right L4 and L5 radiculopathy.  No clear acute radiculopathy.  No peripheral neuropathy noted.  -- Postsurgical lumbar spine MRI with and without IV contrast 8/12/2022 with right hemilaminectomy/medial facetectomy changes at L4-5 with improvement in nerve compression on the right lateral recess.  Persistent mild to moderate right and mild left foraminal stenosis L4-5.  L3-4 mild spinal stenosis with mild bilateral foraminal stenosis.  L5-S1 minimal foraminal stenosis.  --Lumbar spine MRI [facility] 5/10/2022 with transitional lumbosacral anatomy with partially sacralized L5.  L4-5 grade 1 spondylolisthesis, 3 mm, with annular bulging with advanced facet arthropathy with mild to moderate right and mild left  foraminal stenosis, right L4 nerve root impingement.  L3-4 1 to 2 mm spondylolisthesis with facet arthropathy with mild left greater than right foraminal stenosis.  --Lumbar spine MRI 1/24/2020 [facility] with advanced facet arthropathy L4-5 right greater than left and facet arthropathy L3-4 moderate bilaterally.  Grade 1 spondylolisthesis L4-5 with right greater than left L5 impingement.  --Lumbar spine flexion-extension x-ray 2/3/2020 was 0.6 cm spondylolisthesis L4-5, no instability noted.  --[facility] lumbar spine MRI 1/17/2019 shows severe facet arthropathy L4-5 with 5 mm spondylolisthesis, mild central and subarticular recess stenosis, mild right foraminal stenosis.  Mild facet arthropathy L3-4 and L5-S1.    -INTERVENTIONS: Order placed for bilateral L4-5 transforaminal epidural steroid injection to see if we can get further relief of lumbar radiculopathy as he does have bilateral foraminal stenosis at this level.    -MEDICATIONS: No changes in medications.  Discussed side effects of medications and proper use. Patient verbalized understanding.    -PHYSICAL THERAPY: Referral to physical therapy placed to address home exercises for lumbar core strengthening for home program.  Discussed the importance of core strengthening, ROM, stretching exercises with the patient and how each of these entities is important in decreasing pain.  Explained to the patient that the purpose of physical therapy is to teach the patient a home exercise program.  These exercises need to be performed every day in order to decrease pain and prevent future occurrences of pain.        -PATIENT EDUCATION:  Total time of 32 minutes, on the day of service, spent with the patient, reviewing the chart, placing orders, and documenting.     -FOLLOW UP: Follow-up for injection at the spine center   Advised to contact clinic if symptoms worsen or change.    Subjective:     Barry KADEEM Jay is a 69 year old male who presents today for follow-up regarding ongoing  chronic bilateral low back pain with progressive numbness and tingling bilateral feet that is more equal bilaterally at this time whereas historically it was worse on the right.  He does report that his pain is a constant 8/10, a 6 at its best.  Aggravated with standing and bending, does improve with sitting and sleeping.  He denies any lower extremity weakness.  Denies any episodes of his leg giving out on him.    *Patient was evaluated again by Dr. Mari 6/15/2023 who recommended updated MRI.     Treatment to Date:   Right L4-5 hemilaminectomy, medial facetectomy, foraminotomy Dr. Mari 6/27/2022  Physical therapy [facility] x3 sessions.  Patient continues with home exercises at this time on a daily basis.     History of bilateral L4-5 RFA [place] [facility] Dr. Green 2011 and 2013, relief at least 2 years.  Bilateral L3-4 MBB 6/30/2017.  Preprocedure pain 7/10 post 2/10.  Bilateral L4-5 facet joint steroid injection 7/7/2017 with relief.  Preprocedure pain 8/10, post 5/10.  Right L4-5 and L5-S1 TFESI 1/6/2020 with 30% lasting benefit only.  Preprocedure pain 10/10, post 7/10.  Bilateral L2, L3, L4 MBB 3/11/2020 and 7/7/2020 with positive response.  Bilateral L2, L3, L4 RFA 10/9/2020 with 90% significant relief x 8 months.  Right L4-5 TFESI 8/31/2021 with 100% relief acute right LBP and right lumbar radiculitis for 6 months, minimal relief with chronic bilateral LBP however.  Pre and post procedure pain 9/10.  Bilateral L2, L3, L4 RFA 10/19/2021 with significant relief.  Preprocedure pain 8/10, post 0/10.  Right SI joint steroid injection 5/6/2022 with minimal initial and no lasting benefit.  Preprocedure pain 10/10, post 5/10.  Postsurgical injections:  Right L5-S1 TFESI 8/24/2022 with resolution of right leg pain.  Ordered by neurosurgery.  Preprocedure pain 8/10, post 7/10.  Bilateral L2, L3, L4 RFA 10/12/2022 with significant relief x6 months.  Right L5-S1 TFESI 5/16/2023 with no lasting benefit.  Bilateral L2,  L3, L4 RFA 6/1/2023 with no lasting relief.  Right L4-5 TFESI 9/13/2023 with over 50% relief for 6 months     Medications:  Meloxicam    Patient Active Problem List   Diagnosis    Dyslipidemia, goal LDL below 70    Hypertension    Diffuse Joint Pains (Arthralgias)    Joint Pain, Localized In The Wrist    Joint Pain, Localized In The Knee    Generalized Osteoarthritis Of The Hand    Osteoarthritis Of The Knee    Arthralgias In Multiple Sites    Paroxysmal supraventricular tachycardia (H24)    Coronary artery disease involving native coronary artery of native heart with angina pectoris (H24)    Dyspnea on exertion    Worsening angina (H)    Status post coronary angiogram    Abnormal findings on diagnostic imaging of heart and coronary circulation       Current Outpatient Medications   Medication Sig Dispense Refill    ALPRAZolam (XANAX) 0.5 MG tablet [ALPRAZOLAM (XANAX) 0.5 MG TABLET] TAKE 1 TABLET BY MOUTH ONCE A DAY AS NEEDED ORALLY  0    aspirin (ASA) 81 MG chewable tablet Take 81 mg by mouth daily      atorvastatin (LIPITOR) 20 MG tablet Take 1 tablet (20 mg) by mouth at bedtime 90 tablet 0    celecoxib (CELEBREX) 200 MG capsule Take 200 mg by mouth daily as needed for pain  3    irbesartan-hydrochlorothiazide (AVALIDE) 300-12.5 mg per tablet [IRBESARTAN-HYDROCHLOROTHIAZIDE (AVALIDE) 300-12.5 MG PER TABLET] Take 1 tablet by mouth daily. for blood pressure  1    metoprolol tartrate (LOPRESSOR) 50 MG tablet Take 1 tablet (50 mg) by mouth 2 times daily 180 tablet 3    multivitamin, therapeutic (THERA-VIT) TABS tablet Take 1 tablet by mouth daily      omeprazole (PRILOSEC) 20 MG capsule Take 20 mg by mouth daily  1    PARoxetine (PAXIL) 20 MG tablet Take 20 mg by mouth every morning  3    zolpidem (AMBIEN) 10 mg tablet Take 10 mg by mouth nightly as needed for sleep  2     No current facility-administered medications for this visit.       No Known Allergies    Past Medical History:   Diagnosis Date    HTN  (hypertension)     Hyperlipidemia         Review of Systems  ROS:  Specifically negative for bowel/bladder dysfunction, balance changes, headache, dizziness, foot drop, fevers, chills, appetite changes, nausea/vomiting, unexplained weight loss. Otherwise 13 systems reviewed are negative. Please see the patient's intake questionnaire from today for details.    Reviewed Social, Family, Past Medical and Past Surgical history with patient, no significant changes noted since prior visit.     Objective:     /62   Pulse 84     PHYSICAL EXAMINATION:    --CONSTITUTIONAL: Well developed, well nourished, healthy appearing individual.  --PSYCHIATRIC: Appropriate mood and affect. No difficulty interacting due to temper, social withdrawal, or memory issues.  --SKIN: Lumbar region is dry and intact.   --RESPIRATORY: Normal rhythm and effort. No abnormal accessory muscle breathing patterns noted.   --MUSCULOSKELETAL:  Normal lumbar lordosis noted, no lateral shift.  --GROSS MOTOR: Easily arises from a seated position. Gait is non-antalgic  --LUMBAR SPINE:  Inspection reveals no evidence of deformity.     RESULTS:   Imaging: Spine imaging was reviewed today. The images were shown to the patient and the findings were explained using a spine model.        Lumbar spine MRI reviewed

## 2024-08-12 NOTE — PATIENT INSTRUCTIONS
~Spine Center Scheduling #[phone].  ~Please call our [facility] Spine Nurse Navigation #[phone] with any questions or concerns about your treatment plan, if symptoms worsen and you would like to be seen urgently, or if you have problems controlling bladder and bowel function.  ~For any future flareups or new symptoms, recommend follow-up in clinic or contact the nurse navigator line.  ~Please note that any My Chart messages may take multiple days for a response due to the high volume of patients seen in clinic.  Anything sent Thursday night or after will be answered the following week when able, as Claudia Baptiste CNP does not work in clinic on Fridays.   ~Claduia Baptiste CNP is at the [facility] on Tuesdays, otherwise primarily at the [facility].       An injection has been ordered today to potentially help with your pain symptoms. These injections do not fix what is going on in your back, therefore they typically do not take away the pain completely, however they can many times help improve symptoms. Injections should always be completed along with other modalities such as physical therapy for the best long term outcomes. If injections alone are done, then pain will likely return.     [facility] Injection Requirements    A  is required for all fluoroscopically-guided injections.  Injection appointments may be cancelled if there are signs/symptoms of an active infection or if the patient is being actively treated with antibiotics for a diagnosed infection.  Patients may have their steroid injection cancelled if they have had another steroid injection within 2 weeks.  Diabetic patients will have their blood glucose levels checked the day of their injection and the appointment will be rescheduled if the blood glucose level is 300 or higher.  Patients with allergies to cortisone, local anesthetics, iodine, or contrast dye should contact the  Spine Center to further discuss these considerations.  Patients scheduled for medial branch block diagnostic injections should refrain from taking pain medication the day of the procedure.  The medial branch block injection appointment will be rescheduled if the patient's pain rating is not 5/10 or greater at the time of the procedure.  Patients taking warfarin/Coumadin will have their INR checked the day of the procedure and the procedure may be rescheduled if the INR is greater than 3.0.  Please contact the Spine Center (#[phone]) if you are taking any prescription blood-thinning medications (warfarin, Plavix, Lovenox, Eliquis, Brilinta, Effient, etc.) as special dosing adjustments may need to be made depending on the type of injection you are scheduled to receive.  It is recommended that you delay having your steroid injection if you have received a flu shot or shingles vaccine within 2 weeks.

## 2024-08-23 ENCOUNTER — RADIOLOGY INJECTION OFFICE VISIT (OUTPATIENT)
Dept: PHYSICAL MEDICINE AND REHAB | Facility: CLINIC | Age: 69
End: 2024-08-23
Attending: NURSE PRACTITIONER
Payer: COMMERCIAL

## 2024-08-23 VITALS
RESPIRATION RATE: 16 BRPM | TEMPERATURE: 97.7 F | OXYGEN SATURATION: 97 % | HEART RATE: 71 BPM | DIASTOLIC BLOOD PRESSURE: 72 MMHG | SYSTOLIC BLOOD PRESSURE: 124 MMHG

## 2024-08-23 DIAGNOSIS — M48.061 LUMBAR FORAMINAL STENOSIS: ICD-10-CM

## 2024-08-23 DIAGNOSIS — M54.16 LUMBAR RADICULOPATHY: ICD-10-CM

## 2024-08-23 PROCEDURE — 64483 NJX AA&/STRD TFRM EPI L/S 1: CPT | Mod: LT | Performed by: STUDENT IN AN ORGANIZED HEALTH CARE EDUCATION/TRAINING PROGRAM

## 2024-08-23 RX ORDER — LIDOCAINE HYDROCHLORIDE 10 MG/ML
INJECTION, SOLUTION EPIDURAL; INFILTRATION; INTRACAUDAL; PERINEURAL
Status: COMPLETED | OUTPATIENT
Start: 2024-08-23 | End: 2024-08-23

## 2024-08-23 RX ORDER — DEXAMETHASONE SODIUM PHOSPHATE 10 MG/ML
INJECTION, SOLUTION INTRAMUSCULAR; INTRAVENOUS
Status: COMPLETED | OUTPATIENT
Start: 2024-08-23 | End: 2024-08-23

## 2024-08-23 RX ORDER — BUPIVACAINE HYDROCHLORIDE 2.5 MG/ML
INJECTION, SOLUTION EPIDURAL; INFILTRATION; INTRACAUDAL
Status: COMPLETED | OUTPATIENT
Start: 2024-08-23 | End: 2024-08-23

## 2024-08-23 RX ADMIN — LIDOCAINE HYDROCHLORIDE 4 ML: 10 INJECTION, SOLUTION EPIDURAL; INFILTRATION; INTRACAUDAL; PERINEURAL at 10:38

## 2024-08-23 RX ADMIN — BUPIVACAINE HYDROCHLORIDE 2 ML: 2.5 INJECTION, SOLUTION EPIDURAL; INFILTRATION; INTRACAUDAL at 10:38

## 2024-08-23 RX ADMIN — DEXAMETHASONE SODIUM PHOSPHATE 10 MG: 10 INJECTION, SOLUTION INTRAMUSCULAR; INTRAVENOUS at 10:38

## 2024-08-23 ASSESSMENT — PAIN SCALES - GENERAL
PAINLEVEL: SEVERE PAIN (7)
PAINLEVEL: SEVERE PAIN (7)

## 2024-08-23 NOTE — PATIENT INSTRUCTIONS
DISCHARGE INSTRUCTIONS    During office hours (8:00 a.m.- 4:00 p.m.) questions or concerns may be answered  by calling Spine Center Navigation Nurses at  [phone].  Messages received after hours will be returned the following business day.      In the case of an emergency, please dial 911 or seek assistance at the nearest Emergency Room/Urgent Care facility.     All Patients:    You may experience an increase in your symptoms for the first 2 days (It may take anywhere between 2 days- 2 weeks for the steroid to have maximum effect).    You may use ice on the injection site, as frequently as 20 minutes each hour if needed.    You may take your pain medicine.    You may continue taking your regular medication after your injection. If you have had a Medial Branch Block you may resume pain medication once your pain diary is completed.    You may shower. No swimming, tub bath or hot tub for 48 hours.  You may remove your bandaid/bandage as soon as you are home.    You may resume light activities, as tolerated.    Resume your usual diet as tolerated.    If you were told to hold any blood thinning medications you may resume taking them 24 hours after your procedure as prescribed.    It is strongly advised that you do not drive for 1-3 hours post injection.    If you have had oral sedation:  Do not drive for 8 hours post injection.      If you have had IV sedation:  Do not drive for 24 hours post injection.  Do not operate hazardous machinery or make important personal/business decisions for 24 hours.      POSSIBLE STEROID SIDE EFFECTS (If steroid/cortisone was used for your procedure)    -If you experience these symptoms, it should only last for a short period    Swelling of the legs              Skin redness (flushing)     Mouth (oral) irritation   Blood sugar (glucose) levels            Sweats                    Mood changes  Headache  Sleeplessness  Weakened immune system for up to 14 days, which could increase  the risk of navdeep the COVID-19 virus and/or experiencing more severe symptoms of the disease, if exposed.  Decreased effectiveness of the flu vaccine if given within 2 weeks of the steroid.         POSSIBLE PROCEDURE SIDE EFFECTS  -Call the Spine Center if you are concerned  Increased Pain           Increased numbness/tingling      Nausea/Vomiting          Bruising/bleeding at site      Redness or swelling                                              Difficulty walking      Weakness           Fever greater than 100.5    *In the event of a severe headache after an epidural steroid injection that is relieved by lying down, please call the [facility] to speak with a clinical staff member*

## 2024-09-11 ENCOUNTER — OFFICE VISIT (OUTPATIENT)
Dept: PHYSICAL MEDICINE AND REHAB | Facility: CLINIC | Age: 69
End: 2024-09-11
Payer: COMMERCIAL

## 2024-09-11 VITALS
DIASTOLIC BLOOD PRESSURE: 63 MMHG | WEIGHT: 204 LBS | BODY MASS INDEX: 30.92 KG/M2 | HEIGHT: 68 IN | HEART RATE: 80 BPM | SYSTOLIC BLOOD PRESSURE: 141 MMHG

## 2024-09-11 DIAGNOSIS — M43.16 SPONDYLOLISTHESIS OF LUMBAR REGION: ICD-10-CM

## 2024-09-11 DIAGNOSIS — M54.16 RIGHT LUMBAR RADICULOPATHY: Primary | ICD-10-CM

## 2024-09-11 DIAGNOSIS — M48.061 LUMBAR FORAMINAL STENOSIS: ICD-10-CM

## 2024-09-11 DIAGNOSIS — M96.1 FAILED BACK SURGICAL SYNDROME: ICD-10-CM

## 2024-09-11 DIAGNOSIS — Z98.890 HISTORY OF LUMBAR SURGERY: ICD-10-CM

## 2024-09-11 PROCEDURE — 99214 OFFICE O/P EST MOD 30 MIN: CPT | Performed by: NURSE PRACTITIONER

## 2024-09-11 ASSESSMENT — PAIN SCALES - GENERAL: PAINLEVEL: SEVERE PAIN (7)

## 2024-09-11 NOTE — PATIENT INSTRUCTIONS
~Spine Center Scheduling #[phone].  ~Please call our [facility] Spine Nurse Navigation #[phone] with any questions or concerns about your treatment plan, if symptoms worsen and you would like to be seen urgently, or if you have problems controlling bladder and bowel function.  ~For any future flareups or new symptoms, recommend follow-up in clinic or contact the nurse navigator line.  ~Please note that any My Chart messages may take multiple days for a response due to the high volume of patients seen in clinic.  Anything sent Thursday night or after will be answered the following week when able, as Claudia Baptiste CNP does not work in clinic on Fridays.   ~Claudia Baptiste CNP is at the [facility] on Tuesdays, otherwise primarily at the [facility].       Importance of specialized Physical Therapy: Discussed the importance of core strengthening, ROM, stretching exercises with the patient and how each of these entities is important in decreasing pain.  Explained to the patient that the purpose of physical therapy is to teach the patient a home exercise program individualized to them and their specific health concerns.  These exercises need to be performed every day in order to decrease pain and prevent future occurrences of pain.

## 2024-09-11 NOTE — LETTER
9/11/2024      Barry Jay  [address] [address]      Dear Colleague,    Thank you for referring your patient, Barry Jay, to the [facility]. Please see a copy of my visit note below.      Assessment:     Diagnoses and all orders for this visit:  Right lumbar radiculopathy  Lumbar foraminal stenosis  History of lumbar surgery  Failed back surgical syndrome  Spondylolisthesis of lumbar region     Barry aJy is a 69 year old y.o. male with past medical history significant for hypertension, dyslipidemia, hand osteoarthritis, arthralgias, history lumbar surgery who presents today for follow-up regarding:    -Chronic bilateral low back pain with lumbar radiculopathy with minimal 10% lasting relief post bilateral L4-5 TFESI 8/23/2024.    EMG 2023 with mild chronic right L4 and L5 radiculopathy.     Plan:     A shared decision making plan was used. The patient's values and choices were respected. Prior medical records were reviewed today. The following represents what was discussed and decided upon by the provider and the patient.        -DIAGNOSTIC TESTS: Images were personally reviewed and interpreted.   -- We did discuss obtaining updated flexion-extension x-ray and MRI options.  Patient wants to hold off on this.  --Bilateral lower extremity EMG 8/3/2023 with potentially mild chronic right L4 and L5 radiculopathy.  No clear acute radiculopathy.  No peripheral neuropathy noted.  -- Postsurgical lumbar spine MRI with and without IV contrast 8/12/2022 with right hemilaminectomy/medial facetectomy changes at L4-5 with improvement in nerve compression on the right lateral recess.  Persistent mild to moderate right and mild left foraminal stenosis L4-5.  L3-4 mild spinal stenosis with mild bilateral foraminal stenosis.  L5-S1 minimal foraminal stenosis.  --Lumbar spine MRI [facility] 5/10/2022 with transitional lumbosacral anatomy with partially sacralized L5.  L4-5  grade 1 spondylolisthesis, 3 mm, with annular bulging with advanced facet arthropathy with mild to moderate right and mild left foraminal stenosis, right L4 nerve root impingement.  L3-4 1 to 2 mm spondylolisthesis with facet arthropathy with mild left greater than right foraminal stenosis.  --Lumbar spine MRI 1/24/2020 [facility] with advanced facet arthropathy L4-5 right greater than left and facet arthropathy L3-4 moderate bilaterally.  Grade 1 spondylolisthesis L4-5 with right greater than left L5 impingement.  --Lumbar spine flexion-extension x-ray 2/3/2020 was 0.6 cm spondylolisthesis L4-5, no instability noted.  --[facility] lumbar spine MRI 1/17/2019 shows severe facet arthropathy L4-5 with 5 mm spondylolisthesis, mild central and subarticular recess stenosis, mild right foraminal stenosis.  Mild facet arthropathy L3-4 and L5-S1.    -INTERVENTIONS: No further injection recommendations.  We did briefly discuss spinal cord stimulator options and we did give him a pamphlet on this today.  He is not interested in moving forward with this at this time but will let us know if he changes his mind to discuss further.    -MEDICATIONS: No changes in medications today.  Discussed side effects of medications and proper use. Patient verbalized understanding.    -PHYSICAL THERAPY: Did highly recommend trialing further physical therapy specifically the lumbar MedX program as long as flexion-extension x-ray looks okay to work on intensive core strengthening and home program to help with symptoms currently as well as help with long-term spine health.  Patient will think about this program and let us know if he would like to move forward with it.  Discussed the importance of core strengthening, ROM, stretching exercises with the patient and how each of these entities is important in decreasing pain.  Explained to the patient that the purpose of physical therapy is to teach the patient a home exercise program.  These exercises need to be  performed every day in order to decrease pain and prevent future occurrences of pain.        -PATIENT EDUCATION:  Total time of 32 minutes, on the day of service, spent with the patient, reviewing the chart, placing orders, and documenting.     -FOLLOW UP: Follow-up as needed  Advised to contact clinic if symptoms worsen or change.    Subjective:     Barry Jay is a 69 year old male who presents today for follow-up regarding ongoing chronic right low back pain that radiates into the right lower extremity with numbness and tingling into the right leg.  Unfortunately he got minimal maybe 10% relief he reports with recent bilateral L4-5 TFESI.  Does report that his pain is most on the right again low back at a 7/10, 9 at its worst, 4 at its best aggravated with standing, does improve with laying down.  Currently denies lower extremity weakness.  Denies any recent trips or falls or balance changes.  Denies bowel or bladder loss control.    Patient does report that he is active going to the gym and doing workouts on his own but has not completed any recent physical therapy.    *Patient was evaluated again by Dr. Mari 6/15/2023 who recommended updated MRI.     Treatment to Date:   Right L4-5 hemilaminectomy, medial facetectomy, foraminotomy Dr. Mari 6/27/2022  Physical therapy [facility] x3 sessions.  Patient continues with home exercises at this time on a daily basis.     History of bilateral L4-5 RFA [place] [facility] Dr. Green 2011 and 2013, relief at least 2 years.  Bilateral L3-4 MBB 6/30/2017.  Preprocedure pain 7/10 post 2/10.  Bilateral L4-5 facet joint steroid injection 7/7/2017 with relief.  Preprocedure pain 8/10, post 5/10.  Right L4-5 and L5-S1 TFESI 1/6/2020 with 30% lasting benefit only.  Preprocedure pain 10/10, post 7/10.  Bilateral L2, L3, L4 MBB 3/11/2020 and 7/7/2020 with positive response.  Bilateral L2, L3, L4 RFA 10/9/2020 with 90% significant relief x 8 months.  Right L4-5 TFESI 8/31/2021 with  100% relief acute right LBP and right lumbar radiculitis for 6 months, minimal relief with chronic bilateral LBP however.  Pre and post procedure pain 9/10.  Bilateral L2, L3, L4 RFA 10/19/2021 with significant relief.  Preprocedure pain 8/10, post 0/10.  Right SI joint steroid injection 5/6/2022 with minimal initial and no lasting benefit.  Preprocedure pain 10/10, post 5/10.  Postsurgical injections:  Right L5-S1 TFESI 8/24/2022 with resolution of right leg pain.  Ordered by neurosurgery.  Preprocedure pain 8/10, post 7/10.  Bilateral L2, L3, L4 RFA 10/12/2022 with significant relief x6 months.  Right L5-S1 TFESI 5/16/2023 with no lasting benefit.  Bilateral L2, L3, L4 RFA 6/1/2023 with no lasting relief.  Right L4-5 TFESI 9/13/2023 with over 50% relief for 6 months  Bilateral L4-5 TFESI 8/23/2024 with minimal lasting relief at 10%.     Medications:  Meloxicam    Patient Active Problem List   Diagnosis     Dyslipidemia, goal LDL below 70     Hypertension     Diffuse Joint Pains (Arthralgias)     Joint Pain, Localized In The Wrist     Joint Pain, Localized In The Knee     Generalized Osteoarthritis Of The Hand     Osteoarthritis Of The Knee     Arthralgias In Multiple Sites     Paroxysmal supraventricular tachycardia (H24)     Coronary artery disease involving native coronary artery of native heart with angina pectoris (H24)     Dyspnea on exertion     Worsening angina (H)     Status post coronary angiogram     Abnormal findings on diagnostic imaging of heart and coronary circulation       Current Outpatient Medications   Medication Sig Dispense Refill     aspirin (ASA) 81 MG chewable tablet Take 81 mg by mouth daily       celecoxib (CELEBREX) 200 MG capsule Take 200 mg by mouth daily as needed for pain  3     ALPRAZolam (XANAX) 0.5 MG tablet [ALPRAZOLAM (XANAX) 0.5 MG TABLET] TAKE 1 TABLET BY MOUTH ONCE A DAY AS NEEDED ORALLY  0     atorvastatin (LIPITOR) 20 MG tablet Take 1 tablet (20 mg) by mouth at bedtime 90  "tablet 0     irbesartan-hydrochlorothiazide (AVALIDE) 300-12.5 mg per tablet [IRBESARTAN-HYDROCHLOROTHIAZIDE (AVALIDE) 300-12.5 MG PER TABLET] Take 1 tablet by mouth daily. for blood pressure  1     metoprolol tartrate (LOPRESSOR) 50 MG tablet Take 1 tablet (50 mg) by mouth 2 times daily 180 tablet 3     multivitamin, therapeutic (THERA-VIT) TABS tablet Take 1 tablet by mouth daily       omeprazole (PRILOSEC) 20 MG capsule Take 20 mg by mouth daily  1     PARoxetine (PAXIL) 20 MG tablet Take 20 mg by mouth every morning  3     zolpidem (AMBIEN) 10 mg tablet Take 10 mg by mouth nightly as needed for sleep  2     No current facility-administered medications for this visit.       No Known Allergies    Past Medical History:   Diagnosis Date     HTN (hypertension)      Hyperlipidemia         Review of Systems  ROS:  Specifically negative for bowel/bladder dysfunction, balance changes, headache, dizziness, foot drop, fevers, chills, appetite changes, nausea/vomiting, unexplained weight loss. Otherwise 13 systems reviewed are negative. Please see the patient's intake questionnaire from today for details.    Reviewed Social, Family, Past Medical and Past Surgical history with patient, no significant changes noted since prior visit.     Objective:     BP (!) 141/63 (BP Location: Left arm, Patient Position: Sitting, Cuff Size: Adult Large)   Pulse 80   Ht 5' 8\" (1.727 m)   Wt 204 lb (92.5 kg)   BMI 31.02 kg/m      PHYSICAL EXAMINATION:    --CONSTITUTIONAL: Well developed, well nourished, healthy appearing individual.  --PSYCHIATRIC: Appropriate mood and affect. No difficulty interacting due to temper, social withdrawal, or memory issues.  --SKIN: Lumbar region is dry and intact.   --RESPIRATORY: Normal rhythm and effort. No abnormal accessory muscle breathing patterns noted.   --MUSCULOSKELETAL:  Normal lumbar lordosis noted, no lateral shift.  --GROSS MOTOR: Easily arises from a seated position. Gait is " non-antalgic  --LUMBAR SPINE:  Inspection reveals no evidence of deformity.     RESULTS:   Imaging: Spine imaging was reviewed today. The images were shown to the patient and the findings were explained using a spine model.      Lumbar spine MRI reviewed                    Again, thank you for allowing me to participate in the care of your patient.        Sincerely,        Claudia Baptiste, CNP

## 2024-09-11 NOTE — PROGRESS NOTES
Assessment:     Diagnoses and all orders for this visit:  Right lumbar radiculopathy  Lumbar foraminal stenosis  History of lumbar surgery  Failed back surgical syndrome  Spondylolisthesis of lumbar region     Barry Jay is a 69 year old y.o. male with past medical history significant for hypertension, dyslipidemia, hand osteoarthritis, arthralgias, history lumbar surgery who presents today for follow-up regarding:    -Chronic bilateral low back pain with lumbar radiculopathy with minimal 10% lasting relief post bilateral L4-5 TFESI 8/23/2024.    EMG 2023 with mild chronic right L4 and L5 radiculopathy.     Plan:     A shared decision making plan was used. The patient's values and choices were respected. Prior medical records were reviewed today. The following represents what was discussed and decided upon by the provider and the patient.        -DIAGNOSTIC TESTS: Images were personally reviewed and interpreted.   -- We did discuss obtaining updated flexion-extension x-ray and MRI options.  Patient wants to hold off on this.  --Bilateral lower extremity EMG 8/3/2023 with potentially mild chronic right L4 and L5 radiculopathy.  No clear acute radiculopathy.  No peripheral neuropathy noted.  -- Postsurgical lumbar spine MRI with and without IV contrast 8/12/2022 with right hemilaminectomy/medial facetectomy changes at L4-5 with improvement in nerve compression on the right lateral recess.  Persistent mild to moderate right and mild left foraminal stenosis L4-5.  L3-4 mild spinal stenosis with mild bilateral foraminal stenosis.  L5-S1 minimal foraminal stenosis.  --Lumbar spine MRI [facility] 5/10/2022 with transitional lumbosacral anatomy with partially sacralized L5.  L4-5 grade 1 spondylolisthesis, 3 mm, with annular bulging with advanced facet arthropathy with mild to moderate right and mild left foraminal stenosis, right L4 nerve root impingement.  L3-4 1 to 2 mm spondylolisthesis with facet arthropathy with mild  left greater than right foraminal stenosis.  --Lumbar spine MRI 1/24/2020 [facility] with advanced facet arthropathy L4-5 right greater than left and facet arthropathy L3-4 moderate bilaterally.  Grade 1 spondylolisthesis L4-5 with right greater than left L5 impingement.  --Lumbar spine flexion-extension x-ray 2/3/2020 was 0.6 cm spondylolisthesis L4-5, no instability noted.  --[facility] lumbar spine MRI 1/17/2019 shows severe facet arthropathy L4-5 with 5 mm spondylolisthesis, mild central and subarticular recess stenosis, mild right foraminal stenosis.  Mild facet arthropathy L3-4 and L5-S1.    -INTERVENTIONS: No further injection recommendations.  We did briefly discuss spinal cord stimulator options and we did give him a pamphlet on this today.  He is not interested in moving forward with this at this time but will let us know if he changes his mind to discuss further.    -MEDICATIONS: No changes in medications today.  Discussed side effects of medications and proper use. Patient verbalized understanding.    -PHYSICAL THERAPY: Did highly recommend trialing further physical therapy specifically the lumbar MedX program as long as flexion-extension x-ray looks okay to work on intensive core strengthening and home program to help with symptoms currently as well as help with long-term spine health.  Patient will think about this program and let us know if he would like to move forward with it.  Discussed the importance of core strengthening, ROM, stretching exercises with the patient and how each of these entities is important in decreasing pain.  Explained to the patient that the purpose of physical therapy is to teach the patient a home exercise program.  These exercises need to be performed every day in order to decrease pain and prevent future occurrences of pain.        -PATIENT EDUCATION:  Total time of 32 minutes, on the day of service, spent with the patient, reviewing the chart, placing orders, and documenting.      -FOLLOW UP: Follow-up as needed  Advised to contact clinic if symptoms worsen or change.    Subjective:     Barry Jay is a 69 year old male who presents today for follow-up regarding ongoing chronic right low back pain that radiates into the right lower extremity with numbness and tingling into the right leg.  Unfortunately he got minimal maybe 10% relief he reports with recent bilateral L4-5 TFESI.  Does report that his pain is most on the right again low back at a 7/10, 9 at its worst, 4 at its best aggravated with standing, does improve with laying down.  Currently denies lower extremity weakness.  Denies any recent trips or falls or balance changes.  Denies bowel or bladder loss control.    Patient does report that he is active going to the gym and doing workouts on his own but has not completed any recent physical therapy.    *Patient was evaluated again by Dr. Mari 6/15/2023 who recommended updated MRI.     Treatment to Date:   Right L4-5 hemilaminectomy, medial facetectomy, foraminotomy Dr. Mari 6/27/2022  Physical therapy [facility] x3 sessions.  Patient continues with home exercises at this time on a daily basis.     History of bilateral L4-5 RFA [place] [facility] Dr. Green 2011 and 2013, relief at least 2 years.  Bilateral L3-4 MBB 6/30/2017.  Preprocedure pain 7/10 post 2/10.  Bilateral L4-5 facet joint steroid injection 7/7/2017 with relief.  Preprocedure pain 8/10, post 5/10.  Right L4-5 and L5-S1 TFESI 1/6/2020 with 30% lasting benefit only.  Preprocedure pain 10/10, post 7/10.  Bilateral L2, L3, L4 MBB 3/11/2020 and 7/7/2020 with positive response.  Bilateral L2, L3, L4 RFA 10/9/2020 with 90% significant relief x 8 months.  Right L4-5 TFESI 8/31/2021 with 100% relief acute right LBP and right lumbar radiculitis for 6 months, minimal relief with chronic bilateral LBP however.  Pre and post procedure pain 9/10.  Bilateral L2, L3, L4 RFA 10/19/2021 with significant relief.  Preprocedure pain  8/10, post 0/10.  Right SI joint steroid injection 5/6/2022 with minimal initial and no lasting benefit.  Preprocedure pain 10/10, post 5/10.  Postsurgical injections:  Right L5-S1 TFESI 8/24/2022 with resolution of right leg pain.  Ordered by neurosurgery.  Preprocedure pain 8/10, post 7/10.  Bilateral L2, L3, L4 RFA 10/12/2022 with significant relief x6 months.  Right L5-S1 TFESI 5/16/2023 with no lasting benefit.  Bilateral L2, L3, L4 RFA 6/1/2023 with no lasting relief.  Right L4-5 TFESI 9/13/2023 with over 50% relief for 6 months  Bilateral L4-5 TFESI 8/23/2024 with minimal lasting relief at 10%.     Medications:  Meloxicam    Patient Active Problem List   Diagnosis    Dyslipidemia, goal LDL below 70    Hypertension    Diffuse Joint Pains (Arthralgias)    Joint Pain, Localized In The Wrist    Joint Pain, Localized In The Knee    Generalized Osteoarthritis Of The Hand    Osteoarthritis Of The Knee    Arthralgias In Multiple Sites    Paroxysmal supraventricular tachycardia (H24)    Coronary artery disease involving native coronary artery of native heart with angina pectoris (H24)    Dyspnea on exertion    Worsening angina (H)    Status post coronary angiogram    Abnormal findings on diagnostic imaging of heart and coronary circulation       Current Outpatient Medications   Medication Sig Dispense Refill    aspirin (ASA) 81 MG chewable tablet Take 81 mg by mouth daily      celecoxib (CELEBREX) 200 MG capsule Take 200 mg by mouth daily as needed for pain  3    ALPRAZolam (XANAX) 0.5 MG tablet [ALPRAZOLAM (XANAX) 0.5 MG TABLET] TAKE 1 TABLET BY MOUTH ONCE A DAY AS NEEDED ORALLY  0    atorvastatin (LIPITOR) 20 MG tablet Take 1 tablet (20 mg) by mouth at bedtime 90 tablet 0    irbesartan-hydrochlorothiazide (AVALIDE) 300-12.5 mg per tablet [IRBESARTAN-HYDROCHLOROTHIAZIDE (AVALIDE) 300-12.5 MG PER TABLET] Take 1 tablet by mouth daily. for blood pressure  1    metoprolol tartrate (LOPRESSOR) 50 MG tablet Take 1 tablet  "(50 mg) by mouth 2 times daily 180 tablet 3    multivitamin, therapeutic (THERA-VIT) TABS tablet Take 1 tablet by mouth daily      omeprazole (PRILOSEC) 20 MG capsule Take 20 mg by mouth daily  1    PARoxetine (PAXIL) 20 MG tablet Take 20 mg by mouth every morning  3    zolpidem (AMBIEN) 10 mg tablet Take 10 mg by mouth nightly as needed for sleep  2     No current facility-administered medications for this visit.       No Known Allergies    Past Medical History:   Diagnosis Date    HTN (hypertension)     Hyperlipidemia         Review of Systems  ROS:  Specifically negative for bowel/bladder dysfunction, balance changes, headache, dizziness, foot drop, fevers, chills, appetite changes, nausea/vomiting, unexplained weight loss. Otherwise 13 systems reviewed are negative. Please see the patient's intake questionnaire from today for details.    Reviewed Social, Family, Past Medical and Past Surgical history with patient, no significant changes noted since prior visit.     Objective:     BP (!) 141/63 (BP Location: Left arm, Patient Position: Sitting, Cuff Size: Adult Large)   Pulse 80   Ht 5' 8\" (1.727 m)   Wt 204 lb (92.5 kg)   BMI 31.02 kg/m      PHYSICAL EXAMINATION:    --CONSTITUTIONAL: Well developed, well nourished, healthy appearing individual.  --PSYCHIATRIC: Appropriate mood and affect. No difficulty interacting due to temper, social withdrawal, or memory issues.  --SKIN: Lumbar region is dry and intact.   --RESPIRATORY: Normal rhythm and effort. No abnormal accessory muscle breathing patterns noted.   --MUSCULOSKELETAL:  Normal lumbar lordosis noted, no lateral shift.  --GROSS MOTOR: Easily arises from a seated position. Gait is non-antalgic  --LUMBAR SPINE:  Inspection reveals no evidence of deformity.     RESULTS:   Imaging: Spine imaging was reviewed today. The images were shown to the patient and the findings were explained using a spine model.      Lumbar spine MRI reviewed                  "

## 2024-09-27 ENCOUNTER — TELEPHONE (OUTPATIENT)
Dept: CARDIOLOGY | Facility: CLINIC | Age: 69
End: 2024-09-27

## 2024-09-27 ENCOUNTER — APPOINTMENT (OUTPATIENT)
Dept: RADIOLOGY | Facility: HOSPITAL | Age: 69
End: 2024-09-27
Attending: PHYSICIAN ASSISTANT
Payer: COMMERCIAL

## 2024-09-27 ENCOUNTER — HOSPITAL ENCOUNTER (EMERGENCY)
Facility: HOSPITAL | Age: 69
Discharge: HOME | End: 2024-09-27
Attending: EMERGENCY MEDICINE | Admitting: EMERGENCY MEDICINE
Payer: COMMERCIAL

## 2024-09-27 VITALS
HEART RATE: 76 BPM | SYSTOLIC BLOOD PRESSURE: 110 MMHG | OXYGEN SATURATION: 96 % | BODY MASS INDEX: 30.72 KG/M2 | HEIGHT: 68 IN | DIASTOLIC BLOOD PRESSURE: 72 MMHG | TEMPERATURE: 97.6 F | RESPIRATION RATE: 14 BRPM | WEIGHT: 202.7 LBS

## 2024-09-27 DIAGNOSIS — E78.5 DYSLIPIDEMIA, GOAL LDL BELOW 70: ICD-10-CM

## 2024-09-27 DIAGNOSIS — R00.2 PALPITATIONS: ICD-10-CM

## 2024-09-27 DIAGNOSIS — R07.9 CHEST PAIN: ICD-10-CM

## 2024-09-27 LAB
ANION GAP SERPL CALCULATED.3IONS-SCNC: 12 MMOL/L (ref 7–15)
BASOPHILS # BLD AUTO: 0.1 10E3/UL (ref 0–0.2)
BASOPHILS NFR BLD AUTO: 1 %
BUN SERPL-MCNC: 17.2 MG/DL (ref 8–23)
CALCIUM SERPL-MCNC: 9.3 MG/DL (ref 8.8–10.4)
CHLORIDE SERPL-SCNC: 102 MMOL/L (ref 98–107)
CREAT SERPL-MCNC: 1.35 MG/DL (ref 0.67–1.17)
EGFRCR SERPLBLD CKD-EPI 2021: 58 ML/MIN/1.73M2
EOSINOPHIL # BLD AUTO: 0.1 10E3/UL (ref 0–0.7)
EOSINOPHIL NFR BLD AUTO: 1 %
ERYTHROCYTE [DISTWIDTH] IN BLOOD BY AUTOMATED COUNT: 16.8 % (ref 10–15)
GLUCOSE SERPL-MCNC: 178 MG/DL (ref 70–99)
HCO3 SERPL-SCNC: 24 MMOL/L (ref 22–29)
HCT VFR BLD AUTO: 42.7 % (ref 40–53)
HGB BLD-MCNC: 13.2 G/DL (ref 13.3–17.7)
HOLD SPECIMEN: NORMAL
HOLD SPECIMEN: NORMAL
IMM GRANULOCYTES # BLD: 0 10E3/UL
IMM GRANULOCYTES NFR BLD: 0 %
LYMPHOCYTES # BLD AUTO: 2.6 10E3/UL (ref 0.8–5.3)
LYMPHOCYTES NFR BLD AUTO: 26 %
MCH RBC QN AUTO: 24 PG (ref 26.5–33)
MCHC RBC AUTO-ENTMCNC: 30.9 G/DL (ref 31.5–36.5)
MCV RBC AUTO: 78 FL (ref 78–100)
MONOCYTES # BLD AUTO: 0.7 10E3/UL (ref 0–1.3)
MONOCYTES NFR BLD AUTO: 7 %
NEUTROPHILS # BLD AUTO: 6.5 10E3/UL (ref 1.6–8.3)
NEUTROPHILS NFR BLD AUTO: 65 %
NRBC # BLD AUTO: 0 10E3/UL
NRBC BLD AUTO-RTO: 0 /100
PLATELET # BLD AUTO: 312 10E3/UL (ref 150–450)
POTASSIUM SERPL-SCNC: 3.7 MMOL/L (ref 3.4–5.3)
RBC # BLD AUTO: 5.51 10E6/UL (ref 4.4–5.9)
SODIUM SERPL-SCNC: 138 MMOL/L (ref 135–145)
TROPONIN T SERPL HS-MCNC: 17 NG/L
TROPONIN T SERPL HS-MCNC: 19 NG/L
WBC # BLD AUTO: 10 10E3/UL (ref 4–11)

## 2024-09-27 PROCEDURE — 80048 BASIC METABOLIC PNL TOTAL CA: CPT | Performed by: EMERGENCY MEDICINE

## 2024-09-27 PROCEDURE — 71046 X-RAY EXAM CHEST 2 VIEWS: CPT

## 2024-09-27 PROCEDURE — 85014 HEMATOCRIT: CPT | Performed by: STUDENT IN AN ORGANIZED HEALTH CARE EDUCATION/TRAINING PROGRAM

## 2024-09-27 PROCEDURE — 36415 COLL VENOUS BLD VENIPUNCTURE: CPT | Performed by: STUDENT IN AN ORGANIZED HEALTH CARE EDUCATION/TRAINING PROGRAM

## 2024-09-27 PROCEDURE — 84484 ASSAY OF TROPONIN QUANT: CPT | Performed by: STUDENT IN AN ORGANIZED HEALTH CARE EDUCATION/TRAINING PROGRAM

## 2024-09-27 PROCEDURE — 93005 ELECTROCARDIOGRAM TRACING: CPT | Performed by: STUDENT IN AN ORGANIZED HEALTH CARE EDUCATION/TRAINING PROGRAM

## 2024-09-27 PROCEDURE — 93005 ELECTROCARDIOGRAM TRACING: CPT | Performed by: EMERGENCY MEDICINE

## 2024-09-27 PROCEDURE — 84484 ASSAY OF TROPONIN QUANT: CPT | Performed by: EMERGENCY MEDICINE

## 2024-09-27 PROCEDURE — 85025 COMPLETE CBC W/AUTO DIFF WBC: CPT | Performed by: EMERGENCY MEDICINE

## 2024-09-27 PROCEDURE — 80048 BASIC METABOLIC PNL TOTAL CA: CPT | Performed by: STUDENT IN AN ORGANIZED HEALTH CARE EDUCATION/TRAINING PROGRAM

## 2024-09-27 PROCEDURE — 250N000013 HC RX MED GY IP 250 OP 250 PS 637: Performed by: STUDENT IN AN ORGANIZED HEALTH CARE EDUCATION/TRAINING PROGRAM

## 2024-09-27 PROCEDURE — 36415 COLL VENOUS BLD VENIPUNCTURE: CPT | Performed by: EMERGENCY MEDICINE

## 2024-09-27 PROCEDURE — 85004 AUTOMATED DIFF WBC COUNT: CPT | Performed by: STUDENT IN AN ORGANIZED HEALTH CARE EDUCATION/TRAINING PROGRAM

## 2024-09-27 PROCEDURE — 99285 EMERGENCY DEPT VISIT HI MDM: CPT | Mod: 25 | Performed by: EMERGENCY MEDICINE

## 2024-09-27 RX ORDER — ATORVASTATIN CALCIUM 20 MG/1
20 TABLET, FILM COATED ORAL AT BEDTIME
Qty: 90 TABLET | Refills: 0 | Status: SHIPPED | OUTPATIENT
Start: 2024-09-27

## 2024-09-27 RX ORDER — ASPIRIN 81 MG/1
243 TABLET, CHEWABLE ORAL ONCE
Status: COMPLETED | OUTPATIENT
Start: 2024-09-27 | End: 2024-09-27

## 2024-09-27 RX ADMIN — ASPIRIN 81 MG CHEWABLE TABLET 243 MG: 81 TABLET CHEWABLE at 12:45

## 2024-09-27 ASSESSMENT — COLUMBIA-SUICIDE SEVERITY RATING SCALE - C-SSRS
1. IN THE PAST MONTH, HAVE YOU WISHED YOU WERE DEAD OR WISHED YOU COULD GO TO SLEEP AND NOT WAKE UP?: NO
2. HAVE YOU ACTUALLY HAD ANY THOUGHTS OF KILLING YOURSELF IN THE PAST MONTH?: NO
6. HAVE YOU EVER DONE ANYTHING, STARTED TO DO ANYTHING, OR PREPARED TO DO ANYTHING TO END YOUR LIFE?: NO

## 2024-09-27 ASSESSMENT — ACTIVITIES OF DAILY LIVING (ADL)
ADLS_ACUITY_SCORE: 36
ADLS_ACUITY_SCORE: 34

## 2024-09-27 NOTE — ED PROVIDER NOTES
"Emergency Department Midlevel Supervisory Note     I had a face to face encounter with this patient seen by the Advanced Practice Provider (BERNY). I personally made/approved the management plan and take responsibility for the patient management. I personally saw patient and performed a substantive portion of the visit including all aspects of the medical decision making.     ED Course:  1:58 PM Myrna Duran PA-C staffed patient with me. I agree with their assessment and plan of management, and I will see the patient.   I met with the patient to introduce myself, gather additional history, perform my initial exam, and discuss the plan.     Brief HPI:     Barry Jay is a 69 year old male who presents for evaluation of chest pain.    Over the past couple days the patient has had 3 episodes of left-sided chest pain with associated diaphoresis. He denies any shortness of breath. The episodes have occurred when mowing the lawn, standing in a stationary position, and while sitting drinking a coffee. He is not anticoagulated. He reports a prior history of SVT.    I, Audie Orozco, am serving as a scribe to document services personally performed by Dr. Selena Tipton based on my observations and the provider's statements to me.   I, Selena Tipton MD, attest that Audie Orozco was acting in a scribe capacity, has observed my performance of the services and has documented them in accordance with my direction.    Brief Physical Exam: /67   Pulse 82   Temp 97.6  F (36.4  C) (Oral)   Resp 18   Ht 1.727 m (5' 8\")   Wt 91.9 kg (202 lb 11.2 oz)   SpO2 98%   BMI 30.82 kg/m    Constitutional:  Alert, in no acute distress         MDM:  69-year-old male here for evaluation of episodic chest pain.  He is currently asymptomatic.  His EKG is nonischemic, initial troponin is 19.  Chest x-ray is clear.  We have not seen any arrhythmias on cardiac monitoring but he does have a history of SVT and it is possible " that these are intermittent arrhythmias.  If serial troponins negative, which is pending at time of signout, anticipate discharge with close follow-up with rapid access cardiology.       !) Acute chest pain    Consults:      Labs and Imaging:  Results for orders placed or performed during the hospital encounter of 09/27/24   Basic metabolic panel   Result Value Ref Range    Sodium 138 135 - 145 mmol/L    Potassium 3.7 3.4 - 5.3 mmol/L    Chloride 102 98 - 107 mmol/L    Carbon Dioxide (CO2) 24 22 - 29 mmol/L    Anion Gap 12 7 - 15 mmol/L    Urea Nitrogen 17.2 8.0 - 23.0 mg/dL    Creatinine 1.35 (H) 0.67 - 1.17 mg/dL    GFR Estimate 58 (L) >60 mL/min/1.73m2    Calcium 9.3 8.8 - 10.4 mg/dL    Glucose 178 (H) 70 - 99 mg/dL   Result Value Ref Range    Troponin T, High Sensitivity 19 <=22 ng/L   CBC with platelets and differential   Result Value Ref Range    WBC Count 10.0 4.0 - 11.0 10e3/uL    RBC Count 5.51 4.40 - 5.90 10e6/uL    Hemoglobin 13.2 (L) 13.3 - 17.7 g/dL    Hematocrit 42.7 40.0 - 53.0 %    MCV 78 78 - 100 fL    MCH 24.0 (L) 26.5 - 33.0 pg    MCHC 30.9 (L) 31.5 - 36.5 g/dL    RDW 16.8 (H) 10.0 - 15.0 %    Platelet Count 312 150 - 450 10e3/uL    % Neutrophils 65 %    % Lymphocytes 26 %    % Monocytes 7 %    % Eosinophils 1 %    % Basophils 1 %    % Immature Granulocytes 0 %    NRBCs per 100 WBC 0 <1 /100    Absolute Neutrophils 6.5 1.6 - 8.3 10e3/uL    Absolute Lymphocytes 2.6 0.8 - 5.3 10e3/uL    Absolute Monocytes 0.7 0.0 - 1.3 10e3/uL    Absolute Eosinophils 0.1 0.0 - 0.7 10e3/uL    Absolute Basophils 0.1 0.0 - 0.2 10e3/uL    Absolute Immature Granulocytes 0.0 <=0.4 10e3/uL    Absolute NRBCs 0.0 10e3/uL       I have reviewed the relevant laboratory studies above.    I independently interpreted the following imaging study(s):       EKG: I reviewed and independently interpreted the patient's EKG, with comments made as listed below. Please see scanned EKG for full report.     EKG reviewed interpreted by me  shows sinus rhythm with right bundle branch block, rate of 90, normal axis, QTc 481 with no acute ST or T wave changes since previous      Procedures:  I was present for the key portions of procedures documented in BERNY/midlevel note, see midlevel note for further details.    Selena Tipton MD  [facility] EMERGENCY DEPARTMENT  [address] [place] [id number]  [phone]     Selena Tipton MD  09/27/24 0671

## 2024-09-27 NOTE — ED TRIAGE NOTES
Pt ambulatory to triage c/o episodic left chest pain with diaphoresis. Pt states on Wednesday he had CP and diaphoresis and was evaluated at [facility] clinic, was diagnosed with dehydration and went home. Pt states he did feel better. Today he has had 2 episodes of left-sided CP radiating into left arm and neck, each episode lasting about 10-15 minutes then resolving. Pt reports diaphoresis with the pain. Denies SOB, denies nausea.   Currently pt denies CP but does have left arm discomfort.     Triage Assessment (Adult)       Row Name 09/27/24 1234          Triage Assessment    Airway WDL WDL        Respiratory WDL    Respiratory WDL WDL        Cardiac WDL    Cardiac WDL X;chest pain        Chest Pain Assessment    Chest Pain Location anterior chest, left     Chest Pain Radiation arm;neck     Character aching     Precipitating Factors at rest     Chest Pain Intervention 12-lead ECG obtained        Cognitive/Neuro/Behavioral WDL    Cognitive/Neuro/Behavioral WDL WDL                      Quality 110: Preventive Care And Screening: Influenza Immunization: Influenza Immunization Administered during Influenza season Quality 111:Pneumonia Vaccination Status For Older Adults: Patient received any pneumococcal conjugate or polysaccharide vaccine on or after their 60th birthday and before the end of the measurement period Detail Level: Detailed Quality 226: Preventive Care And Screening: Tobacco Use: Screening And Cessation Intervention: Patient screened for tobacco use and is an ex/non-smoker

## 2024-09-27 NOTE — DISCHARGE INSTRUCTIONS
WE DO NOT KNOW YET WHAT IS THE CAUSE OF YOUR CHEST PAIN:    Your EKG did not show a heart attack or heart rhythm problem.    Your exam and/or labs did not show a heart attack, blood clot in your lungs nor heart failure.    Your Chest Xray did not show pneumonia.      Because we do not know yet what is the cause of your symptoms, we have sent a referral to CARDIOLOGY for you to have afollow up appointment within the next 2 to 3 days for repeat exam and consideration of additional testing.     See below for referral to our [facility].   Until you have been seen by the cardiologist, please take a baby aspirin (81 mg) with breakfast everymorning.    If you have develop recurrent chest pain,trouble breathing, fever or any other concerning problems, PLEASE RETURN to the Emergency Department for additional evaluation.

## 2024-09-27 NOTE — ED PROVIDER NOTES
EMERGENCY DEPARTMENT ENCOUNTER      NAME: Barry Jay  AGE: 69 year old male  YOB: 1955  MRN: [id number]  EVALUATION DATE & TIME: No admission date for patient encounter.    PCP: Osmel Iniguez    ED PROVIDER: Myrna Ayers PA-C      Chief Complaint   Patient presents with    Chest Pain         FINAL IMPRESSION:  1. Chest pain    2. Palpitations          ED COURSE & MEDICAL DECISION MAKIN:16 PM I introduced myself to patient, performed initial HPI and examination.   1:58 PM Staffed with Dr. Tipton   3:17 PM Discussed delta troponin results and plan for discharge     69 year old male with PMH dyspnea on exertion, dyslipidemia, HTN, paroxysmal SVT, CAD presents to the Emergency Department for evaluation of chest pain.    Episodic left pain with associated diaphoresis, palpitations. Symptoms Wednesday, reportedly seen at [facility] and subsequently discharged. Today with 2 episodes of left sided chest pain radiating to left arm and neck, each episode lasting 10-15 minutes then resolving. No associated dyspnea, nausea. Currently with left arm discomfort, no chest pain.     Differential includes arrhythmia (SVT, atrial fibrillation, v fib, others), ACS, pulmonary embolism, URI, pneumonia, and others.     VSS, afebrile  Exam with well appearing male, clear heart and lungs. No lower extremity edema/tenderness, swelling.   EKG nonischemic. Initial troponin 19, delta unchanged.   Labs with no leukocytosis, no anemia. No notable electrolyte derangement. Creatinine is at baseline (1.35).   CXR negative      Presentation is most consistent with Suspect paroxysmal arrhythmia, likely SVT with patient's history. Patient was monitored in the ED, no return of symptoms. Troponin reassuring, not consistent with ACS or unstable angina. No shortness of breath or pleuritic pain, vitals stable and no notable risk factors for PE. Thus, no indication for D-dimer or CT PE study. No URI symptoms/cough. CXR without  evidence of pneumonia. Episodic symptoms not consistent with aortic dissection.No indication for CT imaging.        Discussed all results with patient. Instructed on at home management,  close follow up with PCP and cardiology (Rapid Access Cardiology appointment placed),  and red flags/indications to return to the emergency department. All questions were answered to the best of my ability and patient is agreeable with plan.      Medical Decision Making  Obtained supplemental history:Supplemental history obtained?: No  Reviewed external records: External records reviewed?: Documented in chart  Care impacted by chronic illness:Documented in Chart  Care significantly affected by social determinants of health:N/A  Did you consider but not order tests?: Work up considered but not performed and documented in chart, if applicable  Did you interpret images independently?: Independent interpretation of ECG and images noted in documentation, when applicable.  Consultation discussion with other provider:Did you involve another provider (consultant, , pharmacy, etc.)?: No  Discharge. No recommendations on prescription strength medication(s). See documentation for any additional details.  Not Applicable        MEDICATIONS GIVEN IN THE EMERGENCY:  Medications   aspirin (ASA) chewable tablet 243 mg (243 mg Oral $Given 9/27/24 1245)       NEW PRESCRIPTIONS STARTED AT TODAY'S ER VISIT  Discharge Medication List as of 9/27/2024  3:23 PM             =================================================================    HPI    Patient information was obtained from: Patient    Use of : N/A         Barry Jay is a 69 year old male with a pertinent history of dyspnea on exertion, dyslipidemia, HTN, paroxysmal SVT who presents to this ED by private car  for evaluation of chest pain.    Reports episodes of lightheadedness, diaphoresis, heart racing/palpitations and left sided chest pain. Episode Wednesday when mowing the  lawn, was standing and then sitting earlier today when symptoms occurred today. No associated shortness of breath, no pleuritic pain. No leg pain or swelling.    Not on blood thinners. Did take Metoprolol today.    Recently traveled ([place] hours [place], driving[place] No history of blood clots.     Has never had a holter monitor.     Per chart review: Patient has history of CAD, SVT with admission 4/12/23 for dyspnea on exertion: Negative EP study, increased metoprolol to 50 mg BID for angina. Did not tolerate nitrates. Angiogram showed 25% stenosis at Mid LAD, 2nd Mrg, 1st Diag. LV filling pressures mildly elevated. Diffuse coronary atherosclerosis with no significant focal stenosis, Mildly elevated LVEDP 14 mmHg    Echo 1/19/23  Left ventricular size, wall motion and function are normal. The ejection  fraction is 60-65%.  Normal right ventricle size and systolic function.  IVC diameter <2.1 cm collapsing >50% with sniff suggests a normal RA pressure  of 3 mmHg.  No hemodynamically significant valvular abnormalities on 2D or color flow  imaging.    REVIEW OF SYSTEMS   ROS negative unless otherwise stated in HPI    PAST MEDICAL HISTORY:  Past Medical History:   Diagnosis Date    HTN (hypertension)     Hyperlipidemia        PAST SURGICAL HISTORY:  Past Surgical History:   Procedure Laterality Date    APPENDECTOMY      CHOLECYSTECTOMY      CV CORONARY ANGIOGRAM N/A 4/12/2023    Procedure: Coronary Angiogram;  Surgeon: Teena Laguna MD;  Location: [facility] CATH [facility] CV    CV LEFT HEART CATH N/A 4/12/2023    Procedure: Left Heart Catheterization;  Surgeon: Teena Laguna MD;  Location: [facility] CATH LAB CV    EP ABLATION SVT N/A 3/14/2023    Procedure: Ablation Supraventricular Tachycardia;  Surgeon: Lorna Hobson MD;  Location: [facility] CATH LAB CV    LAMINECTOMY LUMBAR ONE LEVEL Right 6/27/2022    Procedure: Right Lumbar 4 - lumbar 5 hemilaminectomy, medial facetectomies, foraminotomies;  Surgeon: Kamaljit  "Shannan RANGEL MD;  Location: [facility] OR    ORTHOPEDIC SURGERY         CURRENT MEDICATIONS:    ALPRAZolam (XANAX) 0.5 MG tablet  aspirin (ASA) 81 MG chewable tablet  atorvastatin (LIPITOR) 20 MG tablet  celecoxib (CELEBREX) 200 MG capsule  irbesartan-hydrochlorothiazide (AVALIDE) 300-12.5 mg per tablet  metoprolol tartrate (LOPRESSOR) 50 MG tablet  multivitamin, therapeutic (THERA-VIT) TABS tablet  omeprazole (PRILOSEC) 20 MG capsule  PARoxetine (PAXIL) 20 MG tablet  zolpidem (AMBIEN) 10 mg tablet        ALLERGIES:  No Known Allergies    FAMILY HISTORY:  No family history on file.    SOCIAL HISTORY:   Social History     Socioeconomic History    Marital status: Single   Tobacco Use    Smoking status: Every Day     Current packs/day: 1.00     Average packs/day: 1 pack/day for 30.0 years (30.0 ttl pk-yrs)     Types: Cigarettes    Smokeless tobacco: Never    Tobacco comments:     Pt quitting, down to 7 cigs per day   Vaping Use    Vaping status: Never Used   Substance and Sexual Activity    Alcohol use: Never    Drug use: Never       VITALS:  /72   Pulse 76   Temp 97.6  F (36.4  C) (Oral)   Resp 14   Ht 1.727 m (5' 8\")   Wt 91.9 kg (202 lb 11.2 oz)   SpO2 96%   BMI 30.82 kg/m      PHYSICAL EXAM    Constitutional: Well developed, Well nourished, NAD, GCS 15   HENT: Normocephalic, Atraumatic, Oropharynx clear.   Neck- Supple, Nontender. Normal ROM.   Eyes: Conjunctiva normal. PERRL. EOM intact.   Respiratory: No respiratory distress, speaking in full sentences. Normal breath sounds, No wheezing, No cough  Cardiovascular: Normal heart rate, Regular rhythm, No murmurs. Chest wall nontender.    GI: Soft, nontender. No distention or masses  Musculoskeletal: No deformities, Moves all extremities equally. No calf tenderness or swelling.  Integument: Warm, Dry, No erythema, ecchymosis, or rash.  Neurologic: Alert & oriented x 3, Normal sensory function. No focal deficits.   Psychiatric: Affect normal, Judgment " normal, Mood normal. Cooperative.      LAB:  All pertinent labs reviewed and interpreted.  Results for orders placed or performed during the hospital encounter of 09/27/24   Chest XR,  PA & LAT    Impression    IMPRESSION: Lungs are clear. No pleural effusion. Heart size and pulmonary vascularity within normal limits. No significant change.   Basic metabolic panel   Result Value Ref Range    Sodium 138 135 - 145 mmol/L    Potassium 3.7 3.4 - 5.3 mmol/L    Chloride 102 98 - 107 mmol/L    Carbon Dioxide (CO2) 24 22 - 29 mmol/L    Anion Gap 12 7 - 15 mmol/L    Urea Nitrogen 17.2 8.0 - 23.0 mg/dL    Creatinine 1.35 (H) 0.67 - 1.17 mg/dL    GFR Estimate 58 (L) >60 mL/min/1.73m2    Calcium 9.3 8.8 - 10.4 mg/dL    Glucose 178 (H) 70 - 99 mg/dL   Result Value Ref Range    Troponin T, High Sensitivity 19 <=22 ng/L   CBC with platelets and differential   Result Value Ref Range    WBC Count 10.0 4.0 - 11.0 10e3/uL    RBC Count 5.51 4.40 - 5.90 10e6/uL    Hemoglobin 13.2 (L) 13.3 - 17.7 g/dL    Hematocrit 42.7 40.0 - 53.0 %    MCV 78 78 - 100 fL    MCH 24.0 (L) 26.5 - 33.0 pg    MCHC 30.9 (L) 31.5 - 36.5 g/dL    RDW 16.8 (H) 10.0 - 15.0 %    Platelet Count 312 150 - 450 10e3/uL    % Neutrophils 65 %    % Lymphocytes 26 %    % Monocytes 7 %    % Eosinophils 1 %    % Basophils 1 %    % Immature Granulocytes 0 %    NRBCs per 100 WBC 0 <1 /100    Absolute Neutrophils 6.5 1.6 - 8.3 10e3/uL    Absolute Lymphocytes 2.6 0.8 - 5.3 10e3/uL    Absolute Monocytes 0.7 0.0 - 1.3 10e3/uL    Absolute Eosinophils 0.1 0.0 - 0.7 10e3/uL    Absolute Basophils 0.1 0.0 - 0.2 10e3/uL    Absolute Immature Granulocytes 0.0 <=0.4 10e3/uL    Absolute NRBCs 0.0 10e3/uL   Extra Blue Top Tube   Result Value Ref Range    Hold Specimen JIC    Extra Red Top Tube   Result Value Ref Range    Hold Specimen JIC    Result Value Ref Range    Troponin T, High Sensitivity 17 <=22 ng/L       RADIOLOGY:  Reviewed all pertinent imaging. Please see official radiology  report.  Chest XR,  PA & LAT   Final Result   IMPRESSION: Lungs are clear. No pleural effusion. Heart size and pulmonary vascularity within normal limits. No significant change.          EKG:    Performed at: 12:28:55    Impression: Sinus rhythm with sinus arrhythmia, RBBB, Abnormal ECG    Rate: 90 BPM  ME Interval: 164 ms  QRS Interval: 126 ms  QTc Interval: 394/418 ms  ST Changes: None  Comparison: When compared with ECG of 12-Apr-2023 07:34, Premature supraventricular complexes are no longer present, T wave inversions are no longer evident in inferior leads    Dr. Tipton and I have independently reviewed and interpreted the EKG(s) documented above.    PROCEDURES:   None        Myrna Ayers PA-C  Emergency Medicine  [facility] EMERGENCY DEPARTMENT  [address] [facility] [id number] [phone]             Myrna Ayers PA-C  09/27/24 3518

## 2024-09-28 ENCOUNTER — APPOINTMENT (OUTPATIENT)
Dept: CT IMAGING | Facility: HOSPITAL | Age: 69
End: 2024-09-28
Attending: FAMILY MEDICINE
Payer: COMMERCIAL

## 2024-09-28 ENCOUNTER — HOSPITAL ENCOUNTER (EMERGENCY)
Facility: HOSPITAL | Age: 69
Discharge: HOME | End: 2024-09-29
Attending: FAMILY MEDICINE | Admitting: FAMILY MEDICINE
Payer: COMMERCIAL

## 2024-09-28 DIAGNOSIS — R07.9 ACUTE CHEST PAIN: ICD-10-CM

## 2024-09-28 DIAGNOSIS — E87.6 HYPOKALEMIA: ICD-10-CM

## 2024-09-28 DIAGNOSIS — I47.10 SVT (SUPRAVENTRICULAR TACHYCARDIA) (H): ICD-10-CM

## 2024-09-28 DIAGNOSIS — E83.42 HYPOMAGNESEMIA: ICD-10-CM

## 2024-09-28 DIAGNOSIS — N17.9 ACUTE KIDNEY INJURY (H): ICD-10-CM

## 2024-09-28 DIAGNOSIS — R00.2 PALPITATIONS: ICD-10-CM

## 2024-09-28 LAB
ALBUMIN SERPL BCG-MCNC: 3.4 G/DL (ref 3.5–5.2)
ALP SERPL-CCNC: 96 U/L (ref 40–150)
ALT SERPL W P-5'-P-CCNC: 36 U/L (ref 0–70)
ANION GAP SERPL CALCULATED.3IONS-SCNC: 16 MMOL/L (ref 7–15)
AST SERPL W P-5'-P-CCNC: 24 U/L (ref 0–45)
ATRIAL RATE - MUSE: 90 BPM
B-OH-BUTYR SERPL-SCNC: <0.18 MMOL/L
BASOPHILS # BLD AUTO: 0.1 10E3/UL (ref 0–0.2)
BASOPHILS NFR BLD AUTO: 1 %
BILIRUB DIRECT SERPL-MCNC: <0.2 MG/DL (ref 0–0.3)
BILIRUB SERPL-MCNC: 0.3 MG/DL
BUN SERPL-MCNC: 14.9 MG/DL (ref 8–23)
CALCIUM SERPL-MCNC: 7.2 MG/DL (ref 8.8–10.4)
CHLORIDE SERPL-SCNC: 110 MMOL/L (ref 98–107)
CREAT SERPL-MCNC: 1.51 MG/DL (ref 0.67–1.17)
DIASTOLIC BLOOD PRESSURE - MUSE: NORMAL MMHG
EGFRCR SERPLBLD CKD-EPI 2021: 50 ML/MIN/1.73M2
EOSINOPHIL # BLD AUTO: 0.1 10E3/UL (ref 0–0.7)
EOSINOPHIL NFR BLD AUTO: 1 %
ERYTHROCYTE [DISTWIDTH] IN BLOOD BY AUTOMATED COUNT: 16.6 % (ref 10–15)
EST. AVERAGE GLUCOSE BLD GHB EST-MCNC: 146 MG/DL
ETHANOL SERPL-MCNC: <0.01 G/DL
GLUCOSE SERPL-MCNC: 212 MG/DL (ref 70–99)
HBA1C MFR BLD: 6.7 %
HCO3 SERPL-SCNC: 16 MMOL/L (ref 22–29)
HCT VFR BLD AUTO: 41.2 % (ref 40–53)
HGB BLD-MCNC: 12.7 G/DL (ref 13.3–17.7)
HOLD SPECIMEN: NORMAL
HOLD SPECIMEN: NORMAL
IMM GRANULOCYTES # BLD: 0 10E3/UL
IMM GRANULOCYTES NFR BLD: 0 %
INTERPRETATION ECG - MUSE: NORMAL
LACTATE SERPL-SCNC: 3.9 MMOL/L (ref 0.7–2)
LYMPHOCYTES # BLD AUTO: 2.8 10E3/UL (ref 0.8–5.3)
LYMPHOCYTES NFR BLD AUTO: 29 %
MAGNESIUM SERPL-MCNC: 1.4 MG/DL (ref 1.7–2.3)
MCH RBC QN AUTO: 24.1 PG (ref 26.5–33)
MCHC RBC AUTO-ENTMCNC: 30.8 G/DL (ref 31.5–36.5)
MCV RBC AUTO: 78 FL (ref 78–100)
MONOCYTES # BLD AUTO: 0.5 10E3/UL (ref 0–1.3)
MONOCYTES NFR BLD AUTO: 5 %
NEUTROPHILS # BLD AUTO: 6.3 10E3/UL (ref 1.6–8.3)
NEUTROPHILS NFR BLD AUTO: 64 %
NRBC # BLD AUTO: 0 10E3/UL
NRBC BLD AUTO-RTO: 0 /100
NT-PROBNP SERPL-MCNC: 1407 PG/ML (ref 0–900)
P AXIS - MUSE: 84 DEGREES
PLATELET # BLD AUTO: 300 10E3/UL (ref 150–450)
POTASSIUM SERPL-SCNC: 3.2 MMOL/L (ref 3.4–5.3)
PR INTERVAL - MUSE: 164 MS
PROCALCITONIN SERPL IA-MCNC: 0.05 NG/ML
PROT SERPL-MCNC: 6 G/DL (ref 6.4–8.3)
QRS DURATION - MUSE: 126 MS
QT - MUSE: 394 MS
QTC - MUSE: 481 MS
R AXIS - MUSE: -29 DEGREES
RBC # BLD AUTO: 5.27 10E6/UL (ref 4.4–5.9)
SODIUM SERPL-SCNC: 142 MMOL/L (ref 135–145)
SYSTOLIC BLOOD PRESSURE - MUSE: NORMAL MMHG
T AXIS - MUSE: 57 DEGREES
TROPONIN T SERPL HS-MCNC: 21 NG/L
TSH SERPL DL<=0.005 MIU/L-ACNC: 2.42 UIU/ML (ref 0.3–4.2)
VENTRICULAR RATE- MUSE: 90 BPM
WBC # BLD AUTO: 9.9 10E3/UL (ref 4–11)

## 2024-09-28 PROCEDURE — 84443 ASSAY THYROID STIM HORMONE: CPT | Performed by: FAMILY MEDICINE

## 2024-09-28 PROCEDURE — 96365 THER/PROPH/DIAG IV INF INIT: CPT | Mod: 59

## 2024-09-28 PROCEDURE — 83036 HEMOGLOBIN GLYCOSYLATED A1C: CPT | Performed by: FAMILY MEDICINE

## 2024-09-28 PROCEDURE — 71275 CT ANGIOGRAPHY CHEST: CPT

## 2024-09-28 PROCEDURE — 96366 THER/PROPH/DIAG IV INF ADDON: CPT

## 2024-09-28 PROCEDURE — 84484 ASSAY OF TROPONIN QUANT: CPT | Performed by: FAMILY MEDICINE

## 2024-09-28 PROCEDURE — 84145 PROCALCITONIN (PCT): CPT | Performed by: FAMILY MEDICINE

## 2024-09-28 PROCEDURE — 250N000011 HC RX IP 250 OP 636: Performed by: FAMILY MEDICINE

## 2024-09-28 PROCEDURE — 99285 EMERGENCY DEPT VISIT HI MDM: CPT | Mod: 25

## 2024-09-28 PROCEDURE — 82010 KETONE BODYS QUAN: CPT | Performed by: FAMILY MEDICINE

## 2024-09-28 PROCEDURE — 82248 BILIRUBIN DIRECT: CPT | Performed by: FAMILY MEDICINE

## 2024-09-28 PROCEDURE — 85025 COMPLETE CBC W/AUTO DIFF WBC: CPT | Performed by: FAMILY MEDICINE

## 2024-09-28 PROCEDURE — 83605 ASSAY OF LACTIC ACID: CPT | Performed by: FAMILY MEDICINE

## 2024-09-28 PROCEDURE — 82077 ASSAY SPEC XCP UR&BREATH IA: CPT | Performed by: FAMILY MEDICINE

## 2024-09-28 PROCEDURE — 83880 ASSAY OF NATRIURETIC PEPTIDE: CPT | Performed by: FAMILY MEDICINE

## 2024-09-28 PROCEDURE — 83735 ASSAY OF MAGNESIUM: CPT | Performed by: FAMILY MEDICINE

## 2024-09-28 PROCEDURE — 258N000003 HC RX IP 258 OP 636: Performed by: FAMILY MEDICINE

## 2024-09-28 PROCEDURE — 36415 COLL VENOUS BLD VENIPUNCTURE: CPT | Performed by: FAMILY MEDICINE

## 2024-09-28 RX ORDER — MAGNESIUM SULFATE HEPTAHYDRATE 40 MG/ML
2 INJECTION, SOLUTION INTRAVENOUS ONCE
Status: COMPLETED | OUTPATIENT
Start: 2024-09-28 | End: 2024-09-29

## 2024-09-28 RX ORDER — IOPAMIDOL 755 MG/ML
75 INJECTION, SOLUTION INTRAVASCULAR ONCE
Status: COMPLETED | OUTPATIENT
Start: 2024-09-29 | End: 2024-09-28

## 2024-09-28 RX ORDER — POTASSIUM CHLORIDE 7.45 MG/ML
10 INJECTION INTRAVENOUS ONCE
Status: COMPLETED | OUTPATIENT
Start: 2024-09-29 | End: 2024-09-29

## 2024-09-28 RX ADMIN — SODIUM CHLORIDE 1000 ML: 9 INJECTION, SOLUTION INTRAVENOUS at 22:34

## 2024-09-28 RX ADMIN — MAGNESIUM SULFATE HEPTAHYDRATE 2 G: 40 INJECTION, SOLUTION INTRAVENOUS at 22:45

## 2024-09-28 RX ADMIN — SODIUM CHLORIDE 1000 ML: 9 INJECTION, SOLUTION INTRAVENOUS at 23:55

## 2024-09-28 RX ADMIN — IOPAMIDOL 75 ML: 755 INJECTION, SOLUTION INTRAVENOUS at 23:43

## 2024-09-28 RX ADMIN — POTASSIUM CHLORIDE 10 MEQ: 7.46 INJECTION, SOLUTION INTRAVENOUS at 23:55

## 2024-09-28 ASSESSMENT — ENCOUNTER SYMPTOMS
DIZZINESS: 1
SHORTNESS OF BREATH: 0
NAUSEA: 0
PALPITATIONS: 1
VOMITING: 0
DIARRHEA: 0

## 2024-09-28 ASSESSMENT — COLUMBIA-SUICIDE SEVERITY RATING SCALE - C-SSRS
6. HAVE YOU EVER DONE ANYTHING, STARTED TO DO ANYTHING, OR PREPARED TO DO ANYTHING TO END YOUR LIFE?: NO
2. HAVE YOU ACTUALLY HAD ANY THOUGHTS OF KILLING YOURSELF IN THE PAST MONTH?: NO
1. IN THE PAST MONTH, HAVE YOU WISHED YOU WERE DEAD OR WISHED YOU COULD GO TO SLEEP AND NOT WAKE UP?: NO

## 2024-09-28 ASSESSMENT — ACTIVITIES OF DAILY LIVING (ADL): ADLS_ACUITY_SCORE: 36

## 2024-09-29 VITALS
BODY MASS INDEX: 29.92 KG/M2 | SYSTOLIC BLOOD PRESSURE: 141 MMHG | TEMPERATURE: 97.7 F | WEIGHT: 202 LBS | HEART RATE: 85 BPM | DIASTOLIC BLOOD PRESSURE: 66 MMHG | HEIGHT: 69 IN | OXYGEN SATURATION: 99 % | RESPIRATION RATE: 21 BRPM

## 2024-09-29 LAB
ANION GAP SERPL CALCULATED.3IONS-SCNC: 11 MMOL/L (ref 7–15)
BASE EXCESS BLDV CALC-SCNC: -1 MMOL/L (ref -3–3)
BUN SERPL-MCNC: 19 MG/DL (ref 8–23)
CALCIUM SERPL-MCNC: 8.6 MG/DL (ref 8.8–10.4)
CHLORIDE SERPL-SCNC: 105 MMOL/L (ref 98–107)
CREAT SERPL-MCNC: 1.61 MG/DL (ref 0.67–1.17)
EGFRCR SERPLBLD CKD-EPI 2021: 47 ML/MIN/1.73M2
GLUCOSE SERPL-MCNC: 114 MG/DL (ref 70–99)
HCO3 BLDV-SCNC: 25 MMOL/L (ref 21–28)
HCO3 SERPL-SCNC: 23 MMOL/L (ref 22–29)
LACTATE SERPL-SCNC: 1.5 MMOL/L (ref 0.7–2)
O2/TOTAL GAS SETTING VFR VENT: 0 %
OXYHGB MFR BLDV: 40 % (ref 70–75)
PCO2 BLDV: 44 MM HG (ref 40–50)
PH BLDV: 7.35 [PH] (ref 7.32–7.43)
PO2 BLDV: 28 MM HG (ref 25–47)
POTASSIUM SERPL-SCNC: 3.9 MMOL/L (ref 3.4–5.3)
SAO2 % BLDV: 40.5 % (ref 70–75)
SODIUM SERPL-SCNC: 139 MMOL/L (ref 135–145)
TROPONIN T SERPL HS-MCNC: 26 NG/L

## 2024-09-29 PROCEDURE — 82805 BLOOD GASES W/O2 SATURATION: CPT | Performed by: FAMILY MEDICINE

## 2024-09-29 PROCEDURE — 250N000013 HC RX MED GY IP 250 OP 250 PS 637: Performed by: FAMILY MEDICINE

## 2024-09-29 PROCEDURE — 83605 ASSAY OF LACTIC ACID: CPT | Performed by: FAMILY MEDICINE

## 2024-09-29 PROCEDURE — 80048 BASIC METABOLIC PNL TOTAL CA: CPT | Performed by: FAMILY MEDICINE

## 2024-09-29 PROCEDURE — 36415 COLL VENOUS BLD VENIPUNCTURE: CPT | Performed by: FAMILY MEDICINE

## 2024-09-29 PROCEDURE — 96361 HYDRATE IV INFUSION ADD-ON: CPT

## 2024-09-29 PROCEDURE — 84484 ASSAY OF TROPONIN QUANT: CPT | Performed by: FAMILY MEDICINE

## 2024-09-29 RX ORDER — METOPROLOL TARTRATE 25 MG/1
75 TABLET, FILM COATED ORAL ONCE
Status: COMPLETED | OUTPATIENT
Start: 2024-09-29 | End: 2024-09-29

## 2024-09-29 RX ADMIN — METOPROLOL TARTRATE 75 MG: 25 TABLET, FILM COATED ORAL at 01:41

## 2024-09-29 ASSESSMENT — ACTIVITIES OF DAILY LIVING (ADL): ADLS_ACUITY_SCORE: 36

## 2024-09-29 NOTE — ED PROVIDER NOTES
EMERGENCY DEPARTMENT ENCOUNTER      NAME: Barry Jay  AGE: 69 year old male  YOB: 1955  MRN: [id number]  EVALUATION DATE & TIME: 9/28/2024 10:06 PM    PCP: Osmel Iniguez    ED PROVIDER: Jaya Montez M.D.    Chief Complaint   Patient presents with    Tachycardia    Palpitations       FINAL IMPRESSION:  1. Hypomagnesemia    2. Acute chest pain    3. Palpitations    4. Hypokalemia    5. SVT (supraventricular tachycardia) (H)    6. Acute kidney injury (H)        ED COURSE & MEDICAL DECISION MAKING:    Pertinent Labs & Imaging studies independently interpreted by me. (See chart for details)  Reviewed emergency department visit from yesterday when patient was seen with left-sided chest pain and palpitations, noted at that time has a history of SVT but no symptoms at that visit.  Also reviewed prior angiogram from April 2023 which showed diffuse mild coronary disease with no stenosis or stenting.  Prior ablation for SVT March 2023    10:11 PM I met with the patient, obtained history, performed an initial exam, and discussed options and plan for diagnostics and treatment here in the ED.     ED Course as of 09/29/24 0121   Sat Sep 28, 2024   2215 Patient seen and examined, presents today with diaphoresis, palpitations, lightheadedness.  He has been having intermittent episodes of this for couple of days, evaluated in the emergency department yesterday.  On initial arrival here, patient is feeling better, tachycardic with what appears to be sinus tachycardia.  I did review the rhythm strip from EMS, this appears to show SVT, per EMS they gave 12 mg adenosine with conversion.  Symptoms could be from recurrent SVT, also consider infectious etiology including pneumonia.  With tachycardia and recent travel as well as chest pain, PE not excluded (CT PE study will be performed.  Fluids ordered and will monitor patient in the emergency department.   2217 EKG:    Independently reviewed and  interpreted by me  Performed at: 10:08 PM  Impression: Sinus tachycardia, right bundle branch block  Rate: 106  Rhythm: Sinus  Axis: Normal  VA Interval: 192  QRS Interval: 130  QTc Interval: 441  ST Changes: No acute ischemic changes  Comparison: September 27, 2024, no acute change   2240 Labs independently interpreted by me with hypomagnesemia, IV replacement ordered.  Patient is noted, quite tachycardic with minimal activity, this appears to be sinus tachycardia on telemetry monitoring, and does recover quickly at rest, however patient remains tachycardic around 100-105.   2305 Labs ordered and independently interpreted by me with normal white blood cell count, lactate 3.9 which may represent dehydration and hypoperfusion but also could be related to sepsis, procalcitonin ordered.  Heart rate improving after fluids.   2326 Labs ordered and independently interpreted by me with mild hypokalemia, elevation in the creatinine from baseline and from labs yesterday, and anion gap acidosis with hyperglycemia in this patient with no prior history of diabetes.   2343 Labs independently interpreted by me with negative procalcitonin, troponin 21 which is slightly elevated from yesterday and will be repeated.   2345 CT scan of the chest independently interpreted by me does not demonstrate acute infiltrate or effusion, no pericardial effusion.   Sun Sep 29, 2024   0041 Repeat lactate 1.5.   0054 Venous blood gas is normal   0101 Repeat basic panel is improved with normalization of the CO2 and anion gap, however creatinine is risen a little bit more.  Repeat troponin elevated from initial but not yet to significance.  Given diaphoresis, palpitations, slight troponin elevation with increased, as well as acute kidney injury, patient will be admitted for further evaluation and treatment   0116 Long discussion with with patient regarding lab results today including rising creatinine and slight elevation in the troponin.  Troponin  elevation is likely partly related to poor clearance related to acute kidney injury, as well as SVT.  However, cannot completely exclude acute coronary syndrome although somewhat less likely given recent angiogram with only mild occlusion.  Also discussed concerns with rising creatinine and spite of fluid boluses.  Patient declines admission and would like to go home.  He has a rapid access cardiology follow-up consult from his visit yesterday, will contact his primary care doctor on Monday for repeat basic panel and follow-up in the meantime we will have patient increase his metoprolol.         At the conclusion of the encounter I discussed the results of all of the tests and the disposition. The questions were answered. The patient or family acknowledged understanding and was agreeable with the care plan.     Medical Decision Making  Obtained supplemental history:Supplemental history obtained?: Friend  Reviewed external records: External records reviewed?: Documented in chart  Care impacted by chronic illness:Heart Disease, Hyperlipidemia, Hypertension, and Smoking / Nicotine Use  Did you consider but not order tests?: Work up considered but not performed and documented in chart, if applicable  Did you interpret images independently?: Independent interpretation of ECG and images noted in documentation, when applicable.  Consultation discussion with other provider:Did you involve another provider (consultant, MH, pharmacy, etc.)?: No  Discharge. I recommended the patient continue their current prescription strength medication(s): Increase metoprolol to 75 mg at night and 50 mg in in the morning. I recommended admission, but the patient declined.    MIPS: CT Pulmonary Angiogram:Patient is moderate to high risk for PE.      MEDICATIONS GIVEN IN THE EMERGENCY:  Medications   metoprolol tartrate (LOPRESSOR) tablet 75 mg (has no administration in time range)   sodium chloride 0.9% BOLUS 1,000 mL (0 mLs Intravenous  "Stopped 9/28/24 2333)   magnesium sulfate 2 g in 50 mL sterile water intermittent infusion (0 g Intravenous Stopped 9/29/24 0023)   potassium chloride 10 mEq in 100 mL sterile water infusion (0 mEq Intravenous Stopped 9/29/24 0106)   iopamidol (ISOVUE-370) solution 75 mL (75 mLs Intravenous $Given 9/28/24 2343)   sodium chloride 0.9% BOLUS 1,000 mL (0 mLs Intravenous Stopped 9/29/24 0106)       NEW PRESCRIPTIONS STARTED AT TODAY'S ER VISIT  New Prescriptions    No medications on file       =================================================================    HPI    Patient information was obtained from: Patient      Barry Jay is a 69 year old male with a pertinent history of hyperlipidemia, hypertension, CAD, paroxysmal SVT,  worsening angina, and s/p coronary angiogram who presents to this ED EMS for evaluation of tachycardia and palpitations.    Patient states he returned from a local vacation in [place], [place], and came on on the night of 09/25/2024 and \"couldn't stop sweating\". Patient felt at baseline on Thursday, but symptoms returned yesterday and he came into ED. Patient further reports dizziness and tachycardia. Per patient's friend, he was in the bathtub today when he started sweating and had some slurred speech, chest pain, and was pale appearing. Patient also adds he had an ablation done a year ago. Patient takes Metoprolol BID, and Irbesartan daily. Patient's surgical history includes a knee replacement, gall bladder removal, and hernia repair. Patient is not on blood thinning medication.     Patient denies nausea, vomiting, diarrhea, shortness of breath, or any other complaints at this time.         Chart Review:  09/27/2024: Patient was seen by Selena Tipton MD, at [facility] ED for evaluation of chest pain. Patient's initial troponin was 19, and his EKG was nonischemic. Chest x-ray was clear. No arrhythmias seen on cardiac monitoring, but patient does have a history of " SVT and it was possible there were intermittent arrhythmias. Patient presentation was most consistent with Suspect paroxysmal arrhythmia, likely SVT given patient's medical history. Patient was educated about a follow up with his PCP and cardiology as well as warning signs of when to return to ED. Patient discharged.       REVIEW OF SYSTEMS   Review of Systems   Respiratory:  Negative for shortness of breath.    Cardiovascular:  Positive for chest pain and palpitations.   Gastrointestinal:  Negative for diarrhea, nausea and vomiting.   Neurological:  Positive for dizziness.   All other systems reviewed and are negative.     All other systems reviewed and negative    PAST MEDICAL HISTORY:  Past Medical History:   Diagnosis Date    HTN (hypertension)     Hyperlipidemia        PAST SURGICAL HISTORY:  Past Surgical History:   Procedure Laterality Date    APPENDECTOMY      CHOLECYSTECTOMY      CV CORONARY ANGIOGRAM N/A 4/12/2023    Procedure: Coronary Angiogram;  Surgeon: Teena Laguna MD;  Location: [facility] CATH LAB CV    CV LEFT HEART CATH N/A 4/12/2023    Procedure: Left Heart Catheterization;  Surgeon: Teena Laguna MD;  Location: [facility] LAB CV    EP ABLATION SVT N/A 3/14/2023    Procedure: Ablation Supraventricular Tachycardia;  Surgeon: Lorna Hobson MD;  Location: [facility] CATH LAB CV    LAMINECTOMY LUMBAR ONE LEVEL Right 6/27/2022    Procedure: Right Lumbar 4 - lumbar 5 hemilaminectomy, medial facetectomies, foraminotomies;  Surgeon: Shannan Mari MD;  Location[place] [facility] [place]    ORTHOPEDIC SURGERY         CURRENT MEDICATIONS:    Current Facility-Administered Medications   Medication Dose Route Frequency Provider Last Rate Last Admin    metoprolol tartrate (LOPRESSOR) tablet 75 mg  75 mg Oral Once Jaya Montez MD         Current Outpatient Medications   Medication Sig Dispense Refill    aspirin (ASA) 81 MG chewable tablet Take 81 mg by mouth daily      atorvastatin (LIPITOR)  "20 MG tablet Take 1 tablet (20 mg) by mouth at bedtime. Pt needs appt for further refills 90 tablet 0    irbesartan-hydrochlorothiazide (AVALIDE) 300-12.5 mg per tablet [IRBESARTAN-HYDROCHLOROTHIAZIDE (AVALIDE) 300-12.5 MG PER TABLET] Take 1 tablet by mouth daily. for blood pressure  1    metoprolol tartrate (LOPRESSOR) 50 MG tablet Take 1 tablet (50 mg) by mouth 2 times daily 180 tablet 3    multivitamin, therapeutic (THERA-VIT) TABS tablet Take 1 tablet by mouth daily      omeprazole (PRILOSEC) 20 MG capsule Take 20 mg by mouth daily  1    PARoxetine (PAXIL) 20 MG tablet Take 20 mg by mouth every morning  3    zolpidem (AMBIEN) 10 mg tablet Take 10 mg by mouth nightly as needed for sleep  2       ALLERGIES:  Allergies   Allergen Reactions    Iodine Hives       FAMILY HISTORY:  No family history on file.    SOCIAL HISTORY:   Social History     Socioeconomic History    Marital status: Single   Tobacco Use    Smoking status: Every Day     Current packs/day: 1.00     Average packs/day: 1 pack/day for 30.0 years (30.0 ttl pk-yrs)     Types: Cigarettes    Smokeless tobacco: Never    Tobacco comments:     Pt quitting, down to 7 cigs per day   Vaping Use    Vaping status: Never Used   Substance and Sexual Activity    Alcohol use: Never    Drug use: Never       VITALS:  /64   Pulse 86   Temp 97.7  F (36.5  C) (Oral)   Resp 21   Ht 1.753 m (5' 9\")   Wt 91.6 kg (202 lb)   SpO2 98%   BMI 29.83 kg/m      PHYSICAL EXAM:  Physical Exam  Vitals and nursing note reviewed.   Constitutional:       Appearance: Normal appearance.   HENT:      Head: Normocephalic and atraumatic.      Right Ear: External ear normal.      Left Ear: External ear normal.      Nose: Nose normal.      Mouth/Throat:      Mouth: Mucous membranes are moist.   Eyes:      Extraocular Movements: Extraocular movements intact.      Conjunctiva/sclera: Conjunctivae normal.      Pupils: Pupils are equal, round, and reactive to light.   Cardiovascular:     "  Rate and Rhythm: Regular rhythm. Tachycardia present.   Pulmonary:      Effort: Pulmonary effort is normal.      Breath sounds: Normal breath sounds. No wheezing or rales.   Abdominal:      General: Abdomen is flat. There is no distension.      Palpations: Abdomen is soft.      Tenderness: There is no abdominal tenderness. There is no guarding.   Musculoskeletal:         General: Normal range of motion.      Cervical back: Normal range of motion and neck supple.      Right lower leg: No edema.      Left lower leg: No edema.   Lymphadenopathy:      Cervical: No cervical adenopathy.   Skin:     General: Skin is warm and dry.   Neurological:      General: No focal deficit present.      Mental Status: He is alert and oriented to person, place, and time. Mental status is at baseline.      Comments: No gross focal neurologic deficits   Psychiatric:         Mood and Affect: Mood normal.         Behavior: Behavior normal.         Thought Content: Thought content normal.          LAB:  All pertinent labs reviewed and interpreted.  Results for orders placed or performed during the hospital encounter of 09/28/24   CT Chest Pulmonary Embolism w Contrast    Impression    IMPRESSION:  1.  Negative for pulmonary embolism. No acute abnormalities or CT findings to explain the patient's symptoms.    2.  Mild emphysematous changes in the lungs. No evidence for pneumonitis.    3.  Moderate coronary artery calcification.    4.  Lipomatous hypertrophy of the intra-atrial septum.   Basic metabolic panel   Result Value Ref Range    Sodium 142 135 - 145 mmol/L    Potassium 3.2 (L) 3.4 - 5.3 mmol/L    Chloride 110 (H) 98 - 107 mmol/L    Carbon Dioxide (CO2) 16 (L) 22 - 29 mmol/L    Anion Gap 16 (H) 7 - 15 mmol/L    Urea Nitrogen 14.9 8.0 - 23.0 mg/dL    Creatinine 1.51 (H) 0.67 - 1.17 mg/dL    GFR Estimate 50 (L) >60 mL/min/1.73m2    Calcium 7.2 (L) 8.8 - 10.4 mg/dL    Glucose 212 (H) 70 - 99 mg/dL   Result Value Ref Range    Magnesium 1.4  (L) 1.7 - 2.3 mg/dL   Nt probnp inpatient (BNP)   Result Value Ref Range    N terminal Pro BNP Inpatient 1,407 (H) 0 - 900 pg/mL   CBC with platelets and differential   Result Value Ref Range    WBC Count 9.9 4.0 - 11.0 10e3/uL    RBC Count 5.27 4.40 - 5.90 10e6/uL    Hemoglobin 12.7 (L) 13.3 - 17.7 g/dL    Hematocrit 41.2 40.0 - 53.0 %    MCV 78 78 - 100 fL    MCH 24.1 (L) 26.5 - 33.0 pg    MCHC 30.8 (L) 31.5 - 36.5 g/dL    RDW 16.6 (H) 10.0 - 15.0 %    Platelet Count 300 150 - 450 10e3/uL    % Neutrophils 64 %    % Lymphocytes 29 %    % Monocytes 5 %    % Eosinophils 1 %    % Basophils 1 %    % Immature Granulocytes 0 %    NRBCs per 100 WBC 0 <1 /100    Absolute Neutrophils 6.3 1.6 - 8.3 10e3/uL    Absolute Lymphocytes 2.8 0.8 - 5.3 10e3/uL    Absolute Monocytes 0.5 0.0 - 1.3 10e3/uL    Absolute Eosinophils 0.1 0.0 - 0.7 10e3/uL    Absolute Basophils 0.1 0.0 - 0.2 10e3/uL    Absolute Immature Granulocytes 0.0 <=0.4 10e3/uL    Absolute NRBCs 0.0 10e3/uL   Extra Blue Top Tube   Result Value Ref Range    Hold Specimen JIC    Extra Red Top Tube   Result Value Ref Range    Hold Specimen JIC    Result Value Ref Range    Troponin T, High Sensitivity 21 <=22 ng/L   Hepatic function panel   Result Value Ref Range    Protein Total 6.0 (L) 6.4 - 8.3 g/dL    Albumin 3.4 (L) 3.5 - 5.2 g/dL    Bilirubin Total 0.3 <=1.2 mg/dL    Alkaline Phosphatase 96 40 - 150 U/L    AST 24 0 - 45 U/L    ALT 36 0 - 70 U/L    Bilirubin Direct <0.20 0.00 - 0.30 mg/dL   Lactic acid whole blood with 1x repeat in 2 hr when >2   Result Value Ref Range    Lactic Acid, Initial 3.9 (H) 0.7 - 2.0 mmol/L   TSH with free T4 reflex   Result Value Ref Range    TSH 2.42 0.30 - 4.20 uIU/mL   Lactic acid whole blood   Result Value Ref Range    Lactic Acid 1.5 0.7 - 2.0 mmol/L   Result Value Ref Range    Procalcitonin 0.05 <0.50 ng/mL   Result Value Ref Range    Troponin T, High Sensitivity 26 (H) <=22 ng/L   Blood gas venous   Result Value Ref Range    pH  Venous 7.35 7.32 - 7.43    pCO2 Venous 44 40 - 50 mm Hg    pO2 Venous 28 25 - 47 mm Hg    Bicarbonate Venous 25 21 - 28 mmol/L    Base Excess/Deficit Venous -1.0 -3.0 - 3.0 mmol/L    FIO2 0     Oxyhemoglobin Venous 40 (L) 70 - 75 %    O2 Sat, Venous 40.5 (L) 70.0 - 75.0 %   Ketone Beta-Hydroxybutyrate Quantitative   Result Value Ref Range    Ketone (Beta-Hydroxybutyrate) Quantitative <0.18 <=0.30 mmol/L   Hemoglobin A1c   Result Value Ref Range    Estimated Average Glucose 146 (H) <117 mg/dL    Hemoglobin A1C 6.7 (H) <5.7 %   Ethyl Alcohol Level   Result Value Ref Range    Alcohol ethyl <0.01 <=0.01 g/dL   Basic metabolic panel   Result Value Ref Range    Sodium 139 135 - 145 mmol/L    Potassium 3.9 3.4 - 5.3 mmol/L    Chloride 105 98 - 107 mmol/L    Carbon Dioxide (CO2) 23 22 - 29 mmol/L    Anion Gap 11 7 - 15 mmol/L    Urea Nitrogen 19.0 8.0 - 23.0 mg/dL    Creatinine 1.61 (H) 0.67 - 1.17 mg/dL    GFR Estimate 47 (L) >60 mL/min/1.73m2    Calcium 8.6 (L) 8.8 - 10.4 mg/dL    Glucose 114 (H) 70 - 99 mg/dL       RADIOLOGY:  Reviewed all pertinent imaging. Please see official radiology report.  CT Chest Pulmonary Embolism w Contrast   Final Result   IMPRESSION:   1.  Negative for pulmonary embolism. No acute abnormalities or CT findings to explain the patient's symptoms.      2.  Mild emphysematous changes in the lungs. No evidence for pneumonitis.      3.  Moderate coronary artery calcification.      4.  Lipomatous hypertrophy of the intra-atrial septum.          I, Rigoberto Luna, am serving as a scribe to document services personally performed by Dr. Montez based on my observation and the provider's statements to me. I, Jaya Montez MD attest that Rigoberto Luna is acting in a scribe capacity, has observed my performance of the services and has documented them in accordance with my direction.    Jaya Montez M.D.  Emergency Medicine  [facility]  [facility] EMERGENCY DEPARTMENT  [address] [place] [id number]  [phone]  Dept: [phone]       Jaya Montez MD  09/29/24 0122

## 2024-09-29 NOTE — PHARMACY-ADMISSION MEDICATION HISTORY
Pharmacist Admission Medication History    Admission medication history is complete. The information provided in this note is only as accurate as the sources available at the time of the update.    Information Source(s): Patient via in-person    Pertinent Information: Patient is asking for second dose of metoprolol for tonight.    Changes made to PTA medication list:  Added: None  Deleted: Celebrex, alprazolam  Changed: None    Allergies reviewed with patient and updates made in EHR: yes    Medication History Completed By: Brown Michelle RPH 9/28/2024 11:21 PM    PTA Med List   Medication Sig Last Dose    aspirin (ASA) 81 MG chewable tablet Take 81 mg by mouth daily 9/28/2024 at AM    atorvastatin (LIPITOR) 20 MG tablet Take 1 tablet (20 mg) by mouth at bedtime. Pt needs appt for further refills 9/28/2024 at AM    irbesartan-hydrochlorothiazide (AVALIDE) 300-12.5 mg per tablet [IRBESARTAN-HYDROCHLOROTHIAZIDE (AVALIDE) 300-12.5 MG PER TABLET] Take 1 tablet by mouth daily. for blood pressure 9/28/2024 at AM    metoprolol tartrate (LOPRESSOR) 50 MG tablet Take 1 tablet (50 mg) by mouth 2 times daily 9/28/2024 at AM    multivitamin, therapeutic (THERA-VIT) TABS tablet Take 1 tablet by mouth daily 9/28/2024 at AM    omeprazole (PRILOSEC) 20 MG capsule Take 20 mg by mouth daily 9/28/2024 at AM    PARoxetine (PAXIL) 20 MG tablet Take 20 mg by mouth every morning 9/28/2024 at AM    zolpidem (AMBIEN) 10 mg tablet Take 10 mg by mouth nightly as needed for sleep 9/27/2024 at PM

## 2024-09-29 NOTE — ED NOTES
Bed: JNED-03  Expected date: 9/28/24  Expected time: 9:55 PM  Means of arrival: Ambulance  Comments:  LV SVT to sinus tachy with RBB 130s after 12mg adenosine

## 2024-09-29 NOTE — DISCHARGE INSTRUCTIONS
Increase metoprolol to 75 mg in the evening and 50 mg in the morning starting tomorrow evening, do not take your morning dose today    Call your primary care provider on Monday to schedule follow-up kidney function testing and electrolyte check    Cardiology clinic will call you Monday morning to schedule follow-up appointment.  If you do not hear from them by noon, call the phone number listed

## 2024-09-29 NOTE — ED TRIAGE NOTES
"Patient brought in by [facility] from home. Patient complained of chest pain and palpitations. When EMS arrived patient was tachycardic in the 220's, diaphoretic, pale and having chest pain. Patient was found to have a heart rate in the 220's. He was given 12 of adenosine and 324 mg's of ASA. On arrival patient reports chest pain 5 out of 10 but stated \"it is much better.\" He denies SOB and feeling dizzy.      Triage Assessment (Adult)       Row Name 09/28/24 2208          Triage Assessment    Airway WDL WDL        Respiratory WDL    Respiratory WDL WDL        Cardiac WDL    Cardiac WDL X        Peripheral/Neurovascular WDL    Peripheral Neurovascular WDL WDL        Cognitive/Neuro/Behavioral WDL    Cognitive/Neuro/Behavioral WDL WDL                     "

## 2024-09-30 ENCOUNTER — OFFICE VISIT (OUTPATIENT)
Dept: CARDIOLOGY | Facility: CLINIC | Age: 69
End: 2024-09-30
Payer: COMMERCIAL

## 2024-09-30 VITALS
SYSTOLIC BLOOD PRESSURE: 122 MMHG | WEIGHT: 204 LBS | HEART RATE: 72 BPM | DIASTOLIC BLOOD PRESSURE: 70 MMHG | BODY MASS INDEX: 30.92 KG/M2 | RESPIRATION RATE: 16 BRPM | HEIGHT: 68 IN

## 2024-09-30 DIAGNOSIS — R00.2 PALPITATIONS: ICD-10-CM

## 2024-09-30 DIAGNOSIS — I25.10 NONOBSTRUCTIVE ATHEROSCLEROSIS OF CORONARY ARTERY: ICD-10-CM

## 2024-09-30 DIAGNOSIS — E78.5 HYPERLIPIDEMIA, UNSPECIFIED HYPERLIPIDEMIA TYPE: ICD-10-CM

## 2024-09-30 DIAGNOSIS — I47.10 PAROXYSMAL SUPRAVENTRICULAR TACHYCARDIA (H): ICD-10-CM

## 2024-09-30 DIAGNOSIS — R07.9 CHEST PAIN, UNSPECIFIED TYPE: Primary | ICD-10-CM

## 2024-09-30 DIAGNOSIS — I10 ESSENTIAL HYPERTENSION: ICD-10-CM

## 2024-09-30 PROCEDURE — 99214 OFFICE O/P EST MOD 30 MIN: CPT | Performed by: GENERAL ACUTE CARE HOSPITAL

## 2024-09-30 NOTE — LETTER
9/30/2024    Osmel Iniguez MD  [address]  [place] [address]    RE: Barry Jay       Dear Colleague,     I had the pleasure of seeing Barry Jay in the [facility].  HEART CARE ENCOUNTER NOTE          Assessment/Recommendations   Assessment:    Paroxysmal supraventricular tachycardia with no inducible arrhythmia identified or ablation performed on electrophysiology study 3/14/2023. He has had reportedly recurrent arrhythmia terminated with intravenous adenosine.  Chest pain which is nonexertional and likely due to supraventricular tachycardia.  Nonobstructive coronary artery disease seen on coronary angiography 4/12/2023.  Essential hypertension. Controlled.  Hyperlipidemia.   Body mass index is 31.02 kg/m .    Plan:  We will have him follow-up in electrophysiology with Dr. Hobson as the patient is very interested in another electrophysiology study with possible ablation if this is an option.  Continue increased dose of metoprolol 50 mg in the morning and 75 mg in the evening.  If he has recurrent episodes of supraventricular tachycardia prior to his appointment with Dr. Salomon, we can mail him a Zio patch to try capturing the specific arrhythmia.  Routine follow-up with his general cardiologist, Dr. Marianne Brush.         History of Present Illness   Mr. Barry Jay is a 69 year old male with a significant past history of paroxysmal SVT and nonobstructive CAD presenting for urgent evaluation of palpitations and chest pain. He normally follows in cardiology clinic with Dr. Marianne Brush and has also seen Dr. Lorna Hobson in electrophysiology.    5 days ago, he started noticing his heart rating which was accompanied by lightheadedness, chest tightness radiating to his left neck and shoulder as well as diaphoresis. This at firs was intermittent but seemed to be occurring more frequently. He first came to the ED 9/27/2024 where his evaluation was  unremarkable and he was discharged home without any new therapy.    His symptoms returned the following day 9/28/2024 and were even more severe, prompting him to call 911. EMS reportedly found him in SVT and administered 12 mg IV adenosine, which converted him to sinus tachycardia. Labs were notable for borderline elevated high-sensitivity troponin 21 -> 26 and mildly elevated NT-proBNP 1407. CTA chest was then performed, which showed no PE or other acute abnormalities. He was instructed to take an extra 25 mg metoprolol dose in the evening.    When he is not having these episodes, he feels fine and exercises regularly with no exertional chest pain/pressure/tightness, shortness of breath at rest or with exertion, light headedness/dizziness, pre-syncope, syncope, lower extremity swelling, palpitations, paroxysmal nocturnal dyspnea (PND), or orthopnea.    An EP study was performed last year, but no inducible arrhythmia could be identified and no ablation was performed.     Cardiac Problems and Cardiac Diagnostics     Most Recent Cardiac testing:  ECG dated 9/28/2024 (personaly reviewed and interpreted): sinus tachycardia  bpm, RBBB QRS duration 130 ms    CTA chest 9/28/2024 (report reviewed):  1.  Negative for pulmonary embolism. No acute abnormalities or CT findings to explain the patient's symptoms.  2.  Mild emphysematous changes in the lungs. No evidence for pneumonitis.  3.  Moderate coronary artery calcification.  4.  Lipomatous hypertrophy of the intra-atrial septum.    ECHO 1/19/2023 (report reviewed):   Left ventricular size, wall motion and function are normal. The ejection fraction is 60-65%.  Normal right ventricle size and systolic function.  IVC diameter <2.1 cm collapsing >50% with sniff suggests a normal RA pressure of 3 mmHg.  No hemodynamically significant valvular abnormalities on 2D or color flow imaging.    CT coronary angiogram 2/23/2023 (report reviewed):  The total Agatston score is 374.  A calcium score in this range places the individual in the 61st percentile when compared to an age and gender matched control group and implies a high risk of cardiac events in the next ten years.  Moderately severe single-vessel coronary atherosclerosis  Mid LAD lesion has moderate luminal stenosis in the range of 50-69%.  CT FFR of LAD lesion suggests this is not a hemodynamically significant stenosis, with FFR 0.83 distal to the lesion.    Cardiac cath 4/12/2023 (report reviewed):      Mid LAD lesion is 25% stenosed.     2nd Mrg lesion is 25% stenosed.     1st Diag lesion is 25% stenosed.     Left ventricular filling pressures are mildly elevated.     Diffuse coronary atherosclerosis with no significant focal stenosis.  Mildly elevated LVEDP 14mmHg.      Medications  Allergies   Current Outpatient Medications   Medication Sig Dispense Refill     aspirin (ASA) 81 MG chewable tablet Take 81 mg by mouth daily       atorvastatin (LIPITOR) 20 MG tablet Take 1 tablet (20 mg) by mouth at bedtime. Pt needs appt for further refills 90 tablet 0     irbesartan-hydrochlorothiazide (AVALIDE) 300-12.5 mg per tablet [IRBESARTAN-HYDROCHLOROTHIAZIDE (AVALIDE) 300-12.5 MG PER TABLET] Take 1 tablet by mouth daily. for blood pressure  1     metoprolol tartrate (LOPRESSOR) 50 MG tablet Take 1 tablet (50 mg) by mouth 2 times daily (Patient taking differently: Take 50 mg by mouth 2 times daily. 50 mg in the am and 75 mg in the evening) 180 tablet 3     multivitamin, therapeutic (THERA-VIT) TABS tablet Take 1 tablet by mouth daily       omeprazole (PRILOSEC) 20 MG capsule Take 20 mg by mouth daily  1     PARoxetine (PAXIL) 20 MG tablet Take 20 mg by mouth every morning  3     zolpidem (AMBIEN) 10 mg tablet Take 10 mg by mouth nightly as needed for sleep  2      Allergies   Allergen Reactions     Iodine Hives        Physical Examination Review of Systems   /70 (BP Location: Left arm, Patient Position: Sitting, Cuff Size: Adult  "Large)   Pulse 72   Resp 16   Ht 1.727 m (5' 8\")   Wt 92.5 kg (204 lb)   BMI 31.02 kg/m    Body mass index is 31.02 kg/m .  Wt Readings from Last 3 Encounters:   09/30/24 92.5 kg (204 lb)   09/28/24 91.6 kg (202 lb)   09/27/24 91.9 kg (202 lb 11.2 oz)       General Appearance:   Pleasant  male, appears  stated age. no acute distress, normal body habitus   ENT/Mouth: membranes moist, no apparent gingival bleeding.      EYES:  no scleral icterus, normal conjunctivae   Neck: no carotid bruits. No anterior cervical lymphadenopaty   Respiratory:   lungs are clear to auscultation, no rales or wheezing, equal chest wall expansion    Cardiovascular:   Regular rhythm, normal rate. Normal first and second heart sounds with no murmurs, rubs, or gallops; the carotid, radial and posterior tibial pulses are intact, Jugular venous pressure normal, no edema bilaterally    Abdomen/GI:  no organomegaly, masses, bruits, or tenderness; bowel sounds are present   Extremities: no cyanosis or clubbing   Skin: no xanthelasma, warm.    Heme/lymph/ Immunology No apparent bleeding noted.   Neurologic: Alert and oriented. normal gait, no tremors     Psychiatric: Pleasant, calm, appropriate affect.    A complete 10 system review of systems was performed and is negative except as mentioned in the HPI/subjective.         Past History   Past Medical History:   Past Medical History:   Diagnosis Date     Anxiety      ED (erectile dysfunction)      HTN (hypertension)      Hyperlipidemia      Nonobstructive atherosclerosis of coronary artery      SVT (supraventricular tachycardia) (H)        Past Surgical History:   Past Surgical History:   Procedure Laterality Date     APPENDECTOMY       CHOLECYSTECTOMY       CV CORONARY ANGIOGRAM N/A 4/12/2023    Procedure: Coronary Angiogram;  Surgeon: Teena Laguna MD;  [facility]: [facility] CATH LAB CV     CV LEFT HEART CATH N/A 4/12/2023    Procedure: Left Heart Catheterization;  Surgeon: Teena Laguna, " MD;  Location: [facility] LAB CV     EP ABLATION SVT N/A 3/14/2023    Procedure: Ablation Supraventricular Tachycardia;  Surgeon: Lorna Hobson MD;  Location: [facility] CV     LAMINECTOMY LUMBAR ONE LEVEL Right 6/27/2022    Procedure: Right Lumbar 4 - lumbar 5 hemilaminectomy, medial facetectomies, foraminotomies;  Surgeon: Shannan Mari MD;  Location: [facility] OR     ORTHOPEDIC SURGERY         Family History:   No documented history of heart disease.    Social History:   Social History     Socioeconomic History     Marital status: Single     Spouse name: Not on file     Number of children: Not on file     Years of education: Not on file     Highest education level: Not on file   Occupational History     Not on file   Tobacco Use     Smoking status: Every Day     Current packs/day: 1.00     Average packs/day: 1 pack/day for 30.0 years (30.0 ttl pk-yrs)     Types: Cigarettes     Smokeless tobacco: Never     Tobacco comments:     Pt quitting, down to 7 cigs per day   Vaping Use     Vaping status: Never Used   Substance and Sexual Activity     Alcohol use: Never     Drug use: Never     Sexual activity: Not on file   Other Topics Concern     Not on file   Social History Narrative     Not on file     Social Determinants of Health     Financial Resource Strain: Not on file   Food Insecurity: Not on file   Transportation Needs: Not on file   Physical Activity: Not on file   Stress: Not on file   Social Connections: Not on file   Interpersonal Safety: Not on file   Housing Stability: Not on file              Lab Results    Chemistry/lipid CBC Cardiac Enzymes/BNP/TSH/INR   Lab Results   Component Value Date    CHOL 118 04/26/2024    HDL 29 (L) 04/26/2024    LDL 44 04/26/2024    TRIG 226 (H) 04/26/2024    CR 1.61 (H) 09/29/2024    BUN 19.0 09/29/2024    POTASSIUM 3.9 09/29/2024     09/29/2024    CO2 23 09/29/2024      Lab Results   Component Value Date    WBC 9.9 09/28/2024    HGB 12.7 (L)  09/28/2024    HCT 41.2 09/28/2024    MCV 78 09/28/2024     09/28/2024    A1C 6.7 (H) 09/28/2024     Lab Results   Component Value Date    A1C 6.7 (H) 09/28/2024    Lab Results   Component Value Date    NTBNP 54 01/04/2023    TSH 2.42 09/28/2024    INR 1.07 06/13/2022          Juan M Padilla MD [facility]  Non-Invasive Cardiologist  [facility]  Pager [phone]      Thank you for allowing me to participate in the care of your patient.      Sincerely,     Juan M Padilla MD     [facility]  cc:   Myrna Ayers PA-C  [address] [place] [address]

## 2024-09-30 NOTE — PATIENT INSTRUCTIONS
Stay on the higher dose of metoprolol 50 mg in the morning and 75 mg in the evening.  If you are having more symptoms, we can consider having you wear a heart monitor.  We will have you follow-up with Dr. Hobson.

## 2024-09-30 NOTE — PROGRESS NOTES
HEART CARE ENCOUNTER NOTE          Assessment/Recommendations   Assessment:    Paroxysmal supraventricular tachycardia with no inducible arrhythmia identified or ablation performed on electrophysiology study 3/14/2023. He has had reportedly recurrent arrhythmia terminated with intravenous adenosine.  Chest pain which is nonexertional and likely due to supraventricular tachycardia.  Nonobstructive coronary artery disease seen on coronary angiography 4/12/2023.  Essential hypertension. Controlled.  Hyperlipidemia.   Body mass index is 31.02 kg/m .    Plan:  We will have him follow-up in electrophysiology with Dr. Hobson as the patient is very interested in another electrophysiology study with possible ablation if this is an option.  Continue increased dose of metoprolol 50 mg in the morning and 75 mg in the evening.  If he has recurrent episodes of supraventricular tachycardia prior to his appointment with Dr. Salomon, we can mail him a Zio patch to try capturing the specific arrhythmia.  Routine follow-up with his general cardiologist, Dr. Marianne Brush.         History of Present Illness   Mr. Barry Jay is a 69 year old male with a significant past history of paroxysmal SVT and nonobstructive CAD presenting for urgent evaluation of palpitations and chest pain. He normally follows in cardiology clinic with Dr. Marianne Brush and has also seen Dr. Lorna Hobson in electrophysiology.    5 days ago, he started noticing his heart rating which was accompanied by lightheadedness, chest tightness radiating to his left neck and shoulder as well as diaphoresis. This at firs was intermittent but seemed to be occurring more frequently. He first came to the ED 9/27/2024 where his evaluation was unremarkable and he was discharged home without any new therapy.    His symptoms returned the following day 9/28/2024 and were even more severe, prompting him to call 911. EMS reportedly found him in SVT and administered 12 mg IV  adenosine, which converted him to sinus tachycardia. Labs were notable for borderline elevated high-sensitivity troponin 21 -> 26 and mildly elevated NT-proBNP 1407. CTA chest was then performed, which showed no PE or other acute abnormalities. He was instructed to take an extra 25 mg metoprolol dose in the evening.    When he is not having these episodes, he feels fine and exercises regularly with no exertional chest pain/pressure/tightness, shortness of breath at rest or with exertion, light headedness/dizziness, pre-syncope, syncope, lower extremity swelling, palpitations, paroxysmal nocturnal dyspnea (PND), or orthopnea.    An EP study was performed last year, but no inducible arrhythmia could be identified and no ablation was performed.     Cardiac Problems and Cardiac Diagnostics     Most Recent Cardiac testing:  ECG dated 9/28/2024 (personaly reviewed and interpreted): sinus tachycardia  bpm, RBBB QRS duration 130 ms    CTA chest 9/28/2024 (report reviewed):  1.  Negative for pulmonary embolism. No acute abnormalities or CT findings to explain the patient's symptoms.  2.  Mild emphysematous changes in the lungs. No evidence for pneumonitis.  3.  Moderate coronary artery calcification.  4.  Lipomatous hypertrophy of the intra-atrial septum.    ECHO 1/19/2023 (report reviewed):   Left ventricular size, wall motion and function are normal. The ejection fraction is 60-65%.  Normal right ventricle size and systolic function.  IVC diameter <2.1 cm collapsing >50% with sniff suggests a normal RA pressure of 3 mmHg.  No hemodynamically significant valvular abnormalities on 2D or color flow imaging.    CT coronary angiogram 2/23/2023 (report reviewed):  The total Agatston score is 374. A calcium score in this range places the individual in the 61st percentile when compared to an age and gender matched control group and implies a high risk of cardiac events in the next ten years.  Moderately severe single-vessel  "coronary atherosclerosis  Mid LAD lesion has moderate luminal stenosis in the range of 50-69%.  CT FFR of LAD lesion suggests this is not a hemodynamically significant stenosis, with FFR 0.83 distal to the lesion.    Cardiac cath 4/12/2023 (report reviewed):     Mid LAD lesion is 25% stenosed.    2nd Mrg lesion is 25% stenosed.    1st Diag lesion is 25% stenosed.    Left ventricular filling pressures are mildly elevated.     Diffuse coronary atherosclerosis with no significant focal stenosis.  Mildly elevated LVEDP 14mmHg.      Medications  Allergies   Current Outpatient Medications   Medication Sig Dispense Refill    aspirin (ASA) 81 MG chewable tablet Take 81 mg by mouth daily      atorvastatin (LIPITOR) 20 MG tablet Take 1 tablet (20 mg) by mouth at bedtime. Pt needs appt for further refills 90 tablet 0    irbesartan-hydrochlorothiazide (AVALIDE) 300-12.5 mg per tablet [IRBESARTAN-HYDROCHLOROTHIAZIDE (AVALIDE) 300-12.5 MG PER TABLET] Take 1 tablet by mouth daily. for blood pressure  1    metoprolol tartrate (LOPRESSOR) 50 MG tablet Take 1 tablet (50 mg) by mouth 2 times daily (Patient taking differently: Take 50 mg by mouth 2 times daily. 50 mg in the am and 75 mg in the evening) 180 tablet 3    multivitamin, therapeutic (THERA-VIT) TABS tablet Take 1 tablet by mouth daily      omeprazole (PRILOSEC) 20 MG capsule Take 20 mg by mouth daily  1    PARoxetine (PAXIL) 20 MG tablet Take 20 mg by mouth every morning  3    zolpidem (AMBIEN) 10 mg tablet Take 10 mg by mouth nightly as needed for sleep  2      Allergies   Allergen Reactions    Iodine Hives        Physical Examination Review of Systems   /70 (BP Location: Left arm, Patient Position: Sitting, Cuff Size: Adult Large)   Pulse 72   Resp 16   Ht 1.727 m (5' 8\")   Wt 92.5 kg (204 lb)   BMI 31.02 kg/m    Body mass index is 31.02 kg/m .  Wt Readings from Last 3 Encounters:   09/30/24 92.5 kg (204 lb)   09/28/24 91.6 kg (202 lb)   09/27/24 91.9 kg (202 lb " 11.2 oz)       General Appearance:   Pleasant  male, appears  stated age. no acute distress, normal body habitus   ENT/Mouth: membranes moist, no apparent gingival bleeding.      EYES:  no scleral icterus, normal conjunctivae   Neck: no carotid bruits. No anterior cervical lymphadenopaty   Respiratory:   lungs are clear to auscultation, no rales or wheezing, equal chest wall expansion    Cardiovascular:   Regular rhythm, normal rate. Normal first and second heart sounds with no murmurs, rubs, or gallops; the carotid, radial and posterior tibial pulses are intact, Jugular venous pressure normal, no edema bilaterally    Abdomen/GI:  no organomegaly, masses, bruits, or tenderness; bowel sounds are present   Extremities: no cyanosis or clubbing   Skin: no xanthelasma, warm.    Heme/lymph/ Immunology No apparent bleeding noted.   Neurologic: Alert and oriented. normal gait, no tremors     Psychiatric: Pleasant, calm, appropriate affect.    A complete 10 system review of systems was performed and is negative except as mentioned in the HPI/subjective.         Past History   Past Medical History:   Past Medical History:   Diagnosis Date    Anxiety     ED (erectile dysfunction)     HTN (hypertension)     Hyperlipidemia     Nonobstructive atherosclerosis of coronary artery     SVT (supraventricular tachycardia) (H)        Past Surgical History:   Past Surgical History:   Procedure Laterality Date    APPENDECTOMY      CHOLECYSTECTOMY      CV CORONARY ANGIOGRAM N/A 4/12/2023    Procedure: Coronary Angiogram;  Surgeon: Teena Laguna MD;  Location: [facility]    CV LEFT HEART CATH N/A 4/12/2023    Procedure: Left Heart Catheterization;  Surgeon: Teena Laguna MD;  Location: [facility] CV    EP ABLATION SVT N/A 3/14/2023    Procedure: Ablation Supraventricular Tachycardia;  Surgeon: Lorna Hobson MD;  Location: [facility] CV    LAMINECTOMY LUMBAR ONE LEVEL Right 6/27/2022    Procedure: Right  Lumbar 4 - lumbar 5 hemilaminectomy, medial facetectomies, foraminotomies;  Surgeon: Shannan Mari MD;  Location: [facility] [place] OR    ORTHOPEDIC SURGERY         Family History:   No documented history of heart disease.    Social History:   Social History     Socioeconomic History    Marital status: Single     Spouse name: Not on file    Number of children: Not on file    Years of education: Not on file    Highest education level: Not on file   Occupational History    Not on file   Tobacco Use    Smoking status: Every Day     Current packs/day: 1.00     Average packs/day: 1 pack/day for 30.0 years (30.0 ttl pk-yrs)     Types: Cigarettes    Smokeless tobacco: Never    Tobacco comments:     Pt quitting, down to 7 cigs per day   Vaping Use    Vaping status: Never Used   Substance and Sexual Activity    Alcohol use: Never    Drug use: Never    Sexual activity: Not on file   Other Topics Concern    Not on file   Social History Narrative    Not on file     Social Determinants of Health     Financial Resource Strain: Not on file   Food Insecurity: Not on file   Transportation Needs: Not on file   Physical Activity: Not on file   Stress: Not on file   Social Connections: Not on file   Interpersonal Safety: Not on file   Housing Stability: Not on file              Lab Results    Chemistry/lipid CBC Cardiac Enzymes/BNP/TSH/INR   Lab Results   Component Value Date    CHOL 118 04/26/2024    HDL 29 (L) 04/26/2024    LDL 44 04/26/2024    TRIG 226 (H) 04/26/2024    CR 1.61 (H) 09/29/2024    BUN 19.0 09/29/2024    POTASSIUM 3.9 09/29/2024     09/29/2024    CO2 23 09/29/2024      Lab Results   Component Value Date    WBC 9.9 09/28/2024    HGB 12.7 (L) 09/28/2024    HCT 41.2 09/28/2024    MCV 78 09/28/2024     09/28/2024    A1C 6.7 (H) 09/28/2024     Lab Results   Component Value Date    A1C 6.7 (H) 09/28/2024    Lab Results   Component Value Date    NTBNP 54 01/04/2023    TSH 2.42 09/28/2024    INR 1.07  06/13/2022          Juan M Padilla MD [facility]  Non-Invasive Cardiologist  [facility]  Pager [phone]

## 2024-10-01 ENCOUNTER — TELEPHONE (OUTPATIENT)
Dept: PHYSICAL MEDICINE AND REHAB | Facility: CLINIC | Age: 69
End: 2024-10-01

## 2024-10-01 ENCOUNTER — OFFICE VISIT (OUTPATIENT)
Dept: CARDIOLOGY | Facility: CLINIC | Age: 69
End: 2024-10-01
Payer: COMMERCIAL

## 2024-10-01 VITALS
HEART RATE: 73 BPM | RESPIRATION RATE: 18 BRPM | BODY MASS INDEX: 30.77 KG/M2 | WEIGHT: 203 LBS | DIASTOLIC BLOOD PRESSURE: 70 MMHG | OXYGEN SATURATION: 98 % | HEIGHT: 68 IN | SYSTOLIC BLOOD PRESSURE: 116 MMHG

## 2024-10-01 DIAGNOSIS — I47.10 PAROXYSMAL SUPRAVENTRICULAR TACHYCARDIA (H): ICD-10-CM

## 2024-10-01 DIAGNOSIS — M96.1 FAILED BACK SURGICAL SYNDROME: Primary | ICD-10-CM

## 2024-10-01 PROCEDURE — 99215 OFFICE O/P EST HI 40 MIN: CPT | Performed by: INTERNAL MEDICINE

## 2024-10-01 RX ORDER — TRIAMCINOLONE ACETONIDE 1 MG/G
OINTMENT TOPICAL 2 TIMES DAILY
COMMUNITY
Start: 2024-09-03

## 2024-10-01 RX ORDER — MUPIROCIN 20 MG/G
OINTMENT TOPICAL 3 TIMES DAILY
COMMUNITY
Start: 2024-02-08

## 2024-10-01 NOTE — TELEPHONE ENCOUNTER
Pt left voicemail on nurse navigation line requesting call back.   Call placed to pt. Pt states he discussed spinal cord stimulator with Claudia at last appt. He stated at the time he would like to think about this. At this time patient states he would like to move forward.     Pt had thoracic spine MRI 9/2023. He has not had behavioral health appt.     Informed pt will message PSP to let her know patient would like to move forward and we will contact him back with next steps.

## 2024-10-01 NOTE — LETTER
10/1/2024    Osmel Iniguez MD  [address]  [place] [address]    RE: Barry Jay       Dear Colleague,     I had the pleasure of seeing Barry Jay in the [facility].     [facility]  Cardiac Electrophysiology  [address]  [place] [address]   Office: [phone]  Fax: [phone]     Patient: Barry Jay   : 1955       CHIEF COMPLAINT/REASON FOR VISIT  Supraventricular tachycardia      Assessment/Recommendations     Supraventricular tachycardia - symptomatic diaphoresis, palpitations.    No inducible SVT at EPS 3/14/2023 - no ablation performed.  Unclear if dual AVN physiology observed.  Possibly adenosine sensitive, though also observed to have salvos of nonsustained AT at 2023 ER visit.  Possible AT vs AVNRT.    We reviewed SVT mechanisms and reviewed treatment options including electrophysiology study and ablation vs continued medical therapy.  We reviewed the nature of EP studies and ablation for SVT, success rates depending on inducibility and specific SVT mechanism, procedural risks (including groin hematoma, vascular injury, tamponade, heart block, stroke) and recovery expectations.  He would prefer catheter ablation  - EPS/ablation for SVT, MAC, hold metoprolol 7 days prior.  May require caffeine infusion.    Follow up: as above           History of Present Illness   Barry Jay is a 69 year old male with adenosine-sensitive SVT, RBBB, non-obstructive CAD, HTN referred by Dr. Padilla for consultation regarding SVT.    Mr. Jay's SVT history is as summarized below:  Symptoms: diaphoresis, palpitations  Symptom onset date: 2023  Diagnosis date: 2023  Admissions/ER visits: 2023 - note of PACs and brief salvos of AT improved with metoprolol, 2024  Prior medical therapies: metoprolol (3/2023-present)  Prior adenosine/DCCVs: 2023 (EMS report SVT up to 200bpm, adenosine -  "no rhythm strips), 9/28/2024 (EMS, adenosine) - ECG reviewed by ER staff, not uploaded to Epic (\"I did review the rhythm strip from EMS, this appears to show SVT\").  Prior ablations: none.  No inducible SVT at EPS 3/14/2023 - no ablation performed.  Unclear if dual AVN physiology observed.    He is generally very active and exercises daily - he is concerned regarding recurrent SVT episodes.  He denies chest pain, syncope.       Physical Examination  Review of Systems   VITALS: /70 (BP Location: Right arm, Patient Position: Sitting, Cuff Size: Adult Large)   Pulse 73   Resp 18   Ht 1.727 m (5' 8\")   Wt 92.1 kg (203 lb)   SpO2 98%   BMI 30.87 kg/m    Wt Readings from Last 3 Encounters:   09/30/24 92.5 kg (204 lb)   09/28/24 91.6 kg (202 lb)   09/27/24 91.9 kg (202 lb 11.2 oz)     CONSTITUTIONAL: well nourished, comfortable, no distress  EYES:  Conjunctivae pink, sclerae clear.    E/N/T:  Oral mucosa pink  RESPIRATORY:  Respiratory effort is normal  CARDIOVASCULAR:  regular, normal S1 and S2  GASTROINTESTINAL:  Abdomen without masses or tenderness  EXTREMITIES:  No clubbing or cyanosis.    MUSCULOSKELETAL:  Overall grossly normal muscle strength  SKIN:  Overall, skin warm and dry, no lesions.  NEURO/PSYCH:  Oriented x 3 with normal affect.   Constitutional:  No weight loss or loss of appetite    Eyes:  No difficulty with vision, no double vision, no dry eyes  ENT:  No sore throat, difficulty swallowing; changes in hearing or tinnitus  Cardiovascular: As detailed above  Respiratory:  No cough  Musculoskeletal  No joint pain, muscle aches  Neurologic:  No syncope, lightheadedness, fainting spells   Hematologic: No easy bruising, excessive bleeding tendency   Gastrointestinal:  No jaundice, abdominal pain or abdominal bloating  Genitourinary: No changes in urinary habits, no trouble urinating    Psychiatric: No anxiety or depression      Medical History  Surgical History   Past Medical History:   Diagnosis " Date     Anxiety      ED (erectile dysfunction)      HTN (hypertension)      Hyperlipidemia      Nonobstructive atherosclerosis of coronary artery      SVT (supraventricular tachycardia) (H)     Past Surgical History:   Procedure Laterality Date     APPENDECTOMY       CHOLECYSTECTOMY       CV CORONARY ANGIOGRAM N/A 4/12/2023    Procedure: Coronary Angiogram;  Surgeon: Teena Laguna MD;  Location: [facility] CATH LAB CV     CV LEFT HEART CATH N/A 4/12/2023    Procedure: Left Heart Catheterization;  Surgeon: Teena Laguna MD;  Location: [facility] CATH LAB CV     EP ABLATION SVT N/A 3/14/2023    Procedure: Ablation Supraventricular Tachycardia;  Surgeon: Lorna Hobson MD;  Location: [facility] CATH LAB CV     LAMINECTOMY LUMBAR ONE LEVEL Right 6/27/2022    Procedure: Right Lumbar 4 - lumbar 5 hemilaminectomy, medial facetectomies, foraminotomies;  Surgeon: Shannan Mari MD;  Location: [facility] OR     ORTHOPEDIC SURGERY           Family History Social History   No family history on file.     Social History     Tobacco Use     Smoking status: Every Day     Current packs/day: 1.00     Average packs/day: 1 pack/day for 30.0 years (30.0 ttl pk-yrs)     Types: Cigarettes     Smokeless tobacco: Never     Tobacco comments:     Pt quitting, down to 7 cigs per day   Vaping Use     Vaping status: Never Used   Substance Use Topics     Alcohol use: Never     Drug use: Never         Medications  Allergies     Current Outpatient Medications:      aspirin (ASA) 81 MG chewable tablet, Take 81 mg by mouth daily, Disp: , Rfl:      atorvastatin (LIPITOR) 20 MG tablet, Take 1 tablet (20 mg) by mouth at bedtime. Pt needs appt for further refills, Disp: 90 tablet, Rfl: 0     irbesartan-hydrochlorothiazide (AVALIDE) 300-12.5 mg per tablet, [IRBESARTAN-HYDROCHLOROTHIAZIDE (AVALIDE) 300-12.5 MG PER TABLET] Take 1 tablet by mouth daily. for blood pressure, Disp: , Rfl: 1     metoprolol tartrate (LOPRESSOR) 50 MG tablet,  "Take 1 tablet (50 mg) by mouth 2 times daily (Patient taking differently: Take 50 mg by mouth 2 times daily. 50 mg in the am and 75 mg in the evening), Disp: 180 tablet, Rfl: 3     multivitamin, therapeutic (THERA-VIT) TABS tablet, Take 1 tablet by mouth daily, Disp: , Rfl:      omeprazole (PRILOSEC) 20 MG capsule, Take 20 mg by mouth daily, Disp: , Rfl: 1     PARoxetine (PAXIL) 20 MG tablet, Take 20 mg by mouth every morning, Disp: , Rfl: 3     zolpidem (AMBIEN) 10 mg tablet, Take 10 mg by mouth nightly as needed for sleep, Disp: , Rfl: 2     Allergies   Allergen Reactions     Iodine Hives          Lab Results    Chemistry CBC Cardiac Enzymes/BNP/TSH/INR   Recent Labs   Lab Test 09/29/24 0033      POTASSIUM 3.9   CHLORIDE 105   CO2 23   *   BUN 19.0   CR 1.61*   GFRESTIMATED 47*   MIKE 8.6*     Recent Labs   Lab Test 09/29/24 0033 09/28/24 2214 09/27/24  1243   CR 1.61* 1.51* 1.35*          Recent Labs   Lab Test 09/28/24 2214   WBC 9.9   HGB 12.7*   HCT 41.2   MCV 78        Recent Labs   Lab Test 09/28/24 2214 09/27/24  1243 05/01/24  1601   HGB 12.7* 13.2* 12.6*    No results for input(s): \"TROPONINI\" in the last 14063 hours.  Recent Labs   Lab Test 09/28/24 2214 01/04/23  1631   NTBNPI 1,407*  --    NTBNP  --  54     Recent Labs   Lab Test 09/28/24 2214   TSH 2.42     Recent Labs   Lab Test 06/13/22  1454   INR 1.07         Data Review    ECGs (tracings independently reviewed)  9/28/2024 - SR 106bpm, RBBB, UT 192ms  9/27/2024 - SR 90bpm, RBBB, UT 164ms  1/25/2023 - SR with PAC and apparent nonsustained AT    1/19/2023 TTE  Left ventricular size, wall motion and function are normal. The ejection  fraction is 60-65%.  Normal right ventricle size and systolic function.  IVC diameter <2.1 cm collapsing >50% with sniff suggests a normal RA pressure  of 3 mmHg.  No hemodynamically significant valvular abnormalities on 2D or color flow  imaging.    4/12/2024 coronary angiography     Mid LAD " lesion is 25% stenosed.     2nd Mrg lesion is 25% stenosed.     1st Diag lesion is 25% stenosed.     Left ventricular filling pressures are mildly elevated.   Diffuse coronary atherosclerosis with no significant focal stenosis.  Mildly elevated LVEDP 14mmHg.        Cc: Juan M Padilla MD, Osmel Iniguez MD Amila Dilusha William, MD  10/1/2024  4:42 PM        Thank you for allowing me to participate in the care of your patient.      Sincerely,     Ricky Hidalgo MD     [facility]  cc:   Juan M Padilla MD  [address]  [place] [address]

## 2024-10-01 NOTE — PATIENT INSTRUCTIONS
[facility]  Cardiac Electrophysiology  [address]  [place] [address]   Office: [phone]  Fax: [phone]       Thank you for seeing us in clinic today - it is a pleasure to be a part of your care team.  Below is a summary of our plan from today's visit.      You have had supraventricular tachycardia (SVT), with no inducible SVT at the time of your EP study 3/14/2023.  We reviewed SVT mechanisms and reviewed treatment options including electrophysiology study and ablation vs continued medical therapy.  We reviewed the nature of EP studies and ablation for SVT, success rates depending on inducibility and specific SVT mechanism, procedural risks and recovery expectations.    We will plan for the following:  - our office will work with you to coordinate an electrophysiology study and ablation for SVT  - continue metoprolol for now    Please do not hesitate to be in touch with our office at [phone] with any questions that may arise.      Thank you for trusting us with your care,    Ricky Hidalgo MD  Clinical Cardiac Electrophysiology  [facility]  [address]  [place] [address]   Office: [phone]  Fax: [phone]

## 2024-10-01 NOTE — PROGRESS NOTES
"[facility]  Cardiac Electrophysiology  [address]  [place] [address]   Office: [phone]  Fax: [phone]     Patient: Barry Jay   : 1955       CHIEF COMPLAINT/REASON FOR VISIT  Supraventricular tachycardia      Assessment/Recommendations     Supraventricular tachycardia - symptomatic diaphoresis, palpitations.    No inducible SVT at EPS 3/14/2023 - no ablation performed.  Unclear if dual AVN physiology observed.  Possibly adenosine sensitive, though also observed to have salvos of nonsustained AT at 2023 ER visit.  Possible AT vs AVNRT.    We reviewed SVT mechanisms and reviewed treatment options including electrophysiology study and ablation vs continued medical therapy.  We reviewed the nature of EP studies and ablation for SVT, success rates depending on inducibility and specific SVT mechanism, procedural risks (including groin hematoma, vascular injury, tamponade, heart block, stroke) and recovery expectations.  He would prefer catheter ablation  - EPS/ablation for SVT, MAC, hold metoprolol 7 days prior.  May require caffeine infusion.    Follow up: as above           History of Present Illness   Barry Jay is a 69 year old male with adenosine-sensitive SVT, RBBB, non-obstructive CAD, HTN referred by Dr. Padlila for consultation regarding SVT.    Mr. Jya's SVT history is as summarized below:  Symptoms: diaphoresis, palpitations  Symptom onset date: 2023  Diagnosis date: 2023  Admissions/ER visits: 2023 - note of PACs and brief salvos of AT improved with metoprolol, 2024  Prior medical therapies: metoprolol (3/2023-present)  Prior adenosine/DCCVs: 2023 (EMS report SVT up to 200bpm, adenosine - no rhythm strips), 2024 (EMS, adenosine) - ECG reviewed by ER staff, not uploaded to Epic (\"I did review the rhythm strip from EMS, this appears to show SVT\").  Prior ablations: none.  No inducible SVT at EPS 3/14/2023 - " "no ablation performed.  Unclear if dual AVN physiology observed.    He is generally very active and exercises daily - he is concerned regarding recurrent SVT episodes.  He denies chest pain, syncope.       Physical Examination  Review of Systems   VITALS: /70 (BP Location: Right arm, Patient Position: Sitting, Cuff Size: Adult Large)   Pulse 73   Resp 18   Ht 1.727 m (5' 8\")   Wt 92.1 kg (203 lb)   SpO2 98%   BMI 30.87 kg/m    Wt Readings from Last 3 Encounters:   09/30/24 92.5 kg (204 lb)   09/28/24 91.6 kg (202 lb)   09/27/24 91.9 kg (202 lb 11.2 oz)     CONSTITUTIONAL: well nourished, comfortable, no distress  EYES:  Conjunctivae pink, sclerae clear.    E/N/T:  Oral mucosa pink  RESPIRATORY:  Respiratory effort is normal  CARDIOVASCULAR:  regular, normal S1 and S2  GASTROINTESTINAL:  Abdomen without masses or tenderness  EXTREMITIES:  No clubbing or cyanosis.    MUSCULOSKELETAL:  Overall grossly normal muscle strength  SKIN:  Overall, skin warm and dry, no lesions.  NEURO/PSYCH:  Oriented x 3 with normal affect.   Constitutional:  No weight loss or loss of appetite    Eyes:  No difficulty with vision, no double vision, no dry eyes  ENT:  No sore throat, difficulty swallowing; changes in hearing or tinnitus  Cardiovascular: As detailed above  Respiratory:  No cough  Musculoskeletal  No joint pain, muscle aches  Neurologic:  No syncope, lightheadedness, fainting spells   Hematologic: No easy bruising, excessive bleeding tendency   Gastrointestinal:  No jaundice, abdominal pain or abdominal bloating  Genitourinary: No changes in urinary habits, no trouble urinating    Psychiatric: No anxiety or depression      Medical History  Surgical History   Past Medical History:   Diagnosis Date    Anxiety     ED (erectile dysfunction)     HTN (hypertension)     Hyperlipidemia     Nonobstructive atherosclerosis of coronary artery     SVT (supraventricular tachycardia) (H)     Past Surgical History:   Procedure " Laterality Date    APPENDECTOMY      CHOLECYSTECTOMY      CV CORONARY ANGIOGRAM N/A 4/12/2023    Procedure: Coronary Angiogram;  Surgeon: Teena Laguna MD;  Location: [facility] CATH LAB CV    CV LEFT HEART CATH N/A 4/12/2023    Procedure: Left Heart Catheterization;  Surgeon: Teena Laguna MD;  Location: [facility] CATH LAB CV    EP ABLATION SVT N/A 3/14/2023    Procedure: Ablation Supraventricular Tachycardia;  Surgeon: Lorna Hobson MD;  Location: [facility] CATH LAB CV    LAMINECTOMY LUMBAR ONE LEVEL Right 6/27/2022    Procedure: Right Lumbar 4 - lumbar 5 hemilaminectomy, medial facetectomies, foraminotomies;  Surgeon: Shannan Mari MD;  Location: [facility] OR    ORTHOPEDIC SURGERY           Family History Social History   No family history on file.     Social History     Tobacco Use    Smoking status: Every Day     Current packs/day: 1.00     Average packs/day: 1 pack/day for 30.0 years (30.0 ttl pk-yrs)     Types: Cigarettes    Smokeless tobacco: Never    Tobacco comments:     Pt quitting, down to 7 cigs per day   Vaping Use    Vaping status: Never Used   Substance Use Topics    Alcohol use: Never    Drug use: Never         Medications  Allergies     Current Outpatient Medications:     aspirin (ASA) 81 MG chewable tablet, Take 81 mg by mouth daily, Disp: , Rfl:     atorvastatin (LIPITOR) 20 MG tablet, Take 1 tablet (20 mg) by mouth at bedtime. Pt needs appt for further refills, Disp: 90 tablet, Rfl: 0    irbesartan-hydrochlorothiazide (AVALIDE) 300-12.5 mg per tablet, [IRBESARTAN-HYDROCHLOROTHIAZIDE (AVALIDE) 300-12.5 MG PER TABLET] Take 1 tablet by mouth daily. for blood pressure, Disp: , Rfl: 1    metoprolol tartrate (LOPRESSOR) 50 MG tablet, Take 1 tablet (50 mg) by mouth 2 times daily (Patient taking differently: Take 50 mg by mouth 2 times daily. 50 mg in the am and 75 mg in the evening), Disp: 180 tablet, Rfl: 3    multivitamin, therapeutic (THERA-VIT) TABS tablet, Take 1 tablet by  "mouth daily, Disp: , Rfl:     omeprazole (PRILOSEC) 20 MG capsule, Take 20 mg by mouth daily, Disp: , Rfl: 1    PARoxetine (PAXIL) 20 MG tablet, Take 20 mg by mouth every morning, Disp: , Rfl: 3    zolpidem (AMBIEN) 10 mg tablet, Take 10 mg by mouth nightly as needed for sleep, Disp: , Rfl: 2     Allergies   Allergen Reactions    Iodine Hives          Lab Results    Chemistry CBC Cardiac Enzymes/BNP/TSH/INR   Recent Labs   Lab Test 09/29/24  0033      POTASSIUM 3.9   CHLORIDE 105   CO2 23   *   BUN 19.0   CR 1.61*   GFRESTIMATED 47*   MIKE 8.6*     Recent Labs   Lab Test 09/29/24 0033 09/28/24 2214 09/27/24  1243   CR 1.61* 1.51* 1.35*          Recent Labs   Lab Test 09/28/24 2214   WBC 9.9   HGB 12.7*   HCT 41.2   MCV 78        Recent Labs   Lab Test 09/28/24 2214 09/27/24  1243 05/01/24  1601   HGB 12.7* 13.2* 12.6*    No results for input(s): \"TROPONINI\" in the last 65749 hours.  Recent Labs   Lab Test 09/28/24 2214 01/04/23  1631   NTBNPI 1,407*  --    NTBNP  --  54     Recent Labs   Lab Test 09/28/24 2214   TSH 2.42     Recent Labs   Lab Test 06/13/22  1454   INR 1.07         Data Review    ECGs (tracings independently reviewed)  9/28/2024 - SR 106bpm, RBBB, NE 192ms  9/27/2024 - SR 90bpm, RBBB, NE 164ms  1/25/2023 - SR with PAC and apparent nonsustained AT    1/19/2023 TTE  Left ventricular size, wall motion and function are normal. The ejection  fraction is 60-65%.  Normal right ventricle size and systolic function.  IVC diameter <2.1 cm collapsing >50% with sniff suggests a normal RA pressure  of 3 mmHg.  No hemodynamically significant valvular abnormalities on 2D or color flow  imaging.    4/12/2024 coronary angiography    Mid LAD lesion is 25% stenosed.    2nd Mrg lesion is 25% stenosed.    1st Diag lesion is 25% stenosed.    Left ventricular filling pressures are mildly elevated.   Diffuse coronary atherosclerosis with no significant focal stenosis.  Mildly elevated LVEDP 14mmHg. "        Cc: Juan M Padilla MD, Osmel Iniguez MD Amila Dilusha William, MD  10/1/2024  4:42 PM

## 2024-10-02 ENCOUNTER — PREP FOR PROCEDURE (OUTPATIENT)
Dept: CARDIOLOGY | Facility: CLINIC | Age: 69
End: 2024-10-02
Payer: COMMERCIAL

## 2024-10-02 ENCOUNTER — DOCUMENTATION ONLY (OUTPATIENT)
Dept: CARDIOLOGY | Facility: CLINIC | Age: 69
End: 2024-10-02
Payer: COMMERCIAL

## 2024-10-02 DIAGNOSIS — I47.10 PAROXYSMAL SUPRAVENTRICULAR TACHYCARDIA (H): Primary | ICD-10-CM

## 2024-10-02 RX ORDER — SODIUM CHLORIDE 9 MG/ML
100 INJECTION, SOLUTION INTRAVENOUS CONTINUOUS
Status: CANCELLED | OUTPATIENT
Start: 2024-11-08

## 2024-10-02 RX ORDER — FENTANYL CITRATE 50 UG/ML
25 INJECTION, SOLUTION INTRAMUSCULAR; INTRAVENOUS
Status: CANCELLED | OUTPATIENT
Start: 2024-11-08

## 2024-10-02 RX ORDER — LIDOCAINE 40 MG/G
CREAM TOPICAL
Status: CANCELLED | OUTPATIENT
Start: 2024-10-02

## 2024-10-02 NOTE — PROGRESS NOTES
H&P:  PMD: []  Date: Card OV: [x]  Date: 10-1 Orders: I [x] P  [x]      AC: None-   N/A AAD: Metoprolol-Hold 7 days  Diuretics: None  DM Meds: None  GLP-1:None     Can we please set Mr. Jay up for EPS/ablation for SVT, MAC, hold metoprolol 7 days prior.  May require caffeine infusion (can add to Snapboard notes).     TY!   Marichuy Jay, 1955, [id number]  Home:[phone] (home) Cell:[phone] (mobile)  Emergency Contact: RANJIT Jay [phone]  PCP: Osmel Iniguez, [phone]    Important patient information for CSC/Cath Lab staff : None    [facility] EP Cath Lab Procedure Order   Ablation Type:Supraventricular Tachycardia  Ordering Provider: Dr Rocky Olivares Ordered and Prepped: 10/2/2024 Rita Esparza RN  Anticipated Case Duration:  Standard ( Case per day SA 2:1, DW 4:1, KA 3:1)   Scheduling Timeframe:  Next Available  Scheduling Restrictions: None  Scheduling Contact: Please contact pt to schedule, if you are unable to schedule date within the next 24 hours please contact pt to update on scheduling process  EP RN Follow Up Apt: Dr Graham or Dr Hidalgo Ablations, do NOT need RN PC follow-up post procedure. Dr Hobson's PVC/VT ablations need 3-4 day EP RN PC post procedure.  Cardiology Follow Up Apt s/p: AVN-General Card @ 6mo + if EF <50 or dx of HF HF BERNY at time of device PO or if EF >50% no dx of HF EP BERNY at time of device PO, SVT/AFL- EP BERNY @ 8 wks and General Card @ 6mo, VT- EP MD @ 6 wks, PVC- Dr Hidalgo 1 wk MCTO/Zio @ 4 wks and Dr Hobson 3 day Zio @ 4 wks with EP MD @ 8 wks for both  Current Device/Device Co Needed for Procedure: None NoneNone  Pre-Procedural Testing needed: None  Mapping System Required:  Carto (Jazlyn Hidalgo)  ICE Needed:  No  Anesthesia:  MAC- Monitored Anesthesia Care    [facility] EP Cath Lab Prep   H&P:  Compled by cardiology on 10-1 if scheduled within 30 days, pt to schedule with PMD if procedure outside of this timeframe  Pre-Procedure  Labs/T&S: For VT & PVC Ablations only schedule lab visit at [facility] lab within 3 days prior to procedure for T&S, BMP, CBC, HcG is appropriate, and INR if on warfarin. All other ablations pre-procedure lab work will be done the morning of the procedure.  Medical Records Pertinent for Procedure:   SVT ablation 3-14-23;  Agniogram 4-12-23  Iodinated Contrast Dye Allergies (Does not include Shellfish, Egg, and/or Iodine Allergy): None  GLP-1 Protocol: If on Dulaglutide (Trulicity) (weekly)- Injection hold 7 days prior to procedure  , Exenatide extended release (Bydureon bcise) (weekly)- Injection hold 7 days prior to procedure, Exenatide (Byetta) (twice daily)- Oral Tablet hold day prior and morning of procedure and for Injection hold 7 days prior to procedure, Semaglutide (Ozempic) (weekly)- Injection and Oral hold 7 days prior to procedure, Liraglutide (Victoza, Saxenda) (daily)- Injection hold day prior and morning of procedure  Follow Up S/P: AFL and SVT Ablation EP BERNY apt at 6 wk (CV lab  to schedule at time of case scheduling), VT Ablation 3-4 day EP RN PC Visit & EP MD apt at 6 wk (CV lab  to schedule at time of case scheduling), PVC Ablation 3-4 day EP RN PC Visit & 3 day Zio at 4 wks for KA and 1 wk ZIO at 4 wks for DW with EP MD apt at 8 wks (CV lab  to schedule at time of case scheduling).    Allergies   Allergen Reactions    Iodine Hives       Current Outpatient Medications:     aspirin (ASA) 81 MG chewable tablet, Take 81 mg by mouth daily, Disp: , Rfl:     atorvastatin (LIPITOR) 20 MG tablet, Take 1 tablet (20 mg) by mouth at bedtime. Pt needs appt for further refills, Disp: 90 tablet, Rfl: 0    irbesartan-hydrochlorothiazide (AVALIDE) 300-12.5 mg per tablet, [IRBESARTAN-HYDROCHLOROTHIAZIDE (AVALIDE) 300-12.5 MG PER TABLET] Take 1 tablet by mouth daily. for blood pressure, Disp: , Rfl: 1    metoprolol tartrate (LOPRESSOR) 50 MG tablet, Take 1 tablet (50 mg) by mouth 2 times daily  (Patient taking differently: Take 50 mg by mouth 2 times daily. 50 mg in the am and 75 mg in the evening), Disp: 180 tablet, Rfl: 3    multivitamin, therapeutic (THERA-VIT) TABS tablet, Take 1 tablet by mouth daily, Disp: , Rfl:     mupirocin (BACTROBAN) 2 % external ointment, Apply topically 3 times daily., Disp: , Rfl:     omeprazole (PRILOSEC) 20 MG capsule, Take 20 mg by mouth daily, Disp: , Rfl: 1    PARoxetine (PAXIL) 20 MG tablet, Take 20 mg by mouth every morning, Disp: , Rfl: 3    triamcinolone (KENALOG) 0.1 % external ointment, Apply topically 2 times daily., Disp: , Rfl:     zolpidem (AMBIEN) 10 mg tablet, Take 10 mg by mouth nightly as needed for sleep, Disp: , Rfl: 2    Documentation Date:10/2/2024 8:13 AM  Rita Esparza RN

## 2024-10-07 NOTE — TELEPHONE ENCOUNTER
Pt returned call. Pt is going to call back to schedule his behavioral health eval. He is also interested in another consult with Dr. Wolff (he had appt 1 year ago but has more questions).     Transferred to  to make appt with Dr. Wolff.

## 2024-10-07 NOTE — TELEPHONE ENCOUNTER
"Per Claudia, \"Please call patient and notify him that the next step in the process would be scheduling a consult one-time visit with our behavioral health therapist Ashley Thomas.  If Barry has any further questions on this stimulator process he could definitely follow-up for another consult.  Otherwise we did give him paperwork before, if he prefers he can wait until after the behavioral health consult and then we can go from there.\"     Call placed to pt to discuss. Left message to return call.    "

## 2024-10-07 NOTE — TELEPHONE ENCOUNTER
This patient is scheduled on 11/5/24 at 2pm I will call patient to help with answering questions a few days before appt.    Isa

## 2024-10-12 PROBLEM — E78.5 DYSLIPIDEMIA: Status: RESOLVED | Noted: 2024-10-12 | Resolved: 2024-10-12

## 2024-10-12 PROBLEM — I20.0 WORSENING ANGINA (H): Status: RESOLVED | Noted: 2023-04-12 | Resolved: 2024-10-12

## 2024-10-12 PROBLEM — Z98.890 STATUS POST CORONARY ANGIOGRAM: Status: RESOLVED | Noted: 2023-04-12 | Resolved: 2024-10-12

## 2024-10-12 PROBLEM — E78.5 DYSLIPIDEMIA: Status: ACTIVE | Noted: 2024-10-12

## 2024-10-12 PROBLEM — R93.1 ABNORMAL FINDINGS ON DIAGNOSTIC IMAGING OF HEART AND CORONARY CIRCULATION: Status: RESOLVED | Noted: 2023-04-12 | Resolved: 2024-10-12

## 2024-10-12 PROBLEM — E11.9 DIABETES MELLITUS, TYPE 2 (H): Status: ACTIVE | Noted: 2024-10-12

## 2024-10-12 PROBLEM — M17.9 OSTEOARTHRITIS OF KNEE: Status: ACTIVE | Noted: 2024-10-12

## 2024-10-12 PROBLEM — M17.9 OSTEOARTHRITIS OF KNEE: Status: RESOLVED | Noted: 2024-10-12 | Resolved: 2024-10-12

## 2024-10-12 NOTE — PROGRESS NOTES
NEUROLOGY CONSULTATION NOTE       [facility] NEUROLOGY [place]  [address] [place] [address]  Tel: [phone]  Fax: [phone]  [email or web address]     Barry Jay,  1955, MRN [id number]  PCP: Osmel Iniguez  Date: 10/15/2024     ASSESSMENT & PLAN     Visit Diagnosis  Paresthesia     Bilateral carpal tunnel syndrome  69-year-old male with history of HTN, HLD, CAD, DM2, paroxysmal supraventricular tachycardia, osteoarthritis who was referred for evaluation of bilateral hand numbness and tingling.  Clinically is finding to suggest bilateral carpal tunnel syndrome.  I have recommended:    1.  EMG bilateral upper extremity  2.  Wear wrist splints at night pending above workup  3.  He was recently seen in the ER and was found to have an elevated hemoglobin A1c at 6.7 suggesting diabetes.  He is somewhat surprised to learn that he has diabetes and I have encouraged him to follow-up with his primary care physician to manage his diabetes  4.  Follow-up the day he is scheduled for EMG    Thank you again for this referral, please feel free to contact me if you have any questions.    Tod Arroyo MD  [facility] NEUROLOGY, [place]     REASON FOR CONSULTATION Arm numbness        HISTORY OF PRESENT ILLNESS     We have been requested by Dr. Iniguez to evaluate Barry Jay who is a 69 year old  male for paresthesia    Patient is a 69-year-old male with history of HTN, HLD, CAD, paroxysmal supraventricular tachycardia, osteoarthritis, DM 2 who was referred for evaluation of bilateral hand numbness and tingling.  According to patient he started noticing the symptoms more than a year ago.  It is in both hands and tends to get worse with activity.  Occasionally he notices skin color changes.  He wakes up at night with numbness tingling in his hand and has noticed some weakness.  There is no history of any neck pain or radiation down into the arm.  In the past he had lower  extremity numbness tingling and had an EMG that suggested right L4-L5 radiculopathy that was managed conservatively.  He was recently seen in the ER with chest pain and had lab work that showed a hemoglobin A1c of 6.7 and he is somewhat surprised to learn that he is diabetic.  He was told to follow-up with cardiology due to recurrent arrhythmias for a repeat electrophysiological study     PROBLEM LIST   Patient Active Problem List   Diagnosis    Dyslipidemia, goal LDL below 70    Hypertension    Generalized Osteoarthritis Of The Hand    Osteoarthritis Of The Knee    Arthralgias In Multiple Sites    Paroxysmal supraventricular tachycardia (H)    Coronary artery disease involving native coronary artery of native heart with angina pectoris (H)    Dyspnea on exertion    Diabetes mellitus, type 2 (H)         PAST MEDICAL & SURGICAL HISTORY     Past Medical History:   Patient  has a past medical history of Anxiety, ED (erectile dysfunction), HTN (hypertension), Hyperlipidemia, Nonobstructive atherosclerosis of coronary artery, and SVT (supraventricular tachycardia) (H).    Surgical History:  He  has a past surgical history that includes Cholecystectomy; appendectomy; orthopedic surgery; Laminectomy lumbar one level (Right, 6/27/2022); Ablation Supraventricular Tachycardia (N/A, 3/14/2023); Coronary Angiogram (N/A, 4/12/2023); and Left Heart Catheterization (N/A, 4/12/2023).     SOCIAL HISTORY     Reviewed, and he  reports that he has been smoking cigarettes. He started smoking about 30 years ago. He has a 30.4 pack-year smoking history. He has never used smokeless tobacco. He reports that he does not drink alcohol and does not use drugs.     FAMILY HISTORY     Reviewed, and family history is not on file.     ALLERGIES     Allergies   Allergen Reactions    Iodine Hives         REVIEW OF SYSTEMS     A 12 point review of system was performed and was negative except as outlined in the history of present illness.     HOME  "MEDICATIONS     Current Outpatient Rx   Medication Sig Dispense Refill    aspirin (ASA) 81 MG chewable tablet Take 81 mg by mouth daily      atorvastatin (LIPITOR) 20 MG tablet Take 1 tablet (20 mg) by mouth at bedtime. Pt needs appt for further refills 90 tablet 0    irbesartan-hydrochlorothiazide (AVALIDE) 300-12.5 mg per tablet [IRBESARTAN-HYDROCHLOROTHIAZIDE (AVALIDE) 300-12.5 MG PER TABLET] Take 1 tablet by mouth daily. for blood pressure  1    metoprolol tartrate (LOPRESSOR) 50 MG tablet Take 1 tablet (50 mg) by mouth 2 times daily (Patient taking differently: Take 50 mg by mouth 2 times daily. 50 mg in the am and 75 mg in the evening) 180 tablet 3    multivitamin, therapeutic (THERA-VIT) TABS tablet Take 1 tablet by mouth daily      mupirocin (BACTROBAN) 2 % external ointment Apply topically 3 times daily.      omeprazole (PRILOSEC) 20 MG capsule Take 20 mg by mouth daily  1    PARoxetine (PAXIL) 20 MG tablet Take 20 mg by mouth every morning  3    triamcinolone (KENALOG) 0.1 % external ointment Apply topically 2 times daily.      zolpidem (AMBIEN) 10 mg tablet Take 10 mg by mouth nightly as needed for sleep  2         PHYSICAL EXAM     Vital signs  BP (!) 88/54   Pulse 111   Ht 1.727 m (5' 8\")   Wt 92.9 kg (204 lb 12.8 oz)   BMI 31.14 kg/m      Weight:   204 lbs 12.8 oz    Middle-age obese gentleman who is alert and oriented x 3 no acute distress.  Vital signs are reviewed and documented in electronic medical record.  Neck supple.  Neurologically speech was normal cranial nerves II through XII are intact motor strength 5/5 except APB bilaterally 4+/5 he has positive Tinel sign.  Sensation intact to light touch pinprick no dysmetria noted on finger-nose testing gait normal Romberg     PERTINENT DIAGNOSTIC STUDIES     Following studies were reviewed:     CT LUMBAR SPINE 9/14/2023  1.  Multilevel lumbar spondylosis status post right L4-L5 hemilaminectomy.  2.  Moderate to severe right and moderate left " neural foraminal stenosis at L4-L5. Mild to moderate left neural foraminal stenosis at L3-L4.  3.  Mild to moderate spinal canal stenosis at L3-L4. Mild spinal canal stenosis at L2-L3.  4.  Wide surgical decompression of the spinal canal at L4-L5.  5.  Normal variant transitional lumbosacral junction anatomy. Recommend close correlation with plain films prior to any intervention.    MRI THORACIC SPINE 9/2/2023  1.  Multilevel thoracic spondylosis.  2.  Mild to moderate neural foraminal stenosis on the left at T2-T3 and on the right T3-T8. Mild neural foraminal stenosis on the right at T2-T3, on the left T3-T8 and bilaterally at T8-T12.  3.  Mild to moderate spinal canal stenosis at T10-T11 and mild spinal canal stenosis at T11-T12.  4.  Normal thoracic spinal cord.    MRI LUMBAR SPINE 8/12/2022  1.  Changes of interval right hemilaminectomy and medial facetectomy at L4-L5 with improved patency of the right lateral recess compared to the preoperative exam. Persistent mild to moderate right and mild left neural foraminal stenosis with minor   displacement of the right L4 nerve root.  2.  At L3-L4, mild spinal canal stenosis with asymmetric narrowing of the left lateral recess and mild bilateral neural foraminal stenosis.  3.  Mild lumbar spondylosis elsewhere as detailed above    EMG 8/3/2023  Abnormal study: There is electrodiagnostic evidence of:     1.  Mild increased insertional activity and mildly prolonged motor unit potential duration of the right tibialis anterior muscle.  In isolation, these findings are nonspecific and nondiagnostic.  Under the correct clinical condition, this can be observed in a chronic and active right L4 versus L5 radiculopathy.       2.  There is no electrodiagnostic evidence of  focal neuropathy or peripheral polyneuropathy in the right lower extremity.     PERTINENT LABS  Following labs were reviewed:  Admission on 09/28/2024, Discharged on 09/29/2024   Component Date Value Ref Range  Status    Sodium 09/28/2024 142  135 - 145 mmol/L Final    Potassium 09/28/2024 3.2 (L)  3.4 - 5.3 mmol/L Final    Chloride 09/28/2024 110 (H)  98 - 107 mmol/L Final    Carbon Dioxide (CO2) 09/28/2024 16 (L)  22 - 29 mmol/L Final    Anion Gap 09/28/2024 16 (H)  7 - 15 mmol/L Final    Urea Nitrogen 09/28/2024 14.9  8.0 - 23.0 mg/dL Final    Creatinine 09/28/2024 1.51 (H)  0.67 - 1.17 mg/dL Final    GFR Estimate 09/28/2024 50 (L)  >60 mL/min/1.73m2 Final    Calcium 09/28/2024 7.2 (L)  8.8 - 10.4 mg/dL Final    Glucose 09/28/2024 212 (H)  70 - 99 mg/dL Final    Magnesium 09/28/2024 1.4 (L)  1.7 - 2.3 mg/dL Final    N terminal Pro BNP Inpatient 09/28/2024 1,407 (H)  0 - 900 pg/mL Final    WBC Count 09/28/2024 9.9  4.0 - 11.0 10e3/uL Final    RBC Count 09/28/2024 5.27  4.40 - 5.90 10e6/uL Final    Hemoglobin 09/28/2024 12.7 (L)  13.3 - 17.7 g/dL Final    Hematocrit 09/28/2024 41.2  40.0 - 53.0 % Final    MCV 09/28/2024 78  78 - 100 fL Final    MCH 09/28/2024 24.1 (L)  26.5 - 33.0 pg Final    MCHC 09/28/2024 30.8 (L)  31.5 - 36.5 g/dL Final    RDW 09/28/2024 16.6 (H)  10.0 - 15.0 % Final    Platelet Count 09/28/2024 300  150 - 450 10e3/uL Final    % Neutrophils 09/28/2024 64  % Final    % Lymphocytes 09/28/2024 29  % Final    % Monocytes 09/28/2024 5  % Final    % Eosinophils 09/28/2024 1  % Final    % Basophils 09/28/2024 1  % Final    % Immature Granulocytes 09/28/2024 0  % Final    NRBCs per 100 WBC 09/28/2024 0  <1 /100 Final    Absolute Neutrophils 09/28/2024 6.3  1.6 - 8.3 10e3/uL Final    Absolute Lymphocytes 09/28/2024 2.8  0.8 - 5.3 10e3/uL Final    Absolute Monocytes 09/28/2024 0.5  0.0 - 1.3 10e3/uL Final    Absolute Eosinophils 09/28/2024 0.1  0.0 - 0.7 10e3/uL Final    Absolute Basophils 09/28/2024 0.1  0.0 - 0.2 10e3/uL Final    Absolute Immature Granulocytes 09/28/2024 0.0  <=0.4 10e3/uL Final    Absolute NRBCs 09/28/2024 0.0  10e3/uL Final    Hold Specimen 09/28/2024 [facility]   Final    Hold Specimen 09/28/2024  [facility]   Final    Troponin T, High Sensitivity 09/28/2024 21  <=22 ng/L Final    Protein Total 09/28/2024 6.0 (L)  6.4 - 8.3 g/dL Final    Albumin 09/28/2024 3.4 (L)  3.5 - 5.2 g/dL Final    Bilirubin Total 09/28/2024 0.3  <=1.2 mg/dL Final    Alkaline Phosphatase 09/28/2024 96  40 - 150 U/L Final    AST 09/28/2024 24  0 - 45 U/L Final    ALT 09/28/2024 36  0 - 70 U/L Final    Bilirubin Direct 09/28/2024 <0.20  0.00 - 0.30 mg/dL Final    Lactic Acid, Initial 09/28/2024 3.9 (H)  0.7 - 2.0 mmol/L Final    TSH 09/28/2024 2.42  0.30 - 4.20 uIU/mL Final    Lactic Acid 09/29/2024 1.5  0.7 - 2.0 mmol/L Final    Procalcitonin 09/28/2024 0.05  <0.50 ng/mL Final    Troponin T, High Sensitivity 09/29/2024 26 (H)  <=22 ng/L Final    pH Venous 09/29/2024 7.35  7.32 - 7.43 Final    pCO2 Venous 09/29/2024 44  40 - 50 mm Hg Final    pO2 Venous 09/29/2024 28  25 - 47 mm Hg Final    Bicarbonate Venous 09/29/2024 25  21 - 28 mmol/L Final    Base Excess/Deficit Venous 09/29/2024 -1.0  -3.0 - 3.0 mmol/L Final    FIO2 09/29/2024 0   Final    Oxyhemoglobin Venous 09/29/2024 40 (L)  70 - 75 % Final    O2 Sat, Venous 09/29/2024 40.5 (L)  70.0 - 75.0 % Final    Ketone (Beta-Hydroxybutyrate) Kenn* 09/28/2024 <0.18  <=0.30 mmol/L Final    Estimated Average Glucose 09/28/2024 146 (H)  <117 mg/dL Final    Hemoglobin A1C 09/28/2024 6.7 (H)  <5.7 % Final    Alcohol ethyl 09/28/2024 <0.01  <=0.01 g/dL Final    Sodium 09/29/2024 139  135 - 145 mmol/L Final    Potassium 09/29/2024 3.9  3.4 - 5.3 mmol/L Final    Chloride 09/29/2024 105  98 - 107 mmol/L Final    Carbon Dioxide (CO2) 09/29/2024 23  22 - 29 mmol/L Final    Anion Gap 09/29/2024 11  7 - 15 mmol/L Final    Urea Nitrogen 09/29/2024 19.0  8.0 - 23.0 mg/dL Final    Creatinine 09/29/2024 1.61 (H)  0.67 - 1.17 mg/dL Final    GFR Estimate 09/29/2024 47 (L)  >60 mL/min/1.73m2 Final    Calcium 09/29/2024 8.6 (L)  8.8 - 10.4 mg/dL Final    Glucose 09/29/2024 114 (H)  70 - 99 mg/dL Final   Admission on  09/27/2024, Discharged on 09/27/2024   Component Date Value Ref Range Status    Ventricular Rate 09/27/2024 90  BPM Final    Atrial Rate 09/27/2024 90  BPM Final    NM Interval 09/27/2024 164  ms Final    QRS Duration 09/27/2024 126  ms Final    QT 09/27/2024 394  ms Final    QTc 09/27/2024 481  ms Final    P Axis 09/27/2024 84  degrees Final    R AXIS 09/27/2024 -29  degrees Final    T Axis 09/27/2024 57  degrees Final    Interpretation ECG 09/27/2024    Final                    Value:Sinus rhythm with sinus arrhythmia  Right bundle branch block  Abnormal ECG  When compared with ECG of 12-Apr-2023 07:34,  Premature supraventricular complexes are no longer Present  T wave inversion no longer evident in Inferior leads  Confirmed by SEE ED PROVIDER NOTE FOR, ECG INTERPRETATION ([id number]),  JACKY WILEY ([id number]) on 9/28/2024 2:19:54 AM      Sodium 09/27/2024 138  135 - 145 mmol/L Final    Potassium 09/27/2024 3.7  3.4 - 5.3 mmol/L Final    Chloride 09/27/2024 102  98 - 107 mmol/L Final    Carbon Dioxide (CO2) 09/27/2024 24  22 - 29 mmol/L Final    Anion Gap 09/27/2024 12  7 - 15 mmol/L Final    Urea Nitrogen 09/27/2024 17.2  8.0 - 23.0 mg/dL Final    Creatinine 09/27/2024 1.35 (H)  0.67 - 1.17 mg/dL Final    GFR Estimate 09/27/2024 58 (L)  >60 mL/min/1.73m2 Final    Calcium 09/27/2024 9.3  8.8 - 10.4 mg/dL Final    Glucose 09/27/2024 178 (H)  70 - 99 mg/dL Final    Troponin T, High Sensitivity 09/27/2024 19  <=22 ng/L Final    WBC Count 09/27/2024 10.0  4.0 - 11.0 10e3/uL Final    RBC Count 09/27/2024 5.51  4.40 - 5.90 10e6/uL Final    Hemoglobin 09/27/2024 13.2 (L)  13.3 - 17.7 g/dL Final    Hematocrit 09/27/2024 42.7  40.0 - 53.0 % Final    MCV 09/27/2024 78  78 - 100 fL Final    MCH 09/27/2024 24.0 (L)  26.5 - 33.0 pg Final    MCHC 09/27/2024 30.9 (L)  31.5 - 36.5 g/dL Final    RDW 09/27/2024 16.8 (H)  10.0 - 15.0 % Final    Platelet Count 09/27/2024 312  150 - 450 10e3/uL Final    % Neutrophils 09/27/2024 65  %  Final    % Lymphocytes 09/27/2024 26  % Final    % Monocytes 09/27/2024 7  % Final    % Eosinophils 09/27/2024 1  % Final    % Basophils 09/27/2024 1  % Final    % Immature Granulocytes 09/27/2024 0  % Final    NRBCs per 100 WBC 09/27/2024 0  <1 /100 Final    Absolute Neutrophils 09/27/2024 6.5  1.6 - 8.3 10e3/uL Final    Absolute Lymphocytes 09/27/2024 2.6  0.8 - 5.3 10e3/uL Final    Absolute Monocytes 09/27/2024 0.7  0.0 - 1.3 10e3/uL Final    Absolute Eosinophils 09/27/2024 0.1  0.0 - 0.7 10e3/uL Final    Absolute Basophils 09/27/2024 0.1  0.0 - 0.2 10e3/uL Final    Absolute Immature Granulocytes 09/27/2024 0.0  <=0.4 10e3/uL Final    Absolute NRBCs 09/27/2024 0.0  10e3/uL Final    Hold Specimen 09/27/2024 [facility]   Final    Hold Specimen 09/27/2024 [facility]   Final    Troponin T, High Sensitivity 09/27/2024 17  <=22 ng/L Final        Total time spent for face to face visit, reviewing labs/imaging studies, counseling and coordination of care was: 1 Hour spent on the date of the encounter doing chart review, review of outside records, review of test results, interpretation of tests, patient visit, and documentation     The longitudinal plan of care for the diagnosis(es)/condition(s) as documented were addressed during this visit. Due to the added complexity in care, I will continue to support Barry in the subsequent management and with ongoing continuity of care.    This note was dictated using voice recognition software.  Any grammatical or context distortions are unintentional and inherent to the software.    Orders Placed This Encounter   Procedures    EMG      New Prescriptions    No medications on file      Modified Medications    No medications on file

## 2024-10-14 ENCOUNTER — TELEPHONE (OUTPATIENT)
Dept: PHYSICAL MEDICINE AND REHAB | Facility: CLINIC | Age: 69
End: 2024-10-14
Payer: COMMERCIAL

## 2024-10-14 NOTE — TELEPHONE ENCOUNTER
PSP:  Claudia Baptiste CNP   Last clinic visit:  6/29/24 OV; 8/23/24 Bilateral L4-L5 TFESIs  Reason for call: Patient left message on  nurse navigation line at 1202 PM regarding need for insurance code number.   Clinical information:  Phone call to patient to discuss request. States he is going to meet with Dr. Wolff at end of month, but wants to make sure this is covered. He spoke with insurance and they request codes for it.   Advice given to patient: PSP and Dr. Wolff will be updated. He will be called with information.  Provider to address: Please advise

## 2024-10-15 ENCOUNTER — HOSPITAL ENCOUNTER (EMERGENCY)
Facility: HOSPITAL | Age: 69
Discharge: HOME | End: 2024-10-15
Attending: EMERGENCY MEDICINE | Admitting: EMERGENCY MEDICINE
Payer: COMMERCIAL

## 2024-10-15 ENCOUNTER — OFFICE VISIT (OUTPATIENT)
Dept: NEUROLOGY | Facility: CLINIC | Age: 69
End: 2024-10-15
Attending: FAMILY MEDICINE
Payer: COMMERCIAL

## 2024-10-15 ENCOUNTER — APPOINTMENT (OUTPATIENT)
Dept: CT IMAGING | Facility: HOSPITAL | Age: 69
End: 2024-10-15
Attending: EMERGENCY MEDICINE
Payer: COMMERCIAL

## 2024-10-15 ENCOUNTER — TELEPHONE (OUTPATIENT)
Dept: CARDIOLOGY | Facility: CLINIC | Age: 69
End: 2024-10-15

## 2024-10-15 VITALS
HEIGHT: 68 IN | HEART RATE: 111 BPM | SYSTOLIC BLOOD PRESSURE: 88 MMHG | BODY MASS INDEX: 31.04 KG/M2 | DIASTOLIC BLOOD PRESSURE: 54 MMHG | WEIGHT: 204.8 LBS

## 2024-10-15 VITALS
BODY MASS INDEX: 30.9 KG/M2 | RESPIRATION RATE: 16 BRPM | DIASTOLIC BLOOD PRESSURE: 88 MMHG | HEIGHT: 68 IN | SYSTOLIC BLOOD PRESSURE: 123 MMHG | OXYGEN SATURATION: 98 % | TEMPERATURE: 97.4 F | WEIGHT: 203.9 LBS | HEART RATE: 112 BPM

## 2024-10-15 DIAGNOSIS — R20.0 ARM NUMBNESS: ICD-10-CM

## 2024-10-15 DIAGNOSIS — R55 NEAR SYNCOPE: ICD-10-CM

## 2024-10-15 DIAGNOSIS — R20.2 PARESTHESIA: Primary | ICD-10-CM

## 2024-10-15 DIAGNOSIS — I47.10 PAROXYSMAL SUPRAVENTRICULAR TACHYCARDIA (H): ICD-10-CM

## 2024-10-15 DIAGNOSIS — I72.3 ILIAC ARTERY ANEURYSM, LEFT (H): ICD-10-CM

## 2024-10-15 LAB
ANION GAP SERPL CALCULATED.3IONS-SCNC: 13 MMOL/L (ref 7–15)
BASOPHILS # BLD AUTO: 0.1 10E3/UL (ref 0–0.2)
BASOPHILS NFR BLD AUTO: 1 %
BUN SERPL-MCNC: 19.3 MG/DL (ref 8–23)
CALCIUM SERPL-MCNC: 9.3 MG/DL (ref 8.8–10.4)
CHLORIDE SERPL-SCNC: 102 MMOL/L (ref 98–107)
CREAT SERPL-MCNC: 1.57 MG/DL (ref 0.67–1.17)
EGFRCR SERPLBLD CKD-EPI 2021: 48 ML/MIN/1.73M2
EOSINOPHIL # BLD AUTO: 0.1 10E3/UL (ref 0–0.7)
EOSINOPHIL NFR BLD AUTO: 1 %
ERYTHROCYTE [DISTWIDTH] IN BLOOD BY AUTOMATED COUNT: 16.1 % (ref 10–15)
FLUAV RNA SPEC QL NAA+PROBE: NEGATIVE
FLUBV RNA RESP QL NAA+PROBE: NEGATIVE
GLUCOSE SERPL-MCNC: 172 MG/DL (ref 70–99)
HCO3 SERPL-SCNC: 24 MMOL/L (ref 22–29)
HCT VFR BLD AUTO: 39.8 % (ref 40–53)
HGB BLD-MCNC: 12.3 G/DL (ref 13.3–17.7)
HOLD SPECIMEN: NORMAL
HOLD SPECIMEN: NORMAL
IMM GRANULOCYTES # BLD: 0 10E3/UL
IMM GRANULOCYTES NFR BLD: 0 %
LACTATE SERPL-SCNC: 1.3 MMOL/L (ref 0.7–2)
LYMPHOCYTES # BLD AUTO: 2.5 10E3/UL (ref 0.8–5.3)
LYMPHOCYTES NFR BLD AUTO: 23 %
MAGNESIUM SERPL-MCNC: 1.9 MG/DL (ref 1.7–2.3)
MCH RBC QN AUTO: 23.7 PG (ref 26.5–33)
MCHC RBC AUTO-ENTMCNC: 30.9 G/DL (ref 31.5–36.5)
MCV RBC AUTO: 77 FL (ref 78–100)
MONOCYTES # BLD AUTO: 0.9 10E3/UL (ref 0–1.3)
MONOCYTES NFR BLD AUTO: 8 %
NEUTROPHILS # BLD AUTO: 7.2 10E3/UL (ref 1.6–8.3)
NEUTROPHILS NFR BLD AUTO: 67 %
NRBC # BLD AUTO: 0 10E3/UL
NRBC BLD AUTO-RTO: 0 /100
NT-PROBNP SERPL-MCNC: 1230 PG/ML (ref 0–900)
PLATELET # BLD AUTO: 326 10E3/UL (ref 150–450)
POTASSIUM SERPL-SCNC: 4.1 MMOL/L (ref 3.4–5.3)
PROCALCITONIN SERPL IA-MCNC: 0.06 NG/ML
RBC # BLD AUTO: 5.2 10E6/UL (ref 4.4–5.9)
RSV RNA SPEC NAA+PROBE: NEGATIVE
SARS-COV-2 RNA RESP QL NAA+PROBE: NEGATIVE
SODIUM SERPL-SCNC: 139 MMOL/L (ref 135–145)
TROPONIN T SERPL HS-MCNC: 20 NG/L
TROPONIN T SERPL HS-MCNC: 24 NG/L
TSH SERPL DL<=0.005 MIU/L-ACNC: 1.39 UIU/ML (ref 0.3–4.2)
WBC # BLD AUTO: 10.7 10E3/UL (ref 4–11)

## 2024-10-15 PROCEDURE — 99205 OFFICE O/P NEW HI 60 MIN: CPT | Performed by: PSYCHIATRY & NEUROLOGY

## 2024-10-15 PROCEDURE — 84145 PROCALCITONIN (PCT): CPT | Performed by: EMERGENCY MEDICINE

## 2024-10-15 PROCEDURE — 83880 ASSAY OF NATRIURETIC PEPTIDE: CPT | Performed by: EMERGENCY MEDICINE

## 2024-10-15 PROCEDURE — 85025 COMPLETE CBC W/AUTO DIFF WBC: CPT | Performed by: EMERGENCY MEDICINE

## 2024-10-15 PROCEDURE — 250N000011 HC RX IP 250 OP 636: Performed by: EMERGENCY MEDICINE

## 2024-10-15 PROCEDURE — G2211 COMPLEX E/M VISIT ADD ON: HCPCS | Performed by: PSYCHIATRY & NEUROLOGY

## 2024-10-15 PROCEDURE — 36415 COLL VENOUS BLD VENIPUNCTURE: CPT | Performed by: STUDENT IN AN ORGANIZED HEALTH CARE EDUCATION/TRAINING PROGRAM

## 2024-10-15 PROCEDURE — 93005 ELECTROCARDIOGRAM TRACING: CPT | Performed by: STUDENT IN AN ORGANIZED HEALTH CARE EDUCATION/TRAINING PROGRAM

## 2024-10-15 PROCEDURE — 85014 HEMATOCRIT: CPT | Performed by: STUDENT IN AN ORGANIZED HEALTH CARE EDUCATION/TRAINING PROGRAM

## 2024-10-15 PROCEDURE — 36415 COLL VENOUS BLD VENIPUNCTURE: CPT | Performed by: EMERGENCY MEDICINE

## 2024-10-15 PROCEDURE — 85004 AUTOMATED DIFF WBC COUNT: CPT | Performed by: STUDENT IN AN ORGANIZED HEALTH CARE EDUCATION/TRAINING PROGRAM

## 2024-10-15 PROCEDURE — 82310 ASSAY OF CALCIUM: CPT | Performed by: EMERGENCY MEDICINE

## 2024-10-15 PROCEDURE — 93005 ELECTROCARDIOGRAM TRACING: CPT | Performed by: EMERGENCY MEDICINE

## 2024-10-15 PROCEDURE — 80048 BASIC METABOLIC PNL TOTAL CA: CPT | Performed by: EMERGENCY MEDICINE

## 2024-10-15 PROCEDURE — 84443 ASSAY THYROID STIM HORMONE: CPT | Performed by: EMERGENCY MEDICINE

## 2024-10-15 PROCEDURE — 84484 ASSAY OF TROPONIN QUANT: CPT | Performed by: EMERGENCY MEDICINE

## 2024-10-15 PROCEDURE — 99285 EMERGENCY DEPT VISIT HI MDM: CPT | Mod: 25

## 2024-10-15 PROCEDURE — 71275 CT ANGIOGRAPHY CHEST: CPT

## 2024-10-15 PROCEDURE — 83735 ASSAY OF MAGNESIUM: CPT | Performed by: EMERGENCY MEDICINE

## 2024-10-15 PROCEDURE — 83605 ASSAY OF LACTIC ACID: CPT | Performed by: EMERGENCY MEDICINE

## 2024-10-15 PROCEDURE — 87637 SARSCOV2&INF A&B&RSV AMP PRB: CPT | Performed by: EMERGENCY MEDICINE

## 2024-10-15 PROCEDURE — 87040 BLOOD CULTURE FOR BACTERIA: CPT | Performed by: EMERGENCY MEDICINE

## 2024-10-15 RX ORDER — IOPAMIDOL 755 MG/ML
75 INJECTION, SOLUTION INTRAVASCULAR ONCE
Status: COMPLETED | OUTPATIENT
Start: 2024-10-15 | End: 2024-10-15

## 2024-10-15 RX ORDER — METOPROLOL TARTRATE 50 MG
TABLET ORAL 2 TIMES DAILY
COMMUNITY

## 2024-10-15 RX ADMIN — IOPAMIDOL 75 ML: 755 INJECTION, SOLUTION INTRAVENOUS at 19:05

## 2024-10-15 ASSESSMENT — ACTIVITIES OF DAILY LIVING (ADL)
ADLS_ACUITY_SCORE: 34
ADLS_ACUITY_SCORE: 36

## 2024-10-15 NOTE — ED TRIAGE NOTES
"Pt went to neurologist today and was found to have bp of 85/50 so pt went home and moxed and has cp, sweating, blurred vision (none now). Had episode of \"rapid heart rate\" last night with diaphoresis and dizziness that last 5 minutes. Currently has cp to left chest and rads to neck     Triage Assessment (Adult)       Row Name 10/15/24 142          Triage Assessment    Airway WDL WDL        Respiratory WDL    Respiratory WDL WDL        Skin Circulation/Temperature WDL    Skin Circulation/Temperature WDL WDL        Cardiac WDL    Cardiac WDL chest pain        Chest Pain Assessment    Chest Pain Location anterior chest, left     Chest Pain Radiation neck        Peripheral/Neurovascular WDL    Peripheral Neurovascular WDL WDL        Cognitive/Neuro/Behavioral WDL    Cognitive/Neuro/Behavioral WDL WDL                     "

## 2024-10-15 NOTE — TELEPHONE ENCOUNTER
Spoke with pt today to discuss holding Metoprolol 7 days prior to SVT ablation. Pt understands last day to take will be on 10/31/24.     All other instructions will be discussed during pre-op phone call week prior to procedure.    Maricruz Aguirre RN

## 2024-10-15 NOTE — ED PROVIDER NOTES
EMERGENCY DEPARTMENT ENCOUNTER      NAME: Barry Jay  AGE: 69 year old male  YOB: 1955  MRN: [id number]  EVALUATION DATE & TIME: No admission date for patient encounter.    PCP: Osmel Iniguez    ED PROVIDER: Juliana Ferris M.D.      Chief Complaint   Patient presents with    Chest Pain         FINAL IMPRESSION:  1. Near syncope    2. Paroxysmal supraventricular tachycardia (H)          ED COURSE & MEDICAL DECISION MAKING:    ED Course as of 10/15/24 1759   Tue Oct 15, 2024   1533 Pt very poor historian with palpitations and lightheadedness with sinus rhythm and h/o SVT with left arm SBP 88 and right arm , pending CTA r/o dissection stat with troponin, viral PCR with some SOB. Initial troponin at 1444 = 24, repeat 2h delta troponin pending for 1644 and will admit for cardiac tele with palpitations and near syncope with known recurrent sVT   1726 Repeat troponin in process and underway now, BNP only minimally elevated, lactic acid WNL and negative procalcitonin 0.06 and normal WBC 10.7 thus no signs of active infection. Magnesium and BMP WNL and TSH normal. CBC also reassuring. Creatinine 1.5 at his 1.5-1.6 baseline per chart review.    1758 I reassessed patient who is going to CT shortly, and patient declines admission for cardiac tele and ACS r/o. Troponin  24 -> 20 which is reassuring and r/o acute MI, but with near syncope and SOB and palpitations, I recommended admit and he notes he doesn't want to be admitted ultimately, ok with plan to CT and reassess/readdress. Pt thus signed out to PM ED MD pending CT and then plan to admit vs. AMA       3:22 PM I met the patient and performed my initial interview and exam.    Pertinent Labs & Imaging studies reviewed. (See chart for details)    Medical Decision Making  Obtained supplemental history:Supplemental history obtained?: No  Reviewed external records: External records reviewed?: Documented in chart  Care impacted by chronic  illness:Diabetes, Heart Disease, Hyperlipidemia, and Hypertension  Did you consider but not order tests?: Work up considered but not performed and documented in chart, if applicable  Did you interpret images independently?: Independent interpretation of ECG and images noted in documentation, when applicable.  Consultation discussion with other provider:Did you involve another provider (consultant, , pharmacy, etc.)?: I discussed the care with another health care provider, see documentation for details.  Admission considered. Patient was signed out to the oncoming physician, disposition pending.    MIPS: CT Pulmonary Angiogram:Patient is moderate to high risk for PE.      At the conclusion of the encounter I discussed the results of all of the tests and the disposition. The questions were answered. The patient or family acknowledged understanding and was agreeable with the care plan.     MEDICATIONS GIVEN IN THE EMERGENCY:  Medications   iopamidol (ISOVUE-370) solution 75 mL (has no administration in time range)       NEW PRESCRIPTIONS STARTED AT TODAY'S ER VISIT  New Prescriptions    No medications on file          =================================================================    HPI      Barry Jay is a 69 year old male with PMHx of diabetes mellitus type 2, hypertension, hyperlipidemia, coronary artery disease, who presents to the ED today via walk-in for evaluation of chest pain.    The patient has a history of SVT with palpitations and recent ER visits in the last week for it. Today, the patient presented to neurology and was found to have a blood pressure of 80/50. He went home afterwards and began to mow the lawn. However, around 12-12:30 PM, he had to stop as he developed lightheadedness with blurred vision and shortness of breath, but no loss of consciousness or palpitations/racing heart. He went to sit down, then with improvement of his symptoms. He still felt generally unwell with lightheadedness  and so called his primary care provider, who directed him to the ER.     The patient only ate half of a donut this morning, and has been drinking water and Pedialyte. The patient denies room spinning dizziness, back pain, chest pain, neck pain.     Per chart review, history of hypertension, diabetes mellitus type 2, hyperlipidemia, coronary artery disease. Patient to neurology today with low blood pressure.     REVIEW OF SYSTEMS   All other systems reviewed and are negative except as noted above in HPI.    PAST MEDICAL HISTORY:  Past Medical History:   Diagnosis Date    Anxiety     ED (erectile dysfunction)     HTN (hypertension)     Hyperlipidemia     Nonobstructive atherosclerosis of coronary artery     SVT (supraventricular tachycardia) (H)        PAST SURGICAL HISTORY:  Past Surgical History:   Procedure Laterality Date    APPENDECTOMY      CHOLECYSTECTOMY      CV CORONARY ANGIOGRAM N/A 4/12/2023    Procedure: Coronary Angiogram;  Surgeon: Teena Laguna MD;  Location: [facility] CV    CV LEFT HEART CATH N/A 4/12/2023    Procedure: Left Heart Catheterization;  Surgeon: Teena Laguna MD;  Location: [facility] LAB CV    EP ABLATION SVT N/A 3/14/2023    Procedure: Ablation Supraventricular Tachycardia;  Surgeon: Lorna Hobson MD;  Location: [facility] CATH LAB CV    LAMINECTOMY LUMBAR ONE LEVEL Right 6/27/2022    Procedure: Right Lumbar 4 - lumbar 5 hemilaminectomy, medial facetectomies, foraminotomies;  Surgeon: Shannan Mari MD;  Location: [facility] OR    ORTHOPEDIC SURGERY         CURRENT MEDICATIONS:    aspirin (ASA) 81 MG chewable tablet  atorvastatin (LIPITOR) 20 MG tablet  irbesartan-hydrochlorothiazide (AVALIDE) 300-12.5 mg per tablet  metoprolol tartrate (LOPRESSOR) 50 MG tablet  multivitamin, therapeutic (THERA-VIT) TABS tablet  omeprazole (PRILOSEC) 20 MG capsule  PARoxetine (PAXIL) 20 MG tablet  triamcinolone (KENALOG) 0.1 % external ointment  zolpidem (AMBIEN) 10 mg  "tablet        ALLERGIES:  Allergies   Allergen Reactions    Iodine Hives       FAMILY HISTORY:  History reviewed. No pertinent family history.    SOCIAL HISTORY:   Social History     Socioeconomic History    Marital status: Single   Tobacco Use    Smoking status: Every Day     Current packs/day: 0.50     Average packs/day: 1 pack/day for 30.8 years (30.4 ttl pk-yrs)     Types: Cigarettes     Start date: 1994    Smokeless tobacco: Never    Tobacco comments:     Pt quitting, down to 7 cigs per day   Vaping Use    Vaping status: Never Used   Substance and Sexual Activity    Alcohol use: Never    Drug use: Never       VITALS:  Patient Vitals for the past 24 hrs:   BP Temp Temp src Pulse Resp SpO2 Height Weight   10/15/24 1741 -- -- -- 112 -- 98 % -- --   10/15/24 1700 128/72 -- -- 111 -- 98 % -- --   10/15/24 1645 108/60 -- -- 112 -- 96 % -- --   10/15/24 1423 114/78 97.4  F (36.3  C) Temporal 114 16 96 % 1.727 m (5' 8\") 92.5 kg (203 lb 14.4 oz)       PHYSICAL EXAM    GENERAL: Awake, alert.  In no acute distress.   HEENT: Normocephalic, atraumatic.  Pupils equal, round and reactive.  Conjunctiva normal.  EOMI.  NECK: No stridor or apparent deformity.  PULMONARY: Symmetrical breath sounds without distress.  Lungs clear to auscultation bilaterally without wheezes, rhonchi or rales.  CARDIO: Regular rate and rhythm.  No significant murmur, rub or gallop.  Radial pulses strong and symmetrical. Right arm blood pressure 111/70 on exam. Left arm blood pressure 88/68 on exam.  ABDOMINAL: Abdomen soft, non-distended and non-tender to palpation.  No CVAT, no palpable hepatosplenomegaly.  EXTREMITIES: No lower extremity swelling or edema.    NEURO: Alert and oriented to person, place and time.  Cranial nerves grossly intact.  No focal motor deficit.  PSYCH: Normal mood and affect  SKIN: No rashes      LAB:  All pertinent labs reviewed and interpreted.  Results for orders placed or performed during the hospital encounter of " 10/15/24   Basic metabolic panel   Result Value Ref Range    Sodium 139 135 - 145 mmol/L    Potassium 4.1 3.4 - 5.3 mmol/L    Chloride 102 98 - 107 mmol/L    Carbon Dioxide (CO2) 24 22 - 29 mmol/L    Anion Gap 13 7 - 15 mmol/L    Urea Nitrogen 19.3 8.0 - 23.0 mg/dL    Creatinine 1.57 (H) 0.67 - 1.17 mg/dL    GFR Estimate 48 (L) >60 mL/min/1.73m2    Calcium 9.3 8.8 - 10.4 mg/dL    Glucose 172 (H) 70 - 99 mg/dL   Result Value Ref Range    Troponin T, High Sensitivity 24 (H) <=22 ng/L   CBC with platelets and differential   Result Value Ref Range    WBC Count 10.7 4.0 - 11.0 10e3/uL    RBC Count 5.20 4.40 - 5.90 10e6/uL    Hemoglobin 12.3 (L) 13.3 - 17.7 g/dL    Hematocrit 39.8 (L) 40.0 - 53.0 %    MCV 77 (L) 78 - 100 fL    MCH 23.7 (L) 26.5 - 33.0 pg    MCHC 30.9 (L) 31.5 - 36.5 g/dL    RDW 16.1 (H) 10.0 - 15.0 %    Platelet Count 326 150 - 450 10e3/uL    % Neutrophils 67 %    % Lymphocytes 23 %    % Monocytes 8 %    % Eosinophils 1 %    % Basophils 1 %    % Immature Granulocytes 0 %    NRBCs per 100 WBC 0 <1 /100    Absolute Neutrophils 7.2 1.6 - 8.3 10e3/uL    Absolute Lymphocytes 2.5 0.8 - 5.3 10e3/uL    Absolute Monocytes 0.9 0.0 - 1.3 10e3/uL    Absolute Eosinophils 0.1 0.0 - 0.7 10e3/uL    Absolute Basophils 0.1 0.0 - 0.2 10e3/uL    Absolute Immature Granulocytes 0.0 <=0.4 10e3/uL    Absolute NRBCs 0.0 10e3/uL   Extra Red Top Tube   Result Value Ref Range    Hold Specimen JIC    Extra Green Top (Lithium Heparin) Tube   Result Value Ref Range    Hold Specimen JIC    Result Value Ref Range    Procalcitonin 0.06 <0.50 ng/mL   Lactic acid whole blood with 1x repeat in 2 hr when >2   Result Value Ref Range    Lactic Acid, Initial 1.3 0.7 - 2.0 mmol/L   Result Value Ref Range    Magnesium 1.9 1.7 - 2.3 mg/dL   TSH with free T4 reflex   Result Value Ref Range    TSH 1.39 0.30 - 4.20 uIU/mL   Nt probnp inpatient (BNP)   Result Value Ref Range    N terminal Pro BNP Inpatient 1,230 (H) 0 - 900 pg/mL   Symptomatic  Influenza A/B, RSV, & SARS-CoV2 PCR (COVID-19) Nasopharyngeal    Specimen: Nasopharyngeal; Swab   Result Value Ref Range    Influenza A PCR Negative Negative    Influenza B PCR Negative Negative    RSV PCR Negative Negative    SARS CoV2 PCR Negative Negative   Result Value Ref Range    Troponin T, High Sensitivity 20 <=22 ng/L       RADIOLOGY:  Reviewed all pertinent imaging. Please see official radiology report.  CTA Chest Abdomen Pelvis w Contrast    (Results Pending)         EKG:    Reviewed and interpreted as: 1440 sinus tachycardia with , no ST anomaleis, morphologically similar to prior EKG from 9/28/2024      I have independently reviewed and interpreted the EKG(s) documented above.        I, Garth Machuca, am serving as a scribe to document services personally performed by Dr. Juliana Ferris based on my observation and the provider's statements to me. I, Juliana Ferris MD attest that Garth Machuca is acting in a scribe capacity, has observed my performance of the services and has documented them in accordance with my direction.       Juliana Ferris MD  10/15/24 1729       Juliana Ferris MD  10/15/24 1753

## 2024-10-15 NOTE — NURSING NOTE
Chief Complaint   Patient presents with    Arm numbness     Patient states he has numbness and tingling in both hands. No EMG completed.     Isa Hunter on 10/15/2024 at 10:52 AM

## 2024-10-15 NOTE — TELEPHONE ENCOUNTER
Pt left voicemail. States he spoke with his insurance company and gave them the codes but was told the codes would need to come from the Spine team.   Call placed to pt to discuss. Left message to return call.

## 2024-10-15 NOTE — ED NOTES
EMERGENCY DEPARTMENT SIGN OUT NOTE        ED COURSE AND MEDICAL DECISION MAKING  Patient was signed out to me by Dr Juliana Ferris at 1830    In brief, Barry Jay is a 69 year old male who initially presented chest pain palpitations and lightheadedness.  Reportedly had asymmetric blood pressures and evidence of hypotension and presyncope earlier when he was with his neurologist.  Currently is chest pain-free.  Does have a history of recurrent SVT.  Plan to follow-up on CT and ultimately admit given patient's history of hypotension.  Plan for cardiac monitoring.    At time of sign out, disposition was pending CTA chest and admit for cardiac tele.    2025-spoke to patient at bedside when CT resulted.  No aortic dissection or PE or pneumothorax.  Did have an incidental left iliac aneurysm that appears chronic in nature without active dissection.  He was given a copy of these results on his after visit summary and a vascular referral was made.  He has follow-up with his PCP in 2 days.  No focal pain or weakness of his lower extremities or numbness that would correlate with his iliac aneurysm.  I believe this is a incidental finding..  I recommended admission however patient decided to go home.  He stated he feels well and does not want to be admitted.  He has a PCP follow-up in 2 days.  I went over risks of going home without further workup or evaluation and patient accepts all risks.  He understand strict return precautions.    FINAL IMPRESSION    1. Near syncope    2. Paroxysmal supraventricular tachycardia (H)    3. Iliac artery aneurysm, left (H)        ED MEDS  Medications   iopamidol (ISOVUE-370) solution 75 mL (75 mLs Intravenous $Given 10/15/24 4913)       LAB  Labs Ordered and Resulted from Time of ED Arrival to Time of ED Departure   BASIC METABOLIC PANEL - Abnormal       Result Value    Sodium 139      Potassium 4.1      Chloride 102      Carbon Dioxide (CO2) 24      Anion Gap 13      Urea Nitrogen 19.3       Creatinine 1.57 (*)     GFR Estimate 48 (*)     Calcium 9.3      Glucose 172 (*)    TROPONIN T, HIGH SENSITIVITY - Abnormal    Troponin T, High Sensitivity 24 (*)    CBC WITH PLATELETS AND DIFFERENTIAL - Abnormal    WBC Count 10.7      RBC Count 5.20      Hemoglobin 12.3 (*)     Hematocrit 39.8 (*)     MCV 77 (*)     MCH 23.7 (*)     MCHC 30.9 (*)     RDW 16.1 (*)     Platelet Count 326      % Neutrophils 67      % Lymphocytes 23      % Monocytes 8      % Eosinophils 1      % Basophils 1      % Immature Granulocytes 0      NRBCs per 100 WBC 0      Absolute Neutrophils 7.2      Absolute Lymphocytes 2.5      Absolute Monocytes 0.9      Absolute Eosinophils 0.1      Absolute Basophils 0.1      Absolute Immature Granulocytes 0.0      Absolute NRBCs 0.0     NT PROBNP INPATIENT - Abnormal    N terminal Pro BNP Inpatient 1,230 (*)    PROCALCITONIN - Normal    Procalcitonin 0.06     LACTIC ACID WHOLE BLOOD WITH 1X REPEAT IN 2 HR WHEN >2 - Normal    Lactic Acid, Initial 1.3     MAGNESIUM - Normal    Magnesium 1.9     TSH WITH FREE T4 REFLEX - Normal    TSH 1.39     INFLUENZA A/B, RSV, & SARS-COV2 PCR - Normal    Influenza A PCR Negative      Influenza B PCR Negative      RSV PCR Negative      SARS CoV2 PCR Negative     TROPONIN T, HIGH SENSITIVITY - Normal    Troponin T, High Sensitivity 20             RADIOLOGY    CTA Chest Abdomen Pelvis w Contrast   Final Result   IMPRESSION:      1.  No aortic dissection or other acute aortic pathology.      2.  No pulmonary embolism to the segmental level. Subsegmental vessels are suboptimally opacified and not well evaluated.      3.  No acute process in the abdomen or pelvis.      4.  Fusiform ectasia of the left common iliac artery up to 1.8 cm with an associated chronic appearing focal dissection.      5.  Mild upper lobe predominant centrilobular emphysema.      6.  Distal colonic diverticulosis. No inflamed diverticuli.      7.  Prostatomegaly.        Independent interpretation of  CT scan did not show a large AAA.    DISCHARGE MEDS  Discharge Medication List as of 10/15/2024  8:32 PM          Freeman Ferrara MD  [facility] EMERGENCY DEPARTMENT  [address] [place] [address] [id number] [phone]         Freeman Ferrara MD  10/16/24 4997

## 2024-10-15 NOTE — MEDICATION SCRIBE - ADMISSION MEDICATION HISTORY
Medication Scribe Admission Medication History    Admission medication history is complete. The information provided in this note is only as accurate as the sources available at the time of the update.    Information Source(s): Patient and CareEverywhere/SureScripts via in-person    Pertinent Information:     Changes made to PTA medication list:  Added: None  Deleted: None  Changed: None    Allergies reviewed with patient and updates made in EHR: yes    Medication History Completed By: IGNACIA ARRIAGA 10/15/2024 5:33 PM    PTA Med List   Medication Sig Last Dose    aspirin (ASA) 81 MG chewable tablet Take 81 mg by mouth daily 10/15/2024 at am    atorvastatin (LIPITOR) 20 MG tablet Take 1 tablet (20 mg) by mouth at bedtime. Pt needs appt for further refills 10/14/2024 at pm    irbesartan-hydrochlorothiazide (AVALIDE) 300-12.5 mg per tablet [IRBESARTAN-HYDROCHLOROTHIAZIDE (AVALIDE) 300-12.5 MG PER TABLET] Take 1 tablet by mouth daily. for blood pressure 10/15/2024 at am    metoprolol tartrate (LOPRESSOR) 50 MG tablet Take by mouth 2 times daily. 50mg in the morning   75mg at bedtime 10/15/2024 at am    multivitamin, therapeutic (THERA-VIT) TABS tablet Take 1 tablet by mouth daily 10/15/2024 at am    omeprazole (PRILOSEC) 20 MG capsule Take 20 mg by mouth daily 10/15/2024 at am    PARoxetine (PAXIL) 20 MG tablet Take 20 mg by mouth every morning 10/15/2024 at am    triamcinolone (KENALOG) 0.1 % external ointment Apply topically 2 times daily as needed. Past Month at prn    zolpidem (AMBIEN) 10 mg tablet Take 10 mg by mouth nightly as needed for sleep 10/14/2024 at pm

## 2024-10-16 ENCOUNTER — TELEPHONE (OUTPATIENT)
Dept: VASCULAR SURGERY | Facility: CLINIC | Age: 69
End: 2024-10-16
Payer: COMMERCIAL

## 2024-10-16 NOTE — DISCHARGE INSTRUCTIONS
You have been evaluated in the Emergency Department today for your near syncopal episode.  we recommended to be admitted given your abnormal vital signs initially and concerning symptoms however you decided to be discharged home.  We went over risks of going home without further observation and you accepted all risk.  You understand strict return precautions.    You also had a CT scan that showed an incidental finding of  Fusiform ectasia of the left common iliac artery up to 1.8 cm with an associated chronic appearing focal dissection.  Recommended he follow-up with vascular surgeon and your primary doctor.    Please follow up with your primary care doctor in 2-3 days.    Return to the ER immediately for worsening or uncontrolled symptoms, headache, chest pain, shortness of breath, persistent vomiting, vision changes, recurrent fainting, or for any other concerning symptoms.    Thank you for choosing us for your care.

## 2024-10-16 NOTE — TELEPHONE ENCOUNTER
LVMTCB #1 to schedule with Dr. Rutherford in [place] end of January/beginning of February or to potentially be seen sooner in [facility].  [phone]

## 2024-10-16 NOTE — TELEPHONE ENCOUNTER
Vascular Referral Intake    Referred by: Freeman Ferrara MD  for I72.3 (ICD-10-CM) - Iliac artery aneurysm, left (H)     Specialty: Vascular Medicine    Specific Provider if Necessary:  MD Jude Rutherford or Bon if wanting to be seen sooner.    Visit Type: New    Time Frame: Next Available    Testing/Imaging Needed Before Consult: None, CTA done 10/15    Appt Note: (to be pasted into appt comments, also add where additional information is located, ie, outside images pushed to PACS, in Epic, sent to [facility], etc)  CONSULT Freeman Ferrara MD referring.  Left common iliac 1.8cm with chronic dissection.  CTA done 10/15/24

## 2024-10-17 ENCOUNTER — LAB REQUISITION (OUTPATIENT)
Dept: LAB | Facility: CLINIC | Age: 69
End: 2024-10-17

## 2024-10-17 DIAGNOSIS — N18.31 CHRONIC KIDNEY DISEASE, STAGE 3A (H): ICD-10-CM

## 2024-10-17 PROCEDURE — 80053 COMPREHEN METABOLIC PANEL: CPT | Performed by: FAMILY MEDICINE

## 2024-10-18 LAB
ALBUMIN SERPL BCG-MCNC: 4.1 G/DL (ref 3.5–5.2)
ALP SERPL-CCNC: 109 U/L (ref 40–150)
ALT SERPL W P-5'-P-CCNC: 32 U/L (ref 0–70)
ANION GAP SERPL CALCULATED.3IONS-SCNC: 12 MMOL/L (ref 7–15)
AST SERPL W P-5'-P-CCNC: 23 U/L (ref 0–45)
BILIRUB SERPL-MCNC: 0.5 MG/DL
BUN SERPL-MCNC: 19.1 MG/DL (ref 8–23)
CALCIUM SERPL-MCNC: 10 MG/DL (ref 8.8–10.4)
CHLORIDE SERPL-SCNC: 104 MMOL/L (ref 98–107)
CREAT SERPL-MCNC: 1.52 MG/DL (ref 0.67–1.17)
EGFRCR SERPLBLD CKD-EPI 2021: 50 ML/MIN/1.73M2
GLUCOSE SERPL-MCNC: 105 MG/DL (ref 70–99)
HCO3 SERPL-SCNC: 25 MMOL/L (ref 22–29)
POTASSIUM SERPL-SCNC: 4.5 MMOL/L (ref 3.4–5.3)
PROT SERPL-MCNC: 7.3 G/DL (ref 6.4–8.3)
SODIUM SERPL-SCNC: 141 MMOL/L (ref 135–145)

## 2024-10-18 NOTE — TELEPHONE ENCOUNTER
LVMTCB #2 to schedule consult in vascular medicine clinic.  Dr. Rutherford booking into February or can go to [facility] to be seen sooner. [phone]

## 2024-10-18 NOTE — TELEPHONE ENCOUNTER
Call received from pt. He would like to cancel his appts associated with the [facility] as he has decided he does not want to move forward. Appt with Dr. Wolff cancelled.   He will call the [facility] to cancel his behavioral health eval appt.

## 2024-10-20 LAB — BACTERIA BLD CULT: NO GROWTH

## 2024-11-01 ENCOUNTER — TELEPHONE (OUTPATIENT)
Dept: CARDIOLOGY | Facility: CLINIC | Age: 69
End: 2024-11-01
Payer: COMMERCIAL

## 2024-11-01 NOTE — TELEPHONE ENCOUNTER
Pre-Procedure Education    Procedure: SVT Abaltion with Dr Hidalgo on 11-8-24 with arrival time 10:00 am    Orders: Orderset for procedure verified signed/held    COVID: COVID policy- if pt develops COVID like symptoms prior to procedure, he/she would need to complete an at home with a rapid antigen COVID test 1-2 days prior to your procedure date. If COVID + pt is aware the procedure will need to be rescheduled, and to contact CV scheduling as soon as possible    Pre-Op H&P: Completed- Scanned in under Media    Education:   Contact: Attempted to contact pt via phone, VM was left with request that pt return call to review above/below information  Pre-Procedure Instruction: NPO after midnight pre procedure, Defined NPO with pt, Remove all jewelry and leave all valuables at home, Shower prior to arrival, Sedation plan/orders, Transportation requirements and arrangements post procedure, Post-procedure follow up process, Post-procedure restrictions/expectations, and Pre-procedure letter sent- letter tab  Risks:  Cardiac Ablation  <1% Hypotension, Hemorrhage, Thrombophlebitis, Systemic or pulmonic emboli, Cardiac perforation (tamponade), Infection, Pneumothorax, Arrhythmias, Proarrhythmic effects of drugs, Radiation exposure, Catheter entrapment  <1 % Vascular injury including perforation of vein, artery or heart  1-2% Complete heart block (for AVNRT or septal accessory pathway)  <0.5% CVA or MI, 1% CVA if Left sided ablation  <0.1% death  If external defibrillation or CV is needed, 25% risk for superficial burn  1-2% Tamponade and Aortic puncture with left sided transeptal approach  Risks associated with general anesthesia will be addressed by the Anesthesiology Department      Medication:   Instructions regarding anticoagulants: Pt not on AC- N/A  Instructions regarding antiarrhythmic medication: Beta Blocker; hold 7 days prior to procedure  Instructions given to pt regarding diuretics medication: None  Instructions  given to pt regarding DM/GLP-1 medication:   DM- None  GLP-1- None  Instructions for medication, other than anticoagulants and antiarrhythmics listed above, given to pt: Take all medication AM of procedure with small sips of water     Important patient information for staff: None    11/1/2024 10:47 AM  Rita Esparza RN

## 2024-11-01 NOTE — TELEPHONE ENCOUNTER
Pt was called, corresponding information/recommendations reviewed, verbalized understanding, has no further questions at this time, and contact information was given for further concerns/questions   11/1/2024 1:40 PM  Delmis Gonsales RN

## 2024-11-08 ENCOUNTER — TELEPHONE (OUTPATIENT)
Dept: CARDIOLOGY | Facility: CLINIC | Age: 69
End: 2024-11-08

## 2024-11-08 ENCOUNTER — HOSPITAL ENCOUNTER (OUTPATIENT)
Facility: HOSPITAL | Age: 69
Discharge: HOME | End: 2024-11-08
Attending: INTERNAL MEDICINE | Admitting: INTERNAL MEDICINE
Payer: COMMERCIAL

## 2024-11-08 ENCOUNTER — APPOINTMENT (OUTPATIENT)
Dept: CARDIOLOGY | Facility: HOSPITAL | Age: 69
End: 2024-11-08
Attending: INTERNAL MEDICINE
Payer: COMMERCIAL

## 2024-11-08 ENCOUNTER — ANESTHESIA (OUTPATIENT)
Dept: CARDIOLOGY | Facility: HOSPITAL | Age: 69
End: 2024-11-08
Payer: COMMERCIAL

## 2024-11-08 ENCOUNTER — ANESTHESIA EVENT (OUTPATIENT)
Dept: CARDIOLOGY | Facility: HOSPITAL | Age: 69
End: 2024-11-08
Payer: COMMERCIAL

## 2024-11-08 VITALS
TEMPERATURE: 98.2 F | DIASTOLIC BLOOD PRESSURE: 63 MMHG | WEIGHT: 200 LBS | RESPIRATION RATE: 18 BRPM | HEART RATE: 95 BPM | SYSTOLIC BLOOD PRESSURE: 139 MMHG | HEIGHT: 68 IN | OXYGEN SATURATION: 95 % | BODY MASS INDEX: 30.31 KG/M2

## 2024-11-08 DIAGNOSIS — I47.10 PAROXYSMAL SUPRAVENTRICULAR TACHYCARDIA (H): ICD-10-CM

## 2024-11-08 DIAGNOSIS — I48.3 TYPICAL ATRIAL FLUTTER (H): Primary | ICD-10-CM

## 2024-11-08 LAB
ACT BLD: 211 SECONDS (ref 74–150)
ACT BLD: 257 SECONDS (ref 74–150)
ANION GAP SERPL CALCULATED.3IONS-SCNC: 14 MMOL/L (ref 7–15)
BUN SERPL-MCNC: 22.2 MG/DL (ref 8–23)
CALCIUM SERPL-MCNC: 9.3 MG/DL (ref 8.8–10.4)
CHLORIDE SERPL-SCNC: 100 MMOL/L (ref 98–107)
CREAT SERPL-MCNC: 1.37 MG/DL (ref 0.67–1.17)
EGFRCR SERPLBLD CKD-EPI 2021: 57 ML/MIN/1.73M2
ERYTHROCYTE [DISTWIDTH] IN BLOOD BY AUTOMATED COUNT: 16.3 % (ref 10–15)
GLUCOSE BLDC GLUCOMTR-MCNC: 183 MG/DL (ref 70–99)
GLUCOSE SERPL-MCNC: 134 MG/DL (ref 70–99)
HCO3 SERPL-SCNC: 23 MMOL/L (ref 22–29)
HCT VFR BLD AUTO: 36.8 % (ref 40–53)
HGB BLD-MCNC: 11.5 G/DL (ref 13.3–17.7)
LVEF ECHO: NORMAL
MCH RBC QN AUTO: 23.5 PG (ref 26.5–33)
MCHC RBC AUTO-ENTMCNC: 31.3 G/DL (ref 31.5–36.5)
MCV RBC AUTO: 75 FL (ref 78–100)
PLATELET # BLD AUTO: 297 10E3/UL (ref 150–450)
POTASSIUM SERPL-SCNC: 3.8 MMOL/L (ref 3.4–5.3)
RBC # BLD AUTO: 4.89 10E6/UL (ref 4.4–5.9)
SODIUM SERPL-SCNC: 137 MMOL/L (ref 135–145)
WBC # BLD AUTO: 8.3 10E3/UL (ref 4–11)

## 2024-11-08 PROCEDURE — 93653 COMPRE EP EVAL TX SVT: CPT | Performed by: ANESTHESIOLOGY

## 2024-11-08 PROCEDURE — 85018 HEMOGLOBIN: CPT | Performed by: INTERNAL MEDICINE

## 2024-11-08 PROCEDURE — 93615 ESOPHAGEAL RECORDING: CPT | Mod: 26 | Performed by: INTERNAL MEDICINE

## 2024-11-08 PROCEDURE — 36415 COLL VENOUS BLD VENIPUNCTURE: CPT | Performed by: INTERNAL MEDICINE

## 2024-11-08 PROCEDURE — C1733 CATH, EP, OTHR THAN COOL-TIP: HCPCS | Performed by: INTERNAL MEDICINE

## 2024-11-08 PROCEDURE — C1732 CATH, EP, DIAG/ABL, 3D/VECT: HCPCS | Performed by: INTERNAL MEDICINE

## 2024-11-08 PROCEDURE — C1759 CATH, INTRA ECHOCARDIOGRAPHY: HCPCS | Performed by: INTERNAL MEDICINE

## 2024-11-08 PROCEDURE — 370N000017 HC ANESTHESIA TECHNICAL FEE, PER MIN: Performed by: INTERNAL MEDICINE

## 2024-11-08 PROCEDURE — 93010 ELECTROCARDIOGRAM REPORT: CPT | Performed by: INTERNAL MEDICINE

## 2024-11-08 PROCEDURE — 250N000009 HC RX 250: Performed by: INTERNAL MEDICINE

## 2024-11-08 PROCEDURE — 82962 GLUCOSE BLOOD TEST: CPT

## 2024-11-08 PROCEDURE — 93655 ICAR CATH ABLTJ DSCRT ARRHYT: CPT | Performed by: INTERNAL MEDICINE

## 2024-11-08 PROCEDURE — 272N000001 HC OR GENERAL SUPPLY STERILE: Performed by: INTERNAL MEDICINE

## 2024-11-08 PROCEDURE — P9045 ALBUMIN (HUMAN), 5%, 250 ML: HCPCS | Performed by: NURSE ANESTHETIST, CERTIFIED REGISTERED

## 2024-11-08 PROCEDURE — C1887 CATHETER, GUIDING: HCPCS | Performed by: INTERNAL MEDICINE

## 2024-11-08 PROCEDURE — 93615 ESOPHAGEAL RECORDING: CPT | Performed by: INTERNAL MEDICINE

## 2024-11-08 PROCEDURE — 258N000003 HC RX IP 258 OP 636: Performed by: INTERNAL MEDICINE

## 2024-11-08 PROCEDURE — 93005 ELECTROCARDIOGRAM TRACING: CPT

## 2024-11-08 PROCEDURE — 93662 INTRACARDIAC ECG (ICE): CPT | Performed by: INTERNAL MEDICINE

## 2024-11-08 PROCEDURE — 250N000011 HC RX IP 250 OP 636: Performed by: INTERNAL MEDICINE

## 2024-11-08 PROCEDURE — 85347 COAGULATION TIME ACTIVATED: CPT

## 2024-11-08 PROCEDURE — 93325 DOPPLER ECHO COLOR FLOW MAPG: CPT

## 2024-11-08 PROCEDURE — 80048 BASIC METABOLIC PNL TOTAL CA: CPT | Performed by: INTERNAL MEDICINE

## 2024-11-08 PROCEDURE — 93320 DOPPLER ECHO COMPLETE: CPT | Mod: 26 | Performed by: INTERNAL MEDICINE

## 2024-11-08 PROCEDURE — 93653 COMPRE EP EVAL TX SVT: CPT | Performed by: INTERNAL MEDICINE

## 2024-11-08 PROCEDURE — 250N000009 HC RX 250: Performed by: NURSE ANESTHETIST, CERTIFIED REGISTERED

## 2024-11-08 PROCEDURE — 250N000013 HC RX MED GY IP 250 OP 250 PS 637: Performed by: INTERNAL MEDICINE

## 2024-11-08 PROCEDURE — 250N000011 HC RX IP 250 OP 636: Performed by: NURSE ANESTHETIST, CERTIFIED REGISTERED

## 2024-11-08 PROCEDURE — C1766 INTRO/SHEATH,STRBLE,NON-PEEL: HCPCS | Performed by: INTERNAL MEDICINE

## 2024-11-08 PROCEDURE — 93623 PRGRMD STIMJ&PACG IV RX NFS: CPT | Performed by: INTERNAL MEDICINE

## 2024-11-08 PROCEDURE — C1769 GUIDE WIRE: HCPCS | Performed by: INTERNAL MEDICINE

## 2024-11-08 PROCEDURE — 93662 INTRACARDIAC ECG (ICE): CPT | Mod: 26 | Performed by: INTERNAL MEDICINE

## 2024-11-08 PROCEDURE — 93623 PRGRMD STIMJ&PACG IV RX NFS: CPT | Mod: 26 | Performed by: INTERNAL MEDICINE

## 2024-11-08 PROCEDURE — 93653 COMPRE EP EVAL TX SVT: CPT | Performed by: NURSE ANESTHETIST, CERTIFIED REGISTERED

## 2024-11-08 PROCEDURE — 93312 ECHO TRANSESOPHAGEAL: CPT | Mod: 26 | Performed by: INTERNAL MEDICINE

## 2024-11-08 PROCEDURE — C1730 CATH, EP, 19 OR FEW ELECT: HCPCS | Performed by: INTERNAL MEDICINE

## 2024-11-08 PROCEDURE — 999N000054 HC STATISTIC EKG NON-CHARGEABLE

## 2024-11-08 PROCEDURE — 258N000003 HC RX IP 258 OP 636: Performed by: NURSE ANESTHETIST, CERTIFIED REGISTERED

## 2024-11-08 PROCEDURE — 93325 DOPPLER ECHO COLOR FLOW MAPG: CPT | Mod: 26 | Performed by: INTERNAL MEDICINE

## 2024-11-08 RX ORDER — ACETAMINOPHEN 325 MG/1
650 TABLET ORAL EVERY 4 HOURS PRN
Status: DISCONTINUED | OUTPATIENT
Start: 2024-11-08 | End: 2024-11-08 | Stop reason: HOSPADM

## 2024-11-08 RX ORDER — FENTANYL CITRATE 50 UG/ML
25 INJECTION, SOLUTION INTRAMUSCULAR; INTRAVENOUS
Status: DISCONTINUED | OUTPATIENT
Start: 2024-11-08 | End: 2024-11-08 | Stop reason: HOSPADM

## 2024-11-08 RX ORDER — HEPARIN SODIUM 1000 [USP'U]/ML
INJECTION, SOLUTION INTRAVENOUS; SUBCUTANEOUS
Status: DISCONTINUED | OUTPATIENT
Start: 2024-11-08 | End: 2024-11-08 | Stop reason: HOSPADM

## 2024-11-08 RX ORDER — DEXAMETHASONE SODIUM PHOSPHATE 4 MG/ML
INJECTION, SOLUTION INTRA-ARTICULAR; INTRALESIONAL; INTRAMUSCULAR; INTRAVENOUS; SOFT TISSUE PRN
Status: DISCONTINUED | OUTPATIENT
Start: 2024-11-08 | End: 2024-11-08

## 2024-11-08 RX ORDER — LIDOCAINE HYDROCHLORIDE AND EPINEPHRINE 10; 10 MG/ML; UG/ML
INJECTION, SOLUTION INFILTRATION; PERINEURAL
Status: DISCONTINUED | OUTPATIENT
Start: 2024-11-08 | End: 2024-11-08 | Stop reason: HOSPADM

## 2024-11-08 RX ORDER — VASOPRESSIN 20 U/ML
INJECTION PARENTERAL PRN
Status: DISCONTINUED | OUTPATIENT
Start: 2024-11-08 | End: 2024-11-08

## 2024-11-08 RX ORDER — ONDANSETRON 4 MG/1
4 TABLET, ORALLY DISINTEGRATING ORAL EVERY 6 HOURS PRN
Status: DISCONTINUED | OUTPATIENT
Start: 2024-11-08 | End: 2024-11-08 | Stop reason: HOSPADM

## 2024-11-08 RX ORDER — SODIUM CHLORIDE 9 MG/ML
100 INJECTION, SOLUTION INTRAVENOUS CONTINUOUS
Status: DISCONTINUED | OUTPATIENT
Start: 2024-11-08 | End: 2024-11-08 | Stop reason: HOSPADM

## 2024-11-08 RX ORDER — FENTANYL CITRATE 50 UG/ML
INJECTION, SOLUTION INTRAMUSCULAR; INTRAVENOUS PRN
Status: DISCONTINUED | OUTPATIENT
Start: 2024-11-08 | End: 2024-11-08

## 2024-11-08 RX ORDER — PROPOFOL 10 MG/ML
INJECTION, EMULSION INTRAVENOUS PRN
Status: DISCONTINUED | OUTPATIENT
Start: 2024-11-08 | End: 2024-11-08

## 2024-11-08 RX ORDER — SODIUM CHLORIDE 9 MG/ML
INJECTION, SOLUTION INTRAVENOUS CONTINUOUS PRN
Status: DISCONTINUED | OUTPATIENT
Start: 2024-11-08 | End: 2024-11-08

## 2024-11-08 RX ORDER — ONDANSETRON 2 MG/ML
4 INJECTION INTRAMUSCULAR; INTRAVENOUS EVERY 6 HOURS PRN
Status: DISCONTINUED | OUTPATIENT
Start: 2024-11-08 | End: 2024-11-08 | Stop reason: HOSPADM

## 2024-11-08 RX ORDER — LIDOCAINE 40 MG/G
CREAM TOPICAL
Status: DISCONTINUED | OUTPATIENT
Start: 2024-11-08 | End: 2024-11-08 | Stop reason: HOSPADM

## 2024-11-08 RX ORDER — HEPARIN SODIUM 10000 [USP'U]/100ML
INJECTION, SOLUTION INTRAVENOUS CONTINUOUS PRN
Status: DISCONTINUED | OUTPATIENT
Start: 2024-11-08 | End: 2024-11-08 | Stop reason: HOSPADM

## 2024-11-08 RX ORDER — ONDANSETRON 2 MG/ML
INJECTION INTRAMUSCULAR; INTRAVENOUS PRN
Status: DISCONTINUED | OUTPATIENT
Start: 2024-11-08 | End: 2024-11-08

## 2024-11-08 RX ORDER — BUPIVACAINE HYDROCHLORIDE 5 MG/ML
INJECTION, SOLUTION EPIDURAL; INTRACAUDAL
Status: DISCONTINUED | OUTPATIENT
Start: 2024-11-08 | End: 2024-11-08 | Stop reason: HOSPADM

## 2024-11-08 RX ORDER — ETOMIDATE 2 MG/ML
INJECTION INTRAVENOUS PRN
Status: DISCONTINUED | OUTPATIENT
Start: 2024-11-08 | End: 2024-11-08

## 2024-11-08 RX ORDER — IBUPROFEN 600 MG/1
600 TABLET, FILM COATED ORAL EVERY 6 HOURS PRN
Status: DISCONTINUED | OUTPATIENT
Start: 2024-11-08 | End: 2024-11-08 | Stop reason: HOSPADM

## 2024-11-08 RX ADMIN — SUGAMMADEX 200 MG: 100 INJECTION, SOLUTION INTRAVENOUS at 13:18

## 2024-11-08 RX ADMIN — CAFFEINE AND SODIUM BENZOATE 500 MG: 125 INJECTION, SOLUTION INTRAMUSCULAR; INTRAVENOUS at 12:41

## 2024-11-08 RX ADMIN — VASOPRESSIN 1 UNITS: 20 INJECTION, SOLUTION INTRAMUSCULAR; SUBCUTANEOUS at 12:27

## 2024-11-08 RX ADMIN — SODIUM CHLORIDE: 9 INJECTION, SOLUTION INTRAVENOUS at 12:45

## 2024-11-08 RX ADMIN — VASOPRESSIN 2 UNITS: 20 INJECTION, SOLUTION INTRAMUSCULAR; SUBCUTANEOUS at 12:25

## 2024-11-08 RX ADMIN — PHENYLEPHRINE HYDROCHLORIDE 200 MCG: 10 INJECTION INTRAVENOUS at 12:46

## 2024-11-08 RX ADMIN — ONDANSETRON 4 MG: 2 INJECTION INTRAMUSCULAR; INTRAVENOUS at 11:59

## 2024-11-08 RX ADMIN — APIXABAN 5 MG: 5 TABLET, FILM COATED ORAL at 14:31

## 2024-11-08 RX ADMIN — FENTANYL CITRATE 50 MCG: 50 INJECTION, SOLUTION INTRAMUSCULAR; INTRAVENOUS at 11:50

## 2024-11-08 RX ADMIN — ETOMIDATE 20 MG: 2 INJECTION, SOLUTION INTRAVENOUS at 11:51

## 2024-11-08 RX ADMIN — VASOPRESSIN 1 UNITS: 20 INJECTION, SOLUTION INTRAMUSCULAR; SUBCUTANEOUS at 12:12

## 2024-11-08 RX ADMIN — ALBUMIN HUMAN: 0.05 INJECTION, SOLUTION INTRAVENOUS at 12:18

## 2024-11-08 RX ADMIN — SODIUM CHLORIDE: 0.9 INJECTION, SOLUTION INTRAVENOUS at 12:56

## 2024-11-08 RX ADMIN — Medication 100 MG: at 11:50

## 2024-11-08 RX ADMIN — Medication 100 MG: at 11:53

## 2024-11-08 RX ADMIN — VASOPRESSIN 1 UNITS: 20 INJECTION, SOLUTION INTRAMUSCULAR; SUBCUTANEOUS at 12:57

## 2024-11-08 RX ADMIN — DEXAMETHASONE SODIUM PHOSPHATE 4 MG: 4 INJECTION, SOLUTION INTRA-ARTICULAR; INTRALESIONAL; INTRAMUSCULAR; INTRAVENOUS; SOFT TISSUE at 11:59

## 2024-11-08 RX ADMIN — VASOPRESSIN 1 UNITS: 20 INJECTION, SOLUTION INTRAMUSCULAR; SUBCUTANEOUS at 12:17

## 2024-11-08 RX ADMIN — PROPOFOL 40 MG: 10 INJECTION, EMULSION INTRAVENOUS at 12:04

## 2024-11-08 RX ADMIN — PHENYLEPHRINE HYDROCHLORIDE 0.3 MCG/KG/MIN: 10 INJECTION INTRAVENOUS at 12:06

## 2024-11-08 RX ADMIN — SODIUM CHLORIDE 100 ML/HR: 9 INJECTION, SOLUTION INTRAVENOUS at 10:43

## 2024-11-08 RX ADMIN — PHENYLEPHRINE HYDROCHLORIDE 100 MCG: 10 INJECTION INTRAVENOUS at 12:07

## 2024-11-08 RX ADMIN — PHENYLEPHRINE HYDROCHLORIDE 50 MCG: 10 INJECTION INTRAVENOUS at 12:35

## 2024-11-08 RX ADMIN — FENTANYL CITRATE 50 MCG: 50 INJECTION, SOLUTION INTRAMUSCULAR; INTRAVENOUS at 11:41

## 2024-11-08 RX ADMIN — ROCURONIUM BROMIDE 50 MG: 50 INJECTION, SOLUTION INTRAVENOUS at 12:29

## 2024-11-08 RX ADMIN — PHENYLEPHRINE HYDROCHLORIDE 200 MCG: 10 INJECTION INTRAVENOUS at 12:10

## 2024-11-08 ASSESSMENT — ACTIVITIES OF DAILY LIVING (ADL)
ADLS_ACUITY_SCORE: 0

## 2024-11-08 ASSESSMENT — LIFESTYLE VARIABLES: TOBACCO_USE: 1

## 2024-11-08 ASSESSMENT — ENCOUNTER SYMPTOMS: DYSRHYTHMIAS: 1

## 2024-11-08 NOTE — INTERVAL H&P NOTE
"I have reviewed the surgical (or preoperative) H&P that is linked to this encounter, and examined the patient. There are no significant changes    Clinical Conditions Present on Arrival:  Clinically Significant Risk Factors Present on Admission                  # Drug Induced Platelet Defect: home medication list includes an antiplatelet medication      # DMII: A1C = 6.7 % (Ref range: <5.7 %) within past 6 months  # Obesity: Estimated body mass index is 30.41 kg/m  as calculated from the following:    Height as of this encounter: 1.727 m (5' 8\").    Weight as of this encounter: 90.7 kg (200 lb).       "

## 2024-11-08 NOTE — ANESTHESIA POSTPROCEDURE EVALUATION
Patient: Barry Jay    Procedure: Procedure(s):  Ablation Supraventricular Tachycardia       Anesthesia Type:  MAC    Note:  Disposition: Outpatient   Postop Pain Control: Uneventful            Sign Out: Well controlled pain   PONV: No   Neuro/Psych: Uneventful            Sign Out: Acceptable/Baseline neuro status   Airway/Respiratory: Uneventful            Sign Out: Acceptable/Baseline resp. status   CV/Hemodynamics: Uneventful            Sign Out: Acceptable CV status   Other NRE:    DID A NON-ROUTINE EVENT OCCUR?            Last vitals:  Vitals Value Taken Time   /66 11/08/24 1415   Temp     Pulse 101 11/08/24 1426   Resp 23 11/08/24 1426   SpO2 93 % 11/08/24 1426   Vitals shown include unfiled device data.    Electronically Signed By: Farhad Saxena MD  November 8, 2024  2:28 PM

## 2024-11-08 NOTE — CARE PLAN
Pt admitted to [facility] for SVT ablation, denies any pain, BP 85/56 Dr. Saxena notified and no changes made at this time, prepped and ready for procedure.

## 2024-11-08 NOTE — ANESTHESIA CARE TRANSFER NOTE
Patient: Barry Jay    Procedure: Procedure(s):  Ablation Supraventricular Tachycardia       Diagnosis: supraventricular tachycardia  Diagnosis Additional Information: No value filed.    Anesthesia Type:   MAC     Note:    Oropharynx: oropharynx clear of all foreign objects and spontaneously breathing  Level of Consciousness: awake  Oxygen Supplementation: face mask  Level of Supplemental Oxygen (L/min / FiO2): 6  Independent Airway: airway patency satisfactory and stable  Dentition: dentition unchanged  Vital Signs Stable: post-procedure vital signs reviewed and stable  Report to RN Given: handoff report given  Patient transferred to: [facility]          Vitals:  Vitals Value Taken Time   /51 11/08/24 1334   Temp 98.1    Pulse 98 11/08/24 1333   Resp 23 11/08/24 1333   SpO2 93 % 11/08/24 1333   Vitals shown include unfiled device data.    Electronically Signed By: ATIYA Henning CRNA  November 8, 2024  1:35 PM

## 2024-11-08 NOTE — PROGRESS NOTES
Pt recovered well after the procedure.  R Groin site oozing while removing stopcock, pressure was applied to site for 10 minutes, but site still oozing.  MD was notified, and he came and assessed site.  20ml of Lido+epi injected by MD, and bleeding stopped.  Site monitored, then Dstat and pressure dressing applied.  Pt had intact CMS and denied pain throughout his stay.  Site remains WDL at discharge.  Friend to pick pt up and stay with him overnight.

## 2024-11-08 NOTE — ANESTHESIA PREPROCEDURE EVALUATION
Anesthesia Pre-Procedure Evaluation    Patient: Barry Jay   MRN: [id number] : 1955        Procedure : Procedure(s):  Ablation Supraventricular Tachycardia          Past Medical History:   Diagnosis Date    Anxiety     ED (erectile dysfunction)     HTN (hypertension)     Hyperlipidemia     Nonobstructive atherosclerosis of coronary artery     SVT (supraventricular tachycardia) (H)       Past Surgical History:   Procedure Laterality Date    APPENDECTOMY      CHOLECYSTECTOMY      CV CORONARY ANGIOGRAM N/A 2023    Procedure: Coronary Angiogram;  Surgeon: Teena Laguna MD;  Location: [facility] CATH LAB CV    CV LEFT HEART CATH N/A 2023    Procedure: Left Heart Catheterization;  Surgeon: Teena Laguna MD;  Location: [facility] CATH LAB CV    EP ABLATION SVT N/A 3/14/2023    Procedure: Ablation Supraventricular Tachycardia;  Surgeon: Lorna Hobson MD;  Location: [facility] CATH LAB CV    LAMINECTOMY LUMBAR ONE LEVEL Right 2022    Procedure: Right Lumbar 4 - lumbar 5 hemilaminectomy, medial facetectomies, foraminotomies;  Surgeon: Shannan Mari MD;  Location: [facility] OR    ORTHOPEDIC SURGERY        Allergies   Allergen Reactions    Iodine Hives      Social History     Tobacco Use    Smoking status: Every Day     Current packs/day: 0.50     Average packs/day: 1 pack/day for 30.9 years (30.4 ttl pk-yrs)     Types: Cigarettes     Start date:     Smokeless tobacco: Never    Tobacco comments:     Pt quitting, down to 7 cigs per day   Substance Use Topics    Alcohol use: Never      Wt Readings from Last 1 Encounters:   24 90.7 kg (200 lb)        Anesthesia Evaluation            ROS/MED HX  ENT/Pulmonary:  - neg pulmonary ROS   (+)                tobacco use, Current use, 0.5 packs/day,                      Neurologic:  - neg neurologic ROS     Cardiovascular:     (+)  hypertension- Peripheral Vascular Disease (left iliac artery aneurysm measuring 1.8cm on CTA with  "associated chronic dissection)-  CAD (nonobstructive) -  - -                        dysrhythmias (SVT), Other,             METS/Exercise Tolerance:     Hematologic:       Musculoskeletal:       GI/Hepatic:       Renal/Genitourinary:       Endo:     (+)  type II DM,                    Psychiatric/Substance Use:     (+) psychiatric history anxiety       Infectious Disease:       Malignancy:       Other:            Physical Exam    Airway        Mallampati: III   TM distance: > 3 FB   Neck ROM: full   Mouth opening: > 3 cm    Respiratory Devices and Support         Dental       (+) Modest Abnormalities - crowns, retainers, 1 or 2 missing teeth      Cardiovascular          Rhythm and rate: regular and normal     Pulmonary           breath sounds clear to auscultation           OUTSIDE LABS:  CBC:   Lab Results   Component Value Date    WBC 10.7 10/15/2024    WBC 9.9 09/28/2024    HGB 12.3 (L) 10/15/2024    HGB 12.7 (L) 09/28/2024    HCT 39.8 (L) 10/15/2024    HCT 41.2 09/28/2024     10/15/2024     09/28/2024     BMP:   Lab Results   Component Value Date     10/17/2024     10/15/2024    POTASSIUM 4.5 10/17/2024    POTASSIUM 4.1 10/15/2024    CHLORIDE 104 10/17/2024    CHLORIDE 102 10/15/2024    CO2 25 10/17/2024    CO2 24 10/15/2024    BUN 19.1 10/17/2024    BUN 19.3 10/15/2024    CR 1.52 (H) 10/17/2024    CR 1.57 (H) 10/15/2024     (H) 10/17/2024     (H) 10/15/2024     COAGS:   Lab Results   Component Value Date    PTT 33 06/13/2022    INR 1.07 06/13/2022     POC: No results found for: \"BGM\", \"HCG\", \"HCGS\"  HEPATIC:   Lab Results   Component Value Date    ALBUMIN 4.1 10/17/2024    PROTTOTAL 7.3 10/17/2024    ALT 32 10/17/2024    AST 23 10/17/2024    ALKPHOS 109 10/17/2024    BILITOTAL 0.5 10/17/2024     OTHER:   Lab Results   Component Value Date    LACT 1.3 10/15/2024    A1C 6.7 (H) 09/28/2024    MIKE 10.0 10/17/2024    MAG 1.9 10/15/2024    LIPASE 28 04/26/2018    TSH 1.39 " "10/15/2024    CRP 0.2 04/26/2018       Anesthesia Plan    ASA Status:  3    NPO Status:  NPO Appropriate    Anesthesia Type: MAC.     - Reason for MAC: straight local not clinically adequate              Consents    Anesthesia Plan(s) and associated risks, benefits, and realistic alternatives discussed. Questions answered and patient/representative(s) expressed understanding.     - Discussed:     - Discussed with:  Patient      - Extended Intubation/Ventilatory Support Discussed: No.      - Patient is DNR/DNI Status: No     Use of blood products discussed: No .     Postoperative Care       PONV prophylaxis: Ondansetron (or other 5HT-3), Dexamethasone or Solumedrol     Comments:    Other Comments: Discussed risks and benefits of MAC including remembering portions of the case and possible need to convert to general anesthesia if intolerant of procedure or respiratory compromise.              Farhad Saxena MD    I have reviewed the pertinent notes and labs in the chart from the past 30 days and (re)examined the patient.  Any updates or changes from those notes are reflected in this note.             # Drug Induced Platelet Defect: home medication list includes an antiplatelet medication   # Hypertension: Noted on problem list          # DMII: A1C = 6.7 % (Ref range: <5.7 %) within past 6 months    # Obesity: Estimated body mass index is 30.41 kg/m  as calculated from the following:    Height as of this encounter: 1.727 m (5' 8\").    Weight as of this encounter: 90.7 kg (200 lb).             "

## 2024-11-08 NOTE — DISCHARGE INSTRUCTIONS
[facility]  Cardiac Electrophysiology  [address]  [place] [address]   Office: [phone]  Fax: [phone]       Cardiac Electrophysiology - Post Ablation Discharge Instructions      PROCEDURE   Electrophysiology study and ablation for atrial flutter and atrioventricular nila reentrant tachycardia         MEDICATION INSTRUCTIONS   Take apixaban (Eliquis) 5mg twice daily - it is important that this is taken.    You may stop aspirin while on apixaban.  Continue your other prior to procedure medications.         DISCHARGE INSTRUCTIONS   General instructions  Have an adult stay with you until tomorrow.  You may resume your normal diet.    You may shower tomorrow.  Do NOT take a bath, or use a hot tub or pool for at least 1 week. Do not scrub the site. Do not use lotion or powder near the puncture site.    Groin care instructions  For the first 24 hrs - check the puncture site every 1-2 hours while awake.  You may keep a bandaid over the puncture sites for 1 or 2 days post-procedure and thereafter may keep these sites uncovered.  Change the bandaid daily.  If there is minor oozing, apply another bandaid and remove it after 12 hours.  For 2 days, when you cough, sneeze, laugh or move your bowels, hold your hand over the puncture site and press firmly.  Mild bruising at the access sites is normal.  If you notice increased swelling, external bleeding, or have other concerns regarding your access sites please consider emergency department evaluation and call your electrophysiology team's office    Activity recommendations  Do not drive for 3 days.  Avoid stooping or squatting more than 90 degrees at the hips for 7 days  Avoid repetitive motions such as loading , vacuuming, raking or shoveling  Avoid heavy lifting (greater than 25lbs) for 1 week    Post ablation instructions  You may have some irregular heartbeats following your ablation.  These may feel very strong  initially.  These episodes should occur less frequently over time.  Pleuritic chest discomfort (chest pain worse with taking deep breaths, worse with laying flat on your back) can occur after ablation, usually coming about within the first 24-48hrs post ablation.  If this occurs and is severe enough to be troublesome to you, please call us and consider starting a course of ibuprofen 400mg three times daily for 5 to 7 days    Things to watch for  As with any type of procedure, please be more attentive to unusual symptoms post ablation (eg. fever, neurologic changes, pain with swallowing, loss of consciousness, etc) - we recommend ER evaluation for any such symptoms in the first few weeks post procedure.    Consider ER evaluation for the following:  Severe chest pain not relieved by Tylenol or Ibuprofen  You have chills or a fever greater than 101 F (38 C)  Neurologic changes (eg. leg, arm or face weakness or numbness, difficulties with speech or word finding, problems walking or with your balance, vision changes)  Severe difficulty swallowing and/or you are coughing up blood  Shortness of breath  Increased groin pain or a large or growing hard lump around the site  Groin is red, swollen, hot or tender  Blood or fluid is draining from the groin site  Any numbness, coolness or changes in color in your extremities  Groin pain not relieved by Tylenol or Advil  Sustained rapid heart rates with or without additional concerning symptoms.    Our office will have a follow-up visit scheduled for you in approximately 6 weeks.  Please do not hesitate to call us before that time should issues arise.        [facility]    [phone]    If you are calling after hours, please listen to the entire voicemail,   a live  will answer at the end of the message.    [phone] to reach the EP nurses working with Dr Hidalgo

## 2024-11-08 NOTE — ANESTHESIA PROCEDURE NOTES
Airway         Procedure Start/Stop Times: 11/8/2024 11:55 AM  Staff -        Performed By: CRNAIndications and Patient Condition       Indications for airway management: taylor-procedural       Induction type:RSI       Mask difficulty assessment: 0 - not attempted    Final Airway Details       Final airway type: endotracheal airway       Successful airway: ETT - single  Endotracheal Airway Details        ETT size (mm): 7.5       Successful intubation technique: video laryngoscopy       VL Blade Size: MAC 4       Grade View of Cords: 1       Position: Right       Measured from: gums/teeth       Secured at (cm): 19       Bite block used: None    Post intubation assessment        Placement verified by: capnometry, equal breath sounds and chest rise        Number of attempts at approach: 1       Number of other approaches attempted: 0       Secured with: tape       Ease of procedure: easy       Dentition: Intact and Unchanged    Medication(s) Administered   Medication Administration Time: 11/8/2024 11:55 AM

## 2024-11-09 LAB
ATRIAL RATE - MUSE: 90 BPM
DIASTOLIC BLOOD PRESSURE - MUSE: NORMAL MMHG
INTERPRETATION ECG - MUSE: NORMAL
P AXIS - MUSE: 71 DEGREES
PR INTERVAL - MUSE: 154 MS
QRS DURATION - MUSE: 118 MS
QT - MUSE: 408 MS
QTC - MUSE: 499 MS
R AXIS - MUSE: -33 DEGREES
SYSTOLIC BLOOD PRESSURE - MUSE: NORMAL MMHG
T AXIS - MUSE: 6 DEGREES
VENTRICULAR RATE- MUSE: 90 BPM

## 2024-11-10 LAB
ATRIAL RATE - MUSE: 113 BPM
DIASTOLIC BLOOD PRESSURE - MUSE: NORMAL MMHG
INTERPRETATION ECG - MUSE: NORMAL
P AXIS - MUSE: 16 DEGREES
PR INTERVAL - MUSE: 184 MS
QRS DURATION - MUSE: 120 MS
QT - MUSE: 338 MS
QTC - MUSE: 463 MS
R AXIS - MUSE: -42 DEGREES
SYSTOLIC BLOOD PRESSURE - MUSE: NORMAL MMHG
T AXIS - MUSE: 32 DEGREES
VENTRICULAR RATE- MUSE: 113 BPM

## 2024-11-15 DIAGNOSIS — I48.3 TYPICAL ATRIAL FLUTTER (H): ICD-10-CM

## 2024-11-18 ENCOUNTER — APPOINTMENT (OUTPATIENT)
Dept: CT IMAGING | Facility: HOSPITAL | Age: 69
End: 2024-11-18
Attending: EMERGENCY MEDICINE
Payer: COMMERCIAL

## 2024-11-18 ENCOUNTER — HOSPITAL ENCOUNTER (EMERGENCY)
Facility: HOSPITAL | Age: 69
Discharge: HOME | End: 2024-11-18
Attending: EMERGENCY MEDICINE | Admitting: EMERGENCY MEDICINE
Payer: COMMERCIAL

## 2024-11-18 VITALS
BODY MASS INDEX: 30.31 KG/M2 | HEIGHT: 68 IN | HEART RATE: 74 BPM | TEMPERATURE: 97.3 F | WEIGHT: 200 LBS | OXYGEN SATURATION: 99 % | DIASTOLIC BLOOD PRESSURE: 64 MMHG | RESPIRATION RATE: 27 BRPM | SYSTOLIC BLOOD PRESSURE: 127 MMHG

## 2024-11-18 DIAGNOSIS — K92.2 LOWER GI BLEED: ICD-10-CM

## 2024-11-18 LAB
ALBUMIN SERPL BCG-MCNC: 4.1 G/DL (ref 3.5–5.2)
ALP SERPL-CCNC: 129 U/L (ref 40–150)
ALT SERPL W P-5'-P-CCNC: 27 U/L (ref 0–70)
ANION GAP SERPL CALCULATED.3IONS-SCNC: 13 MMOL/L (ref 7–15)
AST SERPL W P-5'-P-CCNC: 19 U/L (ref 0–45)
BASOPHILS # BLD AUTO: 0.1 10E3/UL (ref 0–0.2)
BASOPHILS NFR BLD AUTO: 1 %
BILIRUB SERPL-MCNC: 0.4 MG/DL
BUN SERPL-MCNC: 21.9 MG/DL (ref 8–23)
CALCIUM SERPL-MCNC: 9.1 MG/DL (ref 8.8–10.4)
CHLORIDE SERPL-SCNC: 104 MMOL/L (ref 98–107)
CREAT SERPL-MCNC: 1.4 MG/DL (ref 0.67–1.17)
EGFRCR SERPLBLD CKD-EPI 2021: 55 ML/MIN/1.73M2
EOSINOPHIL # BLD AUTO: 0.2 10E3/UL (ref 0–0.7)
EOSINOPHIL NFR BLD AUTO: 1 %
ERYTHROCYTE [DISTWIDTH] IN BLOOD BY AUTOMATED COUNT: 17.1 % (ref 10–15)
GLUCOSE SERPL-MCNC: 116 MG/DL (ref 70–99)
HCO3 SERPL-SCNC: 23 MMOL/L (ref 22–29)
HCT VFR BLD AUTO: 27.9 % (ref 40–53)
HGB BLD-MCNC: 8.2 G/DL (ref 13.3–17.7)
HOLD SPECIMEN: NORMAL
HOLD SPECIMEN: NORMAL
IMM GRANULOCYTES # BLD: 0.1 10E3/UL
IMM GRANULOCYTES NFR BLD: 0 %
INR PPP: 1.44 (ref 0.85–1.15)
LYMPHOCYTES # BLD AUTO: 3.3 10E3/UL (ref 0.8–5.3)
LYMPHOCYTES NFR BLD AUTO: 23 %
MCH RBC QN AUTO: 22.8 PG (ref 26.5–33)
MCHC RBC AUTO-ENTMCNC: 29.4 G/DL (ref 31.5–36.5)
MCV RBC AUTO: 78 FL (ref 78–100)
MONOCYTES # BLD AUTO: 1.3 10E3/UL (ref 0–1.3)
MONOCYTES NFR BLD AUTO: 9 %
NEUTROPHILS # BLD AUTO: 9.7 10E3/UL (ref 1.6–8.3)
NEUTROPHILS NFR BLD AUTO: 66 %
NRBC # BLD AUTO: 0 10E3/UL
NRBC BLD AUTO-RTO: 0 /100
PLATELET # BLD AUTO: 328 10E3/UL (ref 150–450)
POTASSIUM SERPL-SCNC: 4.1 MMOL/L (ref 3.4–5.3)
PROT SERPL-MCNC: 7.3 G/DL (ref 6.4–8.3)
RBC # BLD AUTO: 3.6 10E6/UL (ref 4.4–5.9)
SODIUM SERPL-SCNC: 140 MMOL/L (ref 135–145)
WBC # BLD AUTO: 14.6 10E3/UL (ref 4–11)

## 2024-11-18 PROCEDURE — 74174 CTA ABD&PLVS W/CONTRAST: CPT

## 2024-11-18 PROCEDURE — 85014 HEMATOCRIT: CPT | Performed by: EMERGENCY MEDICINE

## 2024-11-18 PROCEDURE — 84132 ASSAY OF SERUM POTASSIUM: CPT | Performed by: EMERGENCY MEDICINE

## 2024-11-18 PROCEDURE — 85610 PROTHROMBIN TIME: CPT | Performed by: EMERGENCY MEDICINE

## 2024-11-18 PROCEDURE — 82040 ASSAY OF SERUM ALBUMIN: CPT | Performed by: EMERGENCY MEDICINE

## 2024-11-18 PROCEDURE — 36415 COLL VENOUS BLD VENIPUNCTURE: CPT | Performed by: EMERGENCY MEDICINE

## 2024-11-18 PROCEDURE — 250N000011 HC RX IP 250 OP 636: Performed by: EMERGENCY MEDICINE

## 2024-11-18 PROCEDURE — 99285 EMERGENCY DEPT VISIT HI MDM: CPT | Mod: 25

## 2024-11-18 PROCEDURE — 82247 BILIRUBIN TOTAL: CPT | Performed by: EMERGENCY MEDICINE

## 2024-11-18 PROCEDURE — 85004 AUTOMATED DIFF WBC COUNT: CPT | Performed by: EMERGENCY MEDICINE

## 2024-11-18 PROCEDURE — 93005 ELECTROCARDIOGRAM TRACING: CPT | Performed by: EMERGENCY MEDICINE

## 2024-11-18 RX ORDER — METOPROLOL TARTRATE 50 MG
75 TABLET ORAL EVERY EVENING
COMMUNITY

## 2024-11-18 RX ORDER — IOPAMIDOL 755 MG/ML
80 INJECTION, SOLUTION INTRAVASCULAR ONCE
Status: COMPLETED | OUTPATIENT
Start: 2024-11-18 | End: 2024-11-18

## 2024-11-18 RX ADMIN — IOPAMIDOL 80 ML: 755 INJECTION, SOLUTION INTRAVENOUS at 18:26

## 2024-11-18 ASSESSMENT — ACTIVITIES OF DAILY LIVING (ADL)
ADLS_ACUITY_SCORE: 0

## 2024-11-18 ASSESSMENT — COLUMBIA-SUICIDE SEVERITY RATING SCALE - C-SSRS
6. HAVE YOU EVER DONE ANYTHING, STARTED TO DO ANYTHING, OR PREPARED TO DO ANYTHING TO END YOUR LIFE?: NO
1. IN THE PAST MONTH, HAVE YOU WISHED YOU WERE DEAD OR WISHED YOU COULD GO TO SLEEP AND NOT WAKE UP?: NO
2. HAVE YOU ACTUALLY HAD ANY THOUGHTS OF KILLING YOURSELF IN THE PAST MONTH?: NO

## 2024-11-18 NOTE — ED NOTES
Patient did go to the bathroom to void--he says that he was not dizzy when up at this time--no bleeding or stools at this time--Patient did have a successful ablation for afib on 11/8--was started on a blood thinner afterwards--since then he reports having black stools and also some right red blood with his stool.  Has been having episodes of weakness and dizziness when up also--lives alone at home--plan to admit--discussed with patient--all questions and concerns addressed.

## 2024-11-18 NOTE — MEDICATION SCRIBE - ADMISSION MEDICATION HISTORY
Medication Scribe Admission Medication History    Admission medication history is complete. The information provided in this note is only as accurate as the sources available at the time of the update.    Information Source(s): Patient via in-person    Pertinent Information: patient reports self management of medications.     Patient reports no longer taking: Aspirin, Multivitamin     Changes made to PTA medication list:  Added: None  Deleted: Aspirin, Multivitamin   Changed: Metoprolol from one line to two line entry    Allergies reviewed with patient and updates made in EHR: yes    Medication History Completed By: Jaya Green 11/18/2024 5:51 PM    PTA Med List   Medication Sig Last Dose/Taking    atorvastatin (LIPITOR) 20 MG tablet Take 1 tablet (20 mg) by mouth at bedtime. Pt needs appt for further refills 11/17/2024 Evening    irbesartan-hydrochlorothiazide (AVALIDE) 300-12.5 mg per tablet Take 1 tablet by mouth every morning. 11/18/2024 Morning    metoprolol tartrate (LOPRESSOR) 50 MG tablet Take 75 mg by mouth every evening. In addition, patient takes 50 mg in the morning. 11/17/2024 Evening    metoprolol tartrate (LOPRESSOR) 50 MG tablet Take 50 mg by mouth every morning. In addition, patient takes 75 mg in the evening. 11/18/2024 Morning    omeprazole (PRILOSEC) 20 MG capsule Take 20 mg by mouth every morning. 11/18/2024 Morning    PARoxetine (PAXIL) 20 MG tablet Take 20 mg by mouth every morning 11/18/2024 Morning    rivaroxaban ANTICOAGULANT (XARELTO) 20 MG TABS tablet Take 1 tablet (20 mg) by mouth daily (with dinner). 11/17/2024 at  6:00 PM    triamcinolone (KENALOG) 0.1 % external ointment Apply topically 2 times daily as needed. Taking As Needed    zolpidem (AMBIEN) 10 mg tablet Take 10 mg by mouth nightly as needed for sleep Taking As Needed

## 2024-11-18 NOTE — ED TRIAGE NOTES
"Patient here with reports of blood in stool for one week. Had an ablation on 11/8 and was started on Xarelto. Was seen last Thursday and found HGB was low. Has been experiencing dizziness for the past week. Also complains of left shoulder and neck pain that he describes as \"stiffness\".     Triage Assessment (Adult)       Row Name 11/18/24 1539          Triage Assessment    Airway WDL WDL        Respiratory WDL    Respiratory WDL WDL        Skin Circulation/Temperature WDL    Skin Circulation/Temperature WDL WDL        Cardiac WDL    Cardiac WDL WDL        Peripheral/Neurovascular WDL    Peripheral Neurovascular WDL WDL        Cognitive/Neuro/Behavioral WDL    Cognitive/Neuro/Behavioral WDL WDL                     "

## 2024-11-18 NOTE — ED PROVIDER NOTES
EMERGENCY DEPARTMENT ENCOUnter      NAME: Barry Jay  AGE: 69 year old male  YOB: 1955  MRN: [id number]  EVALUATION DATE & TIME: 11/18/2024  3:46 PM    PCP: Osmel Iniguez    ED PROVIDER: Carlos Walsh DO      Chief Complaint   Patient presents with    Rectal Bleeding    Dizziness         FINAL IMPRESSION:  1. Lower GI bleed          ED COURSE & MEDICAL DECISION MAKING:    3:54 PM Introduced myself to the patient, obtained history of present illness, and performed initial physical exam at this time.        The patient presented to the emergency department today complaining of fatigue and lightheadedness.  He states that he has noted some blood in his stool over the last few days.  He was recently started on a blood thinner after undergoing an SVT ablation last week.  He denies any pain at this time.  He states that he had an outpatient hemoglobin drawn a few days ago and the result was around 8.  Laboratory testing today shows a hemoglobin of 8.2.  The remainder of his laboratory testing was unremarkable.  A CT of the abdomen and pelvis with GI bleed protocol was obtained showing no acute process.  I discussed his case with cardiology regarding holding his blood thinner medication.  They confirmed that he can discontinue this medication for the next couple days but they recommend having him contact their clinic tomorrow for further advice regarding this.  Given his symptoms, I recommended that he stay in the hospital overnight for close monitoring of his hemoglobin.  The patient refused admission but stated that he would be able to follow-up closely on an outpatient basis.  I encouraged him to return right away if he were to develop any worsening symptoms or other concerns.        Medical Decision Making  Obtained supplemental history:Supplemental history obtained?: No  Reviewed external records: External records reviewed?: Documented in chart and Inpatient Record: 11/8/24 [facility]  Care impacted by chronic illness:Diabetes, Heart Disease, Hypertension, Smoking / Nicotine Use, and Other: osteoarthritis, dyslipidemia, CAD  Did you consider but not order tests?: Work up considered but not performed and documented in chart, if applicable  Did you interpret images independently?: Independent interpretation of ECG and images noted in documentation, when applicable.  Consultation discussion with other provider:Did you involve another provider (consultant, , pharmacy, etc.)?: I discussed the care with another health care provider, see documentation for details.  Discharge. No recommendations on prescription strength medication(s). I recommended admission, but the patient declined.    MIPS: Not Applicable      At the conclusion of the encounter I discussed the results of all of the tests and the disposition. The questions were answered. The patient or family acknowledged understanding and was agreeable with the care plan.         MEDICATIONS GIVEN IN THE EMERGENCY:  Medications   iopamidol (ISOVUE-370) solution 80 mL (80 mLs Intravenous $Given 11/18/24 8376)       =================================================================    HPI        Barry Jay is a 69 year old male with a pertinent history of osteoarthritis, HTN, dyslipidemia, DMT2, CAD, who presents to this ED for evaluation of rectal bleeding. Patient had an ablation on 11/8/24 and was put on blood thinners. Since, he reports blood in his stool. He saw his primary four days ago who reported his hemoglobin had dropped to 8. He endorses a few dizzy episodes yesterday and today. He also reports a recent diabetes diagnosis and is not on medication for it yet. He denies chest pain.        PAST MEDICAL HISTORY:  Past Medical History:   Diagnosis Date    Anxiety     ED (erectile dysfunction)     HTN (hypertension)     Hyperlipidemia     Nonobstructive atherosclerosis of coronary artery     SVT (supraventricular tachycardia) (H)         PAST SURGICAL HISTORY:  Past Surgical History:   Procedure Laterality Date    APPENDECTOMY      CHOLECYSTECTOMY      CV CORONARY ANGIOGRAM N/A 4/12/2023    Procedure: Coronary Angiogram;  Surgeon: Teena Laguna MD;  Location: [facility] CATH LAB CV    CV LEFT HEART CATH N/A 4/12/2023    Procedure: Left Heart Catheterization;  Surgeon: Teena Laguna MD;  Location: [facility] CATH LAB CV    EP ABLATION SVT N/A 3/14/2023    Procedure: Ablation Supraventricular Tachycardia;  Surgeon: Lorna Hobson MD;  Location: [facility] CATH LAB CV    EP ABLATION SVT N/A 11/8/2024    Procedure: Ablation Supraventricular Tachycardia;  Surgeon: Ricky Hidalgo MD;  Location: [facility] LAB CV    LAMINECTOMY LUMBAR ONE LEVEL Right 6/27/2022    Procedure: Right Lumbar 4 - lumbar 5 hemilaminectomy, medial facetectomies, foraminotomies;  Surgeon: Shannan Mari MD;  Location: [facility] OR    ORTHOPEDIC SURGERY             CURRENT MEDICATIONS:    atorvastatin (LIPITOR) 20 MG tablet  irbesartan-hydrochlorothiazide (AVALIDE) 300-12.5 mg per tablet  metoprolol tartrate (LOPRESSOR) 50 MG tablet  metoprolol tartrate (LOPRESSOR) 50 MG tablet  omeprazole (PRILOSEC) 20 MG capsule  PARoxetine (PAXIL) 20 MG tablet  rivaroxaban ANTICOAGULANT (XARELTO) 20 MG TABS tablet  triamcinolone (KENALOG) 0.1 % external ointment  zolpidem (AMBIEN) 10 mg tablet        ALLERGIES:  Allergies   Allergen Reactions    Iodine Hives       FAMILY HISTORY:  No family history on file.    SOCIAL HISTORY:   Social History     Socioeconomic History    Marital status: Single   Tobacco Use    Smoking status: Every Day     Current packs/day: 0.50     Average packs/day: 1 pack/day for 30.9 years (30.4 ttl pk-yrs)     Types: Cigarettes     Start date: 1994    Smokeless tobacco: Never    Tobacco comments:     Pt quitting, down to 7 cigs per day   Vaping Use    Vaping status: Never Used   Substance and Sexual Activity    Alcohol use: Never    Drug  "use: Never     Social Drivers of Health     Interpersonal Safety: Low Risk  (11/8/2024)    Interpersonal Safety     Do you feel physically and emotionally safe where you currently live?: Yes     Within the past 12 months, have you been hit, slapped, kicked or otherwise physically hurt by someone?: No     Within the past 12 months, have you been humiliated or emotionally abused in other ways by your partner or ex-partner?: No       VITALS:  Patient Vitals for the past 24 hrs:   BP Temp Temp src Pulse Resp SpO2 Height Weight   11/18/24 2030 127/64 -- -- 74 27 99 % -- --   11/1955 126/61 -- -- 68 16 99 % -- --   11/18/24 1927 131/64 -- -- 74 28 99 % -- --   11/18/24 1915 -- -- -- 70 13 98 % -- --   11/18/24 1845 -- -- -- 72 17 98 % -- --   11/18/24 1830 116/57 -- -- 75 15 99 % -- --   11/18/24 1825 127/60 -- -- 83 (!) 33 98 % -- --   11/18/24 1800 109/57 -- -- 72 19 96 % -- --   11/18/24 1745 104/53 -- -- 73 23 98 % -- --   11/18/24 1730 119/60 -- -- 72 20 97 % -- --   11/18/24 1715 110/56 -- -- 74 22 97 % -- --   11/18/24 1700 96/55 -- -- 73 15 93 % -- --   11/18/24 1645 111/53 -- -- 73 21 95 % -- --   11/18/24 1630 107/51 -- -- 72 19 93 % -- --   11/18/24 1615 115/55 -- -- 73 13 97 % -- --   11/18/24 1600 123/57 -- -- 76 19 100 % -- --   11/18/24 1537 127/58 97.3  F (36.3  C) Temporal 82 18 99 % 1.727 m (5' 8\") 90.7 kg (200 lb)       PHYSICAL EXAM    Constitutional:  Well developed, Well nourished,  HENT:  Normocephalic, Atraumatic, Oropharynx moist, Nose normal.   Eyes:  EOMI, Conjunctiva normal, No discharge.   Respiratory:  Normal breath sounds, No respiratory distress, No wheezing, No chest tenderness.   Cardiovascular:  Normal heart rate, Normal rhythm, No murmurs  GI:  Soft, No tenderness, No guarding, No CVA tenderness.   Musculoskeletal:  No tenderness to palpation or major deformities noted.   Extremities: No lower extremity edema.  Neurologic:  Alert & oriented x 3, No focal deficits noted. "   Psychiatric:  Affect normal, Judgment normal, Mood normal.        LAB:  All pertinent labs reviewed and interpreted.  Results for orders placed or performed during the hospital encounter of 11/18/24              Result Value Ref Range    INR 1.44 (H) 0.85 - 1.15   Comprehensive metabolic panel   Result Value Ref Range    Sodium 140 135 - 145 mmol/L    Potassium 4.1 3.4 - 5.3 mmol/L    Carbon Dioxide (CO2) 23 22 - 29 mmol/L    Anion Gap 13 7 - 15 mmol/L    Urea Nitrogen 21.9 8.0 - 23.0 mg/dL    Creatinine 1.40 (H) 0.67 - 1.17 mg/dL    GFR Estimate 55 (L) >60 mL/min/1.73m2    Calcium 9.1 8.8 - 10.4 mg/dL    Chloride 104 98 - 107 mmol/L    Glucose 116 (H) 70 - 99 mg/dL    Alkaline Phosphatase 129 40 - 150 U/L    AST 19 0 - 45 U/L    ALT 27 0 - 70 U/L    Protein Total 7.3 6.4 - 8.3 g/dL    Albumin 4.1 3.5 - 5.2 g/dL    Bilirubin Total 0.4 <=1.2 mg/dL   CBC with platelets and differential   Result Value Ref Range    WBC Count 14.6 (H) 4.0 - 11.0 10e3/uL    RBC Count 3.60 (L) 4.40 - 5.90 10e6/uL    Hemoglobin 8.2 (L) 13.3 - 17.7 g/dL    Hematocrit 27.9 (L) 40.0 - 53.0 %    MCV 78 78 - 100 fL    MCH 22.8 (L) 26.5 - 33.0 pg    MCHC 29.4 (L) 31.5 - 36.5 g/dL    RDW 17.1 (H) 10.0 - 15.0 %    Platelet Count 328 150 - 450 10e3/uL    % Neutrophils 66 %    % Lymphocytes 23 %    % Monocytes 9 %    % Eosinophils 1 %    % Basophils 1 %    % Immature Granulocytes 0 %    NRBCs per 100 WBC 0 <1 /100    Absolute Neutrophils 9.7 (H) 1.6 - 8.3 10e3/uL    Absolute Lymphocytes 3.3 0.8 - 5.3 10e3/uL    Absolute Monocytes 1.3 0.0 - 1.3 10e3/uL    Absolute Eosinophils 0.2 0.0 - 0.7 10e3/uL    Absolute Basophils 0.1 0.0 - 0.2 10e3/uL    Absolute Immature Granulocytes 0.1 <=0.4 10e3/uL    Absolute NRBCs 0.0 10e3/uL   Extra Red Top Tube   Result Value Ref Range    Hold Specimen JIC    Extra Purple Top Tube   Result Value Ref Range    Hold Specimen JIC        RADIOLOGY:  I have independently reviewed and interpreted the above imaging, pending  the final radiology read.  CTA Abdomen Pelvis with Contrast   Final Result   IMPRESSION:    1.  No acute bleeding or other acute findings in the abdomen and pelvis.   2.  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    3.  Marked prostate enlargement.   4.  Colonic diverticulosis with no diverticulitis.   5.  Appendectomy.   6.  Cholecystectomy.            I, Yeimy Maya, am serving as a scribe to document services personally performed by Dr. Walsh based on my observation and the provider's statements to me. I, Carlos Walsh, DO attest that Yeimy Maya is acting in a scribe capacity, has observed my performance of the services and has documented them in accordance with my direction.    aCrlos Walsh DO  Emergency Medicine  [facility] EMERGENCY DEPARTMENT  [facility] [facility] [address]  [phone]  Dept: [phone]     Carlos Walsh DO  11/18/24 231

## 2024-11-19 NOTE — DISCHARGE INSTRUCTIONS
You were found to have a lower GI bleed.  Discontinue your blood thinner medication and contact your cardiology clinic tomorrow regarding how long to be off of this medicine.  Follow-up with your primary care doctor soon as possible and return to the ER for any worsening symptoms or other concerns.

## 2024-11-21 ENCOUNTER — HOSPITAL ENCOUNTER (EMERGENCY)
Facility: HOSPITAL | Age: 69
Discharge: HOME | End: 2024-11-21
Attending: EMERGENCY MEDICINE
Payer: COMMERCIAL

## 2024-11-21 VITALS
DIASTOLIC BLOOD PRESSURE: 58 MMHG | HEIGHT: 68 IN | RESPIRATION RATE: 20 BRPM | WEIGHT: 200 LBS | HEART RATE: 64 BPM | OXYGEN SATURATION: 99 % | BODY MASS INDEX: 30.31 KG/M2 | SYSTOLIC BLOOD PRESSURE: 120 MMHG | TEMPERATURE: 98 F

## 2024-11-21 DIAGNOSIS — D64.9 SYMPTOMATIC ANEMIA: ICD-10-CM

## 2024-11-21 DIAGNOSIS — K92.2 LOWER GI BLEED: ICD-10-CM

## 2024-11-21 LAB
ABO/RH(D): NORMAL
ANION GAP SERPL CALCULATED.3IONS-SCNC: 10 MMOL/L (ref 7–15)
ANTIBODY SCREEN: NEGATIVE
BASOPHILS # BLD AUTO: 0.1 10E3/UL (ref 0–0.2)
BASOPHILS NFR BLD AUTO: 1 %
BLD PROD TYP BPU: NORMAL
BLOOD COMPONENT TYPE: NORMAL
BUN SERPL-MCNC: 18.1 MG/DL (ref 8–23)
CALCIUM SERPL-MCNC: 9.1 MG/DL (ref 8.8–10.4)
CHLORIDE SERPL-SCNC: 105 MMOL/L (ref 98–107)
CODING SYSTEM: NORMAL
CREAT SERPL-MCNC: 1.42 MG/DL (ref 0.67–1.17)
CROSSMATCH: NORMAL
EGFRCR SERPLBLD CKD-EPI 2021: 54 ML/MIN/1.73M2
EOSINOPHIL # BLD AUTO: 0.2 10E3/UL (ref 0–0.7)
EOSINOPHIL NFR BLD AUTO: 1 %
ERYTHROCYTE [DISTWIDTH] IN BLOOD BY AUTOMATED COUNT: 17.5 % (ref 10–15)
GLUCOSE SERPL-MCNC: 122 MG/DL (ref 70–99)
HCO3 SERPL-SCNC: 25 MMOL/L (ref 22–29)
HCT VFR BLD AUTO: 26.7 % (ref 40–53)
HGB BLD-MCNC: 7.8 G/DL (ref 13.3–17.7)
HOLD SPECIMEN: NORMAL
IMM GRANULOCYTES # BLD: 0.1 10E3/UL
IMM GRANULOCYTES NFR BLD: 1 %
ISSUE DATE AND TIME: NORMAL
LYMPHOCYTES # BLD AUTO: 2.3 10E3/UL (ref 0.8–5.3)
LYMPHOCYTES NFR BLD AUTO: 20 %
MCH RBC QN AUTO: 22.6 PG (ref 26.5–33)
MCHC RBC AUTO-ENTMCNC: 29.2 G/DL (ref 31.5–36.5)
MCV RBC AUTO: 77 FL (ref 78–100)
MONOCYTES # BLD AUTO: 1 10E3/UL (ref 0–1.3)
MONOCYTES NFR BLD AUTO: 9 %
NEUTROPHILS # BLD AUTO: 7.6 10E3/UL (ref 1.6–8.3)
NEUTROPHILS NFR BLD AUTO: 68 %
NRBC # BLD AUTO: 0 10E3/UL
NRBC BLD AUTO-RTO: 0 /100
PLATELET # BLD AUTO: 357 10E3/UL (ref 150–450)
POTASSIUM SERPL-SCNC: 4.2 MMOL/L (ref 3.4–5.3)
RBC # BLD AUTO: 3.45 10E6/UL (ref 4.4–5.9)
SODIUM SERPL-SCNC: 140 MMOL/L (ref 135–145)
SPECIMEN EXPIRATION DATE: NORMAL
UNIT ABO/RH: NORMAL
UNIT NUMBER: NORMAL
UNIT STATUS: NORMAL
UNIT TYPE ISBT: 9500
WBC # BLD AUTO: 11.1 10E3/UL (ref 4–11)

## 2024-11-21 PROCEDURE — 86900 BLOOD TYPING SEROLOGIC ABO: CPT | Performed by: EMERGENCY MEDICINE

## 2024-11-21 PROCEDURE — 36430 TRANSFUSION BLD/BLD COMPNT: CPT

## 2024-11-21 PROCEDURE — 36415 COLL VENOUS BLD VENIPUNCTURE: CPT | Performed by: STUDENT IN AN ORGANIZED HEALTH CARE EDUCATION/TRAINING PROGRAM

## 2024-11-21 PROCEDURE — 99291 CRITICAL CARE FIRST HOUR: CPT | Mod: 25

## 2024-11-21 PROCEDURE — 250N000009 HC RX 250: Performed by: EMERGENCY MEDICINE

## 2024-11-21 PROCEDURE — 93005 ELECTROCARDIOGRAM TRACING: CPT | Performed by: STUDENT IN AN ORGANIZED HEALTH CARE EDUCATION/TRAINING PROGRAM

## 2024-11-21 PROCEDURE — 80048 BASIC METABOLIC PNL TOTAL CA: CPT | Performed by: EMERGENCY MEDICINE

## 2024-11-21 PROCEDURE — 258N000003 HC RX IP 258 OP 636: Performed by: EMERGENCY MEDICINE

## 2024-11-21 PROCEDURE — P9016 RBC LEUKOCYTES REDUCED: HCPCS | Performed by: EMERGENCY MEDICINE

## 2024-11-21 PROCEDURE — 84520 ASSAY OF UREA NITROGEN: CPT | Performed by: EMERGENCY MEDICINE

## 2024-11-21 PROCEDURE — 93005 ELECTROCARDIOGRAM TRACING: CPT | Performed by: EMERGENCY MEDICINE

## 2024-11-21 PROCEDURE — 85004 AUTOMATED DIFF WBC COUNT: CPT | Performed by: EMERGENCY MEDICINE

## 2024-11-21 PROCEDURE — 96374 THER/PROPH/DIAG INJ IV PUSH: CPT | Mod: 59

## 2024-11-21 PROCEDURE — 82565 ASSAY OF CREATININE: CPT | Performed by: EMERGENCY MEDICINE

## 2024-11-21 PROCEDURE — 86923 COMPATIBILITY TEST ELECTRIC: CPT | Performed by: EMERGENCY MEDICINE

## 2024-11-21 RX ADMIN — SODIUM CHLORIDE 100 ML: 9 INJECTION, SOLUTION INTRAVENOUS at 17:35

## 2024-11-21 RX ADMIN — PANTOPRAZOLE SODIUM 40 MG: 40 INJECTION, POWDER, FOR SOLUTION INTRAVENOUS at 13:40

## 2024-11-21 ASSESSMENT — COLUMBIA-SUICIDE SEVERITY RATING SCALE - C-SSRS
2. HAVE YOU ACTUALLY HAD ANY THOUGHTS OF KILLING YOURSELF IN THE PAST MONTH?: NO
6. HAVE YOU EVER DONE ANYTHING, STARTED TO DO ANYTHING, OR PREPARED TO DO ANYTHING TO END YOUR LIFE?: NO
1. IN THE PAST MONTH, HAVE YOU WISHED YOU WERE DEAD OR WISHED YOU COULD GO TO SLEEP AND NOT WAKE UP?: NO

## 2024-11-21 ASSESSMENT — ACTIVITIES OF DAILY LIVING (ADL)
ADLS_ACUITY_SCORE: 0

## 2024-11-21 NOTE — ED TRIAGE NOTES
Patient states his  Referred him to ER for low hemoglobin 7.5, low blood pressure in the 80's systolic, just stopped blood thinners, recent heart ablation, recent rectal bleeding.

## 2024-11-21 NOTE — ED TRIAGE NOTES
Patient states his  Referred him to ER for low hemoglobin 7.5, low blood pressure in the 80's systolic, just stopped blood thinners, recent heart ablation, recent rectal bleeding.      Triage Assessment (Adult)       Row Name 11/21/24 1219          Triage Assessment    Airway WDL WDL        Respiratory WDL    Respiratory WDL WDL        Cardiac WDL    Cardiac WDL WDL        Peripheral/Neurovascular WDL    Peripheral Neurovascular WDL WDL        Cognitive/Neuro/Behavioral WDL    Cognitive/Neuro/Behavioral WDL WDL

## 2024-11-21 NOTE — ED PROVIDER NOTES
"EMERGENCY DEPARTMENT ENCOUNTER      NAME: Barry Jay  AGE: 69 year old male  YOB: 1955  MRN: [id number]  EVALUATION DATE & TIME: No admission date for patient encounter.    PCP: Osmel Iniguez    ED PROVIDER: Piero Pritchett D.O.      Chief Complaint   Patient presents with    Hypotension       FINAL IMPRESSION:  1. Symptomatic anemia    2. Lower GI bleed        ED COURSE & MEDICAL DECISION MAKIN:05 PM I met with the patient to gather history and to perform my initial exam. I discussed the plan for care while in the Emergency Department. Patient is feeling reluctant and \"uncomfortable\" staying in the hospital for admission.  1:24 PM Spoke with XOCHITL Rader. They recommend not scoping him now. They want the patient to stay in the hospital for observation and not being discharged home. Recommend starting him on 2 times a day of PPI. Recommend the patient follow up with scope as an outpatient if won't stay  5:15 PM Rechecked the patient. Recommended admission but patient adamantly refused. He will follow up with GI as an outpatient instead.       Pertinent Labs & Imaging studies reviewed. (See chart for details)  69 year old male presents to the Emergency Department for evaluation of low hemoglobin in his primary care's office.  Was seen to be low as well on Monday, and was reporting black-colored stools and bloody stools.  He was discharged from the emergency department based on his request at that time, returning today with even lower hemoglobin.  My concern is for the potential for GI bleed though he does report that his stool colors have improved.  I discussed with gastroenterology and they agree that the patient should likely be admitted for colonoscopy and endoscopy.  However, the patient is reluctant to be admitted.  Gastroenterology recommendation was that we discharged on a PPI and have follow-up as an outpatient if he refuses admission.  He was given a single unit of packed " red blood cells here due to symptomatic anemia secondary to what I suspect was a GI bleed.  Multiple attempts I tried to convince the patient to be admitted to the hospital but he is refusing admission at this time.  Therefore we will have him follow-up as an outpatient.  Return precautions were discussed.    Medical Decision Making  Obtained supplemental history:Supplemental history obtained?: Documented in chart  Reviewed external records: External records reviewed?: Documented in chart  Care impacted by chronic illness:Other: Dyslipidemia, HTN, OA, PAF, CAD, T2DM, and typical atrial flutter  Did you consider but not order tests?: Work up considered but not performed and documented in chart, if applicable  Did you interpret images independently?: Independent interpretation of ECG and images noted in documentation, when applicable.  Consultation discussion with other provider:Did you involve another provider (consultant, , pharmacy, etc.)?: I discussed the care with another health care provider, see documentation for details.  Offered admission but patient denied. He was discharged home    MIPS: Not Applicable        At the conclusion of the encounter I discussed the results of all of the tests and the disposition. The questions were answered. The patient or family acknowledged understanding and was agreeable with the care plan.      CRITICAL CARE:  Critical Care  Performed by: JILLIAN COLE  Authorized by: JILLIAN COLE  Total critical care time: 30 minutes  Critical care time was exclusive of separately billable procedures and treating other patients.  Critical care was necessary to treat or prevent imminent or life-threatening deterioration of the following conditions: symptomatic anemia with transfusion  Critical care was time spent personally by me on the following activities: development of treatment plan with patient or surrogate, discussions with consultants, examination of patient, evaluation  "of patient's response to treatment, obtaining history from patient or surrogate, ordering and performing treatments and interventions, ordering and review of laboratory studies, ordering and review of radiographic studies and re-evaluation of patient's condition, this excludes any separately billable procedures.        HPI    Patient information was obtained from: Patient    Use of : N/A       Barry Jay is a 69 year old male who presents with hypotension and rectal bleeding.    Patient had a heart ablation a couple weeks ago (on 11/8/2024) and had a really bad heavy bloody stool the next day on 11/9. He has since felt generally weak and dizzy since onet of bloody stools. Notes he's been really dizziness and generally weak every since he started rectally bleeding.    He was here Monday (11/18) and his HGB was 8.2 then. He had a follow up appointment with his PCP at [facility] and they rechecked his HGB and notes it was 7.5. States he didn't get a transfusion on Monday. Patient indicates his BP was really low.    On Monday, he states he was started on blood thinner after he underwent the ablation on 11/8/24. He has been feeling weak generally ever since this onset of rectal bleeding. Patient indicates his stools appeared \"really bad\" on Monday and notes this past weekend he had black stools with a lot of blood in it. This morning, before he went to the doctors office at [facility], he indicates his stool looked more normal and he didn't have any blood in his stool today. States he hasn't been seen by GI yet.    Mentions he spoke to his PCP today and was told if he would have a scope, it would be a couple weeks out. No other reported complaints or concerns at this time.      PAST MEDICAL HISTORY:  Past Medical History:   Diagnosis Date    Anxiety     ED (erectile dysfunction)     HTN (hypertension)     Hyperlipidemia     Nonobstructive atherosclerosis of coronary artery     SVT (supraventricular " tachycardia) (H)        PAST SURGICAL HISTORY:  Past Surgical History:   Procedure Laterality Date    APPENDECTOMY      CHOLECYSTECTOMY      CV CORONARY ANGIOGRAM N/A 4/12/2023    Procedure: Coronary Angiogram;  Surgeon: Teena Laguna MD;  Location: [facility] CATH LAB CV    CV LEFT HEART CATH N/A 4/12/2023    Procedure: Left Heart Catheterization;  Surgeon: Teena Laguna MD;  Location: [facility] CATH LAB CV    EP ABLATION SVT N/A 3/14/2023    Procedure: Ablation Supraventricular Tachycardia;  Surgeon: Lorna Hobson MD;  Location: [facility] CATH LAB CV    EP ABLATION SVT N/A 11/8/2024    Procedure: Ablation Supraventricular Tachycardia;  Surgeon: Ricky Hidalgo MD;  Location: [facility] CATH LAB CV    LAMINECTOMY LUMBAR ONE LEVEL Right 6/27/2022    Procedure: Right Lumbar 4 - lumbar 5 hemilaminectomy, medial facetectomies, foraminotomies;  Surgeon: Shannan Mari MD;  Location: [facility] OR    ORTHOPEDIC SURGERY           CURRENT MEDICATIONS:    Current Facility-Administered Medications   Medication Dose Route Frequency Provider Last Rate Last Admin    sodium chloride 0.9% BOLUS 1-250 mL  1-250 mL Intravenous Q1H PRN Piero Pritchett, DO         Current Outpatient Medications   Medication Sig Dispense Refill    atorvastatin (LIPITOR) 20 MG tablet Take 1 tablet (20 mg) by mouth at bedtime. Pt needs appt for further refills 90 tablet 0    irbesartan-hydrochlorothiazide (AVALIDE) 300-12.5 mg per tablet Take 1 tablet by mouth every morning.  1    metoprolol tartrate (LOPRESSOR) 50 MG tablet Take 75 mg by mouth every evening. In addition, patient takes 50 mg in the morning.      metoprolol tartrate (LOPRESSOR) 50 MG tablet Take 50 mg by mouth every morning. In addition, patient takes 75 mg in the evening.      omeprazole (PRILOSEC) 20 MG capsule Take 20 mg by mouth every morning.  1    PARoxetine (PAXIL) 20 MG tablet Take 20 mg by mouth every morning  3    rivaroxaban ANTICOAGULANT  (XARELTO) 20 MG TABS tablet Take 1 tablet (20 mg) by mouth daily (with dinner). 90 tablet 3    triamcinolone (KENALOG) 0.1 % external ointment Apply topically 2 times daily as needed.      zolpidem (AMBIEN) 10 mg tablet Take 10 mg by mouth nightly as needed for sleep  2         ALLERGIES:  Allergies   Allergen Reactions    Iodine Hives       FAMILY HISTORY:  No family history on file.    SOCIAL HISTORY:  Social History     Socioeconomic History    Marital status: Single   Tobacco Use    Smoking status: Every Day     Current packs/day: 0.50     Average packs/day: 1 pack/day for 30.9 years (30.4 ttl pk-yrs)     Types: Cigarettes     Start date: 1994    Smokeless tobacco: Never    Tobacco comments:     Pt quitting, down to 7 cigs per day   Vaping Use    Vaping status: Never Used   Substance and Sexual Activity    Alcohol use: Never    Drug use: Never     Social Drivers of Health     Interpersonal Safety: Low Risk  (11/8/2024)    Interpersonal Safety     Do you feel physically and emotionally safe where you currently live?: Yes     Within the past 12 months, have you been hit, slapped, kicked or otherwise physically hurt by someone?: No     Within the past 12 months, have you been humiliated or emotionally abused in other ways by your partner or ex-partner?: No       VITALS:  Patient Vitals for the past 24 hrs:   BP Temp Temp src Pulse Resp SpO2 Height Weight   11/21/24 1730 126/59 -- -- 65 -- -- -- --   11/21/24 1715 -- -- -- 69 23 99 % -- --   11/21/24 1640 -- -- -- 68 22 -- -- --   11/21/24 1630 113/58 -- -- 67 24 -- -- --   11/21/24 1608 -- -- -- 64 24 98 % -- --   11/21/24 1600 110/60 -- -- 66 22 99 % -- --   11/21/24 1540 106/66 98.3  F (36.8  C) Oral 65 20 100 % -- --   11/21/24 1530 109/57 -- -- 62 17 98 % -- --   11/21/24 1525 -- -- -- 64 25 99 % -- --   11/21/24 1515 107/59 98  F (36.7  C) Oral 65 15 96 % -- --   11/21/24 1500 100/52 -- -- 62 13 98 % -- --   11/21/24 1440 103/57 98  F (36.7  C) Oral 64 17 98 %  "-- --   11/21/24 1400 109/55 -- -- 61 11 98 % -- --   11/21/24 1355 121/58 -- -- 64 12 100 % -- --   11/21/24 1343 102/51 -- -- 63 16 100 % -- --   11/21/24 1221 -- -- -- -- -- -- 1.727 m (5' 8\") --   11/21/24 1217 102/53 98  F (36.7  C) Oral 68 16 100 % -- 90.7 kg (200 lb)       PHYSICAL EXAM    VITAL SIGNS: /59   Pulse 65   Temp 98.3  F (36.8  C) (Oral)   Resp 23   Ht 1.727 m (5' 8\")   Wt 90.7 kg (200 lb)   SpO2 99%   BMI 30.41 kg/m    General Appearance: Well-appearing, well-nourished, no acute distress   Head:  Normocephalic, without obvious abnormality, atraumatic  Eyes:  PERRL, conjunctiva/corneas clear, EOM's intact,  ENT:  Lips, mucosa, and tongue normal, membranes are moist without pallor  Neck:  Normal ROM, symmetrical, trachea midline    Cardio:  Regular rate and rhythm, no murmur, rub or gallop, 2+ pulses symmetric in all extremities  Pulm:  Clear to auscultation bilaterally, respirations unlabored,  Abdomen:  Soft, non-tender, no rebound or guarding.  Musculoskeletal: Full ROM, no edema, no cyanosis, good ROM of major joints  Integument:  Warm, Dry, No erythema, No rash.    Neurologic:  Alert & oriented.  No focal deficits appreciated.    Psychiatric:  Affect normal, Judgment normal, Mood normal.      LABS  Results for orders placed or performed during the hospital encounter of 11/21/24 (from the past 24 hours)   [place] Draw    Narrative    The following orders were created for panel order [place] Draw.  Procedure                               Abnormality         Status                     ---------                               -----------         ------                     Extra Blue Top Tube[[id number]]                              Final result               Extra Red Top Tube[[id number]]                               Final result               Extra Green Top (Lithium...[[id number]]                      Final result               Extra Purple Top Tube[[id number]]                            " Final result               Extra Blood Bank Purple ...[[id number]]                      Final result                 Please view results for these tests on the individual orders.   Extra Blue Top Tube   Result Value Ref Range    Hold Specimen JIC    Extra Red Top Tube   Result Value Ref Range    Hold Specimen JIC    Extra Green Top (Lithium Heparin) Tube   Result Value Ref Range    Hold Specimen JIC    Extra Purple Top Tube   Result Value Ref Range    Hold Specimen JIC    Extra Blood Bank Purple Top Tube   Result Value Ref Range    Hold Specimen JIC    CBC with platelets + differential    Narrative    The following orders were created for panel order CBC with platelets + differential.  Procedure                               Abnormality         Status                     ---------                               -----------         ------                     CBC with platelets and d...[[id number]]  Abnormal            Final result                 Please view results for these tests on the individual orders.   Basic metabolic panel   Result Value Ref Range    Sodium 140 135 - 145 mmol/L    Potassium 4.2 3.4 - 5.3 mmol/L    Chloride 105 98 - 107 mmol/L    Carbon Dioxide (CO2) 25 22 - 29 mmol/L    Anion Gap 10 7 - 15 mmol/L    Urea Nitrogen 18.1 8.0 - 23.0 mg/dL    Creatinine 1.42 (H) 0.67 - 1.17 mg/dL    GFR Estimate 54 (L) >60 mL/min/1.73m2    Calcium 9.1 8.8 - 10.4 mg/dL    Glucose 122 (H) 70 - 99 mg/dL   ABO/Rh type and screen    Narrative    The following orders were created for panel order ABO/Rh type and screen.  Procedure                               Abnormality         Status                     ---------                               -----------         ------                     Adult Type and Screen[[id number]]                            Final result                 Please view results for these tests on the individual orders.   Adult Type and Screen   Result Value Ref Range    ABO/RH(D) O NEG     Antibody  Screen Negative Negative    SPECIMEN EXPIRATION DATE [id number]    CBC with platelets and differential   Result Value Ref Range    WBC Count 11.1 (H) 4.0 - 11.0 10e3/uL    RBC Count 3.45 (L) 4.40 - 5.90 10e6/uL    Hemoglobin 7.8 (L) 13.3 - 17.7 g/dL    Hematocrit 26.7 (L) 40.0 - 53.0 %    MCV 77 (L) 78 - 100 fL    MCH 22.6 (L) 26.5 - 33.0 pg    MCHC 29.2 (L) 31.5 - 36.5 g/dL    RDW 17.5 (H) 10.0 - 15.0 %    Platelet Count 357 150 - 450 10e3/uL    % Neutrophils 68 %    % Lymphocytes 20 %    % Monocytes 9 %    % Eosinophils 1 %    % Basophils 1 %    % Immature Granulocytes 1 %    NRBCs per 100 WBC 0 <1 /100    Absolute Neutrophils 7.6 1.6 - 8.3 10e3/uL    Absolute Lymphocytes 2.3 0.8 - 5.3 10e3/uL    Absolute Monocytes 1.0 0.0 - 1.3 10e3/uL    Absolute Eosinophils 0.2 0.0 - 0.7 10e3/uL    Absolute Basophils 0.1 0.0 - 0.2 10e3/uL    Absolute Immature Granulocytes 0.1 <=0.4 10e3/uL    Absolute NRBCs 0.0 10e3/uL   Prepare red blood cells (unit)   Result Value Ref Range    Blood Component Type Red Blood Cells     Product Code [id number]     Unit Status Transfused     Unit Number [id number]     CROSSMATCH Compatible     CODING SYSTEM [id number]     ISSUE DATE AND TIME [id number]     UNIT ABO/RH O-     UNIT TYPE ISBT 9500          RADIOLOGY  No orders to display            MEDICATIONS GIVEN IN THE EMERGENCY:  Medications   sodium chloride 0.9% BOLUS 1-250 mL (has no administration in time range)   pantoprazole (PROTONIX) IV push injection 40 mg (40 mg Intravenous $Given 11/21/24 1340)       NEW PRESCRIPTIONS STARTED AT TODAY'S ER VISIT  New Prescriptions    No medications on file        I, Kathryn Krueger, am serving as a scribe to document services personally performed by Piero Pritchett D.O., based on my observations and the provider's statements to me.  I, Piero Pritchett D.O., attest that Kathryn Krueger is acting in a scribe capacity, has observed my performance of the services and has documented them in accordance with my  direction.     Piero Pritchett D.O.  Emergency Medicine  [facility] EMERGENCY DEPARTMENT  [facility] [facility] [id number] [phone]  Dept: [phone]       Piero Pritchett,   11/21/24 1746

## 2024-11-23 LAB
ATRIAL RATE - MUSE: 68 BPM
DIASTOLIC BLOOD PRESSURE - MUSE: NORMAL MMHG
INTERPRETATION ECG - MUSE: NORMAL
P AXIS - MUSE: 67 DEGREES
PR INTERVAL - MUSE: 160 MS
QRS DURATION - MUSE: 124 MS
QT - MUSE: 432 MS
QTC - MUSE: 459 MS
R AXIS - MUSE: 6 DEGREES
SYSTOLIC BLOOD PRESSURE - MUSE: NORMAL MMHG
T AXIS - MUSE: 30 DEGREES
VENTRICULAR RATE- MUSE: 68 BPM

## 2024-11-27 LAB
ATRIAL RATE - MUSE: 75 BPM
DIASTOLIC BLOOD PRESSURE - MUSE: NORMAL MMHG
INTERPRETATION ECG - MUSE: NORMAL
P AXIS - MUSE: 56 DEGREES
PR INTERVAL - MUSE: 152 MS
QRS DURATION - MUSE: 118 MS
QT - MUSE: 410 MS
QTC - MUSE: 457 MS
R AXIS - MUSE: 23 DEGREES
SYSTOLIC BLOOD PRESSURE - MUSE: NORMAL MMHG
T AXIS - MUSE: 44 DEGREES
VENTRICULAR RATE- MUSE: 75 BPM

## 2025-01-19 DIAGNOSIS — E78.5 DYSLIPIDEMIA, GOAL LDL BELOW 70: ICD-10-CM

## 2025-01-20 RX ORDER — ATORVASTATIN CALCIUM 20 MG/1
20 TABLET, FILM COATED ORAL AT BEDTIME
Qty: 90 TABLET | Refills: 1 | Status: SHIPPED | OUTPATIENT
Start: 2025-01-20

## 2025-01-23 ENCOUNTER — OFFICE VISIT (OUTPATIENT)
Dept: NEUROLOGY | Facility: CLINIC | Age: 70
End: 2025-01-23
Attending: PSYCHIATRY & NEUROLOGY
Payer: COMMERCIAL

## 2025-01-23 DIAGNOSIS — G56.03 BILATERAL CARPAL TUNNEL SYNDROME: ICD-10-CM

## 2025-01-23 DIAGNOSIS — R20.0 ARM NUMBNESS: ICD-10-CM

## 2025-01-23 NOTE — PROCEDURES
ELECTROMYOGRAPHY (EMG) REPORT       [facility] NEUROLOGY [place]  [address] [place] [address]  Tel: [phone]  Fax: [phone]  [email or web address]     Barry Jay,  1955, MRN [id number]  PCP: Osmel Iniguez  Date: 2025     Principal Diagnosis:    Bilateral carpal tunnel syndrome  Arm numbness     Reason for visit:  Evaluate bilateral uppers. c/o tingling in both hands/finger for a year. Right = Left.   NCS+  Motor Sites      Latency Amplitude Distance Velocity   Site (ms) Norm (mV) Norm (cm) (m/s) Norm   Left Median (APB)   Wrist 4.5  < 4.5 12.0  > 3.0 7     Elbow 9.8 - 11.9 - 24 45  > 50   Right Median (APB)   Wrist 5.6  < 4.5 7.0  > 3.0 7     Elbow 10.5 - 6.7 - 27 55  > 50   Left Ulnar (ADM)   Wrist 2.9  < 3.6 11.7  > 5.0 7     Bel Elbow 6.7 - 11.7 - 19 50  > 50   Abv Elbow 9.4 - 11.7 - 14 52  > 50   Right Ulnar (ADM)   Wrist 2.8  < 3.6 12.2  > 5.0 7     Bel Elbow 6.4 - 11.2 - 19 53  > 50   Abv Elbow 9.1 - 10.9 - 14 52  > 50     NCS+  Sensory Sites      Onset Lat Peak Lat Amp (O-P) Distance Velocity   Site ms (ms)  V Norm cm m/s Norm   Left Median   Wrist-Dig II 3.2 4.0 22  > 15 13 41  > 50   Palm-Wrist 2.3 3.0 46 - 8 35 -   Right Median   Wrist-Dig II 3.1 3.8 14  > 15 13 42  > 50   Palm-Wrist 2.1 2.9 22 - 8 38 -   Left Ulnar   Wrist-Dig V 2.0 2.8 15  > 5 11 55  > 50   Palm-Wrist 1.28 1.88 22 - 8 63 -   Right Ulnar   Wrist-Dig V 2.2 3.0 15  > 5 11 50  > 50   Palm-Wrist 1.28 1.85 13 - 8 63 -   Right Radial (Rec:Wrist)   Forearm 1.90 2.5 45  > 15 10 53 -     Inter-Nerve Comparisons     Nerve 1 Value 1 Nerve 2 Value 2 Parameter Result Normal   Sensory Sites   R Median Palm-Wrist 2.9 ms R Ulnar Palm-Wrist 1.85 ms Peak Lat Diff 1.05 ms <0.40   L Median Palm-Wrist 3.0 ms L Ulnar Palm-Wrist 1.88 ms Peak Lat Diff 1.12 ms <0.40     F Wave Studies     NR F-Lat (ms) Lat Norm (ms) L-R F-Lat (ms) L-R Lat Norm   Left Ulnar (Abd Dig Min)      30.31 <32 0.26 <2.5   Right  Ulnar (Abd Dig Min)      30.05 <32 0.26 <2.5     Electromyography     Side Muscle Fib PSW Fasc IA # Amp Dur PPP RcmtRate Note Comment   Right Brachiorad None None None N N N N N N N    Right Pronator Teres None None None N N N N N N N    Right Biceps None None None N N N N N N N    Right Deltoid None None None N N N N N N N    Right Triceps None None None N N N N N N N    Right FCU None None None N N N N N N N    Right FDI None None None N N N N N N N    Right APB None None None N N N N N N N    Left Brachiorad None None None N N N N N N N    Left Pronator Teres None None None N N N N N N N    Left Biceps None None None N N N N N N N    Left Deltoid None None None N N N N N N N    Left Triceps None None None N N N N N N N    Left FDI None None None N N N N N N N    Left APB None None None N N N N N N N         Summary: Nerve conduction and EMG study of bilateral upper extremities shows:  Delayed right median and borderline left median distal motor latency, amplitude and normal conduction velocity.  Normal bilateral ulnar distal motor latency, amplitude and conduction velocity.  Delayed bilateral median peak sensory latency with slow sensory nerve conduction velocity.  Normal bilateral ulnar and radial SNAP  Normal bilateral ulnar F latency  Needle exam was normal.    Impression:   This is an abnormal nerve conduction and EMG study of bilateral upper extremities that suggests bilateral median neuropathy at or distal to the wrist consistent with mild to moderate right and early left, bilateral carpal tunnel syndrome      Tod Arroyo MD  [facility]      This note was dictated using voice recognition software.  Any grammatical or context distortions are unintentional and inherent to the software.

## 2025-01-23 NOTE — LETTER
1/23/2025      Barry Jay  [address] [address]      Dear Colleague,    Thank you for referring your patient, Barry Jay, to the [facility] NEUROLOGY CL[facility]IC [place]. Please see a copy of my visit note below.    See procedure note for EMG report      Again, thank you for allowing me to participate in the care of your patient.        Sincerely,        Tod Arroyo MD    Electronically signed

## 2025-01-29 ENCOUNTER — OFFICE VISIT (OUTPATIENT)
Dept: VASCULAR SURGERY | Facility: CLINIC | Age: 70
End: 2025-01-29
Payer: COMMERCIAL

## 2025-01-29 VITALS
HEIGHT: 68 IN | HEART RATE: 78 BPM | DIASTOLIC BLOOD PRESSURE: 78 MMHG | SYSTOLIC BLOOD PRESSURE: 121 MMHG | WEIGHT: 200 LBS | OXYGEN SATURATION: 97 % | BODY MASS INDEX: 30.31 KG/M2

## 2025-01-29 DIAGNOSIS — I77.72 ILIAC ARTERY DISSECTION: Primary | ICD-10-CM

## 2025-01-29 DIAGNOSIS — I72.3 ILIAC ARTERY ANEURYSM, LEFT: ICD-10-CM

## 2025-01-29 PROCEDURE — G0463 HOSPITAL OUTPT CLINIC VISIT: HCPCS | Performed by: HOSPITALIST

## 2025-01-29 ASSESSMENT — PAIN SCALES - GENERAL: PAINLEVEL_OUTOF10: NO PAIN (0)

## 2025-01-29 NOTE — PATIENT INSTRUCTIONS
Juanito Reddy,    Thank you for entrusting your care with us today. After your visit today with MD Jude Rutherford this is the plan that was discussed at your appointment.      Follow up in mid November of 2025 with Dr. Rutherford and a CTA scan prior.  A  will reach out to you closer to that time to schedule.     I am including additional information on these things and our contact information if you have any questions or concerns.   Please do not hesitate to reach out to us if you felt we did not answer your questions or you are unsure of the treatment plan after your visit today. Our number is [phone].Thank you for trusting us with your care.         Again thank you for your time.

## 2025-01-29 NOTE — PROGRESS NOTES
VASCULAR MEDICINE CONSULT NOTE          LOCATION:  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    Mr. Jay is a current smoker with medical history significant for longstanding hypertension, dyslipidemia, nonobstructive CAD, CKD stage III AA and recent SVT ablation 11/8/2024.    On 10/15/2024, the patient underwent CTA chest abdomen pelvis to rule out dissection in the setting of hypotension and chest pain.  This came back negative for aortic dissection but did show chronic appearing dissection on the left common iliac artery associated with ectasia 1.8 cm.  He has scattered atherosclerotic disease in the aortoiliac arteries with variable degree of stenosis otherwise no aneurysm or dissection detected elsewhere.  He was referred to our clinic for this finding.    Interestingly, on 11/18/2024, he did have issues with rectal bleeding following his SVT ablation procedure.  He did have another CTA chest abdomen pelvis approximately a month after the first 1 was done 11/18/2024.  This showed the same findings and confirm the likelihood of the chronicity of the left common iliac artery dissection.    Mr. Jay denies intermittent claudication.  Denies abdominal pain or back pain.  Has chronic back pain and did have prior lumbar laminectomy in 2022.    Denies any major trauma to his abdomen that he could recall.  Denies any complicated surgeries in the abdomen or pelvis.  Was playing contact sports in his 20s but nothing recent.      Cardiovascular risk factors:  Hypertension: Had longstanding hypertension since his early 40s.  Has been controlled.  Now on  "irbesartan-hydrochlorothiazide 300-12.5 mg and metoprolol to tartrate 50 mg twice daily.  Dyslipidemia: Also reports being on cholesterol medication since his 40s.  Currently on atorvastatin 20 mg.  Last LDL was 44 from 2024 with low HDL 29, total cholesterol 118 and elevated .  Smoking: Patient reports he smoked for approximately 35 years.  Currently smokes slightly less than 0.5 PPD.  Family history: Reports family history of hypertension.  Mother  at age 73 of c multiple strokes/TIA omplications.  Apparently also had amputation of lower extremity but he is not aware of the cause.  Had a brother who  after a motor cycle accident 15 years ago and on autopsy, was noted to have significant blockages in his arteries.  Denies known family history of dissection, aneurysm or sudden unexplained death.  No history of diabetes.        REVIEW OF SYSTEMS:    A 12 point ROS was reviewed and is negative except what is mentioned in HPI.       Past medical history, surgical history, medications, family history, social history and allergies were reviewed. Pertinent points mentioned under HPI.      OBJECTIVE:    Vital signs:  /78   Pulse 78   Ht 5' 8\" (1.727 m)   Wt 200 lb (90.7 kg)   SpO2 97%   BMI 30.41 kg/m    Wt Readings from Last 1 Encounters:   25 200 lb (90.7 kg)     Body mass index is 30.41 kg/m .    Physical exam:  General appearance: Pleasant male in no apparent distress.    HEENT: NC/AT.    Neck: Carotids +2/2 bilaterally without bruits.  No jugular venous distension.   Heart: RRR. Normal S1, S2. No murmur, rub, click, or gallop.   Chest: Clear to auscultation bilaterally.  Abdomen: Soft, nontender, nondistended. No pulsatile mass.  No bruits.   Extremities: No swelling.  Unable to feel his left femoral pulse.  Right femoral 1/2.  No bruit.  Bilateral popliteal 1/2.  PT 2/2 bilaterally.  DP 0/2 bilaterally.  Skin: Normal color and temperature.  Neurological: Alert, awake and oriented "       DIAGNOSTIC STUDIES:   Labs and diagnostics reviewed including outside records. Pertinent points are mentioned under HPI and assessment and plan sections.        ASSESSMENT AND PLAN:    Chronic left common iliac artery dissection  Left common iliac artery dilatation 1.8 cm  Active current smoker for the past 35 years  Longstanding hypertension: Controlled  Mixed hyperlipidemia  Nonobstructive CAD    Patient has evidence of chronic appearing dissection of the left common iliac artery.  I was able to locate his previous images from 2018 and this has remained stable.  Similarly, the minimal dilatation remains stable around 1.8 cm for at least 6 years.  No evidence of aneurysm or dissection elsewhere.  History is not suggestive of trauma.  Most likely etiology is atherosclerosis with clear evidence of scattered atherosclerosis and variable degree of stenosis in the abdominal aorta and iliac arteries.  Clinically, he has no concern for intermittent claudication.  It does appear that he has erectile dysfunction and while this could be nonvascular, vascular etiology could also be considered especially with a stenosis of the internal iliac artery.  We will discuss next visit this specific issue.    Otherwise, we discussed that this would require only ongoing surveillance.  There is no indication for any intervention currently given the chronic appearing dissection that is not flow limiting.  Additionally, he only has very minimal dilatation of the left common iliac artery at 1.8 cm that is not aneurysmal.  Aneurysm is typically considered in the common iliac artery when it reaches 2.5 cm and intervention is not indicated until around 3.5 cm or there is concern for rapid growth or symptoms.    For medical management we would recommend antiplatelet therapy with aspirin 81 mg.  Patient has taken aspirin 81 mg for primary prophylaxis for years that was discontinued few years ago.  He is meeting with cardiology in a week to  discuss anticoagulation.  It does seem that there was recommendation for him to take apixaban due to atrial flutter?  This was cost prohibitive and he is meeting to discuss with them anticoagulation.  If he were to start apixaban, then he can hold off on aspirin otherwise if no plan for anticoagulation, recommend to start aspirin 81 mg.    Recommendations:  Repeat CTA abdomen pelvis in 1 year expected around November 2025 with follow-up for surveillance of the left NEAL dissection and dilatation.  Start aspirin 81 mg if there is no plan for anticoagulation.  If will be on chronic anticoagulation, defer aspirin 81 mg for now.  Patient has an appointment with cardiology in a week to discuss this matter per his report.  Continue current atorvastatin 20 mg.  LDL is last than 70 although could improve his TG and HDL.  We might need to increase the dose to 40 mg.  Blood pressure is currently controlled.  Goal less than 130/80.  Highly encouraged smoking cessation.  Patient reports that he is already cutting down gradually and declines the need for any further assistance.      It was a pleasure meeting Mr. Jay in our clinic today.    Jude Rutherford MD, [facility]  Vascular Medicine  January 29, 2025  The longitudinal plan of care for the diagnosis(es)/condition(s) as documented were addressed during this visit. Due to the added complexity in care, I will continue to support Barry in the subsequent management and with ongoing continuity of care.

## 2025-01-29 NOTE — Clinical Note
Needs follow up around 11/18 with Dr. Rutherford with CTA abdo pelvis prior.  1 year follow up left common iliac ectasia with chronic dissection

## 2025-01-29 NOTE — PROGRESS NOTES
"[facility]        Patient is here for a consult to discuss Left common iliac 1.8cm with chronic dissection. CTA done 10/15/24. Pt smokes about 0.5 ppd. Quit 20 years ago for 10 months.    Pt is currently taking Statin.    /78   Pulse 78   Ht 5' 8\" (1.727 m)   Wt 200 lb (90.7 kg)   SpO2 97%   BMI 30.41 kg/m      The provider has been notified that the patient has no concerns.     Questions patient would like addressed today are: N/A.    Refills are needed: N/A    Has homecare services and agency name:  No           "

## 2025-02-04 NOTE — PROGRESS NOTES
[facility]  Cardiac Electrophysiology  [address]  [place] [address]   Office: [phone]  Fax: [phone]     HEART CARE ELECTROPHYSIOLOGY FOLLOW UP    Primary Care: Osmel Iniguez MD      Assessment/Recommendations     Supraventricular tachycardia: symptomatic with diaphoresis and palpitations. No inducible SVT at EPS 3/14/23 - no ablation was performed. Then underwent successful CTI and AVNRT ablation with Dr. Hidalgo 11/8/24. Eliquis was started, metoprolol was continued at that time.     [id number] score of at least 2 for age and HTN    HTN: controlled, continue current regimen    Non-obstructive CAD: follows with general cardiology    Lower GIB, acute anemia: presented to ED few days following ablation for bloody stool, where he was transfused. He underwent outpatient workup with GI as an outpatient, there was no concern.  Since then he had not been taking a blood thinner.  He sees his primary in about a month to follow-up on his anemia and GI bleed.      Plan:  Continue metoprolol as ordered  Discussed with Dr. Hidalgo, would be reasonable to come off his Eliquis given it was a incidental finding of typical atrial flutter which was ablated with a CTI ablation and given his GI bleed.  If he were to develop atrial fibrillation in the future, we would recommend starting Eliquis again.  Watchman consideration in the future if he develops atrial fibrillation.  Follow up general cardiology 6 months post ablation       History of Present Illness/Subjective    Barry Jay is a 69 year old male with past medical history significant for paroxysmal SVT, nonobstructive CAD, and hypertension who presents today for 6-week follow-up of his SVT ablation with Dr. Hidalgo.    From the heart rhythm perspective, he feels great.  He is thankful to have this taken care of, as he had to call an ambulance at least once for tachycardia.  He has had no palpitations or  recurrences since.  I discussed possible ways to monitor his heart rhythm, he is considering getting a [employer] mobile..  He denies chest discomfort, palpitations, shortness of breath, lightheadedness/dizziness, pedal edema, or syncope.    He did have an acute GI bleed which resulted in acute anemia several days after his ablation and starting DOAC.  Has not been on a blood thinner since.  We discussed the risks of being on DOAC versus risk of not.  If he were to develop atrial fibrillation, we would recommend starting on DOAC.  He is agreeable to this plan.  Of note, he is eligible for another co-pay per month for Eliquis.       Data Review     Arrhythmia hx  Sx: Diaphoresis, palpitations  Sx onset: 1/25/2023  Dx/date: 1/25/2023  Rate control: Metoprolol  AAD: None  DCCV: None  Ablation: CTI + AVNRT 11/18/2024 Dr. Hidalgo  [id number]  OAC: eliquis    EKG  11/21/2024: Normal sinus rhythm 60 bpm, RBBB  11/18/2024: Sinus rhythm 75 bpm, incomplete RBBB  11/8/2024: Sinus rhythm with PACs, 90 bpm, RBBB   10/15/2024: Sinus tach 113 bpm, RBBB  Personally reviewed.     ANG 11/8/24:  The left ventricle is normal in size.  Left ventricular function is decreased. The ejection fraction is 45-50%  (mildly reduced).  There is mild global hypokinesia of the left ventricle.  Normal right ventricle size and systolic function.  No thrombus is detected in the left atrial appendage.        I have reviewed and updated the patient's past medical history, allergy list and medication list.          Physical Examination   BMI= There is no height or weight on file to calculate BMI.    Wt Readings from Last 3 Encounters:   01/29/25 90.7 kg (200 lb)   11/21/24 90.7 kg (200 lb)   11/18/24 90.7 kg (200 lb)       Vitals: There were no vitals taken for this visit.  General   Appearance:   Alert and oriented, in no acute distress.    HEENT:  Normocephalic and atraumatic. Conjunctiva and sclera are clear. Moist oral mucosa.    Neck: No JVP,  carotid bruit or obvious thyromegaly.   Lungs:   Respirations unlabored. Clear bilaterally with no rales, rhonchi, or wheezes.     Cardiovascular:   Rhythm is regular. S1 and S2 are normal. No significant murmur is present. Lower extremities demonstrate no significant edema.    Extremities: No cyanosis or clubbing   Skin: Skin is warm, dry, and otherwise intact.   Neurologic: Gait not assessed. Mood and affect appropriate.           Medical History  Surgical History Family History Social History   Past Medical History:   Diagnosis Date    Anxiety     ED (erectile dysfunction)     HTN (hypertension)     Hyperlipidemia     Nonobstructive atherosclerosis of coronary artery     SVT (supraventricular tachycardia)     Past Surgical History:   Procedure Laterality Date    APPENDECTOMY      CHOLECYSTECTOMY      CV CORONARY ANGIOGRAM N/A 4/12/2023    Procedure: Coronary Angiogram;  Surgeon: Teena Laguna MD;  Location: [facility] CATH LAB CV    CV LEFT HEART CATH N/A 4/12/2023    Procedure: Left Heart Catheterization;  Surgeon: Teena Laguna MD;  Location: [facility] LAB CV    EP ABLATION SVT N/A 3/14/2023    Procedure: Ablation Supraventricular Tachycardia;  Surgeon: Lorna Hobson MD;  Location: [facility] LAB CV    EP ABLATION SVT N/A 11/8/2024    Procedure: Ablation Supraventricular Tachycardia;  Surgeon: Ricky Hidalgo MD;  Location: [facility] CATH LAB CV    LAMINECTOMY LUMBAR ONE LEVEL Right 6/27/2022    Procedure: Right Lumbar 4 - lumbar 5 hemilaminectomy, medial facetectomies, foraminotomies;  Surgeon: Shannan Mari MD;  Location: [facility] Main OR    ORTHOPEDIC SURGERY      Family History   Problem Relation Age of Onset    Hypertension Mother     Cerebrovascular Disease Mother     Social History     Socioeconomic History    Marital status: Single     Spouse name: Not on file    Number of children: Not on file    Years of education: Not on file    Highest education level: Not on file    Occupational History    Not on file   Tobacco Use    Smoking status: Every Day     Current packs/day: 0.50     Average packs/day: 1 pack/day for 31.1 years (30.5 ttl pk-yrs)     Types: Cigarettes     Start date: 1994    Smokeless tobacco: Never    Tobacco comments:     Pt quitting, down to 7 cigs per day   Vaping Use    Vaping status: Never Used   Substance and Sexual Activity    Alcohol use: Never    Drug use: Never    Sexual activity: Not on file   Other Topics Concern    Not on file   Social History Narrative    Not on file     Social Drivers of Health     Financial Resource Strain: Not on file   Food Insecurity: Not on file   Transportation Needs: Not on file   Physical Activity: Not on file   Stress: Not on file   Social Connections: Not on file   Interpersonal Safety: Not At Risk (1/14/2025)    Received from [facility]    Humiliation, Afraid, Rape, and Kick questionnaire     Fear of Current or Ex-Partner: No     Emotionally Abused: No     Physically Abused: No     Sexually Abused: No   Housing Stability: Not on file          Medications  Allergies   Scheduled Meds:  Current Outpatient Medications   Medication Sig Dispense Refill    apixaban ANTICOAGULANT (ELIQUIS) 5 MG tablet Take 1 tablet (5 mg) by mouth 2 times daily. (Patient not taking: Reported on 1/29/2025) 180 tablet 3    atorvastatin (LIPITOR) 20 MG tablet Take 1 tablet (20 mg) by mouth at bedtime. 90 tablet 1    irbesartan-hydrochlorothiazide (AVALIDE) 300-12.5 mg per tablet Take 1 tablet by mouth every morning.  1    metoprolol tartrate (LOPRESSOR) 50 MG tablet Take 75 mg by mouth every evening. In addition, patient takes 50 mg in the morning.      metoprolol tartrate (LOPRESSOR) 50 MG tablet Take 50 mg by mouth every morning. In addition, patient takes 75 mg in the evening.      omeprazole (PRILOSEC) 20 MG DR capsule Take 1 capsule (20 mg) by mouth 2 times daily. 60 capsule 0    PARoxetine (PAXIL) 20 MG tablet Take 20 mg by mouth every morning   3    triamcinolone (KENALOG) 0.1 % external ointment Apply topically 2 times daily as needed.      zolpidem (AMBIEN) 10 mg tablet Take 10 mg by mouth nightly as needed for sleep  2    Allergies   Allergen Reactions    Iodine Hives         Lab Results    Chemistry/lipid CBC Cardiac Enzymes/BNP/TSH/INR   Lab Results   Component Value Date    CHOL 118 2024    HDL 29 (L) 2024    TRIG 226 (H) 2024    BUN 18.1 2024     2024    CO2 25 2024    Lab Results   Component Value Date    WBC 11.1 (H) 2024    HGB 7.8 (L) 2024    HCT 26.7 (L) 2024    MCV 77 (L) 2024     2024    @RESUFAST(BMP,CBC,BNP,TSH,  INR)@       The longitudinal plan of care for the diagnosis(es)/condition(s) as documented were addressed during this visit. Due to the added complexity in care, I will continue to support Barry in the subsequent management and with ongoing continuity of care.      This note has been dictated using voice recognition software. Any grammatical, typographical, or context distortions are unintentional and inherent to the software.    Wilian Steward PA-C  Cardiac Electrophysiology  [facility] [place]  Clinic and [phone]  Fax: [phone]  Electrophysiology Nurses: [phone]

## 2025-02-05 ENCOUNTER — OFFICE VISIT (OUTPATIENT)
Dept: CARDIOLOGY | Facility: CLINIC | Age: 70
End: 2025-02-05
Attending: INTERNAL MEDICINE
Payer: COMMERCIAL

## 2025-02-05 VITALS
WEIGHT: 205 LBS | SYSTOLIC BLOOD PRESSURE: 108 MMHG | HEART RATE: 66 BPM | DIASTOLIC BLOOD PRESSURE: 62 MMHG | HEIGHT: 68 IN | RESPIRATION RATE: 16 BRPM | BODY MASS INDEX: 31.07 KG/M2

## 2025-02-05 DIAGNOSIS — I47.10 PAROXYSMAL SUPRAVENTRICULAR TACHYCARDIA: ICD-10-CM

## 2025-02-05 DIAGNOSIS — I48.3 TYPICAL ATRIAL FLUTTER (H): ICD-10-CM

## 2025-02-05 PROCEDURE — G2211 COMPLEX E/M VISIT ADD ON: HCPCS

## 2025-02-05 PROCEDURE — 99214 OFFICE O/P EST MOD 30 MIN: CPT

## 2025-02-05 NOTE — LETTER
2/5/2025    Osmel Iniguez MD  [address]  [place] [address]    RE: Barry QUAN Pierre       Dear Colleague,     I had the pleasure of seeing Barry Jay in the [facility].     [facility]  Cardiac Electrophysiology  [address]  [place] [address]   Office: [phone]  Fax: [phone]     HEART CARE ELECTROPHYSIOLOGY FOLLOW UP    Primary Care: Osmel Iniguez MD      Assessment/Recommendations     Supraventricular tachycardia: symptomatic with diaphoresis and palpitations. No inducible SVT at EPS 3/14/23 - no ablation was performed. Then underwent successful CTI and AVNRT ablation with Dr. Hidalgo 11/8/24. Eliquis was started, metoprolol was continued at that time.     [id number] score of at least 2 for age and HTN    HTN: controlled, continue current regimen    Non-obstructive CAD: follows with general cardiology    Lower GIB, acute anemia: presented to ED few days following ablation for bloody stool, where he was transfused. He underwent outpatient workup with GI as an outpatient, there was no concern.  Since then he had not been taking a blood thinner.  He sees his primary in about a month to follow-up on his anemia and GI bleed.      Plan:  Continue metoprolol as ordered  Discussed with Dr. Hidalgo, would be reasonable to come off his Eliquis given it was a incidental finding of typical atrial flutter which was ablated with a CTI ablation and given his GI bleed.  If he were to develop atrial fibrillation in the future, we would recommend starting Eliquis again.  Watchman consideration in the future if he develops atrial fibrillation.  Follow up general cardiology 6 months post ablation       History of Present Illness/Subjective    Barry Jay is a 69 year old male with past medical history significant for paroxysmal SVT, nonobstructive CAD, and hypertension who presents today for 6-week follow-up of his  SVT ablation with Dr. Hidalgo.    From the heart rhythm perspective, he feels great.  He is thankful to have this taken care of, as he had to call an ambulance at least once for tachycardia.  He has had no palpitations or recurrences since.  I discussed possible ways to monitor his heart rhythm, he is considering getting a [employer] mobile..  He denies chest discomfort, palpitations, shortness of breath, lightheadedness/dizziness, pedal edema, or syncope.    He did have an acute GI bleed which resulted in acute anemia several days after his ablation and starting DOAC.  Has not been on a blood thinner since.  We discussed the risks of being on DOAC versus risk of not.  If he were to develop atrial fibrillation, we would recommend starting on DOAC.  He is agreeable to this plan.  Of note, he is eligible for another co-pay per month for Eliquis.       Data Review     Arrhythmia hx  Sx: Diaphoresis, palpitations  Sx onset: 1/25/2023  Dx/date: 1/25/2023  Rate control: Metoprolol  AAD: None  DCCV: None  Ablation: CTI + AVNRT 11/18/2024 Dr. Hidalgo  [id number]  OAC: eliquis    EKG  11/21/2024: Normal sinus rhythm 60 bpm, RBBB  11/18/2024: Sinus rhythm 75 bpm, incomplete RBBB  11/8/2024: Sinus rhythm with PACs, 90 bpm, RBBB   10/15/2024: Sinus tach 113 bpm, RBBB  Personally reviewed.     ANG 11/8/24:  The left ventricle is normal in size.  Left ventricular function is decreased. The ejection fraction is 45-50%  (mildly reduced).  There is mild global hypokinesia of the left ventricle.  Normal right ventricle size and systolic function.  No thrombus is detected in the left atrial appendage.        I have reviewed and updated the patient's past medical history, allergy list and medication list.          Physical Examination   BMI= There is no height or weight on file to calculate BMI.    Wt Readings from Last 3 Encounters:   01/29/25 90.7 kg (200 lb)   11/21/24 90.7 kg (200 lb)   11/18/24 90.7 kg (200 lb)       Vitals: There  were no vitals taken for this visit.  General   Appearance:   Alert and oriented, in no acute distress.    HEENT:  Normocephalic and atraumatic. Conjunctiva and sclera are clear. Moist oral mucosa.    Neck: No JVP, carotid bruit or obvious thyromegaly.   Lungs:   Respirations unlabored. Clear bilaterally with no rales, rhonchi, or wheezes.     Cardiovascular:   Rhythm is regular. S1 and S2 are normal. No significant murmur is present. Lower extremities demonstrate no significant edema.    Extremities: No cyanosis or clubbing   Skin: Skin is warm, dry, and otherwise intact.   Neurologic: Gait not assessed. Mood and affect appropriate.           Medical History  Surgical History Family History Social History   Past Medical History:   Diagnosis Date     Anxiety      ED (erectile dysfunction)      HTN (hypertension)      Hyperlipidemia      Nonobstructive atherosclerosis of coronary artery      SVT (supraventricular tachycardia)     Past Surgical History:   Procedure Laterality Date     APPENDECTOMY       CHOLECYSTECTOMY       CV CORONARY ANGIOGRAM N/A 4/12/2023    Procedure: Coronary Angiogram;  Surgeon: Teena Laguna MD;  Location: [facility] CV     CV LEFT HEART CATH N/A 4/12/2023    Procedure: Left Heart Catheterization;  Surgeon: Teena Laguna MD;  Location: [facility] LAB CV     EP ABLATION SVT N/A 3/14/2023    Procedure: Ablation Supraventricular Tachycardia;  Surgeon: Lorna Hobson MD;  Location: [facility] CV     EP ABLATION SVT N/A 11/8/2024    Procedure: Ablation Supraventricular Tachycardia;  Surgeon: Ricky Hidalgo MD;  Location: [facility] CV     LAMINECTOMY LUMBAR ONE LEVEL Right 6/27/2022    Procedure: Right Lumbar 4 - lumbar 5 hemilaminectomy, medial facetectomies, foraminotomies;  Surgeon: Shannan Mari MD;  Location: [facility] OR     ORTHOPEDIC SURGERY      Family History   Problem Relation Age of Onset     Hypertension Mother       Cerebrovascular Disease Mother     Social History     Socioeconomic History     Marital status: Single     Spouse name: Not on file     Number of children: Not on file     Years of education: Not on file     Highest education level: Not on file   Occupational History     Not on file   Tobacco Use     Smoking status: Every Day     Current packs/day: 0.50     Average packs/day: 1 pack/day for 31.1 years (30.5 ttl pk-yrs)     Types: Cigarettes     Start date: 1994     Smokeless tobacco: Never     Tobacco comments:     Pt quitting, down to 7 cigs per day   Vaping Use     Vaping status: Never Used   Substance and Sexual Activity     Alcohol use: Never     Drug use: Never     Sexual activity: Not on file   Other Topics Concern     Not on file   Social History Narrative     Not on file     Social Drivers of Health     Financial Resource Strain: Not on file   Food Insecurity: Not on file   Transportation Needs: Not on file   Physical Activity: Not on file   Stress: Not on file   Social Connections: Not on file   Interpersonal Safety: Not At Risk (1/14/2025)    Received from [facility]    Humiliation, Afraid, Rape, and Kick questionnaire      Fear of Current or Ex-Partner: No      Emotionally Abused: No      Physically Abused: No      Sexually Abused: No   Housing Stability: Not on file          Medications  Allergies   Scheduled Meds:  Current Outpatient Medications   Medication Sig Dispense Refill     apixaban ANTICOAGULANT (ELIQUIS) 5 MG tablet Take 1 tablet (5 mg) by mouth 2 times daily. (Patient not taking: Reported on 1/29/2025) 180 tablet 3     atorvastatin (LIPITOR) 20 MG tablet Take 1 tablet (20 mg) by mouth at bedtime. 90 tablet 1     irbesartan-hydrochlorothiazide (AVALIDE) 300-12.5 mg per tablet Take 1 tablet by mouth every morning.  1     metoprolol tartrate (LOPRESSOR) 50 MG tablet Take 75 mg by mouth every evening. In addition, patient takes 50 mg in the morning.       metoprolol tartrate (LOPRESSOR) 50 MG  tablet Take 50 mg by mouth every morning. In addition, patient takes 75 mg in the evening.       omeprazole (PRILOSEC) 20 MG DR capsule Take 1 capsule (20 mg) by mouth 2 times daily. 60 capsule 0     PARoxetine (PAXIL) 20 MG tablet Take 20 mg by mouth every morning  3     triamcinolone (KENALOG) 0.1 % external ointment Apply topically 2 times daily as needed.       zolpidem (AMBIEN) 10 mg tablet Take 10 mg by mouth nightly as needed for sleep  2    Allergies   Allergen Reactions     Iodine Hives         Lab Results    Chemistry/lipid CBC Cardiac Enzymes/BNP/TSH/INR   Lab Results   Component Value Date    CHOL 118 2024    HDL 29 (L) 2024    TRIG 226 (H) 2024    BUN 18.1 2024     2024    CO2 25 2024    Lab Results   Component Value Date    WBC 11.1 (H) 2024    HGB 7.8 (L) 2024    HCT 26.7 (L) 2024    MCV 77 (L) 2024     2024    @RESUFAST(BMP,CBC,BNP,TSH,  INR)@       The longitudinal plan of care for the diagnosis(es)/condition(s) as documented were addressed during this visit. Due to the added complexity in care, I will continue to support Barry in the subsequent management and with ongoing continuity of care.      This note has been dictated using voice recognition software. Any grammatical, typographical, or context distortions are unintentional and inherent to the software.    Wilian Steward PA-C  Cardiac Electrophysiology  [facility]  Clinic and [phone]  Fax: [phone]  Electrophysiology Nurses: [phone]         Thank you for allowing me to participate in the care of your patient.      Sincerely,     Freeman Steward PA-C     [facility]  cc:   Ricky Hidalgo MD  [address]  [place] [address]

## 2025-02-05 NOTE — PATIENT INSTRUCTIONS
Barry Jay,    It was a pleasure to see you today at the [facility].     My recommendations after this visit include:  - continue metoprolol as ordered  - discussed with Dr. Hidalgo, it would be reasonable for you to come off the eliquis (blood thinner) given your history of GI bleed and the typical atrial flutter ablation. If you were to develop atrial fibrillation, then we would want you to be on a blood thinner   - follow up general cardiology BERNY 6 months post ablation  - consider Volly for heart rhythm monitoring     Please do not hesitate to call with additional questions or concerns.     Wilian Steward PA-C  Clinical Cardiac Electrophysiology  [facility]  Clinic and [phone]  Fax: [phone]  Electrophysiology Nurses: [phone]

## 2025-03-20 ENCOUNTER — TELEPHONE (OUTPATIENT)
Dept: PHARMACY | Facility: OTHER | Age: 70
End: 2025-03-20
Payer: COMMERCIAL

## 2025-03-20 NOTE — TELEPHONE ENCOUNTER
Entira patient    [facility] Recruitment: Count includes the [facility]     Referral outreach attempt #1 on March 20, 2025      Outcome: left voicemail- Call back number [phone]    See Cyrus MALDONADO   [phone]

## 2025-04-07 ENCOUNTER — LAB REQUISITION (OUTPATIENT)
Dept: LAB | Facility: CLINIC | Age: 70
End: 2025-04-07

## 2025-04-07 DIAGNOSIS — K92.1 MELENA: ICD-10-CM

## 2025-04-07 LAB
BASOPHILS # BLD AUTO: 0.1 10E3/UL (ref 0–0.2)
BASOPHILS NFR BLD AUTO: 1 %
EOSINOPHIL # BLD AUTO: 0.2 10E3/UL (ref 0–0.7)
EOSINOPHIL NFR BLD AUTO: 2 %
ERYTHROCYTE [DISTWIDTH] IN BLOOD BY AUTOMATED COUNT: 16.3 % (ref 10–15)
HCT VFR BLD AUTO: 39.4 % (ref 40–53)
HGB BLD-MCNC: 12.1 G/DL (ref 13.3–17.7)
IMM GRANULOCYTES # BLD: 0 10E3/UL
IMM GRANULOCYTES NFR BLD: 0 %
LYMPHOCYTES # BLD AUTO: 2.1 10E3/UL (ref 0.8–5.3)
LYMPHOCYTES NFR BLD AUTO: 28 %
MCH RBC QN AUTO: 24.5 PG (ref 26.5–33)
MCHC RBC AUTO-ENTMCNC: 30.7 G/DL (ref 31.5–36.5)
MCV RBC AUTO: 80 FL (ref 78–100)
MONOCYTES # BLD AUTO: 0.7 10E3/UL (ref 0–1.3)
MONOCYTES NFR BLD AUTO: 9 %
NEUTROPHILS # BLD AUTO: 4.4 10E3/UL (ref 1.6–8.3)
NEUTROPHILS NFR BLD AUTO: 59 %
NRBC # BLD AUTO: 0 10E3/UL
NRBC BLD AUTO-RTO: 0 /100
PLATELET # BLD AUTO: 310 10E3/UL (ref 150–450)
RBC # BLD AUTO: 4.94 10E6/UL (ref 4.4–5.9)
WBC # BLD AUTO: 7.4 10E3/UL (ref 4–11)

## 2025-04-07 PROCEDURE — 85025 COMPLETE CBC W/AUTO DIFF WBC: CPT | Performed by: FAMILY MEDICINE

## 2025-04-16 NOTE — PROGRESS NOTES
Assessment:     Diagnoses and all orders for this visit:  Right lumbar radiculopathy  -     MR Lumbar Spine w/o & w Contrast; Future  -     XR Lumbar Spine G/E 4 Views; Future  Chronic bilateral low back pain with right-sided sciatica  -     MR Lumbar Spine w/o & w Contrast; Future  -     XR Lumbar Spine G/E 4 Views; Future  Lumbar foraminal stenosis  -     MR Lumbar Spine w/o & w Contrast; Future  -     XR Lumbar Spine G/E 4 Views; Future  History of lumbar surgery  -     MR Lumbar Spine w/o & w Contrast; Future  -     XR Lumbar Spine G/E 4 Views; Future  Claustrophobia  -     MR Lumbar Spine w/o & w Contrast; Future  -     XR Lumbar Spine G/E 4 Views; Future     Barry Jay is a 70 year old y.o. male with past medical history significant for hypertension, dyslipidemia, hand osteoarthritis, arthralgias, history lumbar surgery who presents today for follow-up regarding:    - Flareup of chronic right low back pain with right lumbar radiculopathy with pain and paresthesias into the right foot.     Plan:     A shared decision making plan was used. The patient's values and choices were respected. Prior medical records were reviewed today. The following represents what was discussed and decided upon by the provider and the patient.        -DIAGNOSTIC TESTS: Images were personally reviewed and interpreted.   --Ordered updated lumbar spine MRI with [facility] open laying MRI due to claustrophobia.  --Ordered flexion-extension x-ray to be completed with [facility] to evaluate spine stability.  --Bilateral upper extremity EMG with neurology 1/23/2025, abnormal EMG, with mild to moderate right and mild left bilateral carpal tunnel syndrome.  --Bilateral lower extremity EMG 8/3/2023 with potentially mild chronic right L4 and L5 radiculopathy.  No clear acute radiculopathy.  No peripheral neuropathy noted.  -- Postsurgical lumbar spine MRI with and without IV contrast 8/12/2022 with right hemilaminectomy/medial facetectomy  Referral received from Dr Jones changes at L4-5 with improvement in nerve compression on the right lateral recess.  Persistent mild to moderate right and mild left foraminal stenosis L4-5.  L3-4 mild spinal stenosis with mild bilateral foraminal stenosis.  L5-S1 minimal foraminal stenosis.  --Lumbar spine MRI [facility] 5/10/2022 with transitional lumbosacral anatomy with partially sacralized L5.  L4-5 grade 1 spondylolisthesis, 3 mm, with annular bulging with advanced facet arthropathy with mild to moderate right and mild left foraminal stenosis, right L4 nerve root impingement.  L3-4 1 to 2 mm spondylolisthesis with facet arthropathy with mild left greater than right foraminal stenosis.  --Lumbar spine MRI 1/24/2020 [facility] with advanced facet arthropathy L4-5 right greater than left and facet arthropathy L3-4 moderate bilaterally.  Grade 1 spondylolisthesis L4-5 with right greater than left L5 impingement.  --Lumbar spine flexion-extension x-ray 2/3/2020 was 0.6 cm spondylolisthesis L4-5, no instability noted.  --[facility] lumbar spine MRI 1/17/2019 shows severe facet arthropathy L4-5 with 5 mm spondylolisthesis, mild central and subarticular recess stenosis, mild right foraminal stenosis.  Mild facet arthropathy L3-4 and L5-S1.    -INTERVENTIONS: Consider right lumbar REGI, potential L4-5, versus surgical evaluation pending imaging review.  -- Down the road if he is not a surgical candidate again we could consider spinal cord stimulator.  We did discuss this today as well.    -MEDICATIONS: No changes in medications today  Discussed side effects of medications and proper use. Patient verbalized understanding.    -PHYSICAL THERAPY: Encourage patient to continue with home exercises from recent physical therapy sessions as tolerated.  Discussed the importance of core strengthening, ROM, stretching exercises with the patient and how each of these entities is important in decreasing pain.  Explained to the patient that the purpose of physical therapy is to teach  the patient a home exercise program.  These exercises need to be performed every day in order to decrease pain and prevent future occurrences of pain.        -PATIENT EDUCATION:  Total time of 44 minutes, on the day of service, spent with the patient, reviewing the chart, placing orders, and documenting.     -FOLLOW UP: Follow-up nurse call for imaging results.  Did advise patient since [facility] is external that he if he does not hear from us within a couple of days after completing his MRI, he should call us and let us know that he had the images completed.  Advised to contact clinic if symptoms worsen or change.    Subjective:     Barry Jay is a 70 year old male who presents today for follow-up regarding chronic right low back pain that radiates into the right lateral and anterior thigh and lateral shin with associated numbness tingling into the right foot as well that he reports has been ongoing since 2023.  He reports that the pain has been worse recently however the last 2 to 3 weeks, currently pain is an 8/10, and 9 at its worst.  Prior to this recent flareup symptoms have still been there but were more tolerable and manageable but it still quite bothersome on a regular basis.  He currently denies left leg symptoms.  Denies any recent trips or falls or balance changes.  Denies bowel or bladder loss control, denies saddle anesthesia.    *Patient was evaluated again by Dr. Mari 6/15/2023 who recommended updated MRI.     Treatment to Date:   Right L4-5 hemilaminectomy, medial facetectomy, foraminotomy Dr. Mari 6/27/2022  Physical therapy optimum x3 sessions.  Patient continues with home exercises at this time on a daily basis.     History of bilateral L4-5 RFA [place] [facility] Dr. Green 2011 and 2013, relief at least 2 years.  Bilateral L3-4 MBB 6/30/2017.  Preprocedure pain 7/10 post 2/10.  Bilateral L4-5 facet joint steroid injection 7/7/2017 with relief.  Preprocedure pain 8/10, post 5/10.  Right L4-5 and  L5-S1 TFESI 1/6/2020 with 30% lasting benefit only.  Preprocedure pain 10/10, post 7/10.  Bilateral L2, L3, L4 MBB 3/11/2020 and 7/7/2020 with positive response.  Bilateral L2, L3, L4 RFA 10/9/2020 with 90% significant relief x 8 months.  Right L4-5 TFESI 8/31/2021 with 100% relief acute right LBP and right lumbar radiculitis for 6 months, minimal relief with chronic bilateral LBP however.  Pre and post procedure pain 9/10.  Bilateral L2, L3, L4 RFA 10/19/2021 with significant relief.  Preprocedure pain 8/10, post 0/10.  Right SI joint steroid injection 5/6/2022 with minimal initial and no lasting benefit.  Preprocedure pain 10/10, post 5/10.  Postsurgical injections:  Right L5-S1 TFESI 8/24/2022 with resolution of right leg pain.  Ordered by neurosurgery.  Preprocedure pain 8/10, post 7/10.  Bilateral L2, L3, L4 RFA 10/12/2022 with significant relief x6 months.  Right L5-S1 TFESI 5/16/2023 with no lasting benefit.  Bilateral L2, L3, L4 RFA 6/1/2023 with no lasting relief.  Right L4-5 TFESI 9/13/2023 with over 50% relief for 6 months  Bilateral L4-5 TFESI 8/23/2024 with minimal lasting relief at 10%.     Medications:  Meloxicam    Patient Active Problem List   Diagnosis    Dyslipidemia, goal LDL below 70    Hypertension    Generalized Osteoarthritis Of The Hand    Osteoarthritis Of The Knee    Arthralgias In Multiple Sites    Paroxysmal supraventricular tachycardia    Coronary artery disease involving native coronary artery of native heart with angina pectoris    Dyspnea on exertion    Diabetes mellitus, type 2 (H)    Typical atrial flutter (H)       Current Outpatient Medications   Medication Sig Dispense Refill    atorvastatin (LIPITOR) 20 MG tablet Take 1 tablet (20 mg) by mouth at bedtime. 90 tablet 1    irbesartan-hydrochlorothiazide (AVALIDE) 300-12.5 mg per tablet Take 1 tablet by mouth every morning.  1    metoprolol tartrate (LOPRESSOR) 50 MG tablet Take 75 mg by mouth every evening. In addition, patient  "takes 50 mg in the morning.      metoprolol tartrate (LOPRESSOR) 50 MG tablet Take 50 mg by mouth every morning. In addition, patient takes 75 mg in the evening.      omeprazole (PRILOSEC) 20 MG DR capsule Take 1 capsule (20 mg) by mouth 2 times daily. 60 capsule 0    PARoxetine (PAXIL) 20 MG tablet Take 20 mg by mouth every morning  3    triamcinolone (KENALOG) 0.1 % external ointment Apply topically 2 times daily as needed.      zolpidem (AMBIEN) 10 mg tablet Take 10 mg by mouth nightly as needed for sleep  2     No current facility-administered medications for this visit.       Allergies   Allergen Reactions    Iodine Hives       Past Medical History:   Diagnosis Date    Anxiety     ED (erectile dysfunction)     HTN (hypertension)     Hyperlipidemia     Nonobstructive atherosclerosis of coronary artery     SVT (supraventricular tachycardia)         Review of Systems  ROS:  Specifically negative for bowel/bladder dysfunction, balance changes, headache, dizziness, foot drop, fevers, chills, appetite changes, nausea/vomiting, unexplained weight loss. Otherwise 13 systems reviewed are negative. Please see the patient's intake questionnaire from today for details.    Reviewed Social, Family, Past Medical and Past Surgical history with patient, no significant changes noted since prior visit.     Objective:     BP (!) 146/72 (BP Location: Left arm, Patient Position: Sitting, Cuff Size: Adult Large)   Pulse 74   Ht 5' 8\" (1.727 m)   Wt 205 lb (93 kg)   BMI 31.17 kg/m      PHYSICAL EXAMINATION:    --CONSTITUTIONAL: Well developed, well nourished, healthy appearing individual.  --PSYCHIATRIC: Appropriate mood and affect. No difficulty interacting due to temper, social withdrawal, or memory issues.  --SKIN: Lumbar region is dry and intact.   --RESPIRATORY: Normal rhythm and effort. No abnormal accessory muscle breathing patterns noted.   --MUSCULOSKELETAL:  Normal lumbar lordosis noted, no lateral shift.  --GROSS MOTOR: " Easily arises from a seated position. Gait is non-antalgic  --LUMBAR SPINE:  Inspection reveals no evidence of deformity.     RESULTS:   Imaging: Spine imaging was reviewed today. The images were shown to the patient and the findings were explained using a spine model.      Lumbar spine MRI reviewed

## 2025-04-17 ENCOUNTER — OFFICE VISIT (OUTPATIENT)
Dept: PHYSICAL MEDICINE AND REHAB | Facility: CLINIC | Age: 70
End: 2025-04-17
Payer: COMMERCIAL

## 2025-04-17 VITALS
HEIGHT: 68 IN | DIASTOLIC BLOOD PRESSURE: 72 MMHG | HEART RATE: 74 BPM | WEIGHT: 205 LBS | BODY MASS INDEX: 31.07 KG/M2 | SYSTOLIC BLOOD PRESSURE: 146 MMHG

## 2025-04-17 DIAGNOSIS — M48.061 LUMBAR FORAMINAL STENOSIS: ICD-10-CM

## 2025-04-17 DIAGNOSIS — Z98.890 HISTORY OF LUMBAR SURGERY: ICD-10-CM

## 2025-04-17 DIAGNOSIS — M54.16 RIGHT LUMBAR RADICULOPATHY: Primary | ICD-10-CM

## 2025-04-17 DIAGNOSIS — G89.29 CHRONIC BILATERAL LOW BACK PAIN WITH RIGHT-SIDED SCIATICA: ICD-10-CM

## 2025-04-17 DIAGNOSIS — M54.41 CHRONIC BILATERAL LOW BACK PAIN WITH RIGHT-SIDED SCIATICA: ICD-10-CM

## 2025-04-17 DIAGNOSIS — F40.240 CLAUSTROPHOBIA: ICD-10-CM

## 2025-04-17 ASSESSMENT — PAIN SCALES - GENERAL: PAINLEVEL_OUTOF10: SEVERE PAIN (8)

## 2025-04-17 NOTE — PATIENT INSTRUCTIONS
~Spine Center Scheduling #[phone].  ~Please call our [facility] Spine Nurse Navigation #[phone] with any questions or concerns about your treatment plan, if symptoms worsen and you would like to be seen urgently, or if you have problems controlling bladder and bowel function.  ~For any future flareups or new symptoms, recommend follow-up in clinic or contact the nurse navigator line.  ~Please note that any My Chart messages may take multiple days for a response due to the high volume of patients seen in clinic.  Anything sent Thursday night or after will be answered the following week when able, as Claudia Baptiste CNP does not work in clinic on .   ~Claudia Baptiste CNP is at the [facility] on , otherwise primarily at the [facility].       Please call [facility] to schedule your image, [phone].     [facility] [place] [facility]  [address] [place] [address]   [address]   [place] [address]  [phone]   Fax: [phone]

## 2025-04-17 NOTE — LETTER
4/17/2025      Barry Jay  [address] [facility] [address]      Dear Colleague,    Thank you for referring your patient, Barry Jay, to the [facility]. Please see a copy of my visit note below.      Assessment:     Diagnoses and all orders for this visit:  Right lumbar radiculopathy  -     MR Lumbar Spine w/o & w Contrast; Future  -     XR Lumbar Spine G/E 4 Views; Future  Chronic bilateral low back pain with right-sided sciatica  -     MR Lumbar Spine w/o & w Contrast; Future  -     XR Lumbar Spine G/E 4 Views; Future  Lumbar foraminal stenosis  -     MR Lumbar Spine w/o & w Contrast; Future  -     XR Lumbar Spine G/E 4 Views; Future  History of lumbar surgery  -     MR Lumbar Spine w/o & w Contrast; Future  -     XR Lumbar Spine G/E 4 Views; Future  Claustrophobia  -     MR Lumbar Spine w/o & w Contrast; Future  -     XR Lumbar Spine G/E 4 Views; Future     Barry Jay is a 70 year old y.o. male with past medical history significant for hypertension, dyslipidemia, hand osteoarthritis, arthralgias, history lumbar surgery who presents today for follow-up regarding:    - Flareup of chronic right low back pain with right lumbar radiculopathy with pain and paresthesias into the right foot.     Plan:     A shared decision making plan was used. The patient's values and choices were respected. Prior medical records were reviewed today. The following represents what was discussed and decided upon by the provider and the patient.        -DIAGNOSTIC TESTS: Images were personally reviewed and interpreted.   --Ordered updated lumbar spine MRI with [facility] open laying MRI due to claustrophobia.  --Ordered flexion-extension x-ray to be completed with [facility] to evaluate spine stability.  --Bilateral upper extremity EMG with neurology 1/23/2025, abnormal EMG, with mild to moderate right and mild left bilateral carpal tunnel syndrome.  --Bilateral lower extremity EMG 8/3/2023 with  potentially mild chronic right L4 and L5 radiculopathy.  No clear acute radiculopathy.  No peripheral neuropathy noted.  -- Postsurgical lumbar spine MRI with and without IV contrast 8/12/2022 with right hemilaminectomy/medial facetectomy changes at L4-5 with improvement in nerve compression on the right lateral recess.  Persistent mild to moderate right and mild left foraminal stenosis L4-5.  L3-4 mild spinal stenosis with mild bilateral foraminal stenosis.  L5-S1 minimal foraminal stenosis.  --Lumbar spine MRI [facility] 5/10/2022 with transitional lumbosacral anatomy with partially sacralized L5.  L4-5 grade 1 spondylolisthesis, 3 mm, with annular bulging with advanced facet arthropathy with mild to moderate right and mild left foraminal stenosis, right L4 nerve root impingement.  L3-4 1 to 2 mm spondylolisthesis with facet arthropathy with mild left greater than right foraminal stenosis.  --Lumbar spine MRI 1/24/2020 [facility] with advanced facet arthropathy L4-5 right greater than left and facet arthropathy L3-4 moderate bilaterally.  Grade 1 spondylolisthesis L4-5 with right greater than left L5 impingement.  --Lumbar spine flexion-extension x-ray 2/3/2020 was 0.6 cm spondylolisthesis L4-5, no instability noted.  --[facility] lumbar spine MRI 1/17/2019 shows severe facet arthropathy L4-5 with 5 mm spondylolisthesis, mild central and subarticular recess stenosis, mild right foraminal stenosis.  Mild facet arthropathy L3-4 and L5-S1.    -INTERVENTIONS: Consider right lumbar REGI, potential L4-5, versus surgical evaluation pending imaging review.  -- Down the road if he is not a surgical candidate again we could consider spinal cord stimulator.  We did discuss this today as well.    -MEDICATIONS: No changes in medications today  Discussed side effects of medications and proper use. Patient verbalized understanding.    -PHYSICAL THERAPY: Encourage patient to continue with home exercises from recent physical therapy sessions as  tolerated.  Discussed the importance of core strengthening, ROM, stretching exercises with the patient and how each of these entities is important in decreasing pain.  Explained to the patient that the purpose of physical therapy is to teach the patient a home exercise program.  These exercises need to be performed every day in order to decrease pain and prevent future occurrences of pain.        -PATIENT EDUCATION:  Total time of 44 minutes, on the day of service, spent with the patient, reviewing the chart, placing orders, and documenting.     -FOLLOW UP: Follow-up nurse call for imaging results.  Did advise patient since [facility] is external that he if he does not hear from us within a couple of days after completing his MRI, he should call us and let us know that he had the images completed.  Advised to contact clinic if symptoms worsen or change.    Subjective:     Barry Jay is a 70 year old male who presents today for follow-up regarding chronic right low back pain that radiates into the right lateral and anterior thigh and lateral shin with associated numbness tingling into the right foot as well that he reports has been ongoing since 2023.  He reports that the pain has been worse recently however the last 2 to 3 weeks, currently pain is an 8/10, and 9 at its worst.  Prior to this recent flareup symptoms have still been there but were more tolerable and manageable but it still quite bothersome on a regular basis.  He currently denies left leg symptoms.  Denies any recent trips or falls or balance changes.  Denies bowel or bladder loss control, denies saddle anesthesia.    *Patient was evaluated again by Dr. Mari 6/15/2023 who recommended updated MRI.     Treatment to Date:   Right L4-5 hemilaminectomy, medial facetectomy, foraminotomy Dr. Mari 6/27/2022  Physical therapy optimum x3 sessions.  Patient continues with home exercises at this time on a daily basis.     History of bilateral L4-5 RFA [place]  [facility] Dr. Green 2011 and 2013, relief at least 2 years.  Bilateral L3-4 MBB 6/30/2017.  Preprocedure pain 7/10 post 2/10.  Bilateral L4-5 facet joint steroid injection 7/7/2017 with relief.  Preprocedure pain 8/10, post 5/10.  Right L4-5 and L5-S1 TFESI 1/6/2020 with 30% lasting benefit only.  Preprocedure pain 10/10, post 7/10.  Bilateral L2, L3, L4 MBB 3/11/2020 and 7/7/2020 with positive response.  Bilateral L2, L3, L4 RFA 10/9/2020 with 90% significant relief x 8 months.  Right L4-5 TFESI 8/31/2021 with 100% relief acute right LBP and right lumbar radiculitis for 6 months, minimal relief with chronic bilateral LBP however.  Pre and post procedure pain 9/10.  Bilateral L2, L3, L4 RFA 10/19/2021 with significant relief.  Preprocedure pain 8/10, post 0/10.  Right SI joint steroid injection 5/6/2022 with minimal initial and no lasting benefit.  Preprocedure pain 10/10, post 5/10.  Postsurgical injections:  Right L5-S1 TFESI 8/24/2022 with resolution of right leg pain.  Ordered by neurosurgery.  Preprocedure pain 8/10, post 7/10.  Bilateral L2, L3, L4 RFA 10/12/2022 with significant relief x6 months.  Right L5-S1 TFESI 5/16/2023 with no lasting benefit.  Bilateral L2, L3, L4 RFA 6/1/2023 with no lasting relief.  Right L4-5 TFESI 9/13/2023 with over 50% relief for 6 months  Bilateral L4-5 TFESI 8/23/2024 with minimal lasting relief at 10%.     Medications:  Meloxicam    Patient Active Problem List   Diagnosis     Dyslipidemia, goal LDL below 70     Hypertension     Generalized Osteoarthritis Of The Hand     Osteoarthritis Of The Knee     Arthralgias In Multiple Sites     Paroxysmal supraventricular tachycardia     Coronary artery disease involving native coronary artery of native heart with angina pectoris     Dyspnea on exertion     Diabetes mellitus, type 2 (H)     Typical atrial flutter (H)       Current Outpatient Medications   Medication Sig Dispense Refill     atorvastatin (LIPITOR) 20 MG tablet Take 1 tablet  "(20 mg) by mouth at bedtime. 90 tablet 1     irbesartan-hydrochlorothiazide (AVALIDE) 300-12.5 mg per tablet Take 1 tablet by mouth every morning.  1     metoprolol tartrate (LOPRESSOR) 50 MG tablet Take 75 mg by mouth every evening. In addition, patient takes 50 mg in the morning.       metoprolol tartrate (LOPRESSOR) 50 MG tablet Take 50 mg by mouth every morning. In addition, patient takes 75 mg in the evening.       omeprazole (PRILOSEC) 20 MG DR capsule Take 1 capsule (20 mg) by mouth 2 times daily. 60 capsule 0     PARoxetine (PAXIL) 20 MG tablet Take 20 mg by mouth every morning  3     triamcinolone (KENALOG) 0.1 % external ointment Apply topically 2 times daily as needed.       zolpidem (AMBIEN) 10 mg tablet Take 10 mg by mouth nightly as needed for sleep  2     No current facility-administered medications for this visit.       Allergies   Allergen Reactions     Iodine Hives       Past Medical History:   Diagnosis Date     Anxiety      ED (erectile dysfunction)      HTN (hypertension)      Hyperlipidemia      Nonobstructive atherosclerosis of coronary artery      SVT (supraventricular tachycardia)         Review of Systems  ROS:  Specifically negative for bowel/bladder dysfunction, balance changes, headache, dizziness, foot drop, fevers, chills, appetite changes, nausea/vomiting, unexplained weight loss. Otherwise 13 systems reviewed are negative. Please see the patient's intake questionnaire from today for details.    Reviewed Social, Family, Past Medical and Past Surgical history with patient, no significant changes noted since prior visit.     Objective:     BP (!) 146/72 (BP Location: Left arm, Patient Position: Sitting, Cuff Size: Adult Large)   Pulse 74   Ht 5' 8\" (1.727 m)   Wt 205 lb (93 kg)   BMI 31.17 kg/m      PHYSICAL EXAMINATION:    --CONSTITUTIONAL: Well developed, well nourished, healthy appearing individual.  --PSYCHIATRIC: Appropriate mood and affect. No difficulty interacting due to " temper, social withdrawal, or memory issues.  --SKIN: Lumbar region is dry and intact.   --RESPIRATORY: Normal rhythm and effort. No abnormal accessory muscle breathing patterns noted.   --MUSCULOSKELETAL:  Normal lumbar lordosis noted, no lateral shift.  --GROSS MOTOR: Easily arises from a seated position. Gait is non-antalgic  --LUMBAR SPINE:  Inspection reveals no evidence of deformity.     RESULTS:   Imaging: Spine imaging was reviewed today. The images were shown to the patient and the findings were explained using a spine model.      Lumbar spine MRI reviewed                        Again, thank you for allowing me to participate in the care of your patient.        Sincerely,        Claudia Baptiste CNP    Electronically signed

## 2025-04-29 ENCOUNTER — HOSPITAL ENCOUNTER (OUTPATIENT)
Dept: RADIOLOGY | Facility: HOSPITAL | Age: 70
Discharge: HOME | End: 2025-04-29
Attending: NURSE PRACTITIONER
Payer: COMMERCIAL

## 2025-04-29 ENCOUNTER — HOSPITAL ENCOUNTER (OUTPATIENT)
Dept: MRI IMAGING | Facility: HOSPITAL | Age: 70
Discharge: HOME | End: 2025-04-29
Attending: NURSE PRACTITIONER
Payer: COMMERCIAL

## 2025-04-29 DIAGNOSIS — F40.240 CLAUSTROPHOBIA: ICD-10-CM

## 2025-04-29 DIAGNOSIS — M54.41 CHRONIC BILATERAL LOW BACK PAIN WITH RIGHT-SIDED SCIATICA: ICD-10-CM

## 2025-04-29 DIAGNOSIS — Z98.890 HISTORY OF LUMBAR SURGERY: ICD-10-CM

## 2025-04-29 DIAGNOSIS — G89.29 CHRONIC BILATERAL LOW BACK PAIN WITH RIGHT-SIDED SCIATICA: ICD-10-CM

## 2025-04-29 DIAGNOSIS — M48.061 LUMBAR FORAMINAL STENOSIS: ICD-10-CM

## 2025-04-29 DIAGNOSIS — M54.16 RIGHT LUMBAR RADICULOPATHY: ICD-10-CM

## 2025-04-29 PROCEDURE — 72110 X-RAY EXAM L-2 SPINE 4/>VWS: CPT

## 2025-04-29 PROCEDURE — 255N000002 HC RX 255 OP 636: Performed by: NURSE PRACTITIONER

## 2025-04-29 PROCEDURE — 72158 MRI LUMBAR SPINE W/O & W/DYE: CPT

## 2025-04-29 PROCEDURE — A9585 GADOBUTROL INJECTION: HCPCS | Performed by: NURSE PRACTITIONER

## 2025-04-29 RX ORDER — GADOBUTROL 604.72 MG/ML
0.1 INJECTION INTRAVENOUS ONCE
Status: COMPLETED | OUTPATIENT
Start: 2025-04-29 | End: 2025-04-29

## 2025-04-29 RX ADMIN — GADOBUTROL 9.3 ML: 604.72 INJECTION INTRAVENOUS at 16:34

## 2025-05-01 ENCOUNTER — TELEPHONE (OUTPATIENT)
Dept: PHYSICAL MEDICINE AND REHAB | Facility: CLINIC | Age: 70
End: 2025-05-01
Payer: COMMERCIAL

## 2025-05-01 NOTE — TELEPHONE ENCOUNTER
Patient returned call. Results given and explained. Discussed the recommended injections. Patient reports he has had injections in the past that have not been helpful. He wants to speak to the surgeon to see what his options are in regards to the scar tissue etc.

## 2025-05-01 NOTE — TELEPHONE ENCOUNTER
----- Message from Claudia Baptiste sent at 5/1/2025  9:25 AM CDT -----  Please call patient and notify him that I did review his updated spine imaging.  He does have postsurgical changes at L4-5 with scar tissue on the right causing right L5 nerve compression which could be contributing to his pain.  There is also moderate to severe right facet joint arthritis at L5-S1.  I would recommend trialing a right L4-5 and L5-S1 transforaminal epidural steroid injection to see if we can improve his pain.  If things are not improving with injections and physical therapy we could consider surgical evaluation.  Okay to schedule injection ASAP.  Thanks,  Claudia

## 2025-06-12 ENCOUNTER — TELEPHONE (OUTPATIENT)
Dept: NEUROSURGERY | Facility: CLINIC | Age: 70
End: 2025-06-12
Payer: COMMERCIAL

## 2025-06-19 ENCOUNTER — TELEPHONE (OUTPATIENT)
Dept: CARDIOLOGY | Facility: CLINIC | Age: 70
End: 2025-06-19

## 2025-06-19 DIAGNOSIS — R00.2 PALPITATIONS: ICD-10-CM

## 2025-06-19 DIAGNOSIS — I48.3 TYPICAL ATRIAL FLUTTER (H): ICD-10-CM

## 2025-06-19 DIAGNOSIS — I47.10 PAROXYSMAL SUPRAVENTRICULAR TACHYCARDIA: Primary | ICD-10-CM

## 2025-06-19 RX ORDER — METOPROLOL TARTRATE 50 MG
50 TABLET ORAL EVERY MORNING
Qty: 90 TABLET | Refills: 2 | Status: SHIPPED | OUTPATIENT
Start: 2025-06-19

## 2025-06-19 RX ORDER — METOPROLOL TARTRATE 50 MG
75 TABLET ORAL EVERY EVENING
Qty: 135 TABLET | Refills: 2 | Status: SHIPPED | OUTPATIENT
Start: 2025-06-19

## 2025-06-19 NOTE — TELEPHONE ENCOUNTER
Phone call received by [facility] pharmacist regarding pts metoprolol, he stated they have been trying to get a refill but did not have the correct MD information to send request too.      Reviewed Wilian's last office visit- metoprolol to be continued with no changes.  Will send in refills at this time.    Wendi

## 2025-07-07 ENCOUNTER — LAB REQUISITION (OUTPATIENT)
Dept: LAB | Facility: CLINIC | Age: 70
End: 2025-07-07

## 2025-07-07 DIAGNOSIS — I10 ESSENTIAL (PRIMARY) HYPERTENSION: ICD-10-CM

## 2025-07-07 DIAGNOSIS — E78.2 MIXED HYPERLIPIDEMIA: ICD-10-CM

## 2025-07-07 DIAGNOSIS — R97.20 ELEVATED PROSTATE SPECIFIC ANTIGEN (PSA): ICD-10-CM

## 2025-07-07 DIAGNOSIS — K92.1 MELENA: ICD-10-CM

## 2025-07-07 LAB
BASOPHILS # BLD AUTO: 0.1 10E3/UL (ref 0–0.2)
BASOPHILS NFR BLD AUTO: 1 %
EOSINOPHIL # BLD AUTO: 0.1 10E3/UL (ref 0–0.7)
EOSINOPHIL NFR BLD AUTO: 2 %
ERYTHROCYTE [DISTWIDTH] IN BLOOD BY AUTOMATED COUNT: 17.8 % (ref 10–15)
HCT VFR BLD AUTO: 41.4 % (ref 40–53)
HGB BLD-MCNC: 12.7 G/DL (ref 13.3–17.7)
IMM GRANULOCYTES # BLD: 0 10E3/UL
IMM GRANULOCYTES NFR BLD: 0 %
LYMPHOCYTES # BLD AUTO: 2 10E3/UL (ref 0.8–5.3)
LYMPHOCYTES NFR BLD AUTO: 23 %
MCH RBC QN AUTO: 24.1 PG (ref 26.5–33)
MCHC RBC AUTO-ENTMCNC: 30.7 G/DL (ref 31.5–36.5)
MCV RBC AUTO: 79 FL (ref 78–100)
MONOCYTES # BLD AUTO: 0.8 10E3/UL (ref 0–1.3)
MONOCYTES NFR BLD AUTO: 9 %
NEUTROPHILS # BLD AUTO: 5.7 10E3/UL (ref 1.6–8.3)
NEUTROPHILS NFR BLD AUTO: 65 %
NRBC # BLD AUTO: 0 10E3/UL
NRBC BLD AUTO-RTO: 0 /100
PLATELET # BLD AUTO: 226 10E3/UL (ref 150–450)
RBC # BLD AUTO: 5.26 10E6/UL (ref 4.4–5.9)
WBC # BLD AUTO: 8.8 10E3/UL (ref 4–11)

## 2025-07-07 PROCEDURE — 80061 LIPID PANEL: CPT | Performed by: FAMILY MEDICINE

## 2025-07-07 PROCEDURE — 84153 ASSAY OF PSA TOTAL: CPT | Performed by: FAMILY MEDICINE

## 2025-07-07 PROCEDURE — 82040 ASSAY OF SERUM ALBUMIN: CPT | Performed by: FAMILY MEDICINE

## 2025-07-07 PROCEDURE — 85025 COMPLETE CBC W/AUTO DIFF WBC: CPT | Performed by: FAMILY MEDICINE

## 2025-07-08 LAB
ALBUMIN SERPL BCG-MCNC: 4.1 G/DL (ref 3.5–5.2)
ALP SERPL-CCNC: 131 U/L (ref 40–150)
ALT SERPL W P-5'-P-CCNC: 32 U/L (ref 0–70)
ANION GAP SERPL CALCULATED.3IONS-SCNC: 14 MMOL/L (ref 7–15)
AST SERPL W P-5'-P-CCNC: 25 U/L (ref 0–45)
BILIRUB SERPL-MCNC: 0.5 MG/DL
BUN SERPL-MCNC: 15.5 MG/DL (ref 8–23)
CALCIUM SERPL-MCNC: 9.5 MG/DL (ref 8.8–10.4)
CHLORIDE SERPL-SCNC: 102 MMOL/L (ref 98–107)
CHOLEST SERPL-MCNC: 125 MG/DL
CREAT SERPL-MCNC: 0.99 MG/DL (ref 0.67–1.17)
EGFRCR SERPLBLD CKD-EPI 2021: 83 ML/MIN/1.73M2
FASTING STATUS PATIENT QL REPORTED: ABNORMAL
FASTING STATUS PATIENT QL REPORTED: ABNORMAL
GLUCOSE SERPL-MCNC: 211 MG/DL (ref 70–99)
HCO3 SERPL-SCNC: 21 MMOL/L (ref 22–29)
HDLC SERPL-MCNC: 25 MG/DL
LDLC SERPL CALC-MCNC: 51 MG/DL
NONHDLC SERPL-MCNC: 100 MG/DL
POTASSIUM SERPL-SCNC: 4.1 MMOL/L (ref 3.4–5.3)
PROT SERPL-MCNC: 7.5 G/DL (ref 6.4–8.3)
PSA SERPL DL<=0.01 NG/ML-MCNC: 6.19 NG/ML (ref 0–4.5)
SODIUM SERPL-SCNC: 137 MMOL/L (ref 135–145)
TRIGL SERPL-MCNC: 244 MG/DL

## 2025-08-30 DIAGNOSIS — M54.16 RIGHT LUMBAR RADICULOPATHY: ICD-10-CM

## 2025-09-02 RX ORDER — CELECOXIB 100 MG/1
100 CAPSULE ORAL 2 TIMES DAILY
Qty: 60 CAPSULE | Refills: 3 | Status: SHIPPED | OUTPATIENT
Start: 2025-09-02

## (undated) DEVICE — CATH, QUADRAPOLAR DEFLECTABLE EP 5MM SPACING REPROCESSED

## (undated) DEVICE — INTRO GLIDESHEATH SLENDER 6FR 10X45CM 60-1060

## (undated) DEVICE — 8F SOUNDSTAR ECO ULTRASOUND CATHETER

## (undated) DEVICE — CATH ABLATION 8FR 115CM F-J CURVE BI-DIR QDOT MICRO D139504

## (undated) DEVICE — CATH EP INQUIRY DECAPOLAR 6FR X 110CM LG CRV

## (undated) DEVICE — SU MONOCRYL+ 4-0 18IN PS2 UND MCP496G

## (undated) DEVICE — TRAY PREP DRY SKIN SCRUB 067

## (undated) DEVICE — SLEEVE TR BAND RADIAL COMPRESSION DEVICE 24CM TRB24-REG

## (undated) DEVICE — PITCHER STERILE 1000ML  SSK9004A

## (undated) DEVICE — SOL NACL 0.9% IRRIG 1000ML BOTTLE 2F7124

## (undated) DEVICE — CUSTOM PACK CORONARY SAN5BCRHEA

## (undated) DEVICE — INTRO SHEATH 8FRX10CM PINNACLE RSS802

## (undated) DEVICE — CATH EP WOVEN PENTAPOLAR 6FR X 120CM

## (undated) DEVICE — TOOL DISSECT MIDAS MR8 14CM MATCH HEAD 3MM MR8-14MH30

## (undated) DEVICE — TUBING KIT COOL POINT

## (undated) DEVICE — WRAP LUMBAR COMPRESS COLD THERAPY 4632P

## (undated) DEVICE — ESU ELEC BLADE 2.75" COATED/INSULATED E1455

## (undated) DEVICE — GLOVE BIOGEL PI ULTRATOUCH G SZ 6.5 42165

## (undated) DEVICE — INTRO SHEATH 7FRX10CM PINNACLE RSS702

## (undated) DEVICE — SUCTION MANIFOLD NEPTUNE 2 SYS 1 PORT 702-025-000

## (undated) DEVICE — MANIFOLD KIT ANGIO AUTOMATED 014613

## (undated) DEVICE — CUSTOM PACK EP

## (undated) DEVICE — CATH ANGIO IMPULSE 6FR 100CML AMPLATZ RIGHT 1

## (undated) DEVICE — DRSG PRIMAPORE 03 1/8X6" 66000318

## (undated) DEVICE — TUBE SET SMARKABLATE IRRIGATION

## (undated) DEVICE — ESU PENCIL SMOKE EVAC W/ROCKER SWITCH 0703-047-000

## (undated) DEVICE — SU VICRYL+ 0 8-18 CT1/CR UND VCP840D

## (undated) DEVICE — CATH TRANSSEPTAL VERSACROSS 45D 63X230CM VXSK0032

## (undated) DEVICE — EXCHANGE WIRE .035 260 STAR/JFC/035/260/ M001491681

## (undated) DEVICE — NEEDLE SPINAL DISP 22GA X 3.5" QUINCKE 333320

## (undated) DEVICE — CUSTOM PACK LUMBAR FUSION SNE5BLFHEA

## (undated) DEVICE — CATH ANGIO INFINITI JL3.5 4FRX100CM 538418

## (undated) DEVICE — MARKER SURG SKIN STRL 77734

## (undated) DEVICE — INTRO MICRO MINI STICK 4FR STIFF NITINOL 45-753

## (undated) DEVICE — CATHETER OCTARAY LONG SPLINE CURVE F 3-3-3-3-3 D160906

## (undated) DEVICE — INTRODUCER SHEATH VASC CATH 8.5FR CARTO GIDE STH MED D138502

## (undated) DEVICE — SOL WATER IRRIG 1000ML BOTTLE 2F7114

## (undated) DEVICE — INTRO TERUMO 7FRX25CM W/MARKER RSB703

## (undated) DEVICE — POSITIONER ARM CRADLE LAMINECTOMY DISP

## (undated) DEVICE — DECANTER VIAL 2006S

## (undated) DEVICE — CATH ANGIO INFINITI JR4 4FRX100CM 538421

## (undated) DEVICE — INTRO SHEATH 9FRX10CM PINNACLE RSS902

## (undated) DEVICE — PREP SCRUB SOL EXIDINE 4% CHG 4OZ 29002-404

## (undated) DEVICE — Device

## (undated) DEVICE — KIT HAND CONTROL ACIST 014644 AR-P54

## (undated) DEVICE — ELECTRODE ADULT PACING MULTI P-211-M1

## (undated) DEVICE — SYR ANGIOGRAPHY MULTIUSE KIT ACIST 014612

## (undated) DEVICE — ELECTRODE DEFIB CADENCE 22550R

## (undated) DEVICE — GLOVE UNDER INDICATOR PI SZ 7.0 LF 41670

## (undated) DEVICE — SPONGE NEURO 1/2X1/2 WECK 200100

## (undated) DEVICE — CATH EP 7FR X 115CM DECANAV CA

## (undated) DEVICE — BLADE KNIFE SURG 11 371111

## (undated) DEVICE — SUTURE VICRYL+ 2-0 18 CT1/CR VLT VCP839D

## (undated) DEVICE — ALCOHOL ISOPROPYL 4 OZ 70% IA7004

## (undated) DEVICE — PATCH CARTO 3 EXTERNAL REFERENCE 3D MAPPING CREFP6

## (undated) DEVICE — GOWN IMPERVIOUS BREATHABLE SMART LG 89015

## (undated) DEVICE — PROBE TEMP CIRCA S-CATH M ESPH 12-SNSR 3D MAP CS-21EP

## (undated) DEVICE — DRAPE STERI 23X17 NON STERILE 1010NSD

## (undated) DEVICE — PACK COLD 6 X 10 1-HOUR 0814-0610

## (undated) DEVICE — SHEATH PINNACLE 9/25/038 RSS905

## (undated) DEVICE — PLATE GROUNDING ADULT W/CORD 9165L

## (undated) DEVICE — TRANSDUCER TRAY ARTERIAL 42646-06

## (undated) DEVICE — KIT ENSITE SURFACE ELECTRODE ENSITE-SEK-5-01

## (undated) DEVICE — GLOVE UNDER INDICATOR PI SZ 6.5 LF 41665

## (undated) DEVICE — CUSHION INSERT LG PRONE VIEW JACKSON TABLE

## (undated) RX ORDER — LIDOCAINE HYDROCHLORIDE AND EPINEPHRINE 10; 10 MG/ML; UG/ML
INJECTION, SOLUTION INFILTRATION; PERINEURAL
Status: DISPENSED
Start: 2024-11-08

## (undated) RX ORDER — VASOPRESSIN 20 U/ML
INJECTION PARENTERAL
Status: DISPENSED
Start: 2024-11-08

## (undated) RX ORDER — PHENYLEPHRINE HYDROCHLORIDE 10 MG/ML
INJECTION INTRAVENOUS
Status: DISPENSED
Start: 2024-11-08

## (undated) RX ORDER — DIAZEPAM 5 MG
TABLET ORAL
Status: DISPENSED
Start: 2023-04-12

## (undated) RX ORDER — FENTANYL CITRATE 50 UG/ML
INJECTION, SOLUTION INTRAMUSCULAR; INTRAVENOUS
Status: DISPENSED
Start: 2022-06-27

## (undated) RX ORDER — FENTANYL CITRATE 50 UG/ML
INJECTION, SOLUTION INTRAMUSCULAR; INTRAVENOUS
Status: DISPENSED
Start: 2023-03-14

## (undated) RX ORDER — BACITRACIN ZINC 500 [USP'U]/G
OINTMENT TOPICAL
Status: DISPENSED
Start: 2022-06-27

## (undated) RX ORDER — PROPOFOL 10 MG/ML
INJECTION, EMULSION INTRAVENOUS
Status: DISPENSED
Start: 2024-11-08

## (undated) RX ORDER — FENTANYL CITRATE-0.9 % NACL/PF 10 MCG/ML
PLASTIC BAG, INJECTION (ML) INTRAVENOUS
Status: DISPENSED
Start: 2023-03-14

## (undated) RX ORDER — ONDANSETRON 2 MG/ML
INJECTION INTRAMUSCULAR; INTRAVENOUS
Status: DISPENSED
Start: 2024-11-08

## (undated) RX ORDER — FENTANYL CITRATE 50 UG/ML
INJECTION, SOLUTION INTRAMUSCULAR; INTRAVENOUS
Status: DISPENSED
Start: 2023-04-12

## (undated) RX ORDER — HEPARIN SODIUM 10000 [USP'U]/100ML
INJECTION, SOLUTION INTRAVENOUS
Status: DISPENSED
Start: 2024-11-08

## (undated) RX ORDER — ASPIRIN 325 MG
TABLET ORAL
Status: DISPENSED
Start: 2023-04-12

## (undated) RX ORDER — ADENOSINE 3 MG/ML
INJECTION, SOLUTION INTRAVENOUS
Status: DISPENSED
Start: 2024-11-08

## (undated) RX ORDER — VERAPAMIL HYDROCHLORIDE 2.5 MG/ML
INJECTION, SOLUTION INTRAVENOUS
Status: DISPENSED
Start: 2023-04-12

## (undated) RX ORDER — PROTAMINE SULFATE 10 MG/ML
INJECTION, SOLUTION INTRAVENOUS
Status: DISPENSED
Start: 2024-11-08

## (undated) RX ORDER — FENTANYL CITRATE 50 UG/ML
INJECTION, SOLUTION INTRAMUSCULAR; INTRAVENOUS
Status: DISPENSED
Start: 2024-11-08

## (undated) RX ORDER — LIDOCAINE HYDROCHLORIDE 10 MG/ML
INJECTION, SOLUTION EPIDURAL; INFILTRATION; INTRACAUDAL; PERINEURAL
Status: DISPENSED
Start: 2022-06-27

## (undated) RX ORDER — NITROGLYCERIN 20 MG/100ML
INJECTION INTRAVENOUS
Status: DISPENSED
Start: 2023-03-14

## (undated) RX ORDER — DIPHENHYDRAMINE HYDROCHLORIDE 50 MG/ML
INJECTION INTRAMUSCULAR; INTRAVENOUS
Status: DISPENSED
Start: 2022-06-27

## (undated) RX ORDER — NEOSTIGMINE METHYLSULFATE 1 MG/ML
VIAL (ML) INJECTION
Status: DISPENSED
Start: 2024-11-08

## (undated) RX ORDER — ISOPROTERENOL HYDROCHLORIDE 0.2 MG/ML
INJECTION, SOLUTION INTRAVENOUS
Status: DISPENSED
Start: 2023-03-14

## (undated) RX ORDER — DEXAMETHASONE SODIUM PHOSPHATE 10 MG/ML
INJECTION INTRAMUSCULAR; INTRAVENOUS
Status: DISPENSED
Start: 2022-06-27

## (undated) RX ORDER — ETOMIDATE 2 MG/ML
INJECTION INTRAVENOUS
Status: DISPENSED
Start: 2024-11-08

## (undated) RX ORDER — PROPOFOL 10 MG/ML
INJECTION, EMULSION INTRAVENOUS
Status: DISPENSED
Start: 2023-03-14

## (undated) RX ORDER — ONDANSETRON 2 MG/ML
INJECTION INTRAMUSCULAR; INTRAVENOUS
Status: DISPENSED
Start: 2023-03-14

## (undated) RX ORDER — METOPROLOL TARTRATE 25 MG/1
TABLET, FILM COATED ORAL
Status: DISPENSED
Start: 2023-03-14

## (undated) RX ORDER — KETAMINE HYDROCHLORIDE 10 MG/ML
INJECTION INTRAMUSCULAR; INTRAVENOUS
Status: DISPENSED
Start: 2022-06-27

## (undated) RX ORDER — ONDANSETRON 2 MG/ML
INJECTION INTRAMUSCULAR; INTRAVENOUS
Status: DISPENSED
Start: 2022-06-27

## (undated) RX ORDER — BUPIVACAINE HYDROCHLORIDE AND EPINEPHRINE 2.5; 5 MG/ML; UG/ML
INJECTION, SOLUTION INFILTRATION; PERINEURAL
Status: DISPENSED
Start: 2022-06-27

## (undated) RX ORDER — DEXAMETHASONE SODIUM PHOSPHATE 10 MG/ML
INJECTION, SOLUTION INTRAMUSCULAR; INTRAVENOUS
Status: DISPENSED
Start: 2024-11-08